# Patient Record
Sex: MALE | Race: BLACK OR AFRICAN AMERICAN | Employment: UNEMPLOYED | URBAN - METROPOLITAN AREA
[De-identification: names, ages, dates, MRNs, and addresses within clinical notes are randomized per-mention and may not be internally consistent; named-entity substitution may affect disease eponyms.]

---

## 2024-05-27 ENCOUNTER — HOSPITAL ENCOUNTER (INPATIENT)
Facility: HOSPITAL | Age: 34
LOS: 3 days | Discharge: HOME | DRG: 439 | End: 2024-05-30
Attending: STUDENT IN AN ORGANIZED HEALTH CARE EDUCATION/TRAINING PROGRAM | Admitting: HOSPITALIST

## 2024-05-27 ENCOUNTER — APPOINTMENT (OUTPATIENT)
Facility: HOSPITAL | Age: 34
DRG: 439 | End: 2024-05-27

## 2024-05-27 DIAGNOSIS — K85.20 ALCOHOL-INDUCED ACUTE PANCREATITIS, UNSPECIFIED COMPLICATION STATUS: Primary | ICD-10-CM

## 2024-05-27 DIAGNOSIS — F10.10 ALCOHOL ABUSE: ICD-10-CM

## 2024-05-27 PROBLEM — K85.90 PANCREATITIS, UNSPECIFIED PANCREATITIS TYPE: Status: ACTIVE | Noted: 2024-05-27

## 2024-05-27 LAB
ALBUMIN SERPL-MCNC: 4.3 G/DL (ref 3.5–5.2)
ALBUMIN/GLOB SERPL: 2 (ref 1.1–2.2)
ALP SERPL-CCNC: 94 U/L (ref 40–129)
ALT SERPL-CCNC: 51 U/L (ref 10–50)
ANION GAP SERPL CALC-SCNC: 15 MMOL/L (ref 5–15)
AST SERPL-CCNC: 64 U/L (ref 10–50)
BASOPHILS # BLD: 0 K/UL (ref 0–0.1)
BASOPHILS NFR BLD: 1 % (ref 0–1)
BILIRUB SERPL-MCNC: 1 MG/DL (ref 0.2–1)
BUN SERPL-MCNC: 6 MG/DL (ref 6–20)
BUN/CREAT SERPL: 8 (ref 12–20)
CALCIUM SERPL-MCNC: 9.4 MG/DL (ref 8.6–10)
CHLORIDE SERPL-SCNC: 100 MMOL/L (ref 98–107)
CO2 SERPL-SCNC: 27 MMOL/L (ref 22–29)
CREAT SERPL-MCNC: 0.71 MG/DL (ref 0.7–1.2)
DIFFERENTIAL METHOD BLD: ABNORMAL
EOSINOPHIL # BLD: 0 K/UL (ref 0–0.4)
EOSINOPHIL NFR BLD: 0 % (ref 0–7)
ERYTHROCYTE [DISTWIDTH] IN BLOOD BY AUTOMATED COUNT: 13.2 % (ref 11.5–14.5)
ETHANOL SERPL-MCNC: 15 MG/DL (ref 0–10)
GLOBULIN SER CALC-MCNC: 2.2 G/DL (ref 2–4)
GLUCOSE SERPL-MCNC: 128 MG/DL (ref 65–100)
HCT VFR BLD AUTO: 45.3 % (ref 36.6–50.3)
HGB BLD-MCNC: 15.2 G/DL (ref 12.1–17)
IMM GRANULOCYTES # BLD AUTO: 0 K/UL (ref 0–0.04)
IMM GRANULOCYTES NFR BLD AUTO: 0 % (ref 0–0.5)
LACTATE BLD-SCNC: 0.56 MMOL/L (ref 0.4–2)
LIPASE SERPL-CCNC: 441 U/L (ref 13–60)
LYMPHOCYTES # BLD: 1.1 K/UL (ref 0.8–3.5)
LYMPHOCYTES NFR BLD: 20 % (ref 12–49)
MAGNESIUM SERPL-MCNC: 1.6 MG/DL (ref 1.6–2.6)
MCH RBC QN AUTO: 28.6 PG (ref 26–34)
MCHC RBC AUTO-ENTMCNC: 33.6 G/DL (ref 30–36.5)
MCV RBC AUTO: 85.3 FL (ref 80–99)
MONOCYTES # BLD: 0.4 K/UL (ref 0–1)
MONOCYTES NFR BLD: 7 % (ref 5–13)
NEUTS SEG # BLD: 3.8 K/UL (ref 1.8–8)
NEUTS SEG NFR BLD: 72 % (ref 32–75)
NRBC # BLD: 0 K/UL (ref 0–0.01)
NRBC BLD-RTO: 0 PER 100 WBC
PLATELET # BLD AUTO: 110 K/UL (ref 150–400)
PMV BLD AUTO: 11.3 FL (ref 8.9–12.9)
POTASSIUM SERPL-SCNC: 3.4 MMOL/L (ref 3.5–5.1)
PROT SERPL-MCNC: 6.5 G/DL (ref 6.4–8.3)
RBC # BLD AUTO: 5.31 M/UL (ref 4.1–5.7)
SODIUM SERPL-SCNC: 142 MMOL/L (ref 136–145)
TROPONIN T SERPL HS-MCNC: <6 NG/L (ref 0–22)
WBC # BLD AUTO: 5.3 K/UL (ref 4.1–11.1)

## 2024-05-27 PROCEDURE — 6370000000 HC RX 637 (ALT 250 FOR IP): Performed by: STUDENT IN AN ORGANIZED HEALTH CARE EDUCATION/TRAINING PROGRAM

## 2024-05-27 PROCEDURE — 74177 CT ABD & PELVIS W/CONTRAST: CPT

## 2024-05-27 PROCEDURE — 85025 COMPLETE CBC W/AUTO DIFF WBC: CPT

## 2024-05-27 PROCEDURE — 83605 ASSAY OF LACTIC ACID: CPT

## 2024-05-27 PROCEDURE — 6360000002 HC RX W HCPCS: Performed by: STUDENT IN AN ORGANIZED HEALTH CARE EDUCATION/TRAINING PROGRAM

## 2024-05-27 PROCEDURE — 83690 ASSAY OF LIPASE: CPT

## 2024-05-27 PROCEDURE — 6360000002 HC RX W HCPCS: Performed by: INTERNAL MEDICINE

## 2024-05-27 PROCEDURE — 96374 THER/PROPH/DIAG INJ IV PUSH: CPT

## 2024-05-27 PROCEDURE — 99285 EMERGENCY DEPT VISIT HI MDM: CPT

## 2024-05-27 PROCEDURE — 94761 N-INVAS EAR/PLS OXIMETRY MLT: CPT

## 2024-05-27 PROCEDURE — 2580000003 HC RX 258: Performed by: STUDENT IN AN ORGANIZED HEALTH CARE EDUCATION/TRAINING PROGRAM

## 2024-05-27 PROCEDURE — 36415 COLL VENOUS BLD VENIPUNCTURE: CPT

## 2024-05-27 PROCEDURE — A4216 STERILE WATER/SALINE, 10 ML: HCPCS | Performed by: INTERNAL MEDICINE

## 2024-05-27 PROCEDURE — 2500000003 HC RX 250 WO HCPCS: Performed by: HOSPITALIST

## 2024-05-27 PROCEDURE — 84484 ASSAY OF TROPONIN QUANT: CPT

## 2024-05-27 PROCEDURE — 93005 ELECTROCARDIOGRAM TRACING: CPT | Performed by: STUDENT IN AN ORGANIZED HEALTH CARE EDUCATION/TRAINING PROGRAM

## 2024-05-27 PROCEDURE — 1100000000 HC RM PRIVATE

## 2024-05-27 PROCEDURE — 82077 ASSAY SPEC XCP UR&BREATH IA: CPT

## 2024-05-27 PROCEDURE — 2580000003 HC RX 258: Performed by: INTERNAL MEDICINE

## 2024-05-27 PROCEDURE — 6360000004 HC RX CONTRAST MEDICATION: Performed by: STUDENT IN AN ORGANIZED HEALTH CARE EDUCATION/TRAINING PROGRAM

## 2024-05-27 PROCEDURE — 96361 HYDRATE IV INFUSION ADD-ON: CPT

## 2024-05-27 PROCEDURE — 96375 TX/PRO/DX INJ NEW DRUG ADDON: CPT

## 2024-05-27 PROCEDURE — 2500000003 HC RX 250 WO HCPCS: Performed by: INTERNAL MEDICINE

## 2024-05-27 PROCEDURE — 6360000002 HC RX W HCPCS: Performed by: HOSPITALIST

## 2024-05-27 PROCEDURE — 2580000003 HC RX 258: Performed by: HOSPITALIST

## 2024-05-27 PROCEDURE — 83735 ASSAY OF MAGNESIUM: CPT

## 2024-05-27 PROCEDURE — 80053 COMPREHEN METABOLIC PANEL: CPT

## 2024-05-27 RX ORDER — HYDROMORPHONE HYDROCHLORIDE 1 MG/ML
1 INJECTION, SOLUTION INTRAMUSCULAR; INTRAVENOUS; SUBCUTANEOUS EVERY 4 HOURS PRN
Status: DISCONTINUED | OUTPATIENT
Start: 2024-05-27 | End: 2024-05-27

## 2024-05-27 RX ORDER — 0.9 % SODIUM CHLORIDE 0.9 %
1000 INTRAVENOUS SOLUTION INTRAVENOUS ONCE
Status: COMPLETED | OUTPATIENT
Start: 2024-05-27 | End: 2024-05-27

## 2024-05-27 RX ORDER — SODIUM CHLORIDE 0.9 % (FLUSH) 0.9 %
5-40 SYRINGE (ML) INJECTION PRN
Status: DISCONTINUED | OUTPATIENT
Start: 2024-05-27 | End: 2024-05-30 | Stop reason: HOSPADM

## 2024-05-27 RX ORDER — ACETAMINOPHEN 650 MG/1
650 SUPPOSITORY RECTAL EVERY 6 HOURS PRN
Status: DISCONTINUED | OUTPATIENT
Start: 2024-05-27 | End: 2024-05-30 | Stop reason: HOSPADM

## 2024-05-27 RX ORDER — LORAZEPAM 0.5 MG/1
0.5 TABLET ORAL EVERY 4 HOURS PRN
Status: DISCONTINUED | OUTPATIENT
Start: 2024-05-27 | End: 2024-05-27

## 2024-05-27 RX ORDER — DIAZEPAM 5 MG/ML
5 INJECTION, SOLUTION INTRAMUSCULAR; INTRAVENOUS
Status: DISCONTINUED | OUTPATIENT
Start: 2024-05-27 | End: 2024-05-30 | Stop reason: HOSPADM

## 2024-05-27 RX ORDER — SODIUM CHLORIDE, SODIUM LACTATE, POTASSIUM CHLORIDE, CALCIUM CHLORIDE 600; 310; 30; 20 MG/100ML; MG/100ML; MG/100ML; MG/100ML
INJECTION, SOLUTION INTRAVENOUS CONTINUOUS
Status: DISCONTINUED | OUTPATIENT
Start: 2024-05-27 | End: 2024-05-30 | Stop reason: HOSPADM

## 2024-05-27 RX ORDER — POTASSIUM CHLORIDE 750 MG/1
40 TABLET, FILM COATED, EXTENDED RELEASE ORAL PRN
Status: DISCONTINUED | OUTPATIENT
Start: 2024-05-27 | End: 2024-05-27

## 2024-05-27 RX ORDER — SODIUM CHLORIDE 9 MG/ML
INJECTION, SOLUTION INTRAVENOUS PRN
Status: DISCONTINUED | OUTPATIENT
Start: 2024-05-27 | End: 2024-05-30 | Stop reason: HOSPADM

## 2024-05-27 RX ORDER — LORAZEPAM 1 MG/1
4 TABLET ORAL
Status: DISCONTINUED | OUTPATIENT
Start: 2024-05-27 | End: 2024-05-27

## 2024-05-27 RX ORDER — LORAZEPAM 2 MG/ML
2 INJECTION INTRAMUSCULAR
Status: DISCONTINUED | OUTPATIENT
Start: 2024-05-27 | End: 2024-05-27

## 2024-05-27 RX ORDER — PROCHLORPERAZINE EDISYLATE 5 MG/ML
10 INJECTION INTRAMUSCULAR; INTRAVENOUS
Status: COMPLETED | OUTPATIENT
Start: 2024-05-27 | End: 2024-05-27

## 2024-05-27 RX ORDER — SODIUM CHLORIDE 0.9 % (FLUSH) 0.9 %
5-40 SYRINGE (ML) INJECTION EVERY 12 HOURS SCHEDULED
Status: DISCONTINUED | OUTPATIENT
Start: 2024-05-27 | End: 2024-05-30 | Stop reason: HOSPADM

## 2024-05-27 RX ORDER — MAGNESIUM SULFATE IN WATER 40 MG/ML
2000 INJECTION, SOLUTION INTRAVENOUS PRN
Status: DISCONTINUED | OUTPATIENT
Start: 2024-05-27 | End: 2024-05-27

## 2024-05-27 RX ORDER — LORAZEPAM 2 MG/ML
1 INJECTION INTRAMUSCULAR
Status: DISCONTINUED | OUTPATIENT
Start: 2024-05-27 | End: 2024-05-27

## 2024-05-27 RX ORDER — DIAZEPAM 5 MG/ML
15 INJECTION, SOLUTION INTRAMUSCULAR; INTRAVENOUS
Status: DISCONTINUED | OUTPATIENT
Start: 2024-05-27 | End: 2024-05-30 | Stop reason: HOSPADM

## 2024-05-27 RX ORDER — ENOXAPARIN SODIUM 100 MG/ML
40 INJECTION SUBCUTANEOUS DAILY
Status: DISCONTINUED | OUTPATIENT
Start: 2024-05-27 | End: 2024-05-30 | Stop reason: HOSPADM

## 2024-05-27 RX ORDER — ONDANSETRON 2 MG/ML
4 INJECTION INTRAMUSCULAR; INTRAVENOUS EVERY 6 HOURS PRN
Status: DISCONTINUED | OUTPATIENT
Start: 2024-05-27 | End: 2024-05-30 | Stop reason: HOSPADM

## 2024-05-27 RX ORDER — DIAZEPAM 5 MG/ML
10 INJECTION, SOLUTION INTRAMUSCULAR; INTRAVENOUS
Status: DISCONTINUED | OUTPATIENT
Start: 2024-05-27 | End: 2024-05-30 | Stop reason: HOSPADM

## 2024-05-27 RX ORDER — POLYETHYLENE GLYCOL 3350 17 G/17G
17 POWDER, FOR SOLUTION ORAL DAILY PRN
Status: DISCONTINUED | OUTPATIENT
Start: 2024-05-27 | End: 2024-05-30 | Stop reason: HOSPADM

## 2024-05-27 RX ORDER — LORAZEPAM 2 MG/ML
1 INJECTION INTRAMUSCULAR EVERY 4 HOURS PRN
Status: DISCONTINUED | OUTPATIENT
Start: 2024-05-27 | End: 2024-05-27

## 2024-05-27 RX ORDER — LORAZEPAM 1 MG/1
3 TABLET ORAL
Status: DISCONTINUED | OUTPATIENT
Start: 2024-05-27 | End: 2024-05-27

## 2024-05-27 RX ORDER — LORAZEPAM 2 MG/ML
3 INJECTION INTRAMUSCULAR
Status: DISCONTINUED | OUTPATIENT
Start: 2024-05-27 | End: 2024-05-27

## 2024-05-27 RX ORDER — POTASSIUM CHLORIDE 7.45 MG/ML
10 INJECTION INTRAVENOUS PRN
Status: DISCONTINUED | OUTPATIENT
Start: 2024-05-27 | End: 2024-05-27

## 2024-05-27 RX ORDER — THIAMINE HYDROCHLORIDE 100 MG/ML
100 INJECTION, SOLUTION INTRAMUSCULAR; INTRAVENOUS DAILY
Status: DISCONTINUED | OUTPATIENT
Start: 2024-05-27 | End: 2024-05-27

## 2024-05-27 RX ORDER — GAUZE BANDAGE 2" X 2"
100 BANDAGE TOPICAL DAILY
Status: DISCONTINUED | OUTPATIENT
Start: 2024-05-27 | End: 2024-05-30 | Stop reason: HOSPADM

## 2024-05-27 RX ORDER — ONDANSETRON 4 MG/1
4 TABLET, ORALLY DISINTEGRATING ORAL EVERY 8 HOURS PRN
Status: DISCONTINUED | OUTPATIENT
Start: 2024-05-27 | End: 2024-05-30 | Stop reason: HOSPADM

## 2024-05-27 RX ORDER — SODIUM CHLORIDE 9 MG/ML
INJECTION, SOLUTION INTRAVENOUS CONTINUOUS
Status: DISCONTINUED | OUTPATIENT
Start: 2024-05-27 | End: 2024-05-27

## 2024-05-27 RX ORDER — MULTIVITAMIN WITH IRON
1 TABLET ORAL DAILY
Status: DISCONTINUED | OUTPATIENT
Start: 2024-05-28 | End: 2024-05-30 | Stop reason: HOSPADM

## 2024-05-27 RX ORDER — HYDROMORPHONE HYDROCHLORIDE 1 MG/ML
1 INJECTION, SOLUTION INTRAMUSCULAR; INTRAVENOUS; SUBCUTANEOUS
Status: DISCONTINUED | OUTPATIENT
Start: 2024-05-27 | End: 2024-05-28

## 2024-05-27 RX ORDER — LORAZEPAM 1 MG/1
1 TABLET ORAL
Status: DISCONTINUED | OUTPATIENT
Start: 2024-05-27 | End: 2024-05-27

## 2024-05-27 RX ORDER — HYDROMORPHONE HYDROCHLORIDE 1 MG/ML
1 INJECTION, SOLUTION INTRAMUSCULAR; INTRAVENOUS; SUBCUTANEOUS
Status: DISCONTINUED | OUTPATIENT
Start: 2024-05-27 | End: 2024-05-27

## 2024-05-27 RX ORDER — LORAZEPAM 1 MG/1
2 TABLET ORAL
Status: DISCONTINUED | OUTPATIENT
Start: 2024-05-27 | End: 2024-05-27

## 2024-05-27 RX ORDER — LORAZEPAM 2 MG/ML
4 INJECTION INTRAMUSCULAR
Status: DISCONTINUED | OUTPATIENT
Start: 2024-05-27 | End: 2024-05-27

## 2024-05-27 RX ORDER — PROCHLORPERAZINE EDISYLATE 5 MG/ML
10 INJECTION INTRAMUSCULAR; INTRAVENOUS EVERY 6 HOURS PRN
Status: DISCONTINUED | OUTPATIENT
Start: 2024-05-27 | End: 2024-05-30 | Stop reason: HOSPADM

## 2024-05-27 RX ORDER — PHENOBARBITAL SODIUM 65 MG/ML
65 INJECTION, SOLUTION INTRAMUSCULAR; INTRAVENOUS EVERY 12 HOURS
Status: DISCONTINUED | OUTPATIENT
Start: 2024-05-27 | End: 2024-05-28

## 2024-05-27 RX ORDER — DIAZEPAM 5 MG/ML
20 INJECTION, SOLUTION INTRAMUSCULAR; INTRAVENOUS
Status: DISCONTINUED | OUTPATIENT
Start: 2024-05-27 | End: 2024-05-30 | Stop reason: HOSPADM

## 2024-05-27 RX ORDER — FOLIC ACID 1 MG/1
1 TABLET ORAL DAILY
Status: DISCONTINUED | OUTPATIENT
Start: 2024-05-28 | End: 2024-05-30 | Stop reason: HOSPADM

## 2024-05-27 RX ORDER — ACETAMINOPHEN 325 MG/1
650 TABLET ORAL EVERY 6 HOURS PRN
Status: DISCONTINUED | OUTPATIENT
Start: 2024-05-27 | End: 2024-05-30 | Stop reason: HOSPADM

## 2024-05-27 RX ADMIN — SODIUM CHLORIDE, PRESERVATIVE FREE 10 ML: 5 INJECTION INTRAVENOUS at 21:30

## 2024-05-27 RX ADMIN — HYDROMORPHONE HYDROCHLORIDE 1 MG: 1 INJECTION, SOLUTION INTRAMUSCULAR; INTRAVENOUS; SUBCUTANEOUS at 17:20

## 2024-05-27 RX ADMIN — HYDROMORPHONE HYDROCHLORIDE 1 MG: 1 INJECTION, SOLUTION INTRAMUSCULAR; INTRAVENOUS; SUBCUTANEOUS at 15:05

## 2024-05-27 RX ADMIN — Medication 100 MG: at 12:59

## 2024-05-27 RX ADMIN — SODIUM CHLORIDE: 9 INJECTION, SOLUTION INTRAVENOUS at 20:22

## 2024-05-27 RX ADMIN — SODIUM CHLORIDE 1000 ML: 9 INJECTION, SOLUTION INTRAVENOUS at 12:58

## 2024-05-27 RX ADMIN — PROCHLORPERAZINE EDISYLATE 10 MG: 5 INJECTION INTRAMUSCULAR; INTRAVENOUS at 12:58

## 2024-05-27 RX ADMIN — IOPAMIDOL 100 ML: 755 INJECTION, SOLUTION INTRAVENOUS at 14:23

## 2024-05-27 RX ADMIN — HYDROMORPHONE HYDROCHLORIDE 1 MG: 1 INJECTION, SOLUTION INTRAMUSCULAR; INTRAVENOUS; SUBCUTANEOUS at 22:16

## 2024-05-27 RX ADMIN — LORAZEPAM 2 MG: 2 INJECTION, SOLUTION INTRAMUSCULAR; INTRAVENOUS at 18:45

## 2024-05-27 RX ADMIN — LORAZEPAM 2 MG: 1 TABLET ORAL at 12:57

## 2024-05-27 RX ADMIN — ONDANSETRON 4 MG: 2 INJECTION INTRAMUSCULAR; INTRAVENOUS at 22:16

## 2024-05-27 RX ADMIN — SODIUM CHLORIDE 1000 ML: 9 INJECTION, SOLUTION INTRAVENOUS at 15:09

## 2024-05-27 RX ADMIN — HYDROMORPHONE HYDROCHLORIDE 1 MG: 1 INJECTION, SOLUTION INTRAMUSCULAR; INTRAVENOUS; SUBCUTANEOUS at 20:17

## 2024-05-27 RX ADMIN — SODIUM CHLORIDE, POTASSIUM CHLORIDE, SODIUM LACTATE AND CALCIUM CHLORIDE: 600; 310; 30; 20 INJECTION, SOLUTION INTRAVENOUS at 23:46

## 2024-05-27 RX ADMIN — ENOXAPARIN SODIUM 40 MG: 100 INJECTION SUBCUTANEOUS at 20:16

## 2024-05-27 RX ADMIN — THIAMINE HYDROCHLORIDE 100 MG: 100 INJECTION, SOLUTION INTRAMUSCULAR; INTRAVENOUS at 22:16

## 2024-05-27 RX ADMIN — FAMOTIDINE 20 MG: 10 INJECTION, SOLUTION INTRAVENOUS at 23:44

## 2024-05-27 RX ADMIN — PHENOBARBITAL SODIUM 65 MG: 65 INJECTION, SOLUTION INTRAMUSCULAR; INTRAVENOUS at 23:43

## 2024-05-27 RX ADMIN — HYDROMORPHONE HYDROCHLORIDE 1 MG: 1 INJECTION, SOLUTION INTRAMUSCULAR; INTRAVENOUS; SUBCUTANEOUS at 12:57

## 2024-05-27 ASSESSMENT — PAIN DESCRIPTION - LOCATION
LOCATION: ABDOMEN
LOCATION: GENERALIZED
LOCATION: ABDOMEN
LOCATION: GENERALIZED
LOCATION: ABDOMEN

## 2024-05-27 ASSESSMENT — ENCOUNTER SYMPTOMS
BACK PAIN: 1
ABDOMINAL PAIN: 1
SHORTNESS OF BREATH: 0
NAUSEA: 1

## 2024-05-27 ASSESSMENT — PAIN SCALES - GENERAL
PAINLEVEL_OUTOF10: 10
PAINLEVEL_OUTOF10: 9
PAINLEVEL_OUTOF10: 9
PAINLEVEL_OUTOF10: 10
PAINLEVEL_OUTOF10: 9
PAINLEVEL_OUTOF10: 6
PAINLEVEL_OUTOF10: 9
PAINLEVEL_OUTOF10: 9

## 2024-05-27 ASSESSMENT — PAIN DESCRIPTION - DESCRIPTORS
DESCRIPTORS: ACHING
DESCRIPTORS: ACHING;THROBBING

## 2024-05-27 ASSESSMENT — PAIN DESCRIPTION - ORIENTATION: ORIENTATION: MID;UPPER

## 2024-05-27 ASSESSMENT — PAIN - FUNCTIONAL ASSESSMENT: PAIN_FUNCTIONAL_ASSESSMENT: PREVENTS OR INTERFERES SOME ACTIVE ACTIVITIES AND ADLS

## 2024-05-27 NOTE — ED PROVIDER NOTES
Full Code  Readmission: No  Isolation Requirements: no  Recommended Level of Care: med/surg  Department: [place] [facility] - [phone]  Consulting Provider: Dr. Simpson    Other:  33-year-old male needs admission for acute pancreatitis likely secondary to alcohol abuse.  Patient was previously sober but fell into remission about a month ago has been drinking about 1/5 of vodka per day, last drink was last evening.  Has had severe abdominal pain, history of pancreatitis this is similar.  Patient was tachycardic on arrival afebrile.  Abdomen is soft but tender throughout.  Lipase is 441, white blood cell count is 5.3, CT shows acute pancreatitis with extensive hepatic steatosis.  Has been given multiple doses of analgesia medication and antiemetics, patient received lorazepam given likely withdrawal and thiamine.  Requires admission further evaluation for the above.    Total critical care time spent exclusive of procedures:  45 minutes.         CONSULTS:  IP CONSULT TO SOCIAL WORK  IP CONSULT TO HOSPITALIST    PROCEDURES:  Unless otherwise noted below, none     Procedures      FINAL IMPRESSION      1. Alcohol-induced acute pancreatitis, unspecified complication status    2. Alcohol abuse          DISPOSITION/PLAN   DISPOSITION Decision To Admit 05/27/2024 02:42:59 PM      PATIENT REFERRED TO:  No follow-up provider specified.    DISCHARGE MEDICATIONS:  New Prescriptions    No medications on file         (Please note that portions of this note were completed with a voice recognition program.  Efforts were made to edit the dictations but occasionally words are mis-transcribed.)    Kp Gordon DO (electronically signed)  Emergency Attending Physician / Physician Assistant / Nurse Practitioner              Kp Gordon DO  05/27/24 2047

## 2024-05-27 NOTE — H&P
of nausea and vomiting.  He denies any blood in vomiting.  As per the patient he has a history of pancreatitis in the past and it feels exactly the same.  He drinks vodka every day.  His last drink was last night.  He does have a history of alcohol withdrawals in the past.  He denies any chest pain or shortness of breath.  When he arrived to the ER his blood pressure was 127/84, pulse 111, respiratory rate 24, temperature 99.2.  Blood work showed a sodium 142, potassium of 3.4, creatinine 0.7, lipase 441, glucose 128, blood alcohol level 15.  WBC 5.3, hemoglobin 15.2, platelet count 110.    CT scan of abdominal pelvis showed acute pancreatitis.  Available records were reviewed at the time of H&P.        No past medical history on file.   No past surgical history on file.  Social History     Tobacco Use    Smoking status: Not on file    Smokeless tobacco: Not on file   Substance Use Topics    Alcohol use: Not on file      No family history on file.     No Known Allergies     Prior to Admission medications    Not on File       REVIEW OF SYSTEMS:  See HPI for details  General:  negative for fever, chills, sweats, weakness, weight loss  Eyes: negative for blurred vision, eye pain, loss of vision, diplopia  Ear Nose and Throat: negative for rhinorrhea, pharyngitis, otalgia, tinnitus, speech or swallowing difficulties  Respiratory:  negative for pleuritic pain, cough, sputum production, wheezing, SOB, HO  Cardiology:  negative for chest pain, palpitations, orthopnea, PND, edema, syncope   Gastrointestinal: Positive for abdominal pain nausea and vomiting  Genitourinary: negative for frequency, urgency, dysuria, hematuria, incontinence  Muskuloskeletal : negative for arthralgia, myalgia  Hematology: negative for easy bruising, bleeding, lymphadenopathy  Dermatological: negative for rash, ulceration, mole change, new lesion  Endocrine: negative for hot flashes or polydipsia  Neurological: negative for headache, dizziness,

## 2024-05-27 NOTE — ED TRIAGE NOTES
PT arrives ambulatory AO4 reporting pancreatitis and alcohol withdraw.  Last drink last night, typically drinks 1 5th of vodka daily.    PT has recurrent hx of both.

## 2024-05-28 LAB
ALBUMIN SERPL-MCNC: 3.9 G/DL (ref 3.5–5)
ALBUMIN/GLOB SERPL: 1.5 (ref 1.1–2.2)
ALP SERPL-CCNC: 82 U/L (ref 45–117)
ALT SERPL-CCNC: 57 U/L (ref 12–78)
ANION GAP SERPL CALC-SCNC: 5 MMOL/L (ref 5–15)
AST SERPL-CCNC: 58 U/L (ref 15–37)
BASOPHILS # BLD: 0 K/UL (ref 0–0.1)
BASOPHILS NFR BLD: 0 % (ref 0–1)
BILIRUB SERPL-MCNC: 1.4 MG/DL (ref 0.2–1)
BUN SERPL-MCNC: 6 MG/DL (ref 6–20)
BUN/CREAT SERPL: 8 (ref 12–20)
CALCIUM SERPL-MCNC: 9.2 MG/DL (ref 8.5–10.1)
CHLORIDE SERPL-SCNC: 103 MMOL/L (ref 97–108)
CO2 SERPL-SCNC: 30 MMOL/L (ref 21–32)
CREAT SERPL-MCNC: 0.72 MG/DL (ref 0.7–1.3)
CRP SERPL-MCNC: <0.29 MG/DL (ref 0–0.3)
DIFFERENTIAL METHOD BLD: ABNORMAL
EOSINOPHIL # BLD: 0 K/UL (ref 0–0.4)
EOSINOPHIL NFR BLD: 0 % (ref 0–7)
ERYTHROCYTE [DISTWIDTH] IN BLOOD BY AUTOMATED COUNT: 13.3 % (ref 11.5–14.5)
EST. AVERAGE GLUCOSE BLD GHB EST-MCNC: 97 MG/DL
GLOBULIN SER CALC-MCNC: 2.6 G/DL (ref 2–4)
GLUCOSE SERPL-MCNC: 142 MG/DL (ref 65–100)
HBA1C MFR BLD: 5 % (ref 4–5.6)
HCT VFR BLD AUTO: 46.7 % (ref 36.6–50.3)
HGB BLD-MCNC: 15.4 G/DL (ref 12.1–17)
IMM GRANULOCYTES # BLD AUTO: 0 K/UL (ref 0–0.04)
IMM GRANULOCYTES NFR BLD AUTO: 0 % (ref 0–0.5)
LACTATE SERPL-SCNC: 1.5 MMOL/L (ref 0.4–2)
LIPASE SERPL-CCNC: 670 U/L (ref 13–75)
LYMPHOCYTES # BLD: 0.5 K/UL (ref 0.8–3.5)
LYMPHOCYTES NFR BLD: 6 % (ref 12–49)
MAGNESIUM SERPL-MCNC: 1.6 MG/DL (ref 1.6–2.4)
MCH RBC QN AUTO: 28.4 PG (ref 26–34)
MCHC RBC AUTO-ENTMCNC: 33 G/DL (ref 30–36.5)
MCV RBC AUTO: 86 FL (ref 80–99)
MONOCYTES # BLD: 0.3 K/UL (ref 0–1)
MONOCYTES NFR BLD: 4 % (ref 5–13)
NEUTS SEG # BLD: 7.5 K/UL (ref 1.8–8)
NEUTS SEG NFR BLD: 90 % (ref 32–75)
NRBC # BLD: 0 K/UL (ref 0–0.01)
NRBC BLD-RTO: 0 PER 100 WBC
PHOSPHATE SERPL-MCNC: 2.1 MG/DL (ref 2.6–4.7)
PLATELET # BLD AUTO: 91 K/UL (ref 150–400)
PMV BLD AUTO: 12.1 FL (ref 8.9–12.9)
POTASSIUM SERPL-SCNC: 3.3 MMOL/L (ref 3.5–5.1)
PROT SERPL-MCNC: 6.5 G/DL (ref 6.4–8.2)
RBC # BLD AUTO: 5.43 M/UL (ref 4.1–5.7)
RBC MORPH BLD: ABNORMAL
SODIUM SERPL-SCNC: 138 MMOL/L (ref 136–145)
WBC # BLD AUTO: 8.3 K/UL (ref 4.1–11.1)

## 2024-05-28 PROCEDURE — 2580000003 HC RX 258: Performed by: HOSPITALIST

## 2024-05-28 PROCEDURE — 2580000003 HC RX 258

## 2024-05-28 PROCEDURE — 2580000003 HC RX 258: Performed by: INTERNAL MEDICINE

## 2024-05-28 PROCEDURE — 85025 COMPLETE CBC W/AUTO DIFF WBC: CPT

## 2024-05-28 PROCEDURE — 36415 COLL VENOUS BLD VENIPUNCTURE: CPT

## 2024-05-28 PROCEDURE — 6360000002 HC RX W HCPCS: Performed by: HOSPITALIST

## 2024-05-28 PROCEDURE — 2500000003 HC RX 250 WO HCPCS: Performed by: INTERNAL MEDICINE

## 2024-05-28 PROCEDURE — 83036 HEMOGLOBIN GLYCOSYLATED A1C: CPT

## 2024-05-28 PROCEDURE — 2580000003 HC RX 258: Performed by: STUDENT IN AN ORGANIZED HEALTH CARE EDUCATION/TRAINING PROGRAM

## 2024-05-28 PROCEDURE — 6360000002 HC RX W HCPCS: Performed by: INTERNAL MEDICINE

## 2024-05-28 PROCEDURE — 6370000000 HC RX 637 (ALT 250 FOR IP): Performed by: STUDENT IN AN ORGANIZED HEALTH CARE EDUCATION/TRAINING PROGRAM

## 2024-05-28 PROCEDURE — 6360000002 HC RX W HCPCS

## 2024-05-28 PROCEDURE — 83690 ASSAY OF LIPASE: CPT

## 2024-05-28 PROCEDURE — 84100 ASSAY OF PHOSPHORUS: CPT

## 2024-05-28 PROCEDURE — 86140 C-REACTIVE PROTEIN: CPT

## 2024-05-28 PROCEDURE — 2060000000 HC ICU INTERMEDIATE R&B

## 2024-05-28 PROCEDURE — 83605 ASSAY OF LACTIC ACID: CPT

## 2024-05-28 PROCEDURE — 83735 ASSAY OF MAGNESIUM: CPT

## 2024-05-28 PROCEDURE — 6370000000 HC RX 637 (ALT 250 FOR IP): Performed by: INTERNAL MEDICINE

## 2024-05-28 PROCEDURE — A4216 STERILE WATER/SALINE, 10 ML: HCPCS | Performed by: INTERNAL MEDICINE

## 2024-05-28 PROCEDURE — 6370000000 HC RX 637 (ALT 250 FOR IP)

## 2024-05-28 PROCEDURE — 80053 COMPREHEN METABOLIC PANEL: CPT

## 2024-05-28 RX ORDER — PHENOBARBITAL SODIUM 65 MG/ML
65 INJECTION, SOLUTION INTRAMUSCULAR; INTRAVENOUS EVERY 8 HOURS SCHEDULED
Status: DISCONTINUED | OUTPATIENT
Start: 2024-05-28 | End: 2024-05-28

## 2024-05-28 RX ORDER — LEVETIRACETAM 500 MG/1
500 TABLET ORAL 2 TIMES DAILY
Status: DISCONTINUED | OUTPATIENT
Start: 2024-05-28 | End: 2024-05-30 | Stop reason: HOSPADM

## 2024-05-28 RX ORDER — HYDROMORPHONE HYDROCHLORIDE 1 MG/ML
1 INJECTION, SOLUTION INTRAMUSCULAR; INTRAVENOUS; SUBCUTANEOUS
Status: DISCONTINUED | OUTPATIENT
Start: 2024-05-28 | End: 2024-05-30 | Stop reason: HOSPADM

## 2024-05-28 RX ORDER — MORPHINE SULFATE 4 MG/ML
2 INJECTION, SOLUTION INTRAMUSCULAR; INTRAVENOUS ONCE
Status: COMPLETED | OUTPATIENT
Start: 2024-05-28 | End: 2024-05-28

## 2024-05-28 RX ORDER — MORPHINE SULFATE 4 MG/ML
4 INJECTION, SOLUTION INTRAMUSCULAR; INTRAVENOUS ONCE
Status: COMPLETED | OUTPATIENT
Start: 2024-05-28 | End: 2024-05-28

## 2024-05-28 RX ORDER — PHENOBARBITAL 32.4 MG/1
32.4 TABLET ORAL
Status: DISCONTINUED | OUTPATIENT
Start: 2024-05-28 | End: 2024-05-29

## 2024-05-28 RX ADMIN — HYDROMORPHONE HYDROCHLORIDE 1 MG: 1 INJECTION, SOLUTION INTRAMUSCULAR; INTRAVENOUS; SUBCUTANEOUS at 15:33

## 2024-05-28 RX ADMIN — THERA TABS 1 TABLET: TAB at 07:59

## 2024-05-28 RX ADMIN — PHENOBARBITAL SODIUM 65 MG: 65 INJECTION, SOLUTION INTRAMUSCULAR; INTRAVENOUS at 08:03

## 2024-05-28 RX ADMIN — SODIUM CHLORIDE, POTASSIUM CHLORIDE, SODIUM LACTATE AND CALCIUM CHLORIDE: 600; 310; 30; 20 INJECTION, SOLUTION INTRAVENOUS at 23:28

## 2024-05-28 RX ADMIN — HYDROMORPHONE HYDROCHLORIDE 1 MG: 1 INJECTION, SOLUTION INTRAMUSCULAR; INTRAVENOUS; SUBCUTANEOUS at 22:12

## 2024-05-28 RX ADMIN — HYDROMORPHONE HYDROCHLORIDE 1 MG: 1 INJECTION, SOLUTION INTRAMUSCULAR; INTRAVENOUS; SUBCUTANEOUS at 19:01

## 2024-05-28 RX ADMIN — DIAZEPAM 10 MG: 5 INJECTION, SOLUTION INTRAMUSCULAR; INTRAVENOUS at 07:58

## 2024-05-28 RX ADMIN — SODIUM CHLORIDE, PRESERVATIVE FREE 10 ML: 5 INJECTION INTRAVENOUS at 21:04

## 2024-05-28 RX ADMIN — PROCHLORPERAZINE EDISYLATE 10 MG: 5 INJECTION INTRAMUSCULAR; INTRAVENOUS at 03:42

## 2024-05-28 RX ADMIN — PROCHLORPERAZINE EDISYLATE 10 MG: 5 INJECTION INTRAMUSCULAR; INTRAVENOUS at 15:31

## 2024-05-28 RX ADMIN — LEVETIRACETAM 500 MG: 500 TABLET, FILM COATED ORAL at 20:39

## 2024-05-28 RX ADMIN — MORPHINE SULFATE 2 MG: 4 INJECTION INTRAVENOUS at 01:39

## 2024-05-28 RX ADMIN — HYDROMORPHONE HYDROCHLORIDE 1 MG: 1 INJECTION, SOLUTION INTRAMUSCULAR; INTRAVENOUS; SUBCUTANEOUS at 12:18

## 2024-05-28 RX ADMIN — ONDANSETRON 4 MG: 2 INJECTION INTRAMUSCULAR; INTRAVENOUS at 07:58

## 2024-05-28 RX ADMIN — HYDROMORPHONE HYDROCHLORIDE 1 MG: 1 INJECTION, SOLUTION INTRAMUSCULAR; INTRAVENOUS; SUBCUTANEOUS at 08:32

## 2024-05-28 RX ADMIN — POTASSIUM BICARBONATE 40 MEQ: 782 TABLET, EFFERVESCENT ORAL at 07:58

## 2024-05-28 RX ADMIN — MORPHINE SULFATE 4 MG: 4 INJECTION INTRAVENOUS at 05:31

## 2024-05-28 RX ADMIN — FAMOTIDINE 20 MG: 10 INJECTION, SOLUTION INTRAVENOUS at 07:58

## 2024-05-28 RX ADMIN — Medication 100 MG: at 07:59

## 2024-05-28 RX ADMIN — POTASSIUM BICARBONATE 40 MEQ: 782 TABLET, EFFERVESCENT ORAL at 20:39

## 2024-05-28 RX ADMIN — FAMOTIDINE 20 MG: 10 INJECTION, SOLUTION INTRAVENOUS at 20:39

## 2024-05-28 RX ADMIN — PHENOBARBITAL 32.4 MG: 32.4 TABLET ORAL at 15:37

## 2024-05-28 RX ADMIN — FOLIC ACID 1 MG: 1 TABLET ORAL at 07:59

## 2024-05-28 RX ADMIN — SODIUM CHLORIDE, POTASSIUM CHLORIDE, SODIUM LACTATE AND CALCIUM CHLORIDE: 600; 310; 30; 20 INJECTION, SOLUTION INTRAVENOUS at 06:59

## 2024-05-28 RX ADMIN — SODIUM CHLORIDE, PRESERVATIVE FREE 10 ML: 5 INJECTION INTRAVENOUS at 20:38

## 2024-05-28 RX ADMIN — DIAZEPAM 10 MG: 5 INJECTION, SOLUTION INTRAMUSCULAR; INTRAVENOUS at 02:48

## 2024-05-28 ASSESSMENT — PAIN DESCRIPTION - LOCATION
LOCATION: ABDOMEN

## 2024-05-28 ASSESSMENT — PAIN DESCRIPTION - DESCRIPTORS
DESCRIPTORS: ACHING
DESCRIPTORS: ACHING
DESCRIPTORS: ACHING;THROBBING
DESCRIPTORS: ACHING
DESCRIPTORS: ACHING;STABBING;SHARP

## 2024-05-28 ASSESSMENT — PAIN SCALES - GENERAL
PAINLEVEL_OUTOF10: 8
PAINLEVEL_OUTOF10: 10
PAINLEVEL_OUTOF10: 0
PAINLEVEL_OUTOF10: 5
PAINLEVEL_OUTOF10: 10
PAINLEVEL_OUTOF10: 8
PAINLEVEL_OUTOF10: 3
PAINLEVEL_OUTOF10: 8
PAINLEVEL_OUTOF10: 10
PAINLEVEL_OUTOF10: 7
PAINLEVEL_OUTOF10: 6
PAINLEVEL_OUTOF10: 8
PAINLEVEL_OUTOF10: 10

## 2024-05-28 ASSESSMENT — PAIN SCALES - WONG BAKER
WONGBAKER_NUMERICALRESPONSE: NO HURT
WONGBAKER_NUMERICALRESPONSE: HURTS A LITTLE BIT

## 2024-05-28 ASSESSMENT — PAIN - FUNCTIONAL ASSESSMENT: PAIN_FUNCTIONAL_ASSESSMENT: ACTIVITIES ARE NOT PREVENTED

## 2024-05-28 ASSESSMENT — PAIN DESCRIPTION - ORIENTATION: ORIENTATION: MID;UPPER

## 2024-05-28 NOTE — BSMART NOTE
overall mood and attitude is irritable and guarded.  Feelings of helplessness and hopelessness are not observed.  Generalized anxiety is not observed.  Panic is not observed. Phobias are not observed.  Obsessive compulsive tendencies are not observed.      Section III - Mental Status Exam  The patient's appearance is unkempt.  The patient's behavior is guarded and shows poor eye contact. The patient is oriented to time, place, person and situation.  The patient's speech is mumbled.  The patient's mood is irritable.  The range of affect shows no evidence of impairment.  The patient's thought content demonstrates no evidence of impairment.  The thought process shows no evidence of impairment.  The patient's perception shows no evidence of impairment. The patient's memory shows no evidence of impairment.  The patient's appetite shows no evidence of impairment.  The patient's sleep shows no evidence of impairment. The patient shows little insight.  The patient's judgement shows no evidence of impairment.      The patient has demonstrated mental capacity to provide informed consent.    Section IV - Substance Abuse  The patient is  using substances.  The patient is using alcohol daily for the last 8 months with last use being PTA. The patient has experienced the following withdrawal symptoms: seizures (reports last was \"years\" ago). BAL: 15    Section V - Medical  Past Medical History:   Diagnosis Date    Alcohol abuse      Section VI - Living Arrangements  The patient .  The patient lives with his cousin, Raimundo. The patient has 6 children, ages 4-14 .  Per chart review, the patient plans to discharge home with girlfriend when medically stable. The patient's source of income comes from unknown.    The patient's greatest support comes from no one identified. The patient has not been in an event described as horrible or outside the realm of ordinary life experience either currently or in the past. The patient has

## 2024-05-28 NOTE — CONSULTS
PSYCHIATRY CONSULT NOTE    REASON FOR CONSULT: Alcohol abuse      HISTORY OF PRESENTING COMPLAINT:  Nathan Trejo is a 33 y.o. Black /  male who is currently admitted to the medical floor at [facility]. Mr. Trejo reported to the ED with abdominal pain and was found to have acute alcohol pancreatitis. Patient reported drinking a 5th of liquor daily since November. He denies any acute stressors in his life or changes. He reports having a history of withdrawal seizures. Mr. Trejo denies SI, HI, AVH, or paranoid delusions. He is dismissive and does not fully cooperative with the interview. He is sleeping in bed and is difficult to awaken. He is guarded.        PAST PSYCHIATRIC HISTORY and SUBSTANCE ABUSE HISTORY:  Denies current outpatient services, previous diagnoses, or medications. Denies previous suicide attempts or self harming behaviors. Denies trauma or abuse. Reports drinking a 5th of alcohol daily since November. Reports history of seizure withdrawals, denies Dts. Reports daily marijuana use and occasional cocaine use.       PAST MEDICAL HISTORY:    Please see H&P for details.     Past Medical History:   Diagnosis Date    Alcohol abuse      Prior to Admission medications    Not on File     [unfilled]  Lab Results   Component Value Date    WBC 8.3 05/28/2024    HGB 15.4 05/28/2024    HCT 46.7 05/28/2024    MCV 86.0 05/28/2024    PLT 91 (L) 05/28/2024     Lab Results   Component Value Date     05/28/2024    K 3.3 (L) 05/28/2024     05/28/2024    CO2 30 05/28/2024    BUN 6 05/28/2024    CREATININE 0.72 05/28/2024    GLUCOSE 142 (H) 05/28/2024    CALCIUM 9.2 05/28/2024    BILITOT 1.4 (H) 05/28/2024    ALKPHOS 82 05/28/2024    AST 58 (H) 05/28/2024    ALT 57 05/28/2024    LABGLOM >90 05/28/2024    GLOB 2.6 05/28/2024         No results found for: \"VALAC\", \"VALP\", \"CARB2\"  No results found for: \"LITHM\"  RADIOLOGY REPORTS:(reviewed/updated

## 2024-05-28 NOTE — CARE COORDINATION
Initial Case Management Assessment    Discharge Plan:  Anticipate DC to his girlfriend's home when medically stable for DC.   Resources provided to patient.      Transportation at DC:  Malka Juarez (girlfriend) [phone]    CM met with patient at bedside and confirmed information. Patient is sleeping comfortably and would not open his eyes while discussing discharge plan.  Patient confirmed address on file is [place], but stated that he will be staying in the [place] area for a while.  Patient confirmed his emergency contact to be his girlfriend, Malka Juarez and confirmed that he is discharging to her home (patient was unable to provide his girlfriend's address at this time).      Pt has no hx of HH, home O2, or equipment. Pt is independent with ADLs and ambulation. Denies problems with either.     Insurance:  Patient confirmed that he does not have [place] Medicaid.  He confirmed that he is uninsured.  Referral sent to [facility].      PCP:  Patient confirmed that he currently does not have a PCP.  CM requested assistance from CM Coordinator.      Will continue to follow with DC needs.    ______________________________________  JHON Christiansen, RN-CM  [facility]- Care Management  Available via [employer]  5/28/2024  11:17 AM

## 2024-05-29 LAB
ALBUMIN SERPL-MCNC: 3.1 G/DL (ref 3.5–5)
ALBUMIN/GLOB SERPL: 1.6 (ref 1.1–2.2)
ALP SERPL-CCNC: 64 U/L (ref 45–117)
ALT SERPL-CCNC: 44 U/L (ref 12–78)
ANION GAP SERPL CALC-SCNC: 2 MMOL/L (ref 5–15)
AST SERPL-CCNC: 40 U/L (ref 15–37)
BILIRUB SERPL-MCNC: 1 MG/DL (ref 0.2–1)
BUN SERPL-MCNC: 7 MG/DL (ref 6–20)
BUN/CREAT SERPL: 13 (ref 12–20)
CALCIUM SERPL-MCNC: 8.7 MG/DL (ref 8.5–10.1)
CHLORIDE SERPL-SCNC: 102 MMOL/L (ref 97–108)
CO2 SERPL-SCNC: 33 MMOL/L (ref 21–32)
CREAT SERPL-MCNC: 0.56 MG/DL (ref 0.7–1.3)
EKG ATRIAL RATE: 83 BPM
EKG DIAGNOSIS: NORMAL
EKG P AXIS: 22 DEGREES
EKG P-R INTERVAL: 112 MS
EKG Q-T INTERVAL: 458 MS
EKG QRS DURATION: 78 MS
EKG QTC CALCULATION (BAZETT): 522 MS
EKG R AXIS: 82 DEGREES
EKG T AXIS: 71 DEGREES
EKG VENTRICULAR RATE: 78 BPM
ERYTHROCYTE [DISTWIDTH] IN BLOOD BY AUTOMATED COUNT: 13.1 % (ref 11.5–14.5)
GLOBULIN SER CALC-MCNC: 2 G/DL (ref 2–4)
GLUCOSE SERPL-MCNC: 107 MG/DL (ref 65–100)
HCT VFR BLD AUTO: 42 % (ref 36.6–50.3)
HGB BLD-MCNC: 13.6 G/DL (ref 12.1–17)
LIPASE SERPL-CCNC: 166 U/L (ref 13–75)
MAGNESIUM SERPL-MCNC: 1.5 MG/DL (ref 1.6–2.4)
MCH RBC QN AUTO: 28.7 PG (ref 26–34)
MCHC RBC AUTO-ENTMCNC: 32.4 G/DL (ref 30–36.5)
MCV RBC AUTO: 88.6 FL (ref 80–99)
NRBC # BLD: 0 K/UL (ref 0–0.01)
NRBC BLD-RTO: 0 PER 100 WBC
PHOSPHATE SERPL-MCNC: 1.3 MG/DL (ref 2.6–4.7)
PLATELET # BLD AUTO: 81 K/UL (ref 150–400)
PMV BLD AUTO: 11.7 FL (ref 8.9–12.9)
POTASSIUM SERPL-SCNC: 3.7 MMOL/L (ref 3.5–5.1)
PROT SERPL-MCNC: 5.1 G/DL (ref 6.4–8.2)
RBC # BLD AUTO: 4.74 M/UL (ref 4.1–5.7)
SODIUM SERPL-SCNC: 137 MMOL/L (ref 136–145)
TRIGL SERPL-MCNC: 40 MG/DL
WBC # BLD AUTO: 7.6 K/UL (ref 4.1–11.1)

## 2024-05-29 PROCEDURE — 2500000003 HC RX 250 WO HCPCS: Performed by: INTERNAL MEDICINE

## 2024-05-29 PROCEDURE — 6370000000 HC RX 637 (ALT 250 FOR IP): Performed by: STUDENT IN AN ORGANIZED HEALTH CARE EDUCATION/TRAINING PROGRAM

## 2024-05-29 PROCEDURE — 36415 COLL VENOUS BLD VENIPUNCTURE: CPT

## 2024-05-29 PROCEDURE — 84100 ASSAY OF PHOSPHORUS: CPT

## 2024-05-29 PROCEDURE — 6370000000 HC RX 637 (ALT 250 FOR IP): Performed by: INTERNAL MEDICINE

## 2024-05-29 PROCEDURE — 2580000003 HC RX 258: Performed by: INTERNAL MEDICINE

## 2024-05-29 PROCEDURE — 6360000002 HC RX W HCPCS: Performed by: INTERNAL MEDICINE

## 2024-05-29 PROCEDURE — 84478 ASSAY OF TRIGLYCERIDES: CPT

## 2024-05-29 PROCEDURE — 1100000000 HC RM PRIVATE

## 2024-05-29 PROCEDURE — A4216 STERILE WATER/SALINE, 10 ML: HCPCS | Performed by: INTERNAL MEDICINE

## 2024-05-29 PROCEDURE — 83735 ASSAY OF MAGNESIUM: CPT

## 2024-05-29 PROCEDURE — 2580000003 HC RX 258: Performed by: STUDENT IN AN ORGANIZED HEALTH CARE EDUCATION/TRAINING PROGRAM

## 2024-05-29 PROCEDURE — 83690 ASSAY OF LIPASE: CPT

## 2024-05-29 PROCEDURE — 6370000000 HC RX 637 (ALT 250 FOR IP)

## 2024-05-29 PROCEDURE — 80053 COMPREHEN METABOLIC PANEL: CPT

## 2024-05-29 PROCEDURE — 2580000003 HC RX 258: Performed by: HOSPITALIST

## 2024-05-29 PROCEDURE — 85027 COMPLETE CBC AUTOMATED: CPT

## 2024-05-29 PROCEDURE — 93010 ELECTROCARDIOGRAM REPORT: CPT | Performed by: SPECIALIST

## 2024-05-29 RX ORDER — MAGNESIUM SULFATE IN WATER 40 MG/ML
2000 INJECTION, SOLUTION INTRAVENOUS ONCE
Status: COMPLETED | OUTPATIENT
Start: 2024-05-29 | End: 2024-05-29

## 2024-05-29 RX ORDER — PHENOBARBITAL 32.4 MG/1
64.8 TABLET ORAL 4 TIMES DAILY
Status: DISCONTINUED | OUTPATIENT
Start: 2024-05-29 | End: 2024-05-29

## 2024-05-29 RX ORDER — PHENOBARBITAL 32.4 MG/1
32.4 TABLET ORAL EVERY 6 HOURS PRN
Status: DISCONTINUED | OUTPATIENT
Start: 2024-05-30 | End: 2024-05-30 | Stop reason: HOSPADM

## 2024-05-29 RX ORDER — PHENOBARBITAL 32.4 MG/1
32.4 TABLET ORAL 2 TIMES DAILY
Status: DISCONTINUED | OUTPATIENT
Start: 2024-05-31 | End: 2024-05-30 | Stop reason: HOSPADM

## 2024-05-29 RX ORDER — PHENOBARBITAL 32.4 MG/1
16.2 TABLET ORAL 2 TIMES DAILY
Status: DISCONTINUED | OUTPATIENT
Start: 2024-06-01 | End: 2024-05-30 | Stop reason: HOSPADM

## 2024-05-29 RX ORDER — PHENOBARBITAL 32.4 MG/1
32.4 TABLET ORAL EVERY 8 HOURS
Status: DISCONTINUED | OUTPATIENT
Start: 2024-05-30 | End: 2024-05-30 | Stop reason: HOSPADM

## 2024-05-29 RX ORDER — PHENOBARBITAL 32.4 MG/1
32.4 TABLET ORAL 4 TIMES DAILY
Status: DISCONTINUED | OUTPATIENT
Start: 2024-05-30 | End: 2024-05-29

## 2024-05-29 RX ORDER — PHENOBARBITAL 32.4 MG/1
32.4 TABLET ORAL EVERY 8 HOURS
Status: DISCONTINUED | OUTPATIENT
Start: 2024-05-29 | End: 2024-05-30 | Stop reason: HOSPADM

## 2024-05-29 RX ORDER — PHENOBARBITAL 32.4 MG/1
16.2 TABLET ORAL EVERY 6 HOURS PRN
Status: DISCONTINUED | OUTPATIENT
Start: 2024-06-01 | End: 2024-05-30 | Stop reason: HOSPADM

## 2024-05-29 RX ORDER — PHENOBARBITAL 32.4 MG/1
32.4 TABLET ORAL 2 TIMES DAILY
Status: DISCONTINUED | OUTPATIENT
Start: 2024-05-31 | End: 2024-05-29

## 2024-05-29 RX ORDER — PHENOBARBITAL 32.4 MG/1
16.2 TABLET ORAL 2 TIMES DAILY
Status: DISCONTINUED | OUTPATIENT
Start: 2024-06-01 | End: 2024-05-29

## 2024-05-29 RX ORDER — PHENOBARBITAL 32.4 MG/1
64.8 TABLET ORAL EVERY 6 HOURS PRN
Status: DISCONTINUED | OUTPATIENT
Start: 2024-05-29 | End: 2024-05-30 | Stop reason: HOSPADM

## 2024-05-29 RX ADMIN — SODIUM CHLORIDE, PRESERVATIVE FREE 10 ML: 5 INJECTION INTRAVENOUS at 10:01

## 2024-05-29 RX ADMIN — HYDROMORPHONE HYDROCHLORIDE 1 MG: 1 INJECTION, SOLUTION INTRAMUSCULAR; INTRAVENOUS; SUBCUTANEOUS at 07:34

## 2024-05-29 RX ADMIN — SODIUM CHLORIDE, PRESERVATIVE FREE 10 ML: 5 INJECTION INTRAVENOUS at 07:36

## 2024-05-29 RX ADMIN — HYDROMORPHONE HYDROCHLORIDE 1 MG: 1 INJECTION, SOLUTION INTRAMUSCULAR; INTRAVENOUS; SUBCUTANEOUS at 21:08

## 2024-05-29 RX ADMIN — MAGNESIUM SULFATE HEPTAHYDRATE 2000 MG: 40 INJECTION, SOLUTION INTRAVENOUS at 13:34

## 2024-05-29 RX ADMIN — HYDROMORPHONE HYDROCHLORIDE 1 MG: 1 INJECTION, SOLUTION INTRAMUSCULAR; INTRAVENOUS; SUBCUTANEOUS at 01:17

## 2024-05-29 RX ADMIN — SODIUM CHLORIDE, PRESERVATIVE FREE 10 ML: 5 INJECTION INTRAVENOUS at 21:11

## 2024-05-29 RX ADMIN — SODIUM CHLORIDE, POTASSIUM CHLORIDE, SODIUM LACTATE AND CALCIUM CHLORIDE: 600; 310; 30; 20 INJECTION, SOLUTION INTRAVENOUS at 06:24

## 2024-05-29 RX ADMIN — FAMOTIDINE 20 MG: 10 INJECTION, SOLUTION INTRAVENOUS at 21:08

## 2024-05-29 RX ADMIN — HYDROMORPHONE HYDROCHLORIDE 1 MG: 1 INJECTION, SOLUTION INTRAMUSCULAR; INTRAVENOUS; SUBCUTANEOUS at 11:02

## 2024-05-29 RX ADMIN — PHENOBARBITAL 32.4 MG: 32.4 TABLET ORAL at 07:35

## 2024-05-29 RX ADMIN — THERA TABS 1 TABLET: TAB at 07:35

## 2024-05-29 RX ADMIN — PHENOBARBITAL 32.4 MG: 32.4 TABLET ORAL at 21:07

## 2024-05-29 RX ADMIN — LEVETIRACETAM 500 MG: 500 TABLET, FILM COATED ORAL at 07:35

## 2024-05-29 RX ADMIN — HYDROMORPHONE HYDROCHLORIDE 1 MG: 1 INJECTION, SOLUTION INTRAMUSCULAR; INTRAVENOUS; SUBCUTANEOUS at 04:29

## 2024-05-29 RX ADMIN — FOLIC ACID 1 MG: 1 TABLET ORAL at 07:35

## 2024-05-29 RX ADMIN — PHENOBARBITAL 32.4 MG: 32.4 TABLET ORAL at 00:29

## 2024-05-29 RX ADMIN — FAMOTIDINE 20 MG: 10 INJECTION, SOLUTION INTRAVENOUS at 07:35

## 2024-05-29 RX ADMIN — LEVETIRACETAM 500 MG: 500 TABLET, FILM COATED ORAL at 21:07

## 2024-05-29 RX ADMIN — HYDROMORPHONE HYDROCHLORIDE 1 MG: 1 INJECTION, SOLUTION INTRAMUSCULAR; INTRAVENOUS; SUBCUTANEOUS at 23:52

## 2024-05-29 RX ADMIN — PHENOBARBITAL 32.4 MG: 32.4 TABLET ORAL at 13:28

## 2024-05-29 RX ADMIN — Medication 100 MG: at 07:35

## 2024-05-29 RX ADMIN — SODIUM CHLORIDE, POTASSIUM CHLORIDE, SODIUM LACTATE AND CALCIUM CHLORIDE: 600; 310; 30; 20 INJECTION, SOLUTION INTRAVENOUS at 23:51

## 2024-05-29 RX ADMIN — HYDROMORPHONE HYDROCHLORIDE 1 MG: 1 INJECTION, SOLUTION INTRAMUSCULAR; INTRAVENOUS; SUBCUTANEOUS at 18:09

## 2024-05-29 RX ADMIN — POTASSIUM PHOSPHATE, MONOBASIC POTASSIUM PHOSPHATE, DIBASIC 30 MMOL: 224; 236 INJECTION, SOLUTION, CONCENTRATE INTRAVENOUS at 17:17

## 2024-05-29 RX ADMIN — HYDROMORPHONE HYDROCHLORIDE 1 MG: 1 INJECTION, SOLUTION INTRAMUSCULAR; INTRAVENOUS; SUBCUTANEOUS at 14:41

## 2024-05-29 ASSESSMENT — PAIN DESCRIPTION - DESCRIPTORS
DESCRIPTORS: THROBBING
DESCRIPTORS: ACHING
DESCRIPTORS: THROBBING

## 2024-05-29 ASSESSMENT — PAIN DESCRIPTION - LOCATION
LOCATION: ABDOMEN

## 2024-05-29 ASSESSMENT — PAIN SCALES - GENERAL
PAINLEVEL_OUTOF10: 7
PAINLEVEL_OUTOF10: 0
PAINLEVEL_OUTOF10: 8
PAINLEVEL_OUTOF10: 8
PAINLEVEL_OUTOF10: 0
PAINLEVEL_OUTOF10: 9
PAINLEVEL_OUTOF10: 8
PAINLEVEL_OUTOF10: 7
PAINLEVEL_OUTOF10: 8
PAINLEVEL_OUTOF10: 8

## 2024-05-29 ASSESSMENT — PAIN DESCRIPTION - ORIENTATION
ORIENTATION: LEFT
ORIENTATION: MID;UPPER
ORIENTATION: MID;UPPER

## 2024-05-29 ASSESSMENT — PAIN DESCRIPTION - ONSET: ONSET: OTHER (COMMENT)

## 2024-05-29 ASSESSMENT — PAIN SCALES - WONG BAKER
WONGBAKER_NUMERICALRESPONSE: NO HURT

## 2024-05-29 ASSESSMENT — PAIN DESCRIPTION - PAIN TYPE: TYPE: ACUTE PAIN

## 2024-05-29 NOTE — CARE COORDINATION
Care Management Progress Note:      05/29/24 0838   Discharge Planning   Patient expects to be discharged to: House   Services At/After Discharge   Transition of Care Consult (CM Consult) Other  (SA resources provided)   Mode of Transport at Discharge Other (see comment)  (GF)   Confirm Follow Up Transport Self     Patient new to this CM. Per review, SA resources provided along with FA screening with [facility]. Anticipate home with GF to provide transportation once medically stable.  ______________________  Karla FERREIRA, RN  Care Management  5/29/2024  8:41 AM

## 2024-05-30 VITALS
TEMPERATURE: 98.1 F | RESPIRATION RATE: 16 BRPM | HEART RATE: 69 BPM | BODY MASS INDEX: 21.2 KG/M2 | HEIGHT: 73 IN | DIASTOLIC BLOOD PRESSURE: 98 MMHG | WEIGHT: 160 LBS | SYSTOLIC BLOOD PRESSURE: 129 MMHG | OXYGEN SATURATION: 100 %

## 2024-05-30 LAB
ALBUMIN SERPL-MCNC: 2.6 G/DL (ref 3.5–5)
ALBUMIN/GLOB SERPL: 1.1 (ref 1.1–2.2)
ALP SERPL-CCNC: 58 U/L (ref 45–117)
ALT SERPL-CCNC: 50 U/L (ref 12–78)
ANION GAP SERPL CALC-SCNC: 3 MMOL/L (ref 5–15)
AST SERPL-CCNC: 53 U/L (ref 15–37)
BASOPHILS # BLD: 0 K/UL (ref 0–0.1)
BASOPHILS NFR BLD: 0 % (ref 0–1)
BILIRUB SERPL-MCNC: 0.8 MG/DL (ref 0.2–1)
BUN SERPL-MCNC: 5 MG/DL (ref 6–20)
BUN/CREAT SERPL: 9 (ref 12–20)
CALCIUM SERPL-MCNC: 7.6 MG/DL (ref 8.5–10.1)
CHLORIDE SERPL-SCNC: 104 MMOL/L (ref 97–108)
CO2 SERPL-SCNC: 29 MMOL/L (ref 21–32)
CREAT SERPL-MCNC: 0.58 MG/DL (ref 0.7–1.3)
DIFFERENTIAL METHOD BLD: ABNORMAL
EOSINOPHIL # BLD: 0.1 K/UL (ref 0–0.4)
EOSINOPHIL NFR BLD: 3 % (ref 0–7)
ERYTHROCYTE [DISTWIDTH] IN BLOOD BY AUTOMATED COUNT: 13.1 % (ref 11.5–14.5)
GLOBULIN SER CALC-MCNC: 2.4 G/DL (ref 2–4)
GLUCOSE SERPL-MCNC: 96 MG/DL (ref 65–100)
HCT VFR BLD AUTO: 36.9 % (ref 36.6–50.3)
HGB BLD-MCNC: 12.2 G/DL (ref 12.1–17)
IMM GRANULOCYTES # BLD AUTO: 0 K/UL (ref 0–0.04)
IMM GRANULOCYTES NFR BLD AUTO: 0 % (ref 0–0.5)
LYMPHOCYTES # BLD: 1 K/UL (ref 0.8–3.5)
LYMPHOCYTES NFR BLD: 25 % (ref 12–49)
MAGNESIUM SERPL-MCNC: 1.5 MG/DL (ref 1.6–2.4)
MCH RBC QN AUTO: 28.5 PG (ref 26–34)
MCHC RBC AUTO-ENTMCNC: 33.1 G/DL (ref 30–36.5)
MCV RBC AUTO: 86.2 FL (ref 80–99)
MONOCYTES # BLD: 0.3 K/UL (ref 0–1)
MONOCYTES NFR BLD: 7 % (ref 5–13)
NEUTS SEG # BLD: 2.5 K/UL (ref 1.8–8)
NEUTS SEG NFR BLD: 65 % (ref 32–75)
NRBC # BLD: 0 K/UL (ref 0–0.01)
NRBC BLD-RTO: 0 PER 100 WBC
PHOSPHATE SERPL-MCNC: 2.5 MG/DL (ref 2.6–4.7)
PLATELET # BLD AUTO: 70 K/UL (ref 150–400)
PMV BLD AUTO: 12 FL (ref 8.9–12.9)
POTASSIUM SERPL-SCNC: 3 MMOL/L (ref 3.5–5.1)
PROT SERPL-MCNC: 5 G/DL (ref 6.4–8.2)
RBC # BLD AUTO: 4.28 M/UL (ref 4.1–5.7)
RBC MORPH BLD: ABNORMAL
SODIUM SERPL-SCNC: 136 MMOL/L (ref 136–145)
WBC # BLD AUTO: 3.9 K/UL (ref 4.1–11.1)

## 2024-05-30 PROCEDURE — 85025 COMPLETE CBC W/AUTO DIFF WBC: CPT

## 2024-05-30 PROCEDURE — 83735 ASSAY OF MAGNESIUM: CPT

## 2024-05-30 PROCEDURE — 6370000000 HC RX 637 (ALT 250 FOR IP): Performed by: STUDENT IN AN ORGANIZED HEALTH CARE EDUCATION/TRAINING PROGRAM

## 2024-05-30 PROCEDURE — 6370000000 HC RX 637 (ALT 250 FOR IP): Performed by: INTERNAL MEDICINE

## 2024-05-30 PROCEDURE — 80053 COMPREHEN METABOLIC PANEL: CPT

## 2024-05-30 PROCEDURE — 84100 ASSAY OF PHOSPHORUS: CPT

## 2024-05-30 PROCEDURE — 2500000003 HC RX 250 WO HCPCS: Performed by: INTERNAL MEDICINE

## 2024-05-30 PROCEDURE — 36415 COLL VENOUS BLD VENIPUNCTURE: CPT

## 2024-05-30 PROCEDURE — 6370000000 HC RX 637 (ALT 250 FOR IP)

## 2024-05-30 PROCEDURE — 2580000003 HC RX 258: Performed by: INTERNAL MEDICINE

## 2024-05-30 PROCEDURE — 2580000003 HC RX 258: Performed by: HOSPITALIST

## 2024-05-30 PROCEDURE — A4216 STERILE WATER/SALINE, 10 ML: HCPCS | Performed by: INTERNAL MEDICINE

## 2024-05-30 RX ORDER — POTASSIUM CHLORIDE 750 MG/1
40 TABLET, FILM COATED, EXTENDED RELEASE ORAL ONCE
Status: COMPLETED | OUTPATIENT
Start: 2024-05-30 | End: 2024-05-30

## 2024-05-30 RX ADMIN — THERA TABS 1 TABLET: TAB at 08:45

## 2024-05-30 RX ADMIN — FOLIC ACID 1 MG: 1 TABLET ORAL at 08:45

## 2024-05-30 RX ADMIN — HYDROMORPHONE HYDROCHLORIDE 1 MG: 1 INJECTION, SOLUTION INTRAMUSCULAR; INTRAVENOUS; SUBCUTANEOUS at 03:11

## 2024-05-30 RX ADMIN — POTASSIUM CHLORIDE 40 MEQ: 750 TABLET, FILM COATED, EXTENDED RELEASE ORAL at 09:46

## 2024-05-30 RX ADMIN — LEVETIRACETAM 500 MG: 500 TABLET, FILM COATED ORAL at 08:45

## 2024-05-30 RX ADMIN — PHENOBARBITAL 32.4 MG: 32.4 TABLET ORAL at 05:24

## 2024-05-30 RX ADMIN — HYDROMORPHONE HYDROCHLORIDE 1 MG: 1 INJECTION, SOLUTION INTRAMUSCULAR; INTRAVENOUS; SUBCUTANEOUS at 05:45

## 2024-05-30 RX ADMIN — FAMOTIDINE 20 MG: 10 INJECTION, SOLUTION INTRAVENOUS at 08:45

## 2024-05-30 RX ADMIN — Medication 100 MG: at 08:45

## 2024-05-30 RX ADMIN — HYDROMORPHONE HYDROCHLORIDE 1 MG: 1 INJECTION, SOLUTION INTRAMUSCULAR; INTRAVENOUS; SUBCUTANEOUS at 08:45

## 2024-05-30 RX ADMIN — SODIUM CHLORIDE, PRESERVATIVE FREE 10 ML: 5 INJECTION INTRAVENOUS at 08:46

## 2024-05-30 ASSESSMENT — PAIN DESCRIPTION - DESCRIPTORS
DESCRIPTORS: ACHING

## 2024-05-30 ASSESSMENT — PAIN DESCRIPTION - LOCATION
LOCATION: ABDOMEN

## 2024-05-30 ASSESSMENT — PAIN SCALES - GENERAL
PAINLEVEL_OUTOF10: 6
PAINLEVEL_OUTOF10: 9
PAINLEVEL_OUTOF10: 8
PAINLEVEL_OUTOF10: 8
PAINLEVEL_OUTOF10: 9

## 2024-05-30 ASSESSMENT — PAIN DESCRIPTION - ORIENTATION
ORIENTATION: MID

## 2024-05-30 ASSESSMENT — PAIN DESCRIPTION - PAIN TYPE
TYPE: ACUTE PAIN
TYPE: ACUTE PAIN

## 2024-05-30 NOTE — PLAN OF CARE
Problem: Discharge Planning  Goal: Discharge to home or other facility with appropriate resources  Outcome: HH/HSPC Resolved Met  Flowsheets (Taken 5/29/2024 2210 by Erica Crawford, RN)  Discharge to home or other facility with appropriate resources: Identify barriers to discharge with patient and caregiver     Problem: Safety - Adult  Goal: Free from fall injury  Outcome: HH/HSPC Resolved Met     Problem: Pain  Goal: Verbalizes/displays adequate comfort level or baseline comfort level  Outcome: HH/HSPC Resolved Met

## 2024-05-30 NOTE — DISCHARGE SUMMARY
Patient admitted for pancreatitis and alcohol withdrawal.  Patient being treated for both and electrolyte deficiencies.  Patient decided to leave [facility]; please see [facility] note prior.

## 2024-05-30 NOTE — PROGRESS NOTES
[facility]  [address] [place] [address] [phone]        Hospitalist Progress Note      NAME: Nathan Trejo   :  1990  MRM:  [id number]    Date/Time of service: 2024        Subjective:     Chief Complaint:  Patient was personally seen and examined by me during this time period.  Chart reviewed.  Reports epigastric pain, no n/v        Objective:       Vitals:       Last 24hrs VS reviewed since prior progress note. Most recent are:    Vitals:    24 1110   BP: (!) 136/93   Pulse: 78   Resp: 16   Temp: 98.6 °F (37 °C)   SpO2: 96%     SpO2 Readings from Last 6 Encounters:   24 96%          Intake/Output Summary (Last 24 hours) at 2024 1236  Last data filed at 2024 0622  Gross per 24 hour   Intake --   Output 1275 ml   Net -1275 ml        Exam:     Physical Exam:    Gen:  Well-developed, well-nourished, in no acute distress  HEENT:  Pink conjunctivae, PERRL, hearing intact to voice  Resp:  No accessory muscle use, clear breath sounds without wheezes rales or rhonchi  Card:  No murmurs, normal S1, S2 without thrills, bruits or peripheral edema  Abd:  Soft, epigastric pain, non-distended, normoactive bowel sounds are present  Musc:  No cyanosis or clubbing  Skin:  No rashes   Neuro:  Cranial nerves 3-12 are grossly intact, follows commands appropriately  Psych:  poor insight, oriented to person, place and time, alert    Medications Reviewed: (see below)    Lab Data Reviewed: (see below)    ______________________________________________________________________    Medications:     Current Facility-Administered Medications   Medication Dose Route Frequency    PHENobarbital tablet 64.8 mg  64.8 mg Oral Q6H PRN    Followed by    [START ON 2024] PHENobarbital tablet 32.4 mg  32.4 mg Oral Q6H PRN    Followed by    [START ON 2024] PHENobarbital tablet 16.2 mg  16.2 mg Oral Q6H PRN    PHENobarbital tablet 32.4 mg  32.4 mg Oral q8h    
[facility]  [address] [place] [address] [phone]        Hospitalist Progress Note      NAME: Nathan Trejo   :  1990  MRM:  [id number]    Date/Time of service: 2024        Subjective:     Chief Complaint:  Patient was personally seen and examined by me during this time period.  Chart reviewed.  Improved  epigastric pain, no n/v        Objective:       Vitals:       Last 24hrs VS reviewed since prior progress note. Most recent are:    Vitals:    24 0718   BP: (!) 129/98   Pulse: 69   Resp: 16   Temp: 98.1 °F (36.7 °C)   SpO2: 100%     SpO2 Readings from Last 6 Encounters:   24 100%          Intake/Output Summary (Last 24 hours) at 2024 1058  Last data filed at 2024 2100  Gross per 24 hour   Intake --   Output 1950 ml   Net -1950 ml          Exam:     Physical Exam:    Gen:  Well-developed, well-nourished, in no acute distress  HEENT:  Pink conjunctivae, PERRL, hearing intact to voice  Resp:  No accessory muscle use, clear breath sounds without wheezes rales or rhonchi  Card:  No murmurs, normal S1, S2 without thrills, bruits or peripheral edema  Abd:  Soft, epigastric pain, non-distended, normoactive bowel sounds are present  Musc:  No cyanosis or clubbing  Skin:  No rashes   Neuro:  Cranial nerves 3-12 are grossly intact, follows commands appropriately  Psych:  poor insight, oriented to person, place and time, alert    Medications Reviewed: (see below)    Lab Data Reviewed: (see below)    ______________________________________________________________________    Medications:     Current Facility-Administered Medications   Medication Dose Route Frequency    PHENobarbital tablet 64.8 mg  64.8 mg Oral Q6H PRN    Followed by    PHENobarbital tablet 32.4 mg  32.4 mg Oral Q6H PRN    Followed by    [START ON 2024] PHENobarbital tablet 16.2 mg  16.2 mg Oral Q6H PRN    PHENobarbital tablet 32.4 mg  32.4 mg Oral q8h    Followed by    
New patient PCP appointment set for Wednesday August 28th 2024 at 8:30 AM at the [facility] location. Office asks that patient arrives 15 to 20 mins early for new patient check in, brings ID and any insurance cards.    -Renetta Garcia  Case Management Specialist   
Patient decided that he wants to leave ama. Dr. Soriano made aware and came to bedside and explain treatment to patient. Patient continues to want to leave. IV's removed without any issues.   
Rounded this morning discussed with patient plan was for discharge today since his symptoms had improved and he was tolerating l liquids.  Discussed with patient that we will need to make sure that mag and Phos levels are checked and repleted if needed; discussed potassium was low and we were ready working on repeating potassium.  Discussed with patient the process would take 1 to 2 hours likely.  Shortly after leaving room notified by nursing that patient undergo AMA did not want a wait any longer.  Went to bedside and spoke with patient explained to him that he could experience arrhythmias, worsening condition or even death  And that I cannot provide prescriptions on discharge if he left AMA.  Patient stated he wanted to go to work and wanted to leave AMA understands the risks.  
Ultrasound IV by Beatrice York RN :  Procedure Note    Ultrasound IV education provided to patient. Opportunities for questions given.     Ultrasound used for PIV placement:  20 gauge 8 cm PowerGlide Pro 8cm  left basilic} location.  1 X Attempt(s).    Vigorous blood return present and saline flushes with ease.     Procedure tolerated well. Primary RN aware of IV placement and added to LDA.      BEATRICE YORK RN  
injection 5-40 mL  5-40 mL IntraVENous PRN    0.9 % sodium chloride infusion   IntraVENous PRN    thiamine mononitrate tablet 100 mg  100 mg Oral Daily    sodium chloride flush 0.9 % injection 5-40 mL  5-40 mL IntraVENous 2 times per day    sodium chloride flush 0.9 % injection 5-40 mL  5-40 mL IntraVENous PRN    0.9 % sodium chloride infusion   IntraVENous PRN    ondansetron (ZOFRAN-ODT) disintegrating tablet 4 mg  4 mg Oral Q8H PRN    Or    ondansetron (ZOFRAN) injection 4 mg  4 mg IntraVENous Q6H PRN    polyethylene glycol (GLYCOLAX) packet 17 g  17 g Oral Daily PRN    acetaminophen (TYLENOL) tablet 650 mg  650 mg Oral Q6H PRN    Or    acetaminophen (TYLENOL) suppository 650 mg  650 mg Rectal Q6H PRN    [Held by provider] enoxaparin (LOVENOX) injection 40 mg  40 mg SubCUTAneous Daily    diazePAM (VALIUM) injection 5 mg  5 mg IntraVENous Q1H PRN    Or    diazePAM (VALIUM) injection 10 mg  10 mg IntraVENous Q1H PRN    Or    diazePAM (VALIUM) injection 15 mg  15 mg IntraVENous Q1H PRN    Or    diazePAM (VALIUM) injection 20 mg  20 mg IntraVENous Q1H PRN    folic acid (FOLVITE) tablet 1 mg  1 mg Oral Daily    multivitamin 1 tablet  1 tablet Oral Daily    lactated ringers IV soln infusion   IntraVENous Continuous    prochlorperazine (COMPAZINE) injection 10 mg  10 mg IntraVENous Q6H PRN    famotidine (PEPCID) 20 mg in sodium chloride (PF) 0.9 % 10 mL injection  20 mg IntraVENous BID     No current outpatient medications on file.          Lab Review:     Recent Labs     05/27/24  1232 05/28/24  0346   WBC 5.3 8.3   HGB 15.2 15.4   HCT 45.3 46.7   * 91*     Recent Labs     05/27/24  1232 05/28/24  0346    138   K 3.4* 3.3*    103   CO2 27 30   BUN 6 6   MG 1.6 1.6   PHOS  --  2.1*   ALT 51* 57     No results found for: \"GLUCPOC\"       Assessment / Plan:     34 yo hx of etoh abuse, pancreatitis, presented w/ abd pain, acute etoh pancreatitis    1) Acute etoh pancreatitis/abd pain: slowly improving.

## 2024-07-06 ENCOUNTER — HOSPITAL ENCOUNTER (INPATIENT)
Facility: HOSPITAL | Age: 34
LOS: 5 days | Discharge: HOME | End: 2024-07-12
Attending: EMERGENCY MEDICINE | Admitting: STUDENT IN AN ORGANIZED HEALTH CARE EDUCATION/TRAINING PROGRAM
Payer: COMMERCIAL

## 2024-07-06 ENCOUNTER — APPOINTMENT (OUTPATIENT)
Facility: HOSPITAL | Age: 34
End: 2024-07-06
Payer: COMMERCIAL

## 2024-07-06 DIAGNOSIS — R10.33 PERIUMBILICAL ABDOMINAL PAIN: ICD-10-CM

## 2024-07-06 DIAGNOSIS — K85.90 ACUTE PANCREATITIS WITHOUT INFECTION OR NECROSIS, UNSPECIFIED PANCREATITIS TYPE: Primary | ICD-10-CM

## 2024-07-06 DIAGNOSIS — K85.90 ACUTE PANCREATITIS, UNSPECIFIED COMPLICATION STATUS, UNSPECIFIED PANCREATITIS TYPE: ICD-10-CM

## 2024-07-06 DIAGNOSIS — R11.2 NAUSEA AND VOMITING, UNSPECIFIED VOMITING TYPE: ICD-10-CM

## 2024-07-06 DIAGNOSIS — I42.1 HYPERTROPHIC OBSTRUCTIVE CARDIOMYOPATHY (HCC): ICD-10-CM

## 2024-07-06 LAB
ALBUMIN SERPL-MCNC: 4.4 G/DL (ref 3.5–5)
ALBUMIN/GLOB SERPL: 1.3 (ref 1.1–2.2)
ALP SERPL-CCNC: 95 U/L (ref 45–117)
ALT SERPL-CCNC: 69 U/L (ref 12–78)
ANION GAP SERPL CALC-SCNC: 10 MMOL/L (ref 5–15)
AST SERPL W P-5'-P-CCNC: 53 U/L (ref 15–37)
BASOPHILS # BLD: 0.1 K/UL (ref 0–0.1)
BASOPHILS NFR BLD: 1 % (ref 0–1)
BILIRUB SERPL-MCNC: 1.5 MG/DL (ref 0.2–1)
BUN SERPL-MCNC: 8 MG/DL (ref 6–20)
BUN/CREAT SERPL: 10 (ref 12–20)
CA-I BLD-MCNC: 10 MG/DL (ref 8.5–10.1)
CHLORIDE SERPL-SCNC: 108 MMOL/L (ref 97–108)
CO2 SERPL-SCNC: 20 MMOL/L (ref 21–32)
CREAT SERPL-MCNC: 0.78 MG/DL (ref 0.7–1.3)
DIFFERENTIAL METHOD BLD: ABNORMAL
EOSINOPHIL # BLD: 0 K/UL (ref 0–0.4)
EOSINOPHIL NFR BLD: 0 % (ref 0–7)
ERYTHROCYTE [DISTWIDTH] IN BLOOD BY AUTOMATED COUNT: 14.2 % (ref 11.5–14.5)
ETHANOL SERPL-MCNC: <10 MG/DL (ref 0–0.08)
GLOBULIN SER CALC-MCNC: 3.3 G/DL (ref 2–4)
GLUCOSE SERPL-MCNC: 120 MG/DL (ref 65–100)
HCT VFR BLD AUTO: 44.5 % (ref 36.6–50.3)
HGB BLD-MCNC: 14.8 G/DL (ref 12.1–17)
IMM GRANULOCYTES # BLD AUTO: 0 K/UL (ref 0–0.04)
IMM GRANULOCYTES NFR BLD AUTO: 0 % (ref 0–0.5)
LIPASE SERPL-CCNC: 84 U/L (ref 13–75)
LYMPHOCYTES # BLD: 1.4 K/UL (ref 0.8–3.5)
LYMPHOCYTES NFR BLD: 17 % (ref 12–49)
MAGNESIUM SERPL-MCNC: 1.6 MG/DL (ref 1.6–2.4)
MCH RBC QN AUTO: 28.8 PG (ref 26–34)
MCHC RBC AUTO-ENTMCNC: 33.3 G/DL (ref 30–36.5)
MCV RBC AUTO: 86.6 FL (ref 80–99)
MONOCYTES # BLD: 0.3 K/UL (ref 0–1)
MONOCYTES NFR BLD: 4 % (ref 5–13)
NEUTS SEG # BLD: 6 K/UL (ref 1.8–8)
NEUTS SEG NFR BLD: 78 % (ref 32–75)
NRBC # BLD: 0 K/UL (ref 0–0.01)
NRBC BLD-RTO: 0 PER 100 WBC
PLATELET # BLD AUTO: 162 K/UL (ref 150–400)
PMV BLD AUTO: 11.4 FL (ref 8.9–12.9)
POTASSIUM SERPL-SCNC: 4.4 MMOL/L (ref 3.5–5.1)
PROT SERPL-MCNC: 7.7 G/DL (ref 6.4–8.2)
RBC # BLD AUTO: 5.14 M/UL (ref 4.1–5.7)
SODIUM SERPL-SCNC: 138 MMOL/L (ref 136–145)
WBC # BLD AUTO: 7.8 K/UL (ref 4.1–11.1)

## 2024-07-06 PROCEDURE — 83690 ASSAY OF LIPASE: CPT

## 2024-07-06 PROCEDURE — 96375 TX/PRO/DX INJ NEW DRUG ADDON: CPT

## 2024-07-06 PROCEDURE — 99285 EMERGENCY DEPT VISIT HI MDM: CPT

## 2024-07-06 PROCEDURE — 83735 ASSAY OF MAGNESIUM: CPT

## 2024-07-06 PROCEDURE — 82077 ASSAY SPEC XCP UR&BREATH IA: CPT

## 2024-07-06 PROCEDURE — 6360000002 HC RX W HCPCS: Performed by: EMERGENCY MEDICINE

## 2024-07-06 PROCEDURE — 80053 COMPREHEN METABOLIC PANEL: CPT

## 2024-07-06 PROCEDURE — 6360000004 HC RX CONTRAST MEDICATION: Performed by: EMERGENCY MEDICINE

## 2024-07-06 PROCEDURE — 2580000003 HC RX 258: Performed by: EMERGENCY MEDICINE

## 2024-07-06 PROCEDURE — 93005 ELECTROCARDIOGRAM TRACING: CPT | Performed by: EMERGENCY MEDICINE

## 2024-07-06 PROCEDURE — 96361 HYDRATE IV INFUSION ADD-ON: CPT

## 2024-07-06 PROCEDURE — 96374 THER/PROPH/DIAG INJ IV PUSH: CPT

## 2024-07-06 PROCEDURE — 74177 CT ABD & PELVIS W/CONTRAST: CPT

## 2024-07-06 PROCEDURE — 94761 N-INVAS EAR/PLS OXIMETRY MLT: CPT

## 2024-07-06 PROCEDURE — 2500000003 HC RX 250 WO HCPCS: Performed by: EMERGENCY MEDICINE

## 2024-07-06 PROCEDURE — 85025 COMPLETE CBC W/AUTO DIFF WBC: CPT

## 2024-07-06 PROCEDURE — 36415 COLL VENOUS BLD VENIPUNCTURE: CPT

## 2024-07-06 PROCEDURE — 96376 TX/PRO/DX INJ SAME DRUG ADON: CPT

## 2024-07-06 RX ORDER — 0.9 % SODIUM CHLORIDE 0.9 %
1000 INTRAVENOUS SOLUTION INTRAVENOUS
Status: COMPLETED | OUTPATIENT
Start: 2024-07-06 | End: 2024-07-06

## 2024-07-06 RX ORDER — ONDANSETRON 2 MG/ML
4 INJECTION INTRAMUSCULAR; INTRAVENOUS ONCE
Status: COMPLETED | OUTPATIENT
Start: 2024-07-06 | End: 2024-07-06

## 2024-07-06 RX ORDER — LORAZEPAM 2 MG/ML
1 INJECTION INTRAMUSCULAR ONCE
Status: COMPLETED | OUTPATIENT
Start: 2024-07-06 | End: 2024-07-06

## 2024-07-06 RX ORDER — MORPHINE SULFATE 4 MG/ML
4 INJECTION, SOLUTION INTRAMUSCULAR; INTRAVENOUS
Status: COMPLETED | OUTPATIENT
Start: 2024-07-06 | End: 2024-07-06

## 2024-07-06 RX ORDER — PROCHLORPERAZINE EDISYLATE 5 MG/ML
10 INJECTION INTRAMUSCULAR; INTRAVENOUS ONCE
Status: COMPLETED | OUTPATIENT
Start: 2024-07-06 | End: 2024-07-06

## 2024-07-06 RX ADMIN — HYDROMORPHONE HYDROCHLORIDE 1 MG: 1 INJECTION, SOLUTION INTRAMUSCULAR; INTRAVENOUS; SUBCUTANEOUS at 18:15

## 2024-07-06 RX ADMIN — HYDROMORPHONE HYDROCHLORIDE 1 MG: 1 INJECTION, SOLUTION INTRAMUSCULAR; INTRAVENOUS; SUBCUTANEOUS at 21:48

## 2024-07-06 RX ADMIN — MORPHINE SULFATE 4 MG: 4 INJECTION, SOLUTION INTRAMUSCULAR; INTRAVENOUS at 16:43

## 2024-07-06 RX ADMIN — SODIUM CHLORIDE 1000 ML: 9 INJECTION, SOLUTION INTRAVENOUS at 16:43

## 2024-07-06 RX ADMIN — ONDANSETRON 4 MG: 2 INJECTION INTRAMUSCULAR; INTRAVENOUS at 16:43

## 2024-07-06 RX ADMIN — LORAZEPAM 1 MG: 2 INJECTION, SOLUTION INTRAMUSCULAR; INTRAVENOUS at 19:08

## 2024-07-06 RX ADMIN — IOPAMIDOL 100 ML: 755 INJECTION, SOLUTION INTRAVENOUS at 19:04

## 2024-07-06 RX ADMIN — PROCHLORPERAZINE EDISYLATE 10 MG: 5 INJECTION INTRAMUSCULAR; INTRAVENOUS at 19:07

## 2024-07-06 ASSESSMENT — PAIN - FUNCTIONAL ASSESSMENT: PAIN_FUNCTIONAL_ASSESSMENT: 0-10

## 2024-07-06 ASSESSMENT — PAIN DESCRIPTION - LOCATION: LOCATION: ABDOMEN

## 2024-07-06 ASSESSMENT — PAIN SCALES - GENERAL
PAINLEVEL_OUTOF10: 10
PAINLEVEL_OUTOF10: 10
PAINLEVEL_OUTOF10: 9

## 2024-07-06 ASSESSMENT — LIFESTYLE VARIABLES
HOW MANY STANDARD DRINKS CONTAINING ALCOHOL DO YOU HAVE ON A TYPICAL DAY: 10 OR MORE
HOW OFTEN DO YOU HAVE A DRINK CONTAINING ALCOHOL: 4 OR MORE TIMES A WEEK

## 2024-07-06 ASSESSMENT — PAIN DESCRIPTION - DESCRIPTORS: DESCRIPTORS: ACHING

## 2024-07-06 NOTE — ED PROVIDER NOTES
[facility] EMERGENCY DEPT  EMERGENCY DEPARTMENT HISTORY AND PHYSICAL EXAM      Date: 7/6/2024  Patient Name: Nathan Trejo  MRN: [id number]  Birthdate 1990  Date of evaluation: 7/6/2024  Provider: Kun Briceno MD   Note Started: 5:53 PM EDT 7/6/24    HISTORY OF PRESENT ILLNESS     Chief Complaint   Patient presents with    Abdominal Pain    Nausea    Emesis       History Provided By: Patient    HPI: Nathan Trejo is a 33 y.o. male presents for evaluation of periumbilical abdominal pain with nausea and vomiting.  Patient reports history of the same, reports history of pancreatitis.  Patient denies fevers or chills, denies bowel complaint.    PAST MEDICAL HISTORY   Past Medical History:  Past Medical History:   Diagnosis Date    Alcohol abuse        Past Surgical History:  No past surgical history on file.    Family History:  No family history on file.    Social History:       Allergies:  No Known Allergies    PCP: No primary care provider on file.    Current Meds:   No current facility-administered medications for this encounter.     No current outpatient medications on file.       Social Determinants of Health:   Social Determinants of Health     Tobacco Use: Not on file   Alcohol Use: Heavy Drinker (7/6/2024)    AUDIT-C     Frequency of Alcohol Consumption: 4 or more times a week     Average Number of Drinks: 10 or more     Frequency of Binge Drinking: Daily or almost daily   Financial Resource Strain: Not on file   Food Insecurity: Not on file   Transportation Needs: Not on file   Physical Activity: Not on file   Stress: Not on file   Social Connections: Not on file   Intimate Partner Violence: Not on file   Depression: Not on file   Housing Stability: Not on file   Interpersonal Safety: Not on file   Utilities: Not on file       PHYSICAL EXAM   Physical Exam  Physical Exam  Constitutional:       General: No acute distress.     Appearance: Normal appearance.  Not toxic-appearing.   HENT:      Head:

## 2024-07-06 NOTE — ED NOTES
Pt states that for the last month he has been drinking a gallon of vodka, last drink was yesterday at 1500.

## 2024-07-07 PROBLEM — K85.90 ACUTE PANCREATITIS WITHOUT INFECTION OR NECROSIS, UNSPECIFIED PANCREATITIS TYPE: Status: ACTIVE | Noted: 2024-07-07

## 2024-07-07 LAB
EKG ATRIAL RATE: 46 BPM
EKG ATRIAL RATE: 63 BPM
EKG DIAGNOSIS: NORMAL
EKG DIAGNOSIS: NORMAL
EKG P AXIS: 21 DEGREES
EKG P AXIS: 21 DEGREES
EKG P-R INTERVAL: 100 MS
EKG P-R INTERVAL: 124 MS
EKG Q-T INTERVAL: 404 MS
EKG Q-T INTERVAL: 448 MS
EKG QRS DURATION: 82 MS
EKG QRS DURATION: 92 MS
EKG QTC CALCULATION (BAZETT): 392 MS
EKG QTC CALCULATION (BAZETT): 413 MS
EKG R AXIS: 76 DEGREES
EKG R AXIS: 83 DEGREES
EKG T AXIS: 66 DEGREES
EKG T AXIS: 71 DEGREES
EKG VENTRICULAR RATE: 46 BPM
EKG VENTRICULAR RATE: 63 BPM

## 2024-07-07 PROCEDURE — 6360000002 HC RX W HCPCS: Performed by: STUDENT IN AN ORGANIZED HEALTH CARE EDUCATION/TRAINING PROGRAM

## 2024-07-07 PROCEDURE — 94761 N-INVAS EAR/PLS OXIMETRY MLT: CPT

## 2024-07-07 PROCEDURE — 93005 ELECTROCARDIOGRAM TRACING: CPT | Performed by: PHYSICIAN ASSISTANT

## 2024-07-07 PROCEDURE — 2500000003 HC RX 250 WO HCPCS: Performed by: STUDENT IN AN ORGANIZED HEALTH CARE EDUCATION/TRAINING PROGRAM

## 2024-07-07 PROCEDURE — 2580000003 HC RX 258: Performed by: STUDENT IN AN ORGANIZED HEALTH CARE EDUCATION/TRAINING PROGRAM

## 2024-07-07 PROCEDURE — 1100000000 HC RM PRIVATE

## 2024-07-07 PROCEDURE — 6370000000 HC RX 637 (ALT 250 FOR IP): Performed by: STUDENT IN AN ORGANIZED HEALTH CARE EDUCATION/TRAINING PROGRAM

## 2024-07-07 RX ORDER — DIAZEPAM 5 MG/1
5 TABLET ORAL 3 TIMES DAILY
Status: COMPLETED | OUTPATIENT
Start: 2024-07-07 | End: 2024-07-09

## 2024-07-07 RX ORDER — FOLIC ACID 1 MG/1
1 TABLET ORAL DAILY
Status: DISCONTINUED | OUTPATIENT
Start: 2024-07-07 | End: 2024-07-12 | Stop reason: HOSPADM

## 2024-07-07 RX ORDER — PANTOPRAZOLE SODIUM 40 MG/1
40 TABLET, DELAYED RELEASE ORAL
Status: DISCONTINUED | OUTPATIENT
Start: 2024-07-08 | End: 2024-07-12 | Stop reason: HOSPADM

## 2024-07-07 RX ORDER — DIAZEPAM 5 MG/1
10 TABLET ORAL
Status: DISCONTINUED | OUTPATIENT
Start: 2024-07-07 | End: 2024-07-07 | Stop reason: SDUPTHER

## 2024-07-07 RX ORDER — DIAZEPAM 5 MG/ML
15 INJECTION, SOLUTION INTRAMUSCULAR; INTRAVENOUS
Status: DISCONTINUED | OUTPATIENT
Start: 2024-07-07 | End: 2024-07-07 | Stop reason: SDUPTHER

## 2024-07-07 RX ORDER — DIAZEPAM 5 MG/ML
15 INJECTION, SOLUTION INTRAMUSCULAR; INTRAVENOUS
Status: DISCONTINUED | OUTPATIENT
Start: 2024-07-07 | End: 2024-07-10

## 2024-07-07 RX ORDER — SODIUM CHLORIDE 9 MG/ML
INJECTION, SOLUTION INTRAVENOUS PRN
Status: DISCONTINUED | OUTPATIENT
Start: 2024-07-07 | End: 2024-07-12 | Stop reason: HOSPADM

## 2024-07-07 RX ORDER — SODIUM CHLORIDE 0.9 % (FLUSH) 0.9 %
5-40 SYRINGE (ML) INJECTION EVERY 12 HOURS SCHEDULED
Status: DISCONTINUED | OUTPATIENT
Start: 2024-07-07 | End: 2024-07-07 | Stop reason: SDUPTHER

## 2024-07-07 RX ORDER — HYDROMORPHONE HYDROCHLORIDE 1 MG/ML
1 INJECTION, SOLUTION INTRAMUSCULAR; INTRAVENOUS; SUBCUTANEOUS EVERY 4 HOURS PRN
Status: DISPENSED | OUTPATIENT
Start: 2024-07-07 | End: 2024-07-08

## 2024-07-07 RX ORDER — DIAZEPAM 5 MG/ML
10 INJECTION, SOLUTION INTRAMUSCULAR; INTRAVENOUS
Status: DISCONTINUED | OUTPATIENT
Start: 2024-07-07 | End: 2024-07-10

## 2024-07-07 RX ORDER — DIAZEPAM 5 MG/1
15 TABLET ORAL
Status: DISCONTINUED | OUTPATIENT
Start: 2024-07-07 | End: 2024-07-07 | Stop reason: SDUPTHER

## 2024-07-07 RX ORDER — DIAZEPAM 5 MG/ML
5 INJECTION, SOLUTION INTRAMUSCULAR; INTRAVENOUS
Status: DISCONTINUED | OUTPATIENT
Start: 2024-07-07 | End: 2024-07-07 | Stop reason: SDUPTHER

## 2024-07-07 RX ORDER — DIAZEPAM 5 MG/1
20 TABLET ORAL
Status: DISCONTINUED | OUTPATIENT
Start: 2024-07-07 | End: 2024-07-10

## 2024-07-07 RX ORDER — ONDANSETRON 4 MG/1
4 TABLET, ORALLY DISINTEGRATING ORAL EVERY 8 HOURS PRN
Status: DISCONTINUED | OUTPATIENT
Start: 2024-07-07 | End: 2024-07-12 | Stop reason: HOSPADM

## 2024-07-07 RX ORDER — DIAZEPAM 5 MG/1
5 TABLET ORAL
Status: DISCONTINUED | OUTPATIENT
Start: 2024-07-07 | End: 2024-07-10

## 2024-07-07 RX ORDER — DIAZEPAM 5 MG/ML
20 INJECTION, SOLUTION INTRAMUSCULAR; INTRAVENOUS
Status: DISCONTINUED | OUTPATIENT
Start: 2024-07-07 | End: 2024-07-10

## 2024-07-07 RX ORDER — SODIUM CHLORIDE 0.9 % (FLUSH) 0.9 %
5-40 SYRINGE (ML) INJECTION EVERY 12 HOURS SCHEDULED
Status: DISCONTINUED | OUTPATIENT
Start: 2024-07-07 | End: 2024-07-12 | Stop reason: HOSPADM

## 2024-07-07 RX ORDER — SODIUM CHLORIDE 0.9 % (FLUSH) 0.9 %
5-40 SYRINGE (ML) INJECTION PRN
Status: DISCONTINUED | OUTPATIENT
Start: 2024-07-07 | End: 2024-07-12 | Stop reason: HOSPADM

## 2024-07-07 RX ORDER — SODIUM CHLORIDE, SODIUM LACTATE, POTASSIUM CHLORIDE, CALCIUM CHLORIDE 600; 310; 30; 20 MG/100ML; MG/100ML; MG/100ML; MG/100ML
INJECTION, SOLUTION INTRAVENOUS CONTINUOUS
Status: DISCONTINUED | OUTPATIENT
Start: 2024-07-07 | End: 2024-07-08

## 2024-07-07 RX ORDER — DIAZEPAM 5 MG/1
20 TABLET ORAL
Status: DISCONTINUED | OUTPATIENT
Start: 2024-07-07 | End: 2024-07-07 | Stop reason: SDUPTHER

## 2024-07-07 RX ORDER — DIAZEPAM 5 MG/1
5 TABLET ORAL
Status: DISCONTINUED | OUTPATIENT
Start: 2024-07-07 | End: 2024-07-07 | Stop reason: SDUPTHER

## 2024-07-07 RX ORDER — DIAZEPAM 5 MG/1
10 TABLET ORAL
Status: DISCONTINUED | OUTPATIENT
Start: 2024-07-07 | End: 2024-07-10

## 2024-07-07 RX ORDER — MULTIVITAMIN WITH IRON
1 TABLET ORAL DAILY
Status: DISCONTINUED | OUTPATIENT
Start: 2024-07-07 | End: 2024-07-12 | Stop reason: HOSPADM

## 2024-07-07 RX ORDER — POLYETHYLENE GLYCOL 3350 17 G/17G
17 POWDER, FOR SOLUTION ORAL DAILY PRN
Status: DISCONTINUED | OUTPATIENT
Start: 2024-07-07 | End: 2024-07-12 | Stop reason: HOSPADM

## 2024-07-07 RX ORDER — POTASSIUM CHLORIDE 20 MEQ/1
40 TABLET, EXTENDED RELEASE ORAL PRN
Status: DISCONTINUED | OUTPATIENT
Start: 2024-07-07 | End: 2024-07-12 | Stop reason: HOSPADM

## 2024-07-07 RX ORDER — ACETAMINOPHEN 325 MG/1
650 TABLET ORAL EVERY 6 HOURS PRN
Status: DISCONTINUED | OUTPATIENT
Start: 2024-07-07 | End: 2024-07-12 | Stop reason: HOSPADM

## 2024-07-07 RX ORDER — DIAZEPAM 5 MG/ML
5 INJECTION, SOLUTION INTRAMUSCULAR; INTRAVENOUS
Status: DISCONTINUED | OUTPATIENT
Start: 2024-07-07 | End: 2024-07-10

## 2024-07-07 RX ORDER — DIAZEPAM 5 MG/ML
10 INJECTION, SOLUTION INTRAMUSCULAR; INTRAVENOUS
Status: DISCONTINUED | OUTPATIENT
Start: 2024-07-07 | End: 2024-07-07 | Stop reason: SDUPTHER

## 2024-07-07 RX ORDER — ACETAMINOPHEN 650 MG/1
650 SUPPOSITORY RECTAL EVERY 6 HOURS PRN
Status: DISCONTINUED | OUTPATIENT
Start: 2024-07-07 | End: 2024-07-12 | Stop reason: HOSPADM

## 2024-07-07 RX ORDER — ENOXAPARIN SODIUM 100 MG/ML
40 INJECTION SUBCUTANEOUS DAILY
Status: DISCONTINUED | OUTPATIENT
Start: 2024-07-07 | End: 2024-07-12 | Stop reason: HOSPADM

## 2024-07-07 RX ORDER — DIAZEPAM 5 MG/1
15 TABLET ORAL
Status: DISCONTINUED | OUTPATIENT
Start: 2024-07-07 | End: 2024-07-10

## 2024-07-07 RX ORDER — MAGNESIUM SULFATE IN WATER 40 MG/ML
2000 INJECTION, SOLUTION INTRAVENOUS PRN
Status: DISCONTINUED | OUTPATIENT
Start: 2024-07-07 | End: 2024-07-12 | Stop reason: HOSPADM

## 2024-07-07 RX ORDER — MIDAZOLAM HYDROCHLORIDE 2 MG/2ML
2 INJECTION, SOLUTION INTRAMUSCULAR; INTRAVENOUS ONCE
Status: COMPLETED | OUTPATIENT
Start: 2024-07-07 | End: 2024-07-07

## 2024-07-07 RX ORDER — DIAZEPAM 5 MG/ML
20 INJECTION, SOLUTION INTRAMUSCULAR; INTRAVENOUS
Status: DISCONTINUED | OUTPATIENT
Start: 2024-07-07 | End: 2024-07-07 | Stop reason: SDUPTHER

## 2024-07-07 RX ORDER — SODIUM CHLORIDE 0.9 % (FLUSH) 0.9 %
5-40 SYRINGE (ML) INJECTION PRN
Status: DISCONTINUED | OUTPATIENT
Start: 2024-07-07 | End: 2024-07-07 | Stop reason: SDUPTHER

## 2024-07-07 RX ORDER — POTASSIUM CHLORIDE 7.45 MG/ML
10 INJECTION INTRAVENOUS PRN
Status: DISCONTINUED | OUTPATIENT
Start: 2024-07-07 | End: 2024-07-12 | Stop reason: HOSPADM

## 2024-07-07 RX ORDER — ONDANSETRON 2 MG/ML
4 INJECTION INTRAMUSCULAR; INTRAVENOUS EVERY 6 HOURS PRN
Status: DISCONTINUED | OUTPATIENT
Start: 2024-07-07 | End: 2024-07-12 | Stop reason: HOSPADM

## 2024-07-07 RX ORDER — THIAMINE HYDROCHLORIDE 100 MG/ML
100 INJECTION, SOLUTION INTRAMUSCULAR; INTRAVENOUS DAILY
Status: DISCONTINUED | OUTPATIENT
Start: 2024-07-07 | End: 2024-07-10

## 2024-07-07 RX ADMIN — HYDROMORPHONE HYDROCHLORIDE 1 MG: 1 INJECTION, SOLUTION INTRAMUSCULAR; INTRAVENOUS; SUBCUTANEOUS at 21:49

## 2024-07-07 RX ADMIN — SODIUM CHLORIDE, POTASSIUM CHLORIDE, SODIUM LACTATE AND CALCIUM CHLORIDE: 600; 310; 30; 20 INJECTION, SOLUTION INTRAVENOUS at 01:01

## 2024-07-07 RX ADMIN — DIAZEPAM 10 MG: 5 INJECTION INTRAMUSCULAR; INTRAVENOUS at 04:12

## 2024-07-07 RX ADMIN — ONDANSETRON 4 MG: 2 INJECTION INTRAMUSCULAR; INTRAVENOUS at 14:13

## 2024-07-07 RX ADMIN — MIDAZOLAM HYDROCHLORIDE 2 MG: 1 INJECTION, SOLUTION INTRAMUSCULAR; INTRAVENOUS at 01:02

## 2024-07-07 RX ADMIN — DIAZEPAM 5 MG: 5 INJECTION INTRAMUSCULAR; INTRAVENOUS at 06:26

## 2024-07-07 RX ADMIN — DIAZEPAM 5 MG: 5 TABLET ORAL at 09:01

## 2024-07-07 RX ADMIN — THIAMINE HYDROCHLORIDE 100 MG: 100 INJECTION, SOLUTION INTRAMUSCULAR; INTRAVENOUS at 08:59

## 2024-07-07 RX ADMIN — ENOXAPARIN SODIUM 40 MG: 100 INJECTION SUBCUTANEOUS at 08:59

## 2024-07-07 RX ADMIN — ONDANSETRON 4 MG: 2 INJECTION INTRAMUSCULAR; INTRAVENOUS at 02:15

## 2024-07-07 RX ADMIN — HYDROMORPHONE HYDROCHLORIDE 1 MG: 1 INJECTION, SOLUTION INTRAMUSCULAR; INTRAVENOUS; SUBCUTANEOUS at 11:12

## 2024-07-07 RX ADMIN — DIAZEPAM 5 MG: 5 TABLET ORAL at 20:40

## 2024-07-07 RX ADMIN — THERA TABS 1 TABLET: TAB at 08:59

## 2024-07-07 RX ADMIN — FOLIC ACID 1 MG: 1 TABLET ORAL at 08:59

## 2024-07-07 RX ADMIN — ACETAMINOPHEN 650 MG: 325 TABLET ORAL at 09:03

## 2024-07-07 RX ADMIN — HYDROMORPHONE HYDROCHLORIDE 1 MG: 1 INJECTION, SOLUTION INTRAMUSCULAR; INTRAVENOUS; SUBCUTANEOUS at 02:29

## 2024-07-07 RX ADMIN — HYDROMORPHONE HYDROCHLORIDE 1 MG: 1 INJECTION, SOLUTION INTRAMUSCULAR; INTRAVENOUS; SUBCUTANEOUS at 06:29

## 2024-07-07 RX ADMIN — SODIUM CHLORIDE, PRESERVATIVE FREE 10 ML: 5 INJECTION INTRAVENOUS at 20:43

## 2024-07-07 RX ADMIN — SODIUM CHLORIDE, PRESERVATIVE FREE 10 ML: 5 INJECTION INTRAVENOUS at 08:59

## 2024-07-07 RX ADMIN — DIAZEPAM 10 MG: 5 INJECTION INTRAMUSCULAR; INTRAVENOUS at 02:15

## 2024-07-07 RX ADMIN — DIAZEPAM 5 MG: 5 TABLET ORAL at 14:12

## 2024-07-07 RX ADMIN — HYDROMORPHONE HYDROCHLORIDE 1 MG: 1 INJECTION, SOLUTION INTRAMUSCULAR; INTRAVENOUS; SUBCUTANEOUS at 16:54

## 2024-07-07 ASSESSMENT — PAIN SCALES - GENERAL
PAINLEVEL_OUTOF10: 6
PAINLEVEL_OUTOF10: 8
PAINLEVEL_OUTOF10: 7
PAINLEVEL_OUTOF10: 8
PAINLEVEL_OUTOF10: 8
PAINLEVEL_OUTOF10: 0
PAINLEVEL_OUTOF10: 9
PAINLEVEL_OUTOF10: 9
PAINLEVEL_OUTOF10: 8
PAINLEVEL_OUTOF10: 5
PAINLEVEL_OUTOF10: 7
PAINLEVEL_OUTOF10: 8
PAINLEVEL_OUTOF10: 0
PAINLEVEL_OUTOF10: 5
PAINLEVEL_OUTOF10: 8

## 2024-07-07 ASSESSMENT — PAIN DESCRIPTION - DESCRIPTORS
DESCRIPTORS: CRAMPING
DESCRIPTORS: ACHING;CRAMPING

## 2024-07-07 ASSESSMENT — PAIN DESCRIPTION - LOCATION
LOCATION: ABDOMEN

## 2024-07-07 ASSESSMENT — PAIN DESCRIPTION - ORIENTATION
ORIENTATION: RIGHT;LEFT
ORIENTATION: RIGHT;LEFT

## 2024-07-07 ASSESSMENT — PAIN SCALES - WONG BAKER
WONGBAKER_NUMERICALRESPONSE: HURTS LITTLE MORE
WONGBAKER_NUMERICALRESPONSE: NO HURT
WONGBAKER_NUMERICALRESPONSE: NO HURT

## 2024-07-07 NOTE — H&P
Hospital Medicine History & Physical      Date of Admission: 7/6/2024    Date of Service:  7/6/2024    [x]Admitted to Inpatient with expected LOS greater than two midnights due to medical therapy.  []Placed in Observation status.    Chief Admission Complaint:  abd pain     Presenting Admission History: This is a 34-year-old male with past medical history of alcohol abuse, recurrent pancreatitis who presented to the emergency department with abdominal pain.  Patient was admitted to [facility] for pancreatitis in May and was discharged.  Relapsed and was also admitted at [facility] early June.  After being treated for another episode of pancreatitis patient was discharge home.  While in the hospital patient remained on CIWA protocol.  Patient states he has been sober up until November 2023 relapsed after he lost his grandma and grandpa.  States he has been drinking a gallon of vodka daily.  He was unable to afford alcohol cessation medication which patient was discharged with last month.  Last drink was yesterday.  At the time of evaluation patient complaining of abdominal pain which has been not resolving with pain and      The emergency department patient was hemodynamically stable afebrile.  Labs normal electrolytes as well as normal LFTs no leukocytosis. CT abdomen pelvis consistent with acute pancreatitis.     Assessment/Plan    #Acute pancreatitis  -Recurrent pancreatitis in the setting of alcohol use disorder  Recent hospital admission at [facility]  Discharge on 6/3  -CT abdomen and pelvis as above  -IV fluids  -N.p.o. for now  -Start early p.o. nutrition when abdominal pain subsides      #Alcohol use disorder  -Patient with discharge from [facility] with Campral for alcohol cessation on 6/3.  -Discussed complete cessation of alcohol to avoid recurrent pancreatitis.  -[facility] protocol  -Multivitamins      Physical Exam Performed:      /80   Pulse 59   Temp 98.4 °F (36.9 °C)   Resp 16   Ht 1.854 m (6' 1\")   Wt 74.8 kg  aortic aneurysm. REPRODUCTIVE ORGANS: Prostate not enlarged. URINARY BLADDER: No mass or calculus. BONES: No destructive bone lesion. ABDOMINAL WALL: No mass or hernia. ADDITIONAL COMMENTS: N/A     Acute uncomplicated pancreatitis. Trace ascites. Electronically signed by MIRTA GRAVES      PCP: No primary care provider on file.    Past Medical History:        Diagnosis Date    Alcohol abuse        Past Surgical History:    No past surgical history on file.    Medications Prior to Admission:   Prior to Admission medications    Not on File       Labs: Personally reviewed and interpreted for clinical significance.   Recent Labs     07/06/24  1557   WBC 7.8   HGB 14.8   HCT 44.5        Recent Labs     07/06/24  1557      K 4.4      CO2 20*   BUN 8   CREATININE 0.78   CALCIUM 10.0   MG 1.6     No results for input(s): \"PROBNP\", \"TROPHS\" in the last 72 hours.  No results for input(s): \"LABA1C\" in the last 72 hours.  Recent Labs     07/06/24  1557   AST 53*   ALT 69   BILITOT 1.5*   ALKPHOS 95     No results for input(s): \"INR\", \"LACTA\", \"TSH\" in the last 72 hours.     Geoff Adhikari MD

## 2024-07-07 NOTE — PROGRESS NOTES
4 Eyes Skin Assessment     NAME:  Nathan Trejo  YOB: 1990  MEDICAL RECORD NUMBER:  [id number]    The patient is being assessed for  Admission    I agree that at least one RN has performed a thorough Head to Toe Skin Assessment on the patient. ALL assessment sites listed below have been assessed.      Areas assessed by both nurses:    Head, Face, Ears, Shoulders, Back, Chest, Arms, Elbows, Hands, Sacrum. Buttock, Coccyx, Ischium, and Legs. Feet and Heels        Does the Patient have a Wound? No noted wound(s)       Rg Prevention initiated by RN: Yes  Wound Care Orders initiated by RN: No    Pressure Injury (Stage 3,4, Unstageable, DTI, NWPT, and Complex wounds) if present, place Wound referral order by RN under : No    New Ostomies, if present place, Ostomy referral order under : No     Nurse 1 eSignature: Electronically signed by JEFERSON CALDWELL RN on 7/7/24 at 3:19 AM EDT    **SHARE this note so that the co-signing nurse can place an eSignature**    Nurse 2 eSignature: Electronically signed by Genesis Schultz RN on 7/7/24 at 3:57 AM EDT

## 2024-07-07 NOTE — PROGRESS NOTES
Hospitalist Progress Note    NAME:   Nathan Trejo   : 1990   MRN: [id number]     Date/Time: 2024 9:07 AM  Patient PCP: No primary care provider on file.    Estimated discharge date:48  Barriers: Resolution of pancreatitis, Tolerating diet    Hospital course:    33-year-old male with a history of alcohol abuse and recurrent pancreatitis.  Has been admitted to [facility] in May and [facility] in  for recurrent pancreatitis secondary to alcohol abuse.  CT of the abdomen pelvis consistent with acute pancreatitis.  No obvious obstructive causes.  Triglyceride levels pending.  Bilirubin is mildly elevated 1.5.  If there is an increase will need further evaluation for obstruction with MRCP.  Continuing pain medication and aggressive IV hydration.  Patient with asymptomatic bradycardia and EKG pending.      Assessment / Plan:    Acute pancreatitis secondary to alcohol abuse  CT of the abdomen pelvis with acute pancreatitis  Triglyceride levels pending  Continue pain control  Continue IV fluids  N.p.o.  Lipase mildly elevated  Trend lipase  Protonix for probable acute alcoholic gastritis    Alcohol abuse  Continue thiamine  Continue CIWA protocol  Continue folic acid    Asymptomatic bradycardia  EKG for further evaluation    Medical Decision Making     [x] High (any 2)     A. Problems (any 1)  [x] Acute/Chronic Illness/injury posing threat to life or bodily function:    [] Severe exacerbation of chronic illness:    ---------------------------------------------------------------------  B. Risk of Treatment (any 1)   [x] Drugs/treatments that require intensive monitoring for toxicity include:    [] IV ABX requiring serial renal monitoring for nephrotoxicity:     [] IV Narcotic analgesia for adverse drug reaction  [] Aggressive IV diuresis requiring serial monitoring for renal impairment and electrolyte derangements  [x] Critical electrolyte abnormalities requiring IV replacement and close serial

## 2024-07-07 NOTE — CONSULTS
CARDIOLOGY CONSULT NOTE      Patient Name: Nathan Trejo  Age: 33 y.o.  Gender:male  :1990  MRN: [id number]    33-year-old male with a history of alcohol abuse and recurrent pancreatitis. Has been admitted to [facility] in May and [facility] in  for recurrent pancreatitis secondary to alcohol abuse. CT of the abdomen pelvis consistent with acute pancreatitis.   He only complains of abdominal pain and feeling weak.  He never passed out  No CP or SOB.   Cardiology was consulted today for bradycardia and ST elevation with no symptoms HR went down to 32 bpm.    EKG reviewed sinus bradycardia rate 46(no significant ST elevation_  EKG sinus rate 63 bpm. No ST elevations    HX of alcohol use.  Smoking 1 pack a day    No family hx of CAD    Telemetry reviewed, sinus. No pause    Pertinent review of systems items noted above, all other systems are negative. Current medications reviewed.    Physical Examination    No Known Allergies  Vitals:    24 1142   BP:    Pulse:    Resp: 18   Temp:    SpO2:    /81 HR 41  Vital signs are stable  No apparent distress.  Vital signs are stable  No apparent distress.  Heart has a regular.  no murmur  Lungs are clear  Abdomen is soft, nontender, normal bowel sounds.  Extremities have no edema  Skin is dry and warm.  Normal affect     Labs reviewed: 24 CR 0.78      A and P  Bradycardia  No symptoms DDX from his pain medication and benzodiazepene treatment, also uncontrolled pain  EKG ruled out STEMI.    Plan  -will do echocardiogram  -will monitor  -avoid AV jason blocking agent      Hx of pancreatitis, alcoholism per PCP    Please do not hesitate to call if additional questions arise.    Bryanna Rodriguez MD  2024

## 2024-07-07 NOTE — ED NOTES
Nurse to Nurse report given to Celia RICH on 4E via telephone, once tele box available pt will go up to inpatient room.

## 2024-07-07 NOTE — CARE COORDINATION
07/07/24 1535   Service Assessment   Patient Orientation Alert and Oriented   Cognition Alert   History Provided By Patient   Primary Caregiver Self   Support Systems Friends/Neighbors   PCP Verified by CM Yes  (Currently seeking a doctor)   Last Visit to PCP Within last year   Prior Functional Level Independent in ADLs/IADLs   Current Functional Level Independent in ADLs/IADLs   Can patient return to prior living arrangement Yes   Ability to make needs known: Good   Family able to assist with home care needs: Yes   Would you like for me to discuss the discharge plan with any other family members/significant others, and if so, who? No   Financial Resources Medicaid   Community Resources None   CM/SW Referral No PCP   Social/Functional History   Lives With Alone   Type of Home House   Home Equipment None   Receives Help From Friend(s)   ADL Assistance Independent   Homemaking Assistance Independent   Ambulation Assistance Independent   Transfer Assistance Independent   Active  No   Discharge Planning   Type of Residence Apartment   Living Arrangements Friends   Current Services Prior To Admission None   Potential Assistance Needed (S)  Other (Comment)  (Patient states that there is help he will seek for ETOH in [place])   DME Ordered? No   Potential Assistance Purchasing Medications No   Type of Home Care Services None   Patient expects to be discharged to: Apartment     Patient lives in [place].  Patient was here on a job.  Patient will return back to [place], [place] once medically stable.    Advance Care Planning     General Advance Care Planning (ACP) Conversation    Date of Conversation: 7/7/2024  Conducted with: Patient with Decision Making Capacity  Other persons present: None    Healthcare Decision Maker: No healthcare decision makers have been documented.  Click here to complete HealthCare Decision Makers including selection of the Healthcare Decision Maker Relationship (ie \"Primary\")

## 2024-07-08 ENCOUNTER — APPOINTMENT (OUTPATIENT)
Facility: HOSPITAL | Age: 34
End: 2024-07-08
Payer: COMMERCIAL

## 2024-07-08 LAB
ALBUMIN SERPL-MCNC: 3.5 G/DL (ref 3.5–5)
ALBUMIN/GLOB SERPL: 1.4 (ref 1.1–2.2)
ALP SERPL-CCNC: 73 U/L (ref 45–117)
ALT SERPL-CCNC: 40 U/L (ref 12–78)
ANION GAP SERPL CALC-SCNC: 8 MMOL/L (ref 5–15)
AST SERPL W P-5'-P-CCNC: 18 U/L (ref 15–37)
BASOPHILS # BLD: 0.1 K/UL (ref 0–0.1)
BASOPHILS NFR BLD: 1 % (ref 0–1)
BILIRUB SERPL-MCNC: 1 MG/DL (ref 0.2–1)
BUN SERPL-MCNC: 8 MG/DL (ref 6–20)
BUN/CREAT SERPL: 14 (ref 12–20)
CA-I BLD-MCNC: 8.8 MG/DL (ref 8.5–10.1)
CHLORIDE SERPL-SCNC: 103 MMOL/L (ref 97–108)
CHOLEST SERPL-MCNC: 118 MG/DL
CO2 SERPL-SCNC: 26 MMOL/L (ref 21–32)
CREAT SERPL-MCNC: 0.57 MG/DL (ref 0.7–1.3)
DIFFERENTIAL METHOD BLD: ABNORMAL
EOSINOPHIL # BLD: 0.2 K/UL (ref 0–0.4)
EOSINOPHIL NFR BLD: 3 % (ref 0–7)
ERYTHROCYTE [DISTWIDTH] IN BLOOD BY AUTOMATED COUNT: 13.9 % (ref 11.5–14.5)
GLOBULIN SER CALC-MCNC: 2.5 G/DL (ref 2–4)
GLUCOSE SERPL-MCNC: 70 MG/DL (ref 65–100)
HCT VFR BLD AUTO: 41.8 % (ref 36.6–50.3)
HDLC SERPL-MCNC: 54 MG/DL
HDLC SERPL: 2.2 (ref 0–5)
HGB BLD-MCNC: 13.7 G/DL (ref 12.1–17)
IMM GRANULOCYTES # BLD AUTO: 0 K/UL (ref 0–0.04)
IMM GRANULOCYTES NFR BLD AUTO: 0 % (ref 0–0.5)
LDLC SERPL CALC-MCNC: 44.6 MG/DL (ref 0–100)
LIPASE SERPL-CCNC: 203 U/L (ref 13–75)
LIPID PANEL: NORMAL
LYMPHOCYTES # BLD: 1.6 K/UL (ref 0.8–3.5)
LYMPHOCYTES NFR BLD: 29 % (ref 12–49)
MCH RBC QN AUTO: 29.1 PG (ref 26–34)
MCHC RBC AUTO-ENTMCNC: 32.8 G/DL (ref 30–36.5)
MCV RBC AUTO: 88.7 FL (ref 80–99)
MONOCYTES # BLD: 0.4 K/UL (ref 0–1)
MONOCYTES NFR BLD: 7 % (ref 5–13)
NEUTS SEG # BLD: 3.2 K/UL (ref 1.8–8)
NEUTS SEG NFR BLD: 60 % (ref 32–75)
NRBC # BLD: 0 K/UL (ref 0–0.01)
NRBC BLD-RTO: 0 PER 100 WBC
PLATELET # BLD AUTO: 99 K/UL (ref 150–400)
PMV BLD AUTO: 12.9 FL (ref 8.9–12.9)
POTASSIUM SERPL-SCNC: 3.2 MMOL/L (ref 3.5–5.1)
PROT SERPL-MCNC: 6 G/DL (ref 6.4–8.2)
RBC # BLD AUTO: 4.71 M/UL (ref 4.1–5.7)
RBC MORPH BLD: ABNORMAL
SODIUM SERPL-SCNC: 137 MMOL/L (ref 136–145)
TRIGL SERPL-MCNC: 97 MG/DL
VLDLC SERPL CALC-MCNC: 19.4 MG/DL
WBC # BLD AUTO: 5.5 K/UL (ref 4.1–11.1)

## 2024-07-08 PROCEDURE — 83690 ASSAY OF LIPASE: CPT

## 2024-07-08 PROCEDURE — 6370000000 HC RX 637 (ALT 250 FOR IP): Performed by: PHYSICIAN ASSISTANT

## 2024-07-08 PROCEDURE — 2500000003 HC RX 250 WO HCPCS: Performed by: HOSPITALIST

## 2024-07-08 PROCEDURE — 80053 COMPREHEN METABOLIC PANEL: CPT

## 2024-07-08 PROCEDURE — 6370000000 HC RX 637 (ALT 250 FOR IP): Performed by: STUDENT IN AN ORGANIZED HEALTH CARE EDUCATION/TRAINING PROGRAM

## 2024-07-08 PROCEDURE — 6370000000 HC RX 637 (ALT 250 FOR IP): Performed by: HOSPITALIST

## 2024-07-08 PROCEDURE — 6360000002 HC RX W HCPCS: Performed by: STUDENT IN AN ORGANIZED HEALTH CARE EDUCATION/TRAINING PROGRAM

## 2024-07-08 PROCEDURE — 36415 COLL VENOUS BLD VENIPUNCTURE: CPT

## 2024-07-08 PROCEDURE — 85025 COMPLETE CBC W/AUTO DIFF WBC: CPT

## 2024-07-08 PROCEDURE — 6360000002 HC RX W HCPCS: Performed by: HOSPITALIST

## 2024-07-08 PROCEDURE — 80061 LIPID PANEL: CPT

## 2024-07-08 PROCEDURE — 1100000000 HC RM PRIVATE

## 2024-07-08 PROCEDURE — 93306 TTE W/DOPPLER COMPLETE: CPT

## 2024-07-08 PROCEDURE — 2580000003 HC RX 258: Performed by: STUDENT IN AN ORGANIZED HEALTH CARE EDUCATION/TRAINING PROGRAM

## 2024-07-08 RX ORDER — HYDROMORPHONE HYDROCHLORIDE 1 MG/ML
1 INJECTION, SOLUTION INTRAMUSCULAR; INTRAVENOUS; SUBCUTANEOUS EVERY 4 HOURS PRN
Status: DISCONTINUED | OUTPATIENT
Start: 2024-07-08 | End: 2024-07-09

## 2024-07-08 RX ORDER — UREA 10 %
5 LOTION (ML) TOPICAL NIGHTLY PRN
Status: DISCONTINUED | OUTPATIENT
Start: 2024-07-08 | End: 2024-07-12 | Stop reason: HOSPADM

## 2024-07-08 RX ORDER — SODIUM CHLORIDE AND POTASSIUM CHLORIDE 150; 900 MG/100ML; MG/100ML
INJECTION, SOLUTION INTRAVENOUS CONTINUOUS
Status: DISCONTINUED | OUTPATIENT
Start: 2024-07-08 | End: 2024-07-09

## 2024-07-08 RX ORDER — POTASSIUM CHLORIDE 20 MEQ/1
20 TABLET, EXTENDED RELEASE ORAL ONCE
Status: COMPLETED | OUTPATIENT
Start: 2024-07-08 | End: 2024-07-08

## 2024-07-08 RX ORDER — MAGNESIUM SULFATE HEPTAHYDRATE 40 MG/ML
2000 INJECTION, SOLUTION INTRAVENOUS ONCE
Status: COMPLETED | OUTPATIENT
Start: 2024-07-08 | End: 2024-07-08

## 2024-07-08 RX ADMIN — SODIUM CHLORIDE, PRESERVATIVE FREE 10 ML: 5 INJECTION INTRAVENOUS at 08:32

## 2024-07-08 RX ADMIN — MAGNESIUM SULFATE HEPTAHYDRATE 2000 MG: 40 INJECTION, SOLUTION INTRAVENOUS at 12:06

## 2024-07-08 RX ADMIN — HYDROMORPHONE HYDROCHLORIDE 1 MG: 1 INJECTION, SOLUTION INTRAMUSCULAR; INTRAVENOUS; SUBCUTANEOUS at 08:31

## 2024-07-08 RX ADMIN — HYDROMORPHONE HYDROCHLORIDE 1 MG: 1 INJECTION, SOLUTION INTRAMUSCULAR; INTRAVENOUS; SUBCUTANEOUS at 02:48

## 2024-07-08 RX ADMIN — PANTOPRAZOLE SODIUM 40 MG: 40 TABLET, DELAYED RELEASE ORAL at 07:23

## 2024-07-08 RX ADMIN — THIAMINE HYDROCHLORIDE 100 MG: 100 INJECTION, SOLUTION INTRAMUSCULAR; INTRAVENOUS at 08:31

## 2024-07-08 RX ADMIN — Medication 5 MG: at 00:52

## 2024-07-08 RX ADMIN — POTASSIUM CHLORIDE AND SODIUM CHLORIDE: 900; 150 INJECTION, SOLUTION INTRAVENOUS at 12:08

## 2024-07-08 RX ADMIN — FOLIC ACID 1 MG: 1 TABLET ORAL at 08:32

## 2024-07-08 RX ADMIN — POTASSIUM CHLORIDE 20 MEQ: 1500 TABLET, EXTENDED RELEASE ORAL at 12:22

## 2024-07-08 RX ADMIN — Medication 5 MG: at 21:30

## 2024-07-08 RX ADMIN — DIAZEPAM 5 MG: 5 TABLET ORAL at 14:23

## 2024-07-08 RX ADMIN — SODIUM CHLORIDE, PRESERVATIVE FREE 10 ML: 5 INJECTION INTRAVENOUS at 22:00

## 2024-07-08 RX ADMIN — HYDROMORPHONE HYDROCHLORIDE 1 MG: 1 INJECTION, SOLUTION INTRAMUSCULAR; INTRAVENOUS; SUBCUTANEOUS at 17:00

## 2024-07-08 RX ADMIN — SODIUM CHLORIDE, POTASSIUM CHLORIDE, SODIUM LACTATE AND CALCIUM CHLORIDE: 600; 310; 30; 20 INJECTION, SOLUTION INTRAVENOUS at 03:49

## 2024-07-08 RX ADMIN — HYDROMORPHONE HYDROCHLORIDE 1 MG: 1 INJECTION, SOLUTION INTRAMUSCULAR; INTRAVENOUS; SUBCUTANEOUS at 12:22

## 2024-07-08 RX ADMIN — THERA TABS 1 TABLET: TAB at 08:32

## 2024-07-08 RX ADMIN — DIAZEPAM 10 MG: 5 INJECTION INTRAMUSCULAR; INTRAVENOUS at 12:03

## 2024-07-08 RX ADMIN — DIAZEPAM 5 MG: 5 TABLET ORAL at 08:32

## 2024-07-08 RX ADMIN — DIAZEPAM 5 MG: 5 TABLET ORAL at 21:35

## 2024-07-08 RX ADMIN — ONDANSETRON 4 MG: 2 INJECTION INTRAMUSCULAR; INTRAVENOUS at 21:30

## 2024-07-08 RX ADMIN — HYDROMORPHONE HYDROCHLORIDE 1 MG: 1 INJECTION, SOLUTION INTRAMUSCULAR; INTRAVENOUS; SUBCUTANEOUS at 21:35

## 2024-07-08 RX ADMIN — ENOXAPARIN SODIUM 40 MG: 100 INJECTION SUBCUTANEOUS at 08:31

## 2024-07-08 RX ADMIN — POTASSIUM CHLORIDE AND SODIUM CHLORIDE: 900; 150 INJECTION, SOLUTION INTRAVENOUS at 22:00

## 2024-07-08 RX ADMIN — DIAZEPAM 10 MG: 5 TABLET ORAL at 00:00

## 2024-07-08 ASSESSMENT — PAIN SCALES - GENERAL
PAINLEVEL_OUTOF10: 8
PAINLEVEL_OUTOF10: 7
PAINLEVEL_OUTOF10: 8
PAINLEVEL_OUTOF10: 7
PAINLEVEL_OUTOF10: 9

## 2024-07-08 ASSESSMENT — PAIN DESCRIPTION - DESCRIPTORS
DESCRIPTORS: ACHING;CRAMPING

## 2024-07-08 ASSESSMENT — PAIN DESCRIPTION - LOCATION
LOCATION: ABDOMEN;BACK;HEAD
LOCATION: ABDOMEN
LOCATION: ABDOMEN;BACK
LOCATION: ABDOMEN;BACK
LOCATION: ABDOMEN

## 2024-07-08 ASSESSMENT — PAIN SCALES - WONG BAKER
WONGBAKER_NUMERICALRESPONSE: HURTS A LITTLE BIT
WONGBAKER_NUMERICALRESPONSE: HURTS A LITTLE BIT

## 2024-07-08 NOTE — PLAN OF CARE
Problem: Discharge Planning  Goal: Discharge to home or other facility with appropriate resources  7/7/2024 2122 by Genesis Schultz RN  Outcome: Progressing  7/7/2024 1453 by Laura Rosenthal RN  Outcome: Progressing  Flowsheets (Taken 7/7/2024 0969)  Discharge to home or other facility with appropriate resources: Identify barriers to discharge with patient and caregiver     Problem: Pain  Goal: Verbalizes/displays adequate comfort level or baseline comfort level  7/7/2024 2122 by Genesis Schultz RN  Outcome: Progressing  7/7/2024 1453 by Laura Rosenthal RN  Outcome: Progressing     Problem: Cardiovascular - Adult  Goal: Maintains optimal cardiac output and hemodynamic stability  Outcome: Progressing  Goal: Absence of cardiac dysrhythmias or at baseline  Outcome: Progressing     Problem: Gastrointestinal - Adult  Goal: Minimal or absence of nausea and vomiting  Outcome: Progressing

## 2024-07-08 NOTE — PROGRESS NOTES
Daily    thiamine  100 mg IntraVENous Daily    multivitamin  1 tablet Oral Daily    folic acid  1 mg Oral Daily    diazePAM  5 mg Oral TID    pantoprazole  40 mg Oral QAM AC     Continuous Infusions:   0.9% NaCl with KCl 20 mEq      sodium chloride      sodium chloride       PRN Meds:.melatonin, HYDROmorphone, sodium chloride flush, sodium chloride, potassium chloride **OR** potassium alternative oral replacement **OR** potassium chloride, magnesium sulfate, ondansetron **OR** ondansetron, polyethylene glycol, acetaminophen **OR** acetaminophen, sodium chloride, diazePAM **OR** diazePAM **OR** diazePAM **OR** diazePAM **OR** diazePAM **OR** diazePAM **OR** diazePAM **OR** diazePAM      CT ABDOMEN PELVIS W IV CONTRAST Additional Contrast? None--Result Date: 7/6/2024    Acute uncomplicated pancreatitis. Trace ascites. Electronically signed by MIRTA GRAVES        Assessment/Plan:     Acute pancreatitis from alcohol abuse-  CT as above  TG levels 40  Pain control  Trend lipase  Start clear liquid diet today    Alcohol use disorder-thiamine, multivitamin, CIWA protocol, advised to quit    Mild thrombocytopenia-related to alcohol use monitor periodically    Bradycardia asymptomatic-follow echo, cardiology on case    Hypokalemia-replete potassium             Discussion/MDM:-  Patient with multiple medical comorbidities, each with high likelihood for morbidity and mortality if left untreated.   I have reviewed patient's presenting subjective and objective findings, as well as all laboratory studies from today/yesterday including cbc/bmp or any other labs, imaging studies from today/yesterday if available, consultant notes from today/yesterday and vital signs to date as well as treatment rendered and patient's response to those treatments. Pt need IV fluids with additives , IV antibiotics if needed and Drug therapy requiring intensive monitoring for toxicity with labs and levels. Personally reviewed in detail in conjunction  w/ labs as documented for evidence of drug toxicity. In addition, prior medical, surgical and relevant social and family histories were reviewed. I have discussed management plan with patient/family/consultant and with nursing staff as much as feasible.          AD FC  DVT Prx -subcu Lovenox    [id number] H    Barriers to discharge-clinical improvement        This is dictation was done by dragon, computer voice recognition software.  Quite often unanticipated grammatical, syntax, homophones and other interpretive errors or inadvertently transcribed by the computer software.  Please excuse errors that have escaped final proofreading. Please contact me for any questions or details.  Thank you.     Signed By: Gael Garcia MD     July 8, 2024

## 2024-07-08 NOTE — PROGRESS NOTES
CARDIOLOGY PROGRESS NOTE      Patient Name: Nathan Trejo  Age: 33 y.o.  Gender:male  :1990  MRN: [id number]    Patient seen and examined. This is a patient with a history of alcohol abuse and recurrent pancreatitis who presented with abdominal pain and weakness now being followed for bradycardia. Resting in bed today. Continues to endorse abdominal pain. Denies chest pain. Breathing stable. He reports having a slow heart rate for many years, he works as a  and is very active. No other complaints reported.    Telemetry reviewed.    Pertinent review of systems items noted above, all other systems are negative. Current medications reviewed.    Physical Examination    No Known Allergies  Vitals:    24 0831   BP:    Pulse:    Resp: 18   Temp:    SpO2:      Vital signs are stable  No apparent distress.  Heart has a bradycardic but regular rhythm.  No murmur  Lungs are clear  Abdomen is soft, nontender, normal bowel sounds.  Extremities have no edema  Skin is dry and warm.  Normal affect    Labs reviewed:  Recent Results (from the past 12 hour(s))   Comprehensive Metabolic Panel w/ Reflex to MG    Collection Time: 24  6:16 AM   Result Value Ref Range    Sodium 137 136 - 145 mmol/L    Potassium 3.2 (L) 3.5 - 5.1 mmol/L    Chloride 103 97 - 108 mmol/L    CO2 26 21 - 32 mmol/L    Anion Gap 8 5 - 15 mmol/L    Glucose 70 65 - 100 mg/dL    BUN 8 6 - 20 mg/dL    Creatinine 0.57 (L) 0.70 - 1.30 mg/dL    BUN/Creatinine Ratio 14 12 - 20      Est, Glom Filt Rate >90 >60 ml/min/1.73m2    Calcium 8.8 8.5 - 10.1 mg/dL    Total Bilirubin 1.0 0.2 - 1.0 mg/dL    AST 18 15 - 37 U/L    ALT 40 12 - 78 U/L    Alk Phosphatase 73 45 - 117 U/L    Total Protein 6.0 (L) 6.4 - 8.2 g/dL    Albumin 3.5 3.5 - 5.0 g/dL    Globulin 2.5 2.0 - 4.0 g/dL    Albumin/Globulin Ratio 1.4 1.1 - 2.2     CBC with Auto Differential    Collection Time: 24  6:16 AM   Result Value Ref Range    WBC 5.5 4.1 - 11.1 K/uL    RBC

## 2024-07-08 NOTE — CARE COORDINATION
Pt remains admitted, declines [facility] resources. Indicates he may need assistance w/ transport. CM informed pt that his [place] Medicaid will not pay for transport and that he would need to contact fam/friend or pay privately for transportation.

## 2024-07-09 LAB
ALBUMIN SERPL-MCNC: 3.1 G/DL (ref 3.5–5)
ANION GAP SERPL CALC-SCNC: 4 MMOL/L (ref 5–15)
BASOPHILS # BLD: 0 K/UL (ref 0–0.1)
BASOPHILS NFR BLD: 1 % (ref 0–1)
BUN SERPL-MCNC: 3 MG/DL (ref 6–20)
BUN/CREAT SERPL: 6 (ref 12–20)
CA-I BLD-MCNC: 7.9 MG/DL (ref 8.5–10.1)
CHLORIDE SERPL-SCNC: 101 MMOL/L (ref 97–108)
CO2 SERPL-SCNC: 24 MMOL/L (ref 21–32)
CREAT SERPL-MCNC: 0.5 MG/DL (ref 0.7–1.3)
DIFFERENTIAL METHOD BLD: ABNORMAL
ECHO AO ASC DIAM: 3 CM
ECHO AO ASCENDING AORTA INDEX: 1.55 CM/M2
ECHO AO ROOT DIAM: 3 CM
ECHO AO ROOT INDEX: 1.55 CM/M2
ECHO AV AREA PEAK VELOCITY: 3 CM2
ECHO AV AREA/BSA PEAK VELOCITY: 1.6 CM2/M2
ECHO AV PEAK GRADIENT: 6 MMHG
ECHO AV PEAK VELOCITY: 1.2 M/S
ECHO AV VELOCITY RATIO: 0.92
ECHO BSA: 1.96 M2
ECHO EST RA PRESSURE: 3 MMHG
ECHO IVC EXP: 2 CM
ECHO LA AREA 4C: 18.7 CM2
ECHO LA DIAMETER INDEX: 1.81 CM/M2
ECHO LA DIAMETER: 3.5 CM
ECHO LA MAJOR AXIS: 5.3 CM
ECHO LA TO AORTIC ROOT RATIO: 1.17
ECHO LA VOL MOD A4C: 49 ML (ref 18–58)
ECHO LA VOLUME INDEX MOD A4C: 25 ML/M2 (ref 16–34)
ECHO LV E' LATERAL VELOCITY: 19 CM/S
ECHO LV E' SEPTAL VELOCITY: 15 CM/S
ECHO LV FRACTIONAL SHORTENING: 40 % (ref 28–44)
ECHO LV INTERNAL DIMENSION DIASTOLE INDEX: 2.44 CM/M2
ECHO LV INTERNAL DIMENSION DIASTOLIC: 4.7 CM (ref 4.2–5.9)
ECHO LV INTERNAL DIMENSION SYSTOLIC INDEX: 1.45 CM/M2
ECHO LV INTERNAL DIMENSION SYSTOLIC: 2.8 CM
ECHO LV IVSD: 1.4 CM (ref 0.6–1)
ECHO LV MASS 2D: 199.6 G (ref 88–224)
ECHO LV MASS INDEX 2D: 103.4 G/M2 (ref 49–115)
ECHO LV POSTERIOR WALL DIASTOLIC: 0.9 CM (ref 0.6–1)
ECHO LV RELATIVE WALL THICKNESS RATIO: 0.38
ECHO LVOT AREA: 3.1 CM2
ECHO LVOT DIAM: 2 CM
ECHO LVOT PEAK GRADIENT: 5 MMHG
ECHO LVOT PEAK VELOCITY: 1.1 M/S
ECHO MV A VELOCITY: 0.38 M/S
ECHO MV E DECELERATION TIME (DT): 293 MS
ECHO MV E VELOCITY: 0.71 M/S
ECHO MV E/A RATIO: 1.87
ECHO MV E/E' LATERAL: 3.74
ECHO MV E/E' RATIO (AVERAGED): 4.24
ECHO MV E/E' SEPTAL: 4.73
ECHO PV ACCELERATION TIME (AT): 173 MS
ECHO PV MAX VELOCITY: 1 M/S
ECHO PV PEAK GRADIENT: 4 MMHG
ECHO RA AREA 4C: 17.4 CM2
ECHO RA END SYSTOLIC VOLUME APICAL 4 CHAMBER INDEX BSA: 27 ML/M2
ECHO RA VOLUME: 52 ML
ECHO RV BASAL DIMENSION: 3.8 CM
ECHO RV FREE WALL PEAK S': 15 CM/S
ECHO RV TAPSE: 4.3 CM (ref 1.7–?)
EOSINOPHIL # BLD: 0.1 K/UL (ref 0–0.4)
EOSINOPHIL NFR BLD: 3 % (ref 0–7)
ERYTHROCYTE [DISTWIDTH] IN BLOOD BY AUTOMATED COUNT: 13.9 % (ref 11.5–14.5)
GLUCOSE SERPL-MCNC: 70 MG/DL (ref 65–100)
HCT VFR BLD AUTO: 38.2 % (ref 36.6–50.3)
HGB BLD-MCNC: 12.6 G/DL (ref 12.1–17)
IMM GRANULOCYTES # BLD AUTO: 0 K/UL (ref 0–0.04)
IMM GRANULOCYTES NFR BLD AUTO: 0 % (ref 0–0.5)
LYMPHOCYTES # BLD: 1.3 K/UL (ref 0.8–3.5)
LYMPHOCYTES NFR BLD: 27 % (ref 12–49)
MAGNESIUM SERPL-MCNC: 1.6 MG/DL (ref 1.6–2.4)
MCH RBC QN AUTO: 29 PG (ref 26–34)
MCHC RBC AUTO-ENTMCNC: 33 G/DL (ref 30–36.5)
MCV RBC AUTO: 88 FL (ref 80–99)
MONOCYTES # BLD: 0.4 K/UL (ref 0–1)
MONOCYTES NFR BLD: 8 % (ref 5–13)
NEUTS SEG # BLD: 3.1 K/UL (ref 1.8–8)
NEUTS SEG NFR BLD: 61 % (ref 32–75)
NRBC # BLD: 0 K/UL (ref 0–0.01)
NRBC BLD-RTO: 0 PER 100 WBC
PHOSPHATE SERPL-MCNC: 2.5 MG/DL (ref 2.6–4.7)
PLATELET # BLD AUTO: 80 K/UL (ref 150–400)
PMV BLD AUTO: 12.7 FL (ref 8.9–12.9)
POTASSIUM SERPL-SCNC: 5.7 MMOL/L (ref 3.5–5.1)
RBC # BLD AUTO: 4.34 M/UL (ref 4.1–5.7)
SODIUM SERPL-SCNC: 129 MMOL/L (ref 136–145)
WBC # BLD AUTO: 5 K/UL (ref 4.1–11.1)

## 2024-07-09 PROCEDURE — 6360000002 HC RX W HCPCS: Performed by: HOSPITALIST

## 2024-07-09 PROCEDURE — 6370000000 HC RX 637 (ALT 250 FOR IP): Performed by: STUDENT IN AN ORGANIZED HEALTH CARE EDUCATION/TRAINING PROGRAM

## 2024-07-09 PROCEDURE — 6370000000 HC RX 637 (ALT 250 FOR IP): Performed by: HOSPITALIST

## 2024-07-09 PROCEDURE — 2500000003 HC RX 250 WO HCPCS: Performed by: HOSPITALIST

## 2024-07-09 PROCEDURE — 1100000000 HC RM PRIVATE

## 2024-07-09 PROCEDURE — 6360000002 HC RX W HCPCS: Performed by: STUDENT IN AN ORGANIZED HEALTH CARE EDUCATION/TRAINING PROGRAM

## 2024-07-09 PROCEDURE — 2580000003 HC RX 258: Performed by: STUDENT IN AN ORGANIZED HEALTH CARE EDUCATION/TRAINING PROGRAM

## 2024-07-09 PROCEDURE — 80069 RENAL FUNCTION PANEL: CPT

## 2024-07-09 PROCEDURE — 2580000003 HC RX 258: Performed by: HOSPITALIST

## 2024-07-09 PROCEDURE — 83735 ASSAY OF MAGNESIUM: CPT

## 2024-07-09 PROCEDURE — 6370000000 HC RX 637 (ALT 250 FOR IP): Performed by: PHYSICIAN ASSISTANT

## 2024-07-09 PROCEDURE — 36415 COLL VENOUS BLD VENIPUNCTURE: CPT

## 2024-07-09 PROCEDURE — 85025 COMPLETE CBC W/AUTO DIFF WBC: CPT

## 2024-07-09 RX ORDER — TRAMADOL HYDROCHLORIDE 50 MG/1
50 TABLET ORAL EVERY 6 HOURS PRN
Status: DISCONTINUED | OUTPATIENT
Start: 2024-07-09 | End: 2024-07-12 | Stop reason: HOSPADM

## 2024-07-09 RX ORDER — HYDROMORPHONE HYDROCHLORIDE 1 MG/ML
1 INJECTION, SOLUTION INTRAMUSCULAR; INTRAVENOUS; SUBCUTANEOUS EVERY 4 HOURS PRN
Status: DISCONTINUED | OUTPATIENT
Start: 2024-07-09 | End: 2024-07-10

## 2024-07-09 RX ORDER — HYDROMORPHONE HYDROCHLORIDE 1 MG/ML
1 INJECTION, SOLUTION INTRAMUSCULAR; INTRAVENOUS; SUBCUTANEOUS EVERY 4 HOURS PRN
Status: DISCONTINUED | OUTPATIENT
Start: 2024-07-09 | End: 2024-07-09

## 2024-07-09 RX ORDER — SODIUM CHLORIDE 9 MG/ML
INJECTION, SOLUTION INTRAVENOUS CONTINUOUS
Status: DISCONTINUED | OUTPATIENT
Start: 2024-07-09 | End: 2024-07-12 | Stop reason: HOSPADM

## 2024-07-09 RX ORDER — METOCLOPRAMIDE HYDROCHLORIDE 5 MG/ML
10 INJECTION INTRAMUSCULAR; INTRAVENOUS EVERY 6 HOURS
Status: DISCONTINUED | OUTPATIENT
Start: 2024-07-09 | End: 2024-07-09

## 2024-07-09 RX ORDER — METOCLOPRAMIDE HYDROCHLORIDE 5 MG/ML
10 INJECTION INTRAMUSCULAR; INTRAVENOUS EVERY 6 HOURS PRN
Status: DISCONTINUED | OUTPATIENT
Start: 2024-07-09 | End: 2024-07-12 | Stop reason: HOSPADM

## 2024-07-09 RX ORDER — ATROPINE SULFATE 0.4 MG/ML
0.4 INJECTION, SOLUTION INTRAVENOUS DAILY PRN
Status: DISCONTINUED | OUTPATIENT
Start: 2024-07-09 | End: 2024-07-12 | Stop reason: HOSPADM

## 2024-07-09 RX ADMIN — ONDANSETRON 4 MG: 2 INJECTION INTRAMUSCULAR; INTRAVENOUS at 06:00

## 2024-07-09 RX ADMIN — HYDROMORPHONE HYDROCHLORIDE 1 MG: 1 INJECTION, SOLUTION INTRAMUSCULAR; INTRAVENOUS; SUBCUTANEOUS at 01:44

## 2024-07-09 RX ADMIN — HYDROMORPHONE HYDROCHLORIDE 1 MG: 1 INJECTION, SOLUTION INTRAMUSCULAR; INTRAVENOUS; SUBCUTANEOUS at 10:30

## 2024-07-09 RX ADMIN — THIAMINE HYDROCHLORIDE 100 MG: 100 INJECTION, SOLUTION INTRAMUSCULAR; INTRAVENOUS at 08:33

## 2024-07-09 RX ADMIN — HYDROMORPHONE HYDROCHLORIDE 1 MG: 1 INJECTION, SOLUTION INTRAMUSCULAR; INTRAVENOUS; SUBCUTANEOUS at 06:00

## 2024-07-09 RX ADMIN — TRAMADOL HYDROCHLORIDE 50 MG: 50 TABLET, COATED ORAL at 17:05

## 2024-07-09 RX ADMIN — HYDROMORPHONE HYDROCHLORIDE 1 MG: 1 INJECTION, SOLUTION INTRAMUSCULAR; INTRAVENOUS; SUBCUTANEOUS at 21:18

## 2024-07-09 RX ADMIN — DIAZEPAM 5 MG: 5 TABLET ORAL at 13:27

## 2024-07-09 RX ADMIN — FOLIC ACID 1 MG: 1 TABLET ORAL at 08:35

## 2024-07-09 RX ADMIN — TRAMADOL HYDROCHLORIDE 50 MG: 50 TABLET, COATED ORAL at 10:53

## 2024-07-09 RX ADMIN — ENOXAPARIN SODIUM 40 MG: 100 INJECTION SUBCUTANEOUS at 08:34

## 2024-07-09 RX ADMIN — PANTOPRAZOLE SODIUM 40 MG: 40 TABLET, DELAYED RELEASE ORAL at 08:34

## 2024-07-09 RX ADMIN — SODIUM CHLORIDE: 9 INJECTION, SOLUTION INTRAVENOUS at 09:10

## 2024-07-09 RX ADMIN — METOCLOPRAMIDE 10 MG: 5 INJECTION, SOLUTION INTRAMUSCULAR; INTRAVENOUS at 15:23

## 2024-07-09 RX ADMIN — Medication 5 MG: at 23:14

## 2024-07-09 RX ADMIN — DIAZEPAM 15 MG: 5 INJECTION INTRAMUSCULAR; INTRAVENOUS at 02:45

## 2024-07-09 RX ADMIN — SODIUM CHLORIDE, PRESERVATIVE FREE 10 ML: 5 INJECTION INTRAVENOUS at 08:31

## 2024-07-09 RX ADMIN — HYDROMORPHONE HYDROCHLORIDE 1 MG: 1 INJECTION, SOLUTION INTRAMUSCULAR; INTRAVENOUS; SUBCUTANEOUS at 15:19

## 2024-07-09 RX ADMIN — DIAZEPAM 5 MG: 5 TABLET ORAL at 08:35

## 2024-07-09 RX ADMIN — DIAZEPAM 5 MG: 5 INJECTION INTRAMUSCULAR; INTRAVENOUS at 18:16

## 2024-07-09 RX ADMIN — METOCLOPRAMIDE 10 MG: 5 INJECTION, SOLUTION INTRAMUSCULAR; INTRAVENOUS at 08:30

## 2024-07-09 RX ADMIN — DIAZEPAM 5 MG: 5 TABLET ORAL at 21:18

## 2024-07-09 RX ADMIN — THERA TABS 1 TABLET: TAB at 08:35

## 2024-07-09 RX ADMIN — ONDANSETRON 4 MG: 2 INJECTION INTRAMUSCULAR; INTRAVENOUS at 12:15

## 2024-07-09 RX ADMIN — SODIUM CHLORIDE, PRESERVATIVE FREE 10 ML: 5 INJECTION INTRAVENOUS at 22:08

## 2024-07-09 RX ADMIN — TRAMADOL HYDROCHLORIDE 50 MG: 50 TABLET, COATED ORAL at 23:14

## 2024-07-09 RX ADMIN — DIAZEPAM 10 MG: 5 INJECTION INTRAMUSCULAR; INTRAVENOUS at 12:16

## 2024-07-09 RX ADMIN — DIAZEPAM 10 MG: 5 INJECTION INTRAMUSCULAR; INTRAVENOUS at 13:27

## 2024-07-09 ASSESSMENT — PAIN SCALES - GENERAL
PAINLEVEL_OUTOF10: 0
PAINLEVEL_OUTOF10: 5
PAINLEVEL_OUTOF10: 4
PAINLEVEL_OUTOF10: 8
PAINLEVEL_OUTOF10: 8
PAINLEVEL_OUTOF10: 4
PAINLEVEL_OUTOF10: 4
PAINLEVEL_OUTOF10: 8
PAINLEVEL_OUTOF10: 7
PAINLEVEL_OUTOF10: 3
PAINLEVEL_OUTOF10: 8
PAINLEVEL_OUTOF10: 6
PAINLEVEL_OUTOF10: 8
PAINLEVEL_OUTOF10: 6
PAINLEVEL_OUTOF10: 5
PAINLEVEL_OUTOF10: 7
PAINLEVEL_OUTOF10: 7
PAINLEVEL_OUTOF10: 8
PAINLEVEL_OUTOF10: 3

## 2024-07-09 ASSESSMENT — PAIN DESCRIPTION - DESCRIPTORS
DESCRIPTORS: ACHING
DESCRIPTORS: ACHING;DISCOMFORT
DESCRIPTORS: ACHING
DESCRIPTORS: ACHING;DISCOMFORT

## 2024-07-09 ASSESSMENT — PAIN DESCRIPTION - LOCATION
LOCATION: ABDOMEN
LOCATION: BACK;ABDOMEN
LOCATION: ABDOMEN
LOCATION: ABDOMEN
LOCATION: ABDOMEN;BACK

## 2024-07-09 ASSESSMENT — PAIN DESCRIPTION - PAIN TYPE
TYPE: ACUTE PAIN
TYPE: ACUTE PAIN

## 2024-07-09 ASSESSMENT — PAIN DESCRIPTION - DIRECTION
RADIATING_TOWARDS: R BACK
RADIATING_TOWARDS: R BACK

## 2024-07-09 ASSESSMENT — PAIN DESCRIPTION - ORIENTATION
ORIENTATION: RIGHT
ORIENTATION: ANTERIOR
ORIENTATION: RIGHT
ORIENTATION: ANTERIOR

## 2024-07-09 ASSESSMENT — PAIN - FUNCTIONAL ASSESSMENT
PAIN_FUNCTIONAL_ASSESSMENT: PREVENTS OR INTERFERES SOME ACTIVE ACTIVITIES AND ADLS
PAIN_FUNCTIONAL_ASSESSMENT: ACTIVITIES ARE NOT PREVENTED
PAIN_FUNCTIONAL_ASSESSMENT: PREVENTS OR INTERFERES SOME ACTIVE ACTIVITIES AND ADLS

## 2024-07-09 ASSESSMENT — PAIN DESCRIPTION - FREQUENCY
FREQUENCY: CONTINUOUS
FREQUENCY: CONTINUOUS

## 2024-07-09 ASSESSMENT — PAIN DESCRIPTION - ONSET
ONSET: ON-GOING
ONSET: ON-GOING

## 2024-07-09 ASSESSMENT — PAIN SCALES - WONG BAKER
WONGBAKER_NUMERICALRESPONSE: NO HURT

## 2024-07-09 NOTE — PLAN OF CARE
Problem: Discharge Planning  Goal: Discharge to home or other facility with appropriate resources  7/9/2024 1318 by Hortensia Aceves RN  Outcome: Progressing  Flowsheets (Taken 7/9/2024 0800)  Discharge to home or other facility with appropriate resources:   Identify barriers to discharge with patient and caregiver   Arrange for needed discharge resources and transportation as appropriate   Identify discharge learning needs (meds, wound care, etc)   Refer to discharge planning if patient needs post-hospital services based on physician order or complex needs related to functional status, cognitive ability or social support system  7/9/2024 0337 by Shakir Liu, RN  Outcome: Progressing     Problem: Pain  Goal: Verbalizes/displays adequate comfort level or baseline comfort level  7/9/2024 1318 by Hortensia Aceves RN  Outcome: Progressing  Flowsheets (Taken 7/9/2024 0815)  Verbalizes/displays adequate comfort level or baseline comfort level:   Encourage patient to monitor pain and request assistance   Assess pain using appropriate pain scale   Administer analgesics based on type and severity of pain and evaluate response   Implement non-pharmacological measures as appropriate and evaluate response   Consider cultural and social influences on pain and pain management   Notify Licensed Independent Practitioner if interventions unsuccessful or patient reports new pain  7/9/2024 0337 by Shakir Liu, RN  Outcome: Progressing     Problem: Cardiovascular - Adult  Goal: Maintains optimal cardiac output and hemodynamic stability  7/9/2024 1318 by Hortensia Aceves RN  Outcome: Progressing  Flowsheets (Taken 7/9/2024 0800)  Maintains optimal cardiac output and hemodynamic stability:   Monitor blood pressure and heart rate   Monitor urine output and notify Licensed Independent Practitioner for values outside of normal range   Assess for signs of decreased cardiac output   Administer fluid and/or volume expanders as

## 2024-07-09 NOTE — PROGRESS NOTES
Hospitalist Progress Note    NAME:   Nathan Trejo   : 1990   MRN: [id number]     Subjective:   Daily Progress Note:  2024    Chief complaint:  Chief Complaint   Patient presents with    Abdominal Pain    Nausea    Emesis         Hospital course to date/HPI from H&P:  33-year-old male with a history of alcohol abuse and recurrent pancreatitis. Has been admitted to [facility] in May and [facility] in  for recurrent pancreatitis secondary to alcohol abuse. CT of the abdomen pelvis consistent with acute pancreatitis. No obvious obstructive causes. Triglyceride levels pending. Bilirubin is mildly elevated 1.5. If there is an increase will need further evaluation for obstruction with MRCP. Continuing pain medication and aggressive IV hydration. Patient with asymptomatic bradycardia and seen by cardiology felt bradycardia from pain, benzodiazepines.  Cards recommended echo and close monitoring.       24-patient seen and examined for the first time, chart was reviewed.  Patient continues to have intermittent abdominal pain.  No other acute issues reported to me by staff at this time.  Heart rate improved to 66.  Appreciate cardiology input.  Echo is pending.      24-patient seen and examined, had some nausea yesterday.  Tolerated liquid diet yesterday.  Echo completed, results pending.  Continues to have bradycardia episodes during that time.  Patient denies abdominal pain at this time.  Potassium 5.7 however repeat potassium pending at this time.        Objective:     BP (!) 143/102   Pulse (!) 49   Temp 97.7 °F (36.5 °C)   Resp 15   Ht 1.854 m (6' 1\")   Wt 70 kg (154 lb 5.2 oz)   SpO2 100%   BMI 20.36 kg/m²         Temp (24hrs), Av.8 °F (36.6 °C), Min:97.5 °F (36.4 °C), Max:98.4 °F (36.9 °C)        PHYSICAL EXAM:  Gen thin built and nourished  Neck Supple  CVS RRR  Resp Symmetric expansion  Abdomen soft,  ND  Ext moves all  Neuro Alert normal speech  Psych Normal Affect  Skin no  mg/dL    BUN/Creatinine Ratio 6 (L) 12 - 20      Est, Glom Filt Rate >90 >60 ml/min/1.73m2    Calcium 7.9 (L) 8.5 - 10.1 mg/dL    Phosphorus 2.5 (L) 2.6 - 4.7 mg/dL    Albumin 3.1 (L) 3.5 - 5.0 g/dL   Magnesium    Collection Time: 07/09/24  6:03 AM   Result Value Ref Range    Magnesium 1.6 1.6 - 2.4 mg/dL   CBC with Auto Differential    Collection Time: 07/09/24  6:03 AM   Result Value Ref Range    WBC 5.0 4.1 - 11.1 K/uL    RBC 4.34 4.10 - 5.70 M/uL    Hemoglobin 12.6 12.1 - 17.0 g/dL    Hematocrit 38.2 36.6 - 50.3 %    MCV 88.0 80.0 - 99.0 FL    MCH 29.0 26.0 - 34.0 PG    MCHC 33.0 30.0 - 36.5 g/dL    RDW 13.9 11.5 - 14.5 %    Platelets 80 (L) 150 - 400 K/uL    MPV 12.7 8.9 - 12.9 FL    Nucleated RBCs 0.0 0.0  WBC    nRBC 0.00 0.00 - 0.01 K/uL    Neutrophils % 61 32 - 75 %    Lymphocytes % 27 12 - 49 %    Monocytes % 8 5 - 13 %    Eosinophils % 3 0 - 7 %    Basophils % 1 0 - 1 %    Immature Granulocytes % 0 0 - 0.5 %    Neutrophils Absolute 3.1 1.8 - 8.0 K/UL    Lymphocytes Absolute 1.3 0.8 - 3.5 K/UL    Monocytes Absolute 0.4 0.0 - 1.0 K/UL    Eosinophils Absolute 0.1 0.0 - 0.4 K/UL    Basophils Absolute 0.0 0.0 - 0.1 K/UL    Immature Granulocytes Absolute 0.0 0.00 - 0.04 K/UL    Differential Type AUTOMATED       No results found for this or any previous visit.     Results       ** No results found for the last 336 hours. **             Scheduled Meds:   sodium chloride flush  5-40 mL IntraVENous 2 times per day    enoxaparin  40 mg SubCUTAneous Daily    thiamine  100 mg IntraVENous Daily    multivitamin  1 tablet Oral Daily    folic acid  1 mg Oral Daily    diazePAM  5 mg Oral TID    pantoprazole  40 mg Oral QAM AC     Continuous Infusions:   sodium chloride 125 mL/hr at 07/09/24 0910    sodium chloride      sodium chloride       PRN Meds:.metoclopramide, traMADol, HYDROmorphone, atropine, melatonin, sodium chloride flush, sodium chloride, potassium chloride **OR** potassium alternative oral replacement

## 2024-07-09 NOTE — PROGRESS NOTES
0730: Bedside and Verbal shift change report given to Dorene Aceves RN (oncoming nurse) by Shakir Liu RN (offgoing nurse). Report included the following information Nurse Handoff Report, Index, Intake/Output, MAR, Recent Results, Cardiac Rhythm SR-SB, Alarm Parameters, Quality Measures, and Neuro Assessment.     0800: Assessment completed. Pt repositioned in bed.     1000: Pt repositioned in bed.     1131: IV infiltrated. Primary RN attempted twice to place new IV and unsuccessful. Spoke to Eloisa charge RN and Henri. Spoke to ED and ICU Rns to try to get an ultrasound guided IV in.     1200: Reassessment completed. Pt repositioned in bed.     1400: Pt repositioned in bed.     1600: Reassessment completed. Pt repositioned in bed.     1800: Pt repositioned in bed.     1920: Bedside and Verbal shift change report given to Arcelia Tripp, HILARIO (oncoming nurse) by Dorene Aceves RN (offgoing nurse). Report included the following information Nurse Handoff Report, Index, Adult Overview, Intake/Output, MAR, Recent Results, Cardiac Rhythm SB, Alarm Parameters, Quality Measures, and Neuro Assessment.

## 2024-07-09 NOTE — PLAN OF CARE
Problem: Discharge Planning  Goal: Discharge to home or other facility with appropriate resources  Outcome: Progressing     Problem: Pain  Goal: Verbalizes/displays adequate comfort level or baseline comfort level  Outcome: Progressing     Problem: Cardiovascular - Adult  Goal: Maintains optimal cardiac output and hemodynamic stability  Outcome: Progressing  Goal: Absence of cardiac dysrhythmias or at baseline  Outcome: Progressing     Problem: Gastrointestinal - Adult  Goal: Minimal or absence of nausea and vomiting  Outcome: Progressing

## 2024-07-10 LAB
ALBUMIN SERPL-MCNC: 3.3 G/DL (ref 3.5–5)
ANION GAP SERPL CALC-SCNC: 1 MMOL/L (ref 5–15)
BUN SERPL-MCNC: 7 MG/DL (ref 6–20)
BUN/CREAT SERPL: 8 (ref 12–20)
CA-I BLD-MCNC: 8.9 MG/DL (ref 8.5–10.1)
CHLORIDE SERPL-SCNC: 104 MMOL/L (ref 97–108)
CO2 SERPL-SCNC: 32 MMOL/L (ref 21–32)
CREAT SERPL-MCNC: 0.91 MG/DL (ref 0.7–1.3)
GLUCOSE SERPL-MCNC: 167 MG/DL (ref 65–100)
MAGNESIUM SERPL-MCNC: 2 MG/DL (ref 1.6–2.4)
PHOSPHATE SERPL-MCNC: 4.9 MG/DL (ref 2.6–4.7)
PLATELET # BLD AUTO: 108 K/UL (ref 150–400)
POTASSIUM SERPL-SCNC: 4 MMOL/L (ref 3.5–5.1)
SODIUM SERPL-SCNC: 137 MMOL/L (ref 136–145)

## 2024-07-10 PROCEDURE — 6370000000 HC RX 637 (ALT 250 FOR IP): Performed by: STUDENT IN AN ORGANIZED HEALTH CARE EDUCATION/TRAINING PROGRAM

## 2024-07-10 PROCEDURE — 2580000003 HC RX 258: Performed by: HOSPITALIST

## 2024-07-10 PROCEDURE — 80069 RENAL FUNCTION PANEL: CPT

## 2024-07-10 PROCEDURE — 6370000000 HC RX 637 (ALT 250 FOR IP): Performed by: HOSPITALIST

## 2024-07-10 PROCEDURE — 2500000003 HC RX 250 WO HCPCS: Performed by: HOSPITALIST

## 2024-07-10 PROCEDURE — 36415 COLL VENOUS BLD VENIPUNCTURE: CPT

## 2024-07-10 PROCEDURE — 83735 ASSAY OF MAGNESIUM: CPT

## 2024-07-10 PROCEDURE — 85049 AUTOMATED PLATELET COUNT: CPT

## 2024-07-10 PROCEDURE — 1100000000 HC RM PRIVATE

## 2024-07-10 PROCEDURE — 6370000000 HC RX 637 (ALT 250 FOR IP): Performed by: PHYSICIAN ASSISTANT

## 2024-07-10 PROCEDURE — 6360000002 HC RX W HCPCS: Performed by: STUDENT IN AN ORGANIZED HEALTH CARE EDUCATION/TRAINING PROGRAM

## 2024-07-10 PROCEDURE — 2580000003 HC RX 258: Performed by: STUDENT IN AN ORGANIZED HEALTH CARE EDUCATION/TRAINING PROGRAM

## 2024-07-10 RX ORDER — GAUZE BANDAGE 2" X 2"
100 BANDAGE TOPICAL DAILY
Status: DISCONTINUED | OUTPATIENT
Start: 2024-07-10 | End: 2024-07-12 | Stop reason: HOSPADM

## 2024-07-10 RX ORDER — OXYCODONE HYDROCHLORIDE 5 MG/1
5 TABLET ORAL EVERY 6 HOURS PRN
Status: DISCONTINUED | OUTPATIENT
Start: 2024-07-10 | End: 2024-07-11

## 2024-07-10 RX ADMIN — HYDROMORPHONE HYDROCHLORIDE 1 MG: 1 INJECTION, SOLUTION INTRAMUSCULAR; INTRAVENOUS; SUBCUTANEOUS at 06:39

## 2024-07-10 RX ADMIN — FOLIC ACID 1 MG: 1 TABLET ORAL at 09:13

## 2024-07-10 RX ADMIN — PANTOPRAZOLE SODIUM 40 MG: 40 TABLET, DELAYED RELEASE ORAL at 06:05

## 2024-07-10 RX ADMIN — DIAZEPAM 5 MG: 5 TABLET ORAL at 06:05

## 2024-07-10 RX ADMIN — SODIUM CHLORIDE: 9 INJECTION, SOLUTION INTRAVENOUS at 21:05

## 2024-07-10 RX ADMIN — HYDROMORPHONE HYDROCHLORIDE 1 MG: 1 INJECTION, SOLUTION INTRAMUSCULAR; INTRAVENOUS; SUBCUTANEOUS at 11:27

## 2024-07-10 RX ADMIN — TRAMADOL HYDROCHLORIDE 50 MG: 50 TABLET, COATED ORAL at 04:53

## 2024-07-10 RX ADMIN — Medication 5 MG: at 21:04

## 2024-07-10 RX ADMIN — ENOXAPARIN SODIUM 40 MG: 100 INJECTION SUBCUTANEOUS at 09:13

## 2024-07-10 RX ADMIN — DIAZEPAM 5 MG: 5 TABLET ORAL at 02:44

## 2024-07-10 RX ADMIN — OXYCODONE HYDROCHLORIDE 5 MG: 5 TABLET ORAL at 17:33

## 2024-07-10 RX ADMIN — SODIUM CHLORIDE, PRESERVATIVE FREE 10 ML: 5 INJECTION INTRAVENOUS at 21:04

## 2024-07-10 RX ADMIN — THERA TABS 1 TABLET: TAB at 09:13

## 2024-07-10 RX ADMIN — OXYCODONE HYDROCHLORIDE 5 MG: 5 TABLET ORAL at 23:18

## 2024-07-10 RX ADMIN — OXYCODONE HYDROCHLORIDE 5 MG: 5 TABLET ORAL at 13:57

## 2024-07-10 RX ADMIN — HYDROMORPHONE HYDROCHLORIDE 1 MG: 1 INJECTION, SOLUTION INTRAMUSCULAR; INTRAVENOUS; SUBCUTANEOUS at 02:44

## 2024-07-10 RX ADMIN — THIAMINE HCL TAB 100 MG 100 MG: 100 TAB at 09:13

## 2024-07-10 RX ADMIN — TRAMADOL HYDROCHLORIDE 50 MG: 50 TABLET, COATED ORAL at 21:04

## 2024-07-10 RX ADMIN — SODIUM CHLORIDE: 9 INJECTION, SOLUTION INTRAVENOUS at 04:52

## 2024-07-10 RX ADMIN — TRAMADOL HYDROCHLORIDE 50 MG: 50 TABLET, COATED ORAL at 09:12

## 2024-07-10 RX ADMIN — ACETAMINOPHEN 650 MG: 325 TABLET ORAL at 23:18

## 2024-07-10 RX ADMIN — SODIUM CHLORIDE, PRESERVATIVE FREE 10 ML: 5 INJECTION INTRAVENOUS at 09:15

## 2024-07-10 ASSESSMENT — PAIN DESCRIPTION - LOCATION
LOCATION: ABDOMEN
LOCATION: ABDOMEN
LOCATION: ABDOMEN;BACK
LOCATION: ABDOMEN
LOCATION: ABDOMEN
LOCATION: ABDOMEN;FLANK
LOCATION: ABDOMEN

## 2024-07-10 ASSESSMENT — PAIN DESCRIPTION - ORIENTATION
ORIENTATION: ANTERIOR
ORIENTATION: ANTERIOR;RIGHT
ORIENTATION: RIGHT
ORIENTATION: ANTERIOR
ORIENTATION: RIGHT;LOWER
ORIENTATION: RIGHT
ORIENTATION: ANTERIOR
ORIENTATION: ANTERIOR
ORIENTATION: RIGHT;LOWER

## 2024-07-10 ASSESSMENT — PAIN DESCRIPTION - PAIN TYPE
TYPE: ACUTE PAIN

## 2024-07-10 ASSESSMENT — PAIN SCALES - GENERAL
PAINLEVEL_OUTOF10: 5
PAINLEVEL_OUTOF10: 10
PAINLEVEL_OUTOF10: 10
PAINLEVEL_OUTOF10: 5
PAINLEVEL_OUTOF10: 8
PAINLEVEL_OUTOF10: 0
PAINLEVEL_OUTOF10: 8
PAINLEVEL_OUTOF10: 7
PAINLEVEL_OUTOF10: 5
PAINLEVEL_OUTOF10: 8
PAINLEVEL_OUTOF10: 7

## 2024-07-10 ASSESSMENT — PAIN DESCRIPTION - DESCRIPTORS
DESCRIPTORS: ACHING
DESCRIPTORS: THROBBING
DESCRIPTORS: ACHING;DISCOMFORT
DESCRIPTORS: ACHING
DESCRIPTORS: THROBBING

## 2024-07-10 ASSESSMENT — PAIN DESCRIPTION - DIRECTION: RADIATING_TOWARDS: BACK

## 2024-07-10 ASSESSMENT — PAIN DESCRIPTION - FREQUENCY
FREQUENCY: CONTINUOUS

## 2024-07-10 ASSESSMENT — PAIN DESCRIPTION - ONSET
ONSET: PROGRESSIVE
ONSET: ON-GOING
ONSET: ON-GOING

## 2024-07-10 ASSESSMENT — PAIN SCALES - WONG BAKER
WONGBAKER_NUMERICALRESPONSE: HURTS EVEN MORE
WONGBAKER_NUMERICALRESPONSE: HURTS EVEN MORE

## 2024-07-10 NOTE — PROGRESS NOTES
4 Eyes Skin Assessment     NAME:  Nathan Trejo  YOB: 1990  MEDICAL RECORD NUMBER:  [id number]    The patient is being assessed for  Skin assessment    I agree that at least one RN has performed a thorough Head to Toe Skin Assessment on the patient. ALL assessment sites listed below have been assessed.      Areas assessed by both nurses:    Head, Face, Ears, Shoulders, Back, Chest, Arms, Elbows, Hands, Sacrum. Buttock, Coccyx, Ischium, Legs. Feet and Heels, and Under Medical Devices         Does the Patient have a Wound? No noted wound(s)       Rg Prevention initiated by RN: Yes  Wound Care Orders initiated by RN: No    Pressure Injury (Stage 3,4, Unstageable, DTI, NWPT, and Complex wounds) if present, place Wound referral order by RN under : No    New Ostomies, if present place, Ostomy referral order under : No     Nurse 1 eSignature: Electronically signed by HUSSAIN ALEJANDRE RN on 7/10/24 at 12:49 AM EDT    **SHARE this note so that the co-signing nurse can place an eSignature**    Nurse 2 eSignature: {Esignature:[id number]}

## 2024-07-10 NOTE — PROGRESS NOTES
CARDIOLOGY PROGRESS NOTE      Patient Name: Nathan Trejo  Age: 33 y.o.  Gender:male  :1990  MRN: [id number]    Patient seen and examined. This is a patient with a history of alcohol abuse and recurrent pancreatitis who presented with abdominal pain and weakness now being followed for bradycardia. Sleeping in bed today, awakens to voice. Continues to endorse abdominal pain. Denies chest pain. Breathing stable. He reports having a slow heart rate for many years, he works as a  and is very active. Also taking Valium, and pain medications around the clock. No other complaints reported.    Telemetry reviewed.    Pertinent review of systems items noted above, all other systems are negative. Current medications reviewed.    Physical Examination    No Known Allergies  Vitals:    07/10/24 0830   BP: 111/67   Pulse: 58   Resp: 18   Temp: 97.5 °F (36.4 °C)   SpO2:      Vital signs are stable  No apparent distress.  Heart has a bradycardic but regular rhythm.  No murmur  Lungs are clear  Abdomen is soft, nontender, normal bowel sounds.  Extremities have no edema  Skin is dry and warm.  Normal affect    Labs reviewed:  Recent Results (from the past 12 hour(s))   Renal Function Panel    Collection Time: 07/10/24  5:51 AM   Result Value Ref Range    Sodium 137 136 - 145 mmol/L    Potassium 4.0 3.5 - 5.1 mmol/L    Chloride 104 97 - 108 mmol/L    CO2 32 21 - 32 mmol/L    Anion Gap 1 (L) 5 - 15 mmol/L    Glucose 167 (H) 65 - 100 mg/dL    BUN 7 6 - 20 mg/dL    Creatinine 0.91 0.70 - 1.30 mg/dL    BUN/Creatinine Ratio 8 (L) 12 - 20      Est, Glom Filt Rate >90 >60 ml/min/1.73m2    Calcium 8.9 8.5 - 10.1 mg/dL    Phosphorus 4.9 (H) 2.6 - 4.7 mg/dL    Albumin 3.3 (L) 3.5 - 5.0 g/dL   Magnesium    Collection Time: 07/10/24  5:51 AM   Result Value Ref Range    Magnesium 2.0 1.6 - 2.4 mg/dL   Platelet Count    Collection Time: 07/10/24  5:51 AM   Result Value Ref Range    Platelets 108 (L) 150 - 400 K/uL

## 2024-07-10 NOTE — PLAN OF CARE
Problem: Pain  Goal: Verbalizes/displays adequate comfort level or baseline comfort level  Outcome: Progressing  Flowsheets  Taken 7/10/2024 0314  Verbalizes/displays adequate comfort level or baseline comfort level:   Encourage patient to monitor pain and request assistance   Assess pain using appropriate pain scale    Problem: Cardiovascular - Adult  Goal: Maintains optimal cardiac output and hemodynamic stability  Outcome: Progressing  Flowsheets (Taken 7/9/2024 2006)  Maintains optimal cardiac output and hemodynamic stability:   Monitor blood pressure and heart rate   Monitor urine output and notify Licensed Independent Practitioner for values outside of normal range     Problem: Safety - Adult  Goal: Free from fall injury  Outcome: Progressing

## 2024-07-10 NOTE — PROGRESS NOTES
Hospitalist Progress Note    NAME:   Nathan Trejo   : 1990   MRN: [id number]     Subjective:   Daily Progress Note:  7/10/2024    Chief complaint:  Chief Complaint   Patient presents with    Abdominal Pain    Nausea    Emesis         Hospital course to date/HPI from H&P:  33-year-old male with a history of alcohol abuse and recurrent pancreatitis. Has been admitted to [facility] in May and [facility] in  for recurrent pancreatitis secondary to alcohol abuse. CT of the abdomen pelvis consistent with acute pancreatitis. No obvious obstructive causes. Triglyceride levels pending. Bilirubin is mildly elevated 1.5. If there is an increase will need further evaluation for obstruction with MRCP. Continuing pain medication and aggressive IV hydration. Patient with asymptomatic bradycardia and seen by cardiology felt bradycardia from pain, benzodiazepines.  Cards recommended echo and close monitoring.       24-patient seen and examined for the first time, chart was reviewed.  Patient continues to have intermittent abdominal pain.  No other acute issues reported to me by staff at this time.  Heart rate improved to 66.  Appreciate cardiology input.  Echo is pending.      24-patient seen and examined, had some nausea yesterday.  Tolerated liquid diet yesterday.  Echo completed, results pending.  Continues to have bradycardia episodes during that time.  Patient denies abdominal pain at this time.  Potassium 5.7 however repeat potassium pending at this time.      7/10/24-patient seen and examined had some more nausea today.  He does not feel well to go home today.  Patient still wants to try soft diet today and see how he does.  I have DC'd his IV Dilaudid and Valium as patient was sedated and causing bradycardia.  We will try to manage pain orally at this time.      Objective:     /82   Pulse 59   Temp 97.9 °F (36.6 °C) (Oral)   Resp 14   Ht 1.854 m (6' 1\")   Wt 70 kg (154 lb 5.2 oz)   SpO2  questions or details.  Thank you.     Signed By: Gael Garcia MD     July 10, 2024

## 2024-07-11 PROCEDURE — 6360000002 HC RX W HCPCS: Performed by: STUDENT IN AN ORGANIZED HEALTH CARE EDUCATION/TRAINING PROGRAM

## 2024-07-11 PROCEDURE — 6370000000 HC RX 637 (ALT 250 FOR IP): Performed by: HOSPITALIST

## 2024-07-11 PROCEDURE — 6370000000 HC RX 637 (ALT 250 FOR IP): Performed by: STUDENT IN AN ORGANIZED HEALTH CARE EDUCATION/TRAINING PROGRAM

## 2024-07-11 PROCEDURE — 2580000003 HC RX 258: Performed by: STUDENT IN AN ORGANIZED HEALTH CARE EDUCATION/TRAINING PROGRAM

## 2024-07-11 PROCEDURE — 1100000000 HC RM PRIVATE

## 2024-07-11 PROCEDURE — 6370000000 HC RX 637 (ALT 250 FOR IP): Performed by: PHYSICIAN ASSISTANT

## 2024-07-11 RX ORDER — THIAMINE MONONITRATE (VIT B1) 100 MG
100 TABLET ORAL DAILY
Qty: 30 TABLET | Refills: 0 | Status: SHIPPED | OUTPATIENT
Start: 2024-07-12

## 2024-07-11 RX ORDER — PANTOPRAZOLE SODIUM 40 MG/1
40 TABLET, DELAYED RELEASE ORAL
Qty: 30 TABLET | Refills: 3 | Status: SHIPPED | OUTPATIENT
Start: 2024-07-12

## 2024-07-11 RX ORDER — FOLIC ACID 1 MG/1
1 TABLET ORAL DAILY
Qty: 30 TABLET | Refills: 3 | Status: SHIPPED | OUTPATIENT
Start: 2024-07-12

## 2024-07-11 RX ORDER — TRAMADOL HYDROCHLORIDE 50 MG/1
50 TABLET ORAL EVERY 6 HOURS PRN
Qty: 10 TABLET | Refills: 0 | Status: SHIPPED | OUTPATIENT
Start: 2024-07-11 | End: 2024-07-14

## 2024-07-11 RX ADMIN — TRAMADOL HYDROCHLORIDE 50 MG: 50 TABLET, COATED ORAL at 14:01

## 2024-07-11 RX ADMIN — FOLIC ACID 1 MG: 1 TABLET ORAL at 09:18

## 2024-07-11 RX ADMIN — SODIUM CHLORIDE, PRESERVATIVE FREE 10 ML: 5 INJECTION INTRAVENOUS at 20:17

## 2024-07-11 RX ADMIN — PANTOPRAZOLE SODIUM 40 MG: 40 TABLET, DELAYED RELEASE ORAL at 05:20

## 2024-07-11 RX ADMIN — OXYCODONE HYDROCHLORIDE 5 MG: 5 TABLET ORAL at 05:20

## 2024-07-11 RX ADMIN — THIAMINE HCL TAB 100 MG 100 MG: 100 TAB at 09:18

## 2024-07-11 RX ADMIN — ENOXAPARIN SODIUM 40 MG: 100 INJECTION SUBCUTANEOUS at 09:19

## 2024-07-11 RX ADMIN — THERA TABS 1 TABLET: TAB at 09:18

## 2024-07-11 RX ADMIN — TRAMADOL HYDROCHLORIDE 50 MG: 50 TABLET, COATED ORAL at 04:24

## 2024-07-11 RX ADMIN — TRAMADOL HYDROCHLORIDE 50 MG: 50 TABLET, COATED ORAL at 09:18

## 2024-07-11 RX ADMIN — TRAMADOL HYDROCHLORIDE 50 MG: 50 TABLET, COATED ORAL at 20:17

## 2024-07-11 RX ADMIN — SODIUM CHLORIDE, PRESERVATIVE FREE 10 ML: 5 INJECTION INTRAVENOUS at 09:20

## 2024-07-11 ASSESSMENT — PAIN DESCRIPTION - ORIENTATION
ORIENTATION: ANTERIOR
ORIENTATION: RIGHT;LOWER
ORIENTATION: ANTERIOR
ORIENTATION: RIGHT;LOWER

## 2024-07-11 ASSESSMENT — PAIN DESCRIPTION - DESCRIPTORS
DESCRIPTORS: ACHING
DESCRIPTORS: ACHING
DESCRIPTORS: THROBBING
DESCRIPTORS: THROBBING
DESCRIPTORS: ACHING

## 2024-07-11 ASSESSMENT — PAIN SCALES - WONG BAKER
WONGBAKER_NUMERICALRESPONSE: HURTS A LITTLE BIT
WONGBAKER_NUMERICALRESPONSE: HURTS LITTLE MORE
WONGBAKER_NUMERICALRESPONSE: HURTS A LITTLE BIT

## 2024-07-11 ASSESSMENT — PAIN - FUNCTIONAL ASSESSMENT
PAIN_FUNCTIONAL_ASSESSMENT: PREVENTS OR INTERFERES SOME ACTIVE ACTIVITIES AND ADLS
PAIN_FUNCTIONAL_ASSESSMENT: PREVENTS OR INTERFERES SOME ACTIVE ACTIVITIES AND ADLS

## 2024-07-11 ASSESSMENT — PAIN SCALES - GENERAL
PAINLEVEL_OUTOF10: 5
PAINLEVEL_OUTOF10: 7
PAINLEVEL_OUTOF10: 6
PAINLEVEL_OUTOF10: 4
PAINLEVEL_OUTOF10: 0
PAINLEVEL_OUTOF10: 8
PAINLEVEL_OUTOF10: 8
PAINLEVEL_OUTOF10: 5
PAINLEVEL_OUTOF10: 9
PAINLEVEL_OUTOF10: 0
PAINLEVEL_OUTOF10: 7

## 2024-07-11 ASSESSMENT — PAIN DESCRIPTION - LOCATION
LOCATION: ABDOMEN

## 2024-07-11 NOTE — DISCHARGE INSTRUCTIONS
Diet-low-fat soft easily digestible  Activity slowly increase to levels before  Stop alcohol abuse  Schedule appointment with PCP follow-up care  Follow-up with GI for further workup of pancreatitis  Return to emergency call ambulance immediately symptoms recur or get worse

## 2024-07-11 NOTE — CARE COORDINATION
Transition of Care Plan:    RUR: 10%  Prior Level of Functioning: INDEP  Disposition: HOME/SELF  If SNF or IPR: Date FOC offered: NA  Date FOC received: NA  Accepting facility: HOME  Date authorization started with reference number: NA  Date authorization received and expires: NA  Follow up appointments: PCP  DME needed: NONE  Transportation at discharge: FAMILY  IM/IMM Medicare/ letter given: NA  Is patient a  and connected with [facility]? NO   If yes, was [place] transfer form completed and VA notified?   Caregiver Contact: MIKA AGUILAR  Discharge Caregiver contacted prior to discharge? NA  Care Conference needed? NO  Barriers to discharge: NONE    Pt to dc today w/ no needs from CM. CM received consult for PCP establishment. Pt currently has [place] Medicaid with a tentative plan to move to [place], CM unable to assist with PCP establishment as pt is unclear as to when and where he will be living.

## 2024-07-11 NOTE — CARE COORDINATION
CM spoke with pt this morning, pt had concerns regarding dc today in regards to pain and indicating that he feels he was not able to speak with his provider and have his concerns heard and addressed. CM notified provider who indicated that he would come to pt bedside to discuss.

## 2024-07-11 NOTE — PLAN OF CARE
Problem: Discharge Planning  Goal: Discharge to home or other facility with appropriate resources  Outcome: Progressing     Problem: Pain  Goal: Verbalizes/displays adequate comfort level or baseline comfort level  Outcome: Progressing     Problem: Cardiovascular - Adult  Goal: Maintains optimal cardiac output and hemodynamic stability  Outcome: Progressing  Goal: Absence of cardiac dysrhythmias or at baseline  Outcome: Progressing     Problem: Gastrointestinal - Adult  Goal: Minimal or absence of nausea and vomiting  Outcome: Progressing     Problem: Safety - Adult  Goal: Free from fall injury  Outcome: Progressing

## 2024-07-12 VITALS
HEART RATE: 53 BPM | OXYGEN SATURATION: 98 % | RESPIRATION RATE: 16 BRPM | HEIGHT: 73 IN | TEMPERATURE: 97.5 F | SYSTOLIC BLOOD PRESSURE: 113 MMHG | DIASTOLIC BLOOD PRESSURE: 76 MMHG | BODY MASS INDEX: 21.48 KG/M2 | WEIGHT: 162.04 LBS

## 2024-07-12 PROCEDURE — 6370000000 HC RX 637 (ALT 250 FOR IP): Performed by: STUDENT IN AN ORGANIZED HEALTH CARE EDUCATION/TRAINING PROGRAM

## 2024-07-12 PROCEDURE — 6370000000 HC RX 637 (ALT 250 FOR IP): Performed by: PHYSICIAN ASSISTANT

## 2024-07-12 PROCEDURE — 6360000002 HC RX W HCPCS: Performed by: STUDENT IN AN ORGANIZED HEALTH CARE EDUCATION/TRAINING PROGRAM

## 2024-07-12 PROCEDURE — 2580000003 HC RX 258: Performed by: HOSPITALIST

## 2024-07-12 PROCEDURE — 6370000000 HC RX 637 (ALT 250 FOR IP): Performed by: HOSPITALIST

## 2024-07-12 PROCEDURE — 2580000003 HC RX 258: Performed by: STUDENT IN AN ORGANIZED HEALTH CARE EDUCATION/TRAINING PROGRAM

## 2024-07-12 RX ADMIN — SODIUM CHLORIDE, PRESERVATIVE FREE 10 ML: 5 INJECTION INTRAVENOUS at 09:19

## 2024-07-12 RX ADMIN — PANTOPRAZOLE SODIUM 40 MG: 40 TABLET, DELAYED RELEASE ORAL at 06:10

## 2024-07-12 RX ADMIN — FOLIC ACID 1 MG: 1 TABLET ORAL at 09:18

## 2024-07-12 RX ADMIN — SODIUM CHLORIDE: 9 INJECTION, SOLUTION INTRAVENOUS at 06:37

## 2024-07-12 RX ADMIN — THERA TABS 1 TABLET: TAB at 09:18

## 2024-07-12 RX ADMIN — THIAMINE HCL TAB 100 MG 100 MG: 100 TAB at 09:18

## 2024-07-12 RX ADMIN — ENOXAPARIN SODIUM 40 MG: 100 INJECTION SUBCUTANEOUS at 09:18

## 2024-07-12 ASSESSMENT — PAIN SCALES - GENERAL: PAINLEVEL_OUTOF10: 8

## 2024-07-12 NOTE — DISCHARGE SUMMARY
deleted     
MD

## 2024-07-12 NOTE — PROGRESS NOTES
Patient discharged per providers' order. Paperwork discussed with patient and all questions answered. Patient is Ambulatory, he walked off the unit to his cab, which is his ride home (per patient).

## 2024-07-12 NOTE — PROGRESS NOTES
Hospitalist Progress Note    NAME:   Nathan Trejo   : 1990   MRN: [id number]     Subjective:   Daily Progress Note:  2024    Chief complaint:  Chief Complaint   Patient presents with    Abdominal Pain    Nausea    Emesis         Hospital course to date/HPI from H&P:  33-year-old male with a history of alcohol abuse and recurrent pancreatitis. Has been admitted to [facility] in May and [facility] in  for recurrent pancreatitis secondary to alcohol abuse. CT of the abdomen pelvis consistent with acute pancreatitis. No obvious obstructive causes. Triglyceride levels pending. Bilirubin is mildly elevated 1.5. If there is an increase will need further evaluation for obstruction with MRCP. Continuing pain medication and aggressive IV hydration. Patient with asymptomatic bradycardia and seen by cardiology felt bradycardia from pain, benzodiazepines.  Cards recommended echo and close monitoring.       24-patient seen and examined for the first time, chart was reviewed.  Patient continues to have intermittent abdominal pain.  No other acute issues reported to me by staff at this time.  Heart rate improved to 66.  Appreciate cardiology input.  Echo is pending.      24-patient seen and examined, had some nausea yesterday.  Tolerated liquid diet yesterday.  Echo completed, results pending.  Continues to have bradycardia episodes during that time.  Patient denies abdominal pain at this time.  Potassium 5.7 however repeat potassium pending at this time.      7/10/24-patient seen and examined had some more nausea today.  He does not feel well to go home today.  Patient still wants to try soft diet today and see how he does.  I have DC'd his IV Dilaudid and Valium as patient was sedated and causing bradycardia.  We will try to manage pain orally at this time.    24-patient was seen and examined by me twice on 2028, late entry note.  Patient was discharged 24 however patient refused to be

## 2024-08-10 ENCOUNTER — APPOINTMENT (OUTPATIENT)
Facility: HOSPITAL | Age: 34
End: 2024-08-10
Payer: COMMERCIAL

## 2024-08-10 ENCOUNTER — HOSPITAL ENCOUNTER (EMERGENCY)
Facility: HOSPITAL | Age: 34
Discharge: HOME | End: 2024-08-10
Attending: EMERGENCY MEDICINE
Payer: COMMERCIAL

## 2024-08-10 VITALS
RESPIRATION RATE: 18 BRPM | DIASTOLIC BLOOD PRESSURE: 105 MMHG | SYSTOLIC BLOOD PRESSURE: 147 MMHG | HEART RATE: 80 BPM | TEMPERATURE: 97.8 F

## 2024-08-10 DIAGNOSIS — K85.20 ALCOHOL-INDUCED ACUTE PANCREATITIS WITHOUT INFECTION OR NECROSIS: Primary | ICD-10-CM

## 2024-08-10 LAB
ALBUMIN SERPL-MCNC: 3.7 G/DL (ref 3.5–5)
ALBUMIN/GLOB SERPL: 1.5 (ref 1.1–2.2)
ALP SERPL-CCNC: 82 U/L (ref 45–117)
ALT SERPL-CCNC: 30 U/L (ref 12–78)
ANION GAP SERPL CALC-SCNC: 7 MMOL/L (ref 5–15)
AST SERPL-CCNC: 36 U/L (ref 15–37)
BASOPHILS # BLD: 0 K/UL (ref 0–0.1)
BASOPHILS NFR BLD: 1 % (ref 0–1)
BILIRUB SERPL-MCNC: 1.1 MG/DL (ref 0.2–1)
BUN SERPL-MCNC: 6 MG/DL (ref 6–20)
BUN/CREAT SERPL: 7 (ref 12–20)
CALCIUM SERPL-MCNC: 8.8 MG/DL (ref 8.5–10.1)
CHLORIDE SERPL-SCNC: 109 MMOL/L (ref 97–108)
CO2 SERPL-SCNC: 26 MMOL/L (ref 21–32)
CREAT SERPL-MCNC: 0.82 MG/DL (ref 0.7–1.3)
DIFFERENTIAL METHOD BLD: ABNORMAL
EOSINOPHIL # BLD: 0 K/UL (ref 0–0.4)
EOSINOPHIL NFR BLD: 1 % (ref 0–7)
ERYTHROCYTE [DISTWIDTH] IN BLOOD BY AUTOMATED COUNT: 12.8 % (ref 11.5–14.5)
GLOBULIN SER CALC-MCNC: 2.5 G/DL (ref 2–4)
GLUCOSE SERPL-MCNC: 106 MG/DL (ref 65–100)
HCT VFR BLD AUTO: 43.2 % (ref 36.6–50.3)
HGB BLD-MCNC: 14.6 G/DL (ref 12.1–17)
IMM GRANULOCYTES # BLD AUTO: 0 K/UL (ref 0–0.04)
IMM GRANULOCYTES NFR BLD AUTO: 0 % (ref 0–0.5)
LIPASE SERPL-CCNC: 118 U/L (ref 13–75)
LYMPHOCYTES # BLD: 2.2 K/UL (ref 0.8–3.5)
LYMPHOCYTES NFR BLD: 41 % (ref 12–49)
MCH RBC QN AUTO: 29 PG (ref 26–34)
MCHC RBC AUTO-ENTMCNC: 33.8 G/DL (ref 30–36.5)
MCV RBC AUTO: 85.7 FL (ref 80–99)
MONOCYTES # BLD: 0.3 K/UL (ref 0–1)
MONOCYTES NFR BLD: 6 % (ref 5–13)
NEUTS SEG # BLD: 2.8 K/UL (ref 1.8–8)
NEUTS SEG NFR BLD: 52 % (ref 32–75)
NRBC # BLD: 0 K/UL (ref 0–0.01)
NRBC BLD-RTO: 0 PER 100 WBC
PLATELET # BLD AUTO: 119 K/UL (ref 150–400)
PMV BLD AUTO: 11 FL (ref 8.9–12.9)
POTASSIUM SERPL-SCNC: 3.1 MMOL/L (ref 3.5–5.1)
PROT SERPL-MCNC: 6.2 G/DL (ref 6.4–8.2)
RBC # BLD AUTO: 5.04 M/UL (ref 4.1–5.7)
SODIUM SERPL-SCNC: 142 MMOL/L (ref 136–145)
WBC # BLD AUTO: 5.4 K/UL (ref 4.1–11.1)

## 2024-08-10 PROCEDURE — 74177 CT ABD & PELVIS W/CONTRAST: CPT

## 2024-08-10 PROCEDURE — 96361 HYDRATE IV INFUSION ADD-ON: CPT

## 2024-08-10 PROCEDURE — 6360000004 HC RX CONTRAST MEDICATION: Performed by: STUDENT IN AN ORGANIZED HEALTH CARE EDUCATION/TRAINING PROGRAM

## 2024-08-10 PROCEDURE — 99285 EMERGENCY DEPT VISIT HI MDM: CPT

## 2024-08-10 PROCEDURE — 85025 COMPLETE CBC W/AUTO DIFF WBC: CPT

## 2024-08-10 PROCEDURE — 96374 THER/PROPH/DIAG INJ IV PUSH: CPT

## 2024-08-10 PROCEDURE — 2580000003 HC RX 258: Performed by: EMERGENCY MEDICINE

## 2024-08-10 PROCEDURE — 83690 ASSAY OF LIPASE: CPT

## 2024-08-10 PROCEDURE — 6360000002 HC RX W HCPCS: Performed by: EMERGENCY MEDICINE

## 2024-08-10 PROCEDURE — 2500000003 HC RX 250 WO HCPCS: Performed by: EMERGENCY MEDICINE

## 2024-08-10 PROCEDURE — 96375 TX/PRO/DX INJ NEW DRUG ADDON: CPT

## 2024-08-10 PROCEDURE — 80053 COMPREHEN METABOLIC PANEL: CPT

## 2024-08-10 PROCEDURE — 36415 COLL VENOUS BLD VENIPUNCTURE: CPT

## 2024-08-10 RX ORDER — SODIUM CHLORIDE, SODIUM LACTATE, POTASSIUM CHLORIDE, AND CALCIUM CHLORIDE .6; .31; .03; .02 G/100ML; G/100ML; G/100ML; G/100ML
1000 INJECTION, SOLUTION INTRAVENOUS ONCE
Status: COMPLETED | OUTPATIENT
Start: 2024-08-10 | End: 2024-08-10

## 2024-08-10 RX ORDER — DROPERIDOL 2.5 MG/ML
0.62 INJECTION, SOLUTION INTRAMUSCULAR; INTRAVENOUS EVERY 6 HOURS PRN
Status: DISCONTINUED | OUTPATIENT
Start: 2024-08-10 | End: 2024-08-10 | Stop reason: HOSPADM

## 2024-08-10 RX ORDER — OXYCODONE HYDROCHLORIDE AND ACETAMINOPHEN 5; 325 MG/1; MG/1
1 TABLET ORAL EVERY 6 HOURS PRN
Qty: 12 TABLET | Refills: 0 | Status: ON HOLD | OUTPATIENT
Start: 2024-08-10 | End: 2024-08-16 | Stop reason: HOSPADM

## 2024-08-10 RX ORDER — ONDANSETRON 4 MG/1
4 TABLET, ORALLY DISINTEGRATING ORAL 3 TIMES DAILY PRN
Qty: 21 TABLET | Refills: 0 | Status: ON HOLD | OUTPATIENT
Start: 2024-08-10 | End: 2024-08-16 | Stop reason: HOSPADM

## 2024-08-10 RX ORDER — MORPHINE SULFATE 4 MG/ML
4 INJECTION, SOLUTION INTRAMUSCULAR; INTRAVENOUS
Status: COMPLETED | OUTPATIENT
Start: 2024-08-10 | End: 2024-08-10

## 2024-08-10 RX ORDER — FENTANYL CITRATE 50 UG/ML
50 INJECTION, SOLUTION INTRAMUSCULAR; INTRAVENOUS
Status: COMPLETED | OUTPATIENT
Start: 2024-08-10 | End: 2024-08-10

## 2024-08-10 RX ORDER — ONDANSETRON 2 MG/ML
4 INJECTION INTRAMUSCULAR; INTRAVENOUS ONCE
Status: COMPLETED | OUTPATIENT
Start: 2024-08-10 | End: 2024-08-10

## 2024-08-10 RX ADMIN — SODIUM CHLORIDE, POTASSIUM CHLORIDE, SODIUM LACTATE AND CALCIUM CHLORIDE 1000 ML: 600; 310; 30; 20 INJECTION, SOLUTION INTRAVENOUS at 17:33

## 2024-08-10 RX ADMIN — DROPERIDOL 0.62 MG: 2.5 INJECTION, SOLUTION INTRAMUSCULAR; INTRAVENOUS at 19:05

## 2024-08-10 RX ADMIN — FENTANYL CITRATE 50 MCG: 50 INJECTION INTRAMUSCULAR; INTRAVENOUS at 19:27

## 2024-08-10 RX ADMIN — MORPHINE SULFATE 4 MG: 4 INJECTION, SOLUTION INTRAMUSCULAR; INTRAVENOUS at 17:33

## 2024-08-10 RX ADMIN — FAMOTIDINE 20 MG: 10 INJECTION, SOLUTION INTRAVENOUS at 19:05

## 2024-08-10 RX ADMIN — ONDANSETRON 4 MG: 2 INJECTION INTRAMUSCULAR; INTRAVENOUS at 17:33

## 2024-08-10 RX ADMIN — IOPAMIDOL 100 ML: 755 INJECTION, SOLUTION INTRAVENOUS at 17:45

## 2024-08-10 ASSESSMENT — PAIN DESCRIPTION - ORIENTATION
ORIENTATION: RIGHT
ORIENTATION: RIGHT

## 2024-08-10 ASSESSMENT — PAIN SCALES - GENERAL
PAINLEVEL_OUTOF10: 8
PAINLEVEL_OUTOF10: 9

## 2024-08-10 ASSESSMENT — PAIN DESCRIPTION - PAIN TYPE: TYPE: ACUTE PAIN

## 2024-08-10 ASSESSMENT — LIFESTYLE VARIABLES
HOW OFTEN DO YOU HAVE A DRINK CONTAINING ALCOHOL: 4 OR MORE TIMES A WEEK
HOW MANY STANDARD DRINKS CONTAINING ALCOHOL DO YOU HAVE ON A TYPICAL DAY: 7 TO 9

## 2024-08-10 ASSESSMENT — PAIN DESCRIPTION - DESCRIPTORS
DESCRIPTORS: ACHING;THROBBING
DESCRIPTORS: ACHING

## 2024-08-10 ASSESSMENT — PAIN DESCRIPTION - LOCATION
LOCATION: ABDOMEN
LOCATION: ABDOMEN

## 2024-08-10 NOTE — ED PROVIDER NOTES
[facility] EMERGENCY DEPT  EMERGENCY DEPARTMENT ENCOUNTER       Pt Name: Nathan Trejo  MRN: [id number]  Birthdate 1990  Date of evaluation: 8/10/2024  Provider: Keenan Oliveira DO   PCP: No primary care provider on file.  Note Started: 5:35 PM EDT 8/10/24     CHIEF COMPLAINT       Chief Complaint   Patient presents with    Abdominal Pain     Patient arrives via EMS c/o RLQ abdominal pain that wraps around flank x 2 days following drinking pantoja. Patient states was sober for a couple weeks before relapse about 5 days ago. Patient reports hx of seizures with detox before. Last drink was 2 shots around 8am.         HISTORY OF PRESENT ILLNESS: 1 or more elements      History From: Patient, History limited by: none     Nathan Trejo is a 33 y.o. male presents to the emergency department by EMS for evaluation of abdominal pain nausea vomiting.       Please See MDM for Additional Details of the HPI/PMH  Nursing Notes were all reviewed and agreed with or any disagreements were addressed in the HPI.     REVIEW OF SYSTEMS        Positives and Pertinent negatives as per HPI.    PAST HISTORY     Past Medical History:  Past Medical History:   Diagnosis Date    Alcohol abuse        Past Surgical History:  No past surgical history on file.    Family History:  No family history on file.    Social History:  Social History     Tobacco Use    Smoking status: Every Day     Current packs/day: 1.00     Average packs/day: 1 pack/day for 5.6 years (5.6 ttl pk-yrs)     Types: Cigarettes     Start date: 2019    Smokeless tobacco: Never   Vaping Use    Vaping status: Never Used       Allergies:  No Known Allergies    CURRENT MEDICATIONS      Previous Medications    FOLIC ACID (FOLVITE) 1 MG TABLET    Take 1 tablet by mouth daily    PANTOPRAZOLE (PROTONIX) 40 MG TABLET    Take 1 tablet by mouth every morning (before breakfast)    THIAMINE MONONITRATE (THIAMINE) 100 MG TABLET    Take 1 tablet by mouth daily       SCREENINGS               No  Medication List          I am the Primary Clinician of Record.   Keenan Oliveira DO (electronically signed)    (Please note that parts of this dictation were completed with voice recognition software. Quite often unanticipated grammatical, syntax, homophones, and other interpretive errors are inadvertently transcribed by the computer software. Please disregards these errors. Please excuse any errors that have escaped final proofreading.)          Keenan Oliveira DO  08/10/24 3421

## 2024-08-11 ENCOUNTER — HOSPITAL ENCOUNTER (INPATIENT)
Facility: HOSPITAL | Age: 34
LOS: 4 days | Discharge: LEFT BEFORE DISCHARGE | End: 2024-08-16
Attending: STUDENT IN AN ORGANIZED HEALTH CARE EDUCATION/TRAINING PROGRAM | Admitting: HOSPITALIST
Payer: MEDICAID

## 2024-08-11 DIAGNOSIS — K85.20 ALCOHOL-INDUCED ACUTE PANCREATITIS WITHOUT INFECTION OR NECROSIS: ICD-10-CM

## 2024-08-11 DIAGNOSIS — E87.6 HYPOKALEMIA: ICD-10-CM

## 2024-08-11 DIAGNOSIS — K85.20 ALCOHOL-INDUCED ACUTE PANCREATITIS, UNSPECIFIED COMPLICATION STATUS: Primary | ICD-10-CM

## 2024-08-11 LAB
ALBUMIN SERPL-MCNC: 3.9 G/DL (ref 3.5–5)
ALBUMIN/GLOB SERPL: 1.5 (ref 1.1–2.2)
ALP SERPL-CCNC: 81 U/L (ref 45–117)
ALT SERPL-CCNC: 25 U/L (ref 12–78)
ANION GAP SERPL CALC-SCNC: 17 MMOL/L (ref 5–15)
AST SERPL-CCNC: 33 U/L (ref 15–37)
BASOPHILS # BLD: 0 K/UL (ref 0–0.1)
BASOPHILS NFR BLD: 0 % (ref 0–1)
BILIRUB SERPL-MCNC: 1.2 MG/DL (ref 0.2–1)
BUN SERPL-MCNC: 6 MG/DL (ref 6–20)
BUN/CREAT SERPL: 7 (ref 12–20)
CALCIUM SERPL-MCNC: 8.6 MG/DL (ref 8.5–10.1)
CHLORIDE SERPL-SCNC: 103 MMOL/L (ref 97–108)
CO2 SERPL-SCNC: 21 MMOL/L (ref 21–32)
CREAT SERPL-MCNC: 0.89 MG/DL (ref 0.7–1.3)
DIFFERENTIAL METHOD BLD: ABNORMAL
EOSINOPHIL # BLD: 0 K/UL (ref 0–0.4)
EOSINOPHIL NFR BLD: 0 % (ref 0–7)
ERYTHROCYTE [DISTWIDTH] IN BLOOD BY AUTOMATED COUNT: 12.8 % (ref 11.5–14.5)
GLOBULIN SER CALC-MCNC: 2.6 G/DL (ref 2–4)
GLUCOSE SERPL-MCNC: 106 MG/DL (ref 65–100)
HCT VFR BLD AUTO: 42.3 % (ref 36.6–50.3)
HGB BLD-MCNC: 14.8 G/DL (ref 12.1–17)
IMM GRANULOCYTES # BLD AUTO: 0 K/UL (ref 0–0.04)
IMM GRANULOCYTES NFR BLD AUTO: 0 % (ref 0–0.5)
LIPASE SERPL-CCNC: 939 U/L (ref 13–75)
LYMPHOCYTES # BLD: 0.9 K/UL (ref 0.8–3.5)
LYMPHOCYTES NFR BLD: 11 % (ref 12–49)
MCH RBC QN AUTO: 29.5 PG (ref 26–34)
MCHC RBC AUTO-ENTMCNC: 35 G/DL (ref 30–36.5)
MCV RBC AUTO: 84.4 FL (ref 80–99)
MONOCYTES # BLD: 0.3 K/UL (ref 0–1)
MONOCYTES NFR BLD: 4 % (ref 5–13)
NEUTS SEG # BLD: 6.6 K/UL (ref 1.8–8)
NEUTS SEG NFR BLD: 85 % (ref 32–75)
NRBC # BLD: 0 K/UL (ref 0–0.01)
NRBC BLD-RTO: 0 PER 100 WBC
PLATELET # BLD AUTO: 109 K/UL (ref 150–400)
POTASSIUM SERPL-SCNC: 3.1 MMOL/L (ref 3.5–5.1)
PROT SERPL-MCNC: 6.5 G/DL (ref 6.4–8.2)
RBC # BLD AUTO: 5.01 M/UL (ref 4.1–5.7)
RBC MORPH BLD: ABNORMAL
SODIUM SERPL-SCNC: 141 MMOL/L (ref 136–145)
WBC # BLD AUTO: 7.8 K/UL (ref 4.1–11.1)

## 2024-08-11 PROCEDURE — 96361 HYDRATE IV INFUSION ADD-ON: CPT

## 2024-08-11 PROCEDURE — 6360000002 HC RX W HCPCS: Performed by: STUDENT IN AN ORGANIZED HEALTH CARE EDUCATION/TRAINING PROGRAM

## 2024-08-11 PROCEDURE — 2580000003 HC RX 258: Performed by: STUDENT IN AN ORGANIZED HEALTH CARE EDUCATION/TRAINING PROGRAM

## 2024-08-11 PROCEDURE — 96374 THER/PROPH/DIAG INJ IV PUSH: CPT

## 2024-08-11 PROCEDURE — 96375 TX/PRO/DX INJ NEW DRUG ADDON: CPT

## 2024-08-11 PROCEDURE — 36415 COLL VENOUS BLD VENIPUNCTURE: CPT

## 2024-08-11 PROCEDURE — 99285 EMERGENCY DEPT VISIT HI MDM: CPT

## 2024-08-11 PROCEDURE — 80053 COMPREHEN METABOLIC PANEL: CPT

## 2024-08-11 PROCEDURE — 83690 ASSAY OF LIPASE: CPT

## 2024-08-11 PROCEDURE — 85025 COMPLETE CBC W/AUTO DIFF WBC: CPT

## 2024-08-11 RX ORDER — MORPHINE SULFATE 4 MG/ML
4 INJECTION, SOLUTION INTRAMUSCULAR; INTRAVENOUS
Status: COMPLETED | OUTPATIENT
Start: 2024-08-11 | End: 2024-08-11

## 2024-08-11 RX ORDER — SODIUM CHLORIDE, SODIUM LACTATE, POTASSIUM CHLORIDE, AND CALCIUM CHLORIDE .6; .31; .03; .02 G/100ML; G/100ML; G/100ML; G/100ML
1000 INJECTION, SOLUTION INTRAVENOUS ONCE
Status: COMPLETED | OUTPATIENT
Start: 2024-08-11 | End: 2024-08-11

## 2024-08-11 RX ORDER — DROPERIDOL 2.5 MG/ML
0.62 INJECTION, SOLUTION INTRAMUSCULAR; INTRAVENOUS ONCE
Status: COMPLETED | OUTPATIENT
Start: 2024-08-11 | End: 2024-08-11

## 2024-08-11 RX ORDER — LORAZEPAM 2 MG/ML
0.5 INJECTION INTRAMUSCULAR ONCE
Status: COMPLETED | OUTPATIENT
Start: 2024-08-11 | End: 2024-08-11

## 2024-08-11 RX ADMIN — LORAZEPAM 0.5 MG: 2 INJECTION INTRAMUSCULAR; INTRAVENOUS at 22:58

## 2024-08-11 RX ADMIN — SODIUM CHLORIDE, POTASSIUM CHLORIDE, SODIUM LACTATE AND CALCIUM CHLORIDE 1000 ML: 600; 310; 30; 20 INJECTION, SOLUTION INTRAVENOUS at 22:03

## 2024-08-11 RX ADMIN — MORPHINE SULFATE 4 MG: 4 INJECTION INTRAVENOUS at 22:05

## 2024-08-11 RX ADMIN — DROPERIDOL 0.62 MG: 2.5 INJECTION, SOLUTION INTRAMUSCULAR; INTRAVENOUS at 22:03

## 2024-08-11 ASSESSMENT — PAIN - FUNCTIONAL ASSESSMENT: PAIN_FUNCTIONAL_ASSESSMENT: 0-10

## 2024-08-11 ASSESSMENT — PAIN DESCRIPTION - ORIENTATION: ORIENTATION: RIGHT;UPPER

## 2024-08-11 ASSESSMENT — PAIN SCALES - GENERAL: PAINLEVEL_OUTOF10: 10

## 2024-08-11 ASSESSMENT — PAIN DESCRIPTION - LOCATION: LOCATION: ABDOMEN

## 2024-08-11 ASSESSMENT — PAIN DESCRIPTION - DESCRIPTORS: DESCRIPTORS: THROBBING

## 2024-08-12 PROBLEM — K85.20 ALCOHOL INDUCED ACUTE PANCREATITIS WITHOUT NECROSIS OR INFECTION: Status: ACTIVE | Noted: 2024-08-12

## 2024-08-12 LAB
ALBUMIN SERPL-MCNC: 3.1 G/DL (ref 3.5–5)
ALBUMIN/GLOB SERPL: 1.2 (ref 1.1–2.2)
ALP SERPL-CCNC: 73 U/L (ref 45–117)
ALT SERPL-CCNC: 22 U/L (ref 12–78)
AMPHET UR QL SCN: NEGATIVE
ANION GAP SERPL CALC-SCNC: 10 MMOL/L (ref 5–15)
ANION GAP SERPL CALC-SCNC: 16 MMOL/L (ref 5–15)
ANION GAP SERPL CALC-SCNC: 8 MMOL/L (ref 5–15)
APPEARANCE UR: CLEAR
AST SERPL-CCNC: 29 U/L (ref 15–37)
BACTERIA URNS QL MICRO: NEGATIVE /HPF
BARBITURATES UR QL SCN: NEGATIVE
BENZODIAZ UR QL: NEGATIVE
BILIRUB SERPL-MCNC: 1.1 MG/DL (ref 0.2–1)
BILIRUB UR QL CFM: NEGATIVE
BUN SERPL-MCNC: 5 MG/DL (ref 6–20)
BUN SERPL-MCNC: 6 MG/DL (ref 6–20)
BUN SERPL-MCNC: 7 MG/DL (ref 6–20)
BUN/CREAT SERPL: 8 (ref 12–20)
BUN/CREAT SERPL: 8 (ref 12–20)
BUN/CREAT SERPL: 9 (ref 12–20)
CALCIUM SERPL-MCNC: 8.2 MG/DL (ref 8.5–10.1)
CANNABINOIDS UR QL SCN: POSITIVE
CHLORIDE SERPL-SCNC: 104 MMOL/L (ref 97–108)
CHLORIDE SERPL-SCNC: 106 MMOL/L (ref 97–108)
CHLORIDE SERPL-SCNC: 106 MMOL/L (ref 97–108)
CO2 SERPL-SCNC: 26 MMOL/L (ref 21–32)
CO2 SERPL-SCNC: 26 MMOL/L (ref 21–32)
CO2 SERPL-SCNC: 28 MMOL/L (ref 21–32)
COCAINE UR QL SCN: NEGATIVE
COLOR UR: ABNORMAL
CREAT SERPL-MCNC: 0.65 MG/DL (ref 0.7–1.3)
CREAT SERPL-MCNC: 0.75 MG/DL (ref 0.7–1.3)
CREAT SERPL-MCNC: 0.79 MG/DL (ref 0.7–1.3)
EPITH CASTS URNS QL MICRO: ABNORMAL /LPF
GLOBULIN SER CALC-MCNC: 2.6 G/DL (ref 2–4)
GLUCOSE SERPL-MCNC: 104 MG/DL (ref 65–100)
GLUCOSE SERPL-MCNC: 94 MG/DL (ref 65–100)
GLUCOSE SERPL-MCNC: 97 MG/DL (ref 65–100)
GLUCOSE UR STRIP.AUTO-MCNC: NEGATIVE MG/DL
HGB UR QL STRIP: NEGATIVE
KETONES UR QL STRIP.AUTO: >80 MG/DL
LEUKOCYTE ESTERASE UR QL STRIP.AUTO: ABNORMAL
Lab: ABNORMAL
MAGNESIUM SERPL-MCNC: 1.4 MG/DL (ref 1.6–2.4)
METHADONE UR QL: NEGATIVE
NITRITE UR QL STRIP.AUTO: NEGATIVE
OPIATES UR QL: POSITIVE
PCP UR QL: NEGATIVE
PH UR STRIP: 6.5 (ref 5–8)
POTASSIUM SERPL-SCNC: 3.3 MMOL/L (ref 3.5–5.1)
POTASSIUM SERPL-SCNC: 3.3 MMOL/L (ref 3.5–5.1)
POTASSIUM SERPL-SCNC: 3.5 MMOL/L (ref 3.5–5.1)
PROT SERPL-MCNC: 5.7 G/DL (ref 6.4–8.2)
PROT UR STRIP-MCNC: 100 MG/DL
RBC #/AREA URNS HPF: ABNORMAL /HPF (ref 0–5)
SODIUM SERPL-SCNC: 142 MMOL/L (ref 136–145)
SODIUM SERPL-SCNC: 142 MMOL/L (ref 136–145)
SODIUM SERPL-SCNC: 146 MMOL/L (ref 136–145)
SP GR UR REFRACTOMETRY: 1.02 (ref 1–1.03)
TRIGL SERPL-MCNC: 67 MG/DL
URINE CULTURE IF INDICATED: ABNORMAL
UROBILINOGEN UR QL STRIP.AUTO: 1 EU/DL (ref 0.2–1)
WBC URNS QL MICRO: ABNORMAL /HPF (ref 0–4)

## 2024-08-12 PROCEDURE — 2500000003 HC RX 250 WO HCPCS: Performed by: HOSPITALIST

## 2024-08-12 PROCEDURE — 6370000000 HC RX 637 (ALT 250 FOR IP): Performed by: STUDENT IN AN ORGANIZED HEALTH CARE EDUCATION/TRAINING PROGRAM

## 2024-08-12 PROCEDURE — 6360000002 HC RX W HCPCS: Performed by: HOSPITALIST

## 2024-08-12 PROCEDURE — 81001 URINALYSIS AUTO W/SCOPE: CPT

## 2024-08-12 PROCEDURE — 6370000000 HC RX 637 (ALT 250 FOR IP): Performed by: HOSPITALIST

## 2024-08-12 PROCEDURE — 83735 ASSAY OF MAGNESIUM: CPT

## 2024-08-12 PROCEDURE — 80048 BASIC METABOLIC PNL TOTAL CA: CPT

## 2024-08-12 PROCEDURE — 2580000003 HC RX 258: Performed by: HOSPITALIST

## 2024-08-12 PROCEDURE — 6360000002 HC RX W HCPCS: Performed by: STUDENT IN AN ORGANIZED HEALTH CARE EDUCATION/TRAINING PROGRAM

## 2024-08-12 PROCEDURE — 36415 COLL VENOUS BLD VENIPUNCTURE: CPT

## 2024-08-12 PROCEDURE — 1200000000 HC SEMI PRIVATE

## 2024-08-12 PROCEDURE — 84478 ASSAY OF TRIGLYCERIDES: CPT

## 2024-08-12 PROCEDURE — 93005 ELECTROCARDIOGRAM TRACING: CPT | Performed by: INTERNAL MEDICINE

## 2024-08-12 PROCEDURE — 6360000002 HC RX W HCPCS: Performed by: INTERNAL MEDICINE

## 2024-08-12 PROCEDURE — 80053 COMPREHEN METABOLIC PANEL: CPT

## 2024-08-12 PROCEDURE — 80307 DRUG TEST PRSMV CHEM ANLYZR: CPT

## 2024-08-12 PROCEDURE — 6370000000 HC RX 637 (ALT 250 FOR IP): Performed by: INTERNAL MEDICINE

## 2024-08-12 PROCEDURE — 2580000003 HC RX 258: Performed by: INTERNAL MEDICINE

## 2024-08-12 PROCEDURE — 96376 TX/PRO/DX INJ SAME DRUG ADON: CPT

## 2024-08-12 RX ORDER — SODIUM CHLORIDE 0.9 % (FLUSH) 0.9 %
5-40 SYRINGE (ML) INJECTION PRN
Status: DISCONTINUED | OUTPATIENT
Start: 2024-08-12 | End: 2024-08-16 | Stop reason: HOSPADM

## 2024-08-12 RX ORDER — ACETAMINOPHEN 650 MG/1
650 SUPPOSITORY RECTAL EVERY 6 HOURS PRN
Status: DISCONTINUED | OUTPATIENT
Start: 2024-08-12 | End: 2024-08-16 | Stop reason: HOSPADM

## 2024-08-12 RX ORDER — LORAZEPAM 2 MG/ML
4 INJECTION INTRAMUSCULAR
Status: DISCONTINUED | OUTPATIENT
Start: 2024-08-12 | End: 2024-08-16 | Stop reason: HOSPADM

## 2024-08-12 RX ORDER — ACETAMINOPHEN 325 MG/1
650 TABLET ORAL EVERY 6 HOURS PRN
Status: DISCONTINUED | OUTPATIENT
Start: 2024-08-12 | End: 2024-08-16 | Stop reason: HOSPADM

## 2024-08-12 RX ORDER — SODIUM CHLORIDE 9 MG/ML
INJECTION, SOLUTION INTRAVENOUS CONTINUOUS
Status: DISCONTINUED | OUTPATIENT
Start: 2024-08-12 | End: 2024-08-12

## 2024-08-12 RX ORDER — LORAZEPAM 1 MG/1
2 TABLET ORAL
Status: DISCONTINUED | OUTPATIENT
Start: 2024-08-12 | End: 2024-08-12

## 2024-08-12 RX ORDER — NICOTINE 21 MG/24HR
1 PATCH, TRANSDERMAL 24 HOURS TRANSDERMAL DAILY
Status: DISCONTINUED | OUTPATIENT
Start: 2024-08-12 | End: 2024-08-16 | Stop reason: HOSPADM

## 2024-08-12 RX ORDER — FOLIC ACID 1 MG/1
1 TABLET ORAL DAILY
Status: DISCONTINUED | OUTPATIENT
Start: 2024-08-12 | End: 2024-08-16 | Stop reason: HOSPADM

## 2024-08-12 RX ORDER — LORAZEPAM 2 MG/ML
2 INJECTION INTRAMUSCULAR
Status: DISCONTINUED | OUTPATIENT
Start: 2024-08-12 | End: 2024-08-12

## 2024-08-12 RX ORDER — LORAZEPAM 2 MG/ML
4 INJECTION INTRAMUSCULAR
Status: DISCONTINUED | OUTPATIENT
Start: 2024-08-12 | End: 2024-08-12

## 2024-08-12 RX ORDER — LORAZEPAM 2 MG/ML
1 INJECTION INTRAMUSCULAR
Status: DISCONTINUED | OUTPATIENT
Start: 2024-08-12 | End: 2024-08-12

## 2024-08-12 RX ORDER — METOCLOPRAMIDE HYDROCHLORIDE 5 MG/ML
5 INJECTION INTRAMUSCULAR; INTRAVENOUS EVERY 4 HOURS PRN
Status: DISCONTINUED | OUTPATIENT
Start: 2024-08-12 | End: 2024-08-16 | Stop reason: HOSPADM

## 2024-08-12 RX ORDER — DROPERIDOL 2.5 MG/ML
0.62 INJECTION, SOLUTION INTRAMUSCULAR; INTRAVENOUS ONCE
Status: COMPLETED | OUTPATIENT
Start: 2024-08-12 | End: 2024-08-12

## 2024-08-12 RX ORDER — ONDANSETRON 4 MG/1
4 TABLET, ORALLY DISINTEGRATING ORAL EVERY 8 HOURS PRN
Status: DISCONTINUED | OUTPATIENT
Start: 2024-08-12 | End: 2024-08-16 | Stop reason: HOSPADM

## 2024-08-12 RX ORDER — MAGNESIUM SULFATE IN WATER 40 MG/ML
2000 INJECTION, SOLUTION INTRAVENOUS PRN
Status: DISCONTINUED | OUTPATIENT
Start: 2024-08-12 | End: 2024-08-16 | Stop reason: HOSPADM

## 2024-08-12 RX ORDER — LORAZEPAM 1 MG/1
4 TABLET ORAL
Status: DISCONTINUED | OUTPATIENT
Start: 2024-08-12 | End: 2024-08-12

## 2024-08-12 RX ORDER — POTASSIUM CHLORIDE 750 MG/1
40 TABLET, FILM COATED, EXTENDED RELEASE ORAL PRN
Status: DISCONTINUED | OUTPATIENT
Start: 2024-08-12 | End: 2024-08-16 | Stop reason: HOSPADM

## 2024-08-12 RX ORDER — LANOLIN ALCOHOL/MO/W.PET/CERES
100 CREAM (GRAM) TOPICAL DAILY
Status: DISCONTINUED | OUTPATIENT
Start: 2024-08-12 | End: 2024-08-16 | Stop reason: HOSPADM

## 2024-08-12 RX ORDER — SODIUM CHLORIDE 9 MG/ML
INJECTION, SOLUTION INTRAVENOUS PRN
Status: DISCONTINUED | OUTPATIENT
Start: 2024-08-12 | End: 2024-08-16 | Stop reason: HOSPADM

## 2024-08-12 RX ORDER — CHLORDIAZEPOXIDE HYDROCHLORIDE 5 MG/1
10 CAPSULE, GELATIN COATED ORAL EVERY 4 HOURS PRN
Status: DISCONTINUED | OUTPATIENT
Start: 2024-08-12 | End: 2024-08-16 | Stop reason: HOSPADM

## 2024-08-12 RX ORDER — LORAZEPAM 2 MG/ML
3 INJECTION INTRAMUSCULAR
Status: DISCONTINUED | OUTPATIENT
Start: 2024-08-12 | End: 2024-08-12

## 2024-08-12 RX ORDER — SODIUM CHLORIDE 0.9 % (FLUSH) 0.9 %
5-40 SYRINGE (ML) INJECTION PRN
Status: DISCONTINUED | OUTPATIENT
Start: 2024-08-12 | End: 2024-08-14

## 2024-08-12 RX ORDER — SODIUM CHLORIDE 0.9 % (FLUSH) 0.9 %
5-40 SYRINGE (ML) INJECTION EVERY 12 HOURS SCHEDULED
Status: DISCONTINUED | OUTPATIENT
Start: 2024-08-12 | End: 2024-08-16 | Stop reason: HOSPADM

## 2024-08-12 RX ORDER — MORPHINE SULFATE 2 MG/ML
2 INJECTION, SOLUTION INTRAMUSCULAR; INTRAVENOUS
Status: DISCONTINUED | OUTPATIENT
Start: 2024-08-12 | End: 2024-08-13

## 2024-08-12 RX ORDER — POTASSIUM CHLORIDE 7.45 MG/ML
10 INJECTION INTRAVENOUS PRN
Status: DISCONTINUED | OUTPATIENT
Start: 2024-08-12 | End: 2024-08-16 | Stop reason: HOSPADM

## 2024-08-12 RX ORDER — NALOXONE HYDROCHLORIDE 0.4 MG/ML
0.4 INJECTION, SOLUTION INTRAMUSCULAR; INTRAVENOUS; SUBCUTANEOUS PRN
Status: DISCONTINUED | OUTPATIENT
Start: 2024-08-12 | End: 2024-08-16 | Stop reason: HOSPADM

## 2024-08-12 RX ORDER — ACETAMINOPHEN 500 MG
1000 TABLET ORAL
Status: COMPLETED | OUTPATIENT
Start: 2024-08-12 | End: 2024-08-12

## 2024-08-12 RX ORDER — ENOXAPARIN SODIUM 100 MG/ML
40 INJECTION SUBCUTANEOUS DAILY
Status: DISCONTINUED | OUTPATIENT
Start: 2024-08-12 | End: 2024-08-16 | Stop reason: HOSPADM

## 2024-08-12 RX ORDER — MAGNESIUM HYDROXIDE/ALUMINUM HYDROXICE/SIMETHICONE 120; 1200; 1200 MG/30ML; MG/30ML; MG/30ML
30 SUSPENSION ORAL EVERY 6 HOURS PRN
Status: DISCONTINUED | OUTPATIENT
Start: 2024-08-12 | End: 2024-08-16 | Stop reason: HOSPADM

## 2024-08-12 RX ORDER — CHLORDIAZEPOXIDE HYDROCHLORIDE 25 MG/1
75 CAPSULE, GELATIN COATED ORAL
Status: DISCONTINUED | OUTPATIENT
Start: 2024-08-12 | End: 2024-08-16 | Stop reason: HOSPADM

## 2024-08-12 RX ORDER — CHLORDIAZEPOXIDE HYDROCHLORIDE 25 MG/1
25 CAPSULE, GELATIN COATED ORAL EVERY 4 HOURS PRN
Status: DISCONTINUED | OUTPATIENT
Start: 2024-08-12 | End: 2024-08-16 | Stop reason: HOSPADM

## 2024-08-12 RX ORDER — LORAZEPAM 1 MG/1
4 TABLET ORAL
Status: DISCONTINUED | OUTPATIENT
Start: 2024-08-12 | End: 2024-08-16 | Stop reason: HOSPADM

## 2024-08-12 RX ORDER — SODIUM CHLORIDE, SODIUM LACTATE, POTASSIUM CHLORIDE, CALCIUM CHLORIDE 600; 310; 30; 20 MG/100ML; MG/100ML; MG/100ML; MG/100ML
INJECTION, SOLUTION INTRAVENOUS CONTINUOUS
Status: DISCONTINUED | OUTPATIENT
Start: 2024-08-12 | End: 2024-08-15

## 2024-08-12 RX ORDER — LORAZEPAM 1 MG/1
3 TABLET ORAL
Status: DISCONTINUED | OUTPATIENT
Start: 2024-08-12 | End: 2024-08-12

## 2024-08-12 RX ORDER — ONDANSETRON 2 MG/ML
4 INJECTION INTRAMUSCULAR; INTRAVENOUS EVERY 6 HOURS PRN
Status: DISCONTINUED | OUTPATIENT
Start: 2024-08-12 | End: 2024-08-16 | Stop reason: HOSPADM

## 2024-08-12 RX ORDER — CHLORDIAZEPOXIDE HYDROCHLORIDE 25 MG/1
50 CAPSULE, GELATIN COATED ORAL
Status: DISCONTINUED | OUTPATIENT
Start: 2024-08-12 | End: 2024-08-16 | Stop reason: HOSPADM

## 2024-08-12 RX ORDER — POTASSIUM CHLORIDE 7.45 MG/ML
10 INJECTION INTRAVENOUS
Status: COMPLETED | OUTPATIENT
Start: 2024-08-12 | End: 2024-08-12

## 2024-08-12 RX ORDER — LORAZEPAM 1 MG/1
1 TABLET ORAL
Status: DISCONTINUED | OUTPATIENT
Start: 2024-08-12 | End: 2024-08-12

## 2024-08-12 RX ORDER — SODIUM CHLORIDE 9 MG/ML
INJECTION, SOLUTION INTRAVENOUS PRN
Status: DISCONTINUED | OUTPATIENT
Start: 2024-08-12 | End: 2024-08-14

## 2024-08-12 RX ORDER — SODIUM CHLORIDE 0.9 % (FLUSH) 0.9 %
5-40 SYRINGE (ML) INJECTION EVERY 12 HOURS SCHEDULED
Status: DISCONTINUED | OUTPATIENT
Start: 2024-08-12 | End: 2024-08-14

## 2024-08-12 RX ORDER — POLYETHYLENE GLYCOL 3350 17 G/17G
17 POWDER, FOR SOLUTION ORAL DAILY PRN
Status: DISCONTINUED | OUTPATIENT
Start: 2024-08-12 | End: 2024-08-16 | Stop reason: HOSPADM

## 2024-08-12 RX ORDER — MORPHINE SULFATE 4 MG/ML
4 INJECTION, SOLUTION INTRAMUSCULAR; INTRAVENOUS
Status: COMPLETED | OUTPATIENT
Start: 2024-08-12 | End: 2024-08-12

## 2024-08-12 RX ORDER — MORPHINE SULFATE 4 MG/ML
4 INJECTION, SOLUTION INTRAMUSCULAR; INTRAVENOUS
Status: DISCONTINUED | OUTPATIENT
Start: 2024-08-12 | End: 2024-08-13

## 2024-08-12 RX ORDER — MULTIVITAMIN WITH IRON
1 TABLET ORAL DAILY
Status: DISCONTINUED | OUTPATIENT
Start: 2024-08-12 | End: 2024-08-16 | Stop reason: HOSPADM

## 2024-08-12 RX ADMIN — MORPHINE SULFATE 4 MG: 4 INJECTION INTRAVENOUS at 13:46

## 2024-08-12 RX ADMIN — MORPHINE SULFATE 4 MG: 4 INJECTION INTRAVENOUS at 22:32

## 2024-08-12 RX ADMIN — METOCLOPRAMIDE HYDROCHLORIDE 5 MG: 5 INJECTION INTRAMUSCULAR; INTRAVENOUS at 19:49

## 2024-08-12 RX ADMIN — SODIUM CHLORIDE, POTASSIUM CHLORIDE, SODIUM LACTATE AND CALCIUM CHLORIDE: 600; 310; 30; 20 INJECTION, SOLUTION INTRAVENOUS at 17:00

## 2024-08-12 RX ADMIN — FAMOTIDINE 20 MG: 10 INJECTION, SOLUTION INTRAVENOUS at 22:20

## 2024-08-12 RX ADMIN — ONDANSETRON 4 MG: 2 INJECTION INTRAMUSCULAR; INTRAVENOUS at 17:10

## 2024-08-12 RX ADMIN — MORPHINE SULFATE 4 MG: 4 INJECTION INTRAVENOUS at 20:14

## 2024-08-12 RX ADMIN — POTASSIUM CHLORIDE 10 MEQ: 7.46 INJECTION, SOLUTION INTRAVENOUS at 01:47

## 2024-08-12 RX ADMIN — POTASSIUM CHLORIDE 10 MEQ: 7.46 INJECTION, SOLUTION INTRAVENOUS at 04:32

## 2024-08-12 RX ADMIN — Medication 100 MG: at 08:39

## 2024-08-12 RX ADMIN — ACETAMINOPHEN 1000 MG: 500 TABLET ORAL at 00:19

## 2024-08-12 RX ADMIN — MAGNESIUM SULFATE HEPTAHYDRATE 2000 MG: 40 INJECTION, SOLUTION INTRAVENOUS at 11:57

## 2024-08-12 RX ADMIN — DROPERIDOL 0.62 MG: 2.5 INJECTION, SOLUTION INTRAMUSCULAR; INTRAVENOUS at 00:16

## 2024-08-12 RX ADMIN — SODIUM CHLORIDE, POTASSIUM CHLORIDE, SODIUM LACTATE AND CALCIUM CHLORIDE: 600; 310; 30; 20 INJECTION, SOLUTION INTRAVENOUS at 11:11

## 2024-08-12 RX ADMIN — POTASSIUM CHLORIDE 10 MEQ: 7.46 INJECTION, SOLUTION INTRAVENOUS at 02:32

## 2024-08-12 RX ADMIN — ONDANSETRON 4 MG: 2 INJECTION INTRAMUSCULAR; INTRAVENOUS at 04:31

## 2024-08-12 RX ADMIN — MORPHINE SULFATE 4 MG: 4 INJECTION INTRAVENOUS at 01:40

## 2024-08-12 RX ADMIN — THERA TABS 1 TABLET: TAB at 14:16

## 2024-08-12 RX ADMIN — MORPHINE SULFATE 4 MG: 4 INJECTION INTRAVENOUS at 04:30

## 2024-08-12 RX ADMIN — SODIUM CHLORIDE: 9 INJECTION, SOLUTION INTRAVENOUS at 01:46

## 2024-08-12 RX ADMIN — POTASSIUM CHLORIDE 40 MEQ: 750 TABLET, EXTENDED RELEASE ORAL at 22:21

## 2024-08-12 RX ADMIN — ONDANSETRON 4 MG: 2 INJECTION INTRAMUSCULAR; INTRAVENOUS at 11:38

## 2024-08-12 RX ADMIN — SODIUM CHLORIDE 25 ML: 9 INJECTION, SOLUTION INTRAVENOUS at 11:55

## 2024-08-12 RX ADMIN — SODIUM CHLORIDE, PRESERVATIVE FREE 10 ML: 5 INJECTION INTRAVENOUS at 08:37

## 2024-08-12 RX ADMIN — MORPHINE SULFATE 4 MG: 4 INJECTION INTRAVENOUS at 11:44

## 2024-08-12 RX ADMIN — LORAZEPAM 2 MG: 2 INJECTION INTRAMUSCULAR; INTRAVENOUS at 06:11

## 2024-08-12 RX ADMIN — MORPHINE SULFATE 4 MG: 4 INJECTION INTRAVENOUS at 08:31

## 2024-08-12 RX ADMIN — POTASSIUM CHLORIDE 10 MEQ: 7.46 INJECTION, SOLUTION INTRAVENOUS at 03:33

## 2024-08-12 RX ADMIN — MORPHINE SULFATE 4 MG: 4 INJECTION INTRAVENOUS at 15:47

## 2024-08-12 RX ADMIN — SODIUM CHLORIDE, PRESERVATIVE FREE 10 ML: 5 INJECTION INTRAVENOUS at 08:40

## 2024-08-12 RX ADMIN — ENOXAPARIN SODIUM 40 MG: 100 INJECTION SUBCUTANEOUS at 08:53

## 2024-08-12 RX ADMIN — SODIUM CHLORIDE, PRESERVATIVE FREE 10 ML: 5 INJECTION INTRAVENOUS at 22:20

## 2024-08-12 RX ADMIN — LORAZEPAM 1 MG: 2 INJECTION INTRAMUSCULAR; INTRAVENOUS at 17:10

## 2024-08-12 RX ADMIN — FAMOTIDINE 20 MG: 10 INJECTION, SOLUTION INTRAVENOUS at 02:31

## 2024-08-12 RX ADMIN — FOLIC ACID 1 MG: 1 TABLET ORAL at 14:15

## 2024-08-12 RX ADMIN — SODIUM CHLORIDE, POTASSIUM CHLORIDE, SODIUM LACTATE AND CALCIUM CHLORIDE: 600; 310; 30; 20 INJECTION, SOLUTION INTRAVENOUS at 22:38

## 2024-08-12 RX ADMIN — CHLORDIAZEPOXIDE HYDROCHLORIDE 25 MG: 25 CAPSULE ORAL at 18:42

## 2024-08-12 RX ADMIN — FAMOTIDINE 20 MG: 10 INJECTION, SOLUTION INTRAVENOUS at 08:39

## 2024-08-12 RX ADMIN — MORPHINE SULFATE 4 MG: 4 INJECTION INTRAVENOUS at 18:06

## 2024-08-12 ASSESSMENT — PAIN DESCRIPTION - LOCATION
LOCATION: ABDOMEN

## 2024-08-12 ASSESSMENT — PAIN SCALES - GENERAL
PAINLEVEL_OUTOF10: 5
PAINLEVEL_OUTOF10: 10
PAINLEVEL_OUTOF10: 6
PAINLEVEL_OUTOF10: 10
PAINLEVEL_OUTOF10: 8
PAINLEVEL_OUTOF10: 0
PAINLEVEL_OUTOF10: 9
PAINLEVEL_OUTOF10: 10
PAINLEVEL_OUTOF10: 9
PAINLEVEL_OUTOF10: 0
PAINLEVEL_OUTOF10: 10
PAINLEVEL_OUTOF10: 6
PAINLEVEL_OUTOF10: 9
PAINLEVEL_OUTOF10: 5
PAINLEVEL_OUTOF10: 8
PAINLEVEL_OUTOF10: 9
PAINLEVEL_OUTOF10: 6
PAINLEVEL_OUTOF10: 8
PAINLEVEL_OUTOF10: 9

## 2024-08-12 ASSESSMENT — PAIN - FUNCTIONAL ASSESSMENT
PAIN_FUNCTIONAL_ASSESSMENT: PREVENTS OR INTERFERES SOME ACTIVE ACTIVITIES AND ADLS
PAIN_FUNCTIONAL_ASSESSMENT: ACTIVITIES ARE NOT PREVENTED
PAIN_FUNCTIONAL_ASSESSMENT: PREVENTS OR INTERFERES SOME ACTIVE ACTIVITIES AND ADLS
PAIN_FUNCTIONAL_ASSESSMENT: ACTIVITIES ARE NOT PREVENTED
PAIN_FUNCTIONAL_ASSESSMENT: PREVENTS OR INTERFERES SOME ACTIVE ACTIVITIES AND ADLS

## 2024-08-12 ASSESSMENT — PAIN DESCRIPTION - ORIENTATION
ORIENTATION: MID;LOWER
ORIENTATION: MID
ORIENTATION: LOWER;MID
ORIENTATION: LEFT;RIGHT;ANTERIOR;MID
ORIENTATION: MID;ANTERIOR
ORIENTATION: LOWER;MID
ORIENTATION: MID;LOWER
ORIENTATION: ANTERIOR;MID
ORIENTATION: ANTERIOR;MID
ORIENTATION: MID

## 2024-08-12 ASSESSMENT — PAIN DESCRIPTION - DESCRIPTORS
DESCRIPTORS: ACHING
DESCRIPTORS: ACHING
DESCRIPTORS: DISCOMFORT;CRAMPING;ACHING
DESCRIPTORS: THROBBING;SHARP
DESCRIPTORS: TIGHTNESS
DESCRIPTORS: OTHER (COMMENT)
DESCRIPTORS: TIGHTNESS
DESCRIPTORS: ACHING
DESCRIPTORS: CRAMPING;DISCOMFORT;ACHING
DESCRIPTORS: OTHER (COMMENT)

## 2024-08-12 ASSESSMENT — PAIN DESCRIPTION - FREQUENCY
FREQUENCY: CONTINUOUS
FREQUENCY: CONTINUOUS

## 2024-08-12 ASSESSMENT — PAIN DESCRIPTION - PAIN TYPE
TYPE: ACUTE PAIN;CHRONIC PAIN
TYPE: ACUTE PAIN;CHRONIC PAIN

## 2024-08-12 NOTE — PROGRESS NOTES
[facility]: MED RECONCILIATION    Comments/Recommendations:   Source: patient interview at bedside  Patient states he is not currently taking any medications. Patient reports recently being prescribed medications listed on PTA list but he has not picked them up yet.    Confirmed allergies and preferred pharmacy.       Prior to Admission Medications:   Prior to Admission Medications   Prescriptions Last Dose Informant   folic acid (FOLVITE) 1 MG tablet not picked up yet Self   Sig: Take 1 tablet by mouth daily   ondansetron (ZOFRAN-ODT) 4 MG disintegrating tablet not picked up yet Self   Sig: Take 1 tablet by mouth 3 times daily as needed for Nausea or Vomiting   oxyCODONE-acetaminophen (PERCOCET) 5-325 MG per tablet not picked up yet Self   Sig: Take 1 tablet by mouth every 6 hours as needed for Pain for up to 3 days. Intended supply: 3 days. Take lowest dose possible to manage pain Max Daily Amount: 4 tablets   pantoprazole (PROTONIX) 40 MG tablet not picked up yet Self   Sig: Take 1 tablet by mouth every morning (before breakfast)   thiamine mononitrate (THIAMINE) 100 MG tablet not picked up yet Self   Sig: Take 1 tablet by mouth daily      Facility-Administered Medications: None           Courtney Hollingsworth RPH  Contact: [phone]

## 2024-08-12 NOTE — PROGRESS NOTES
[facility]  Hospitalist Group                                                                                          Hospitalist Progress Note  Anand Roberts MD  Office Phone: [phone]        Date of Service:  2024  NAME:  Nathan Trejo  :  1990  MRN:  [id number]       Admission Summary:   CHIEF COMPLAINT:  Vomiting  abdominal Pain  Pt presents to ED via EMS for c/o N/V & RUQ abd pain for 2 days. Pt reports to be daily drinker & has hx of alcohol induced pancreatitis & withdrawal seizures (last seizure 1 year ago). Pt reports he attempted to drink alcohol yesterday, but was unable to tolerate due to emesis. Pt reports marijuana use yesterday. Pt actively vomiting during triage.     Pt was seen at [facility] ED yesterday for same issue.      HISTORY OF PRESENT ILLNESS:     Nathan Trejo is a 33 y.o.   male with   PMHx  as stated below  including h/o alcohol pancreatitis presenting with abdominal pain, NV.  Notes this has been going on for about 2 days.  Seen at [facility] yesterday and had labs, meds, CT.  Notes he didn't feel good after leaving.  Last etoh yesterday and feels like he's withdrawing.  Smokes marijuana every day.      The patient denies any headache, blurry vision, sore throat, trouble swallowing, trouble with speech, chest pain, SOB, cough, fever, chills, urinary symptoms, constipation, recent travels, sick contacts, focal or generalized neurological symptoms, falls, injuries, rashes, contact with COVID-19 diagnosed patients, hematemesis, melena, hemoptysis, hematuria, rashes, denies starting any new medications and denies any other concerns or problems besides as mentioned above.       ED  COURSE :  Given multiple doses    IV morphine/Ativan    Interval history / Subjective:      Patient endorsed multiple episodes of emesis, nonbloody, abdominal pain epigastric tender to touch, some guarding  -States he went a few months sober, had a few    Interpersonal Safety: Not At Risk (8/12/2024)    Interpersonal Safety Domain Source: IP Abuse Screening     Physical abuse: Denies     Verbal abuse: Denies     Emotional abuse: Denies     Financial abuse: Denies     Sexual abuse: Denies   Utilities: Not At Risk (8/12/2024)    [facility] Utilities     Threatened with loss of utilities: No       Review of Systems:   Per Hpi      Vital Signs:    Last 24hrs VS reviewed since prior progress note. Most recent are:  Vitals:    08/12/24 0717   BP: 139/83   Pulse: 57   Resp: 18   Temp: 97.9 °F (36.6 °C)   SpO2: 99%         Intake/Output Summary (Last 24 hours) at 8/12/2024 0928  Last data filed at 8/12/2024 0218  Gross per 24 hour   Intake 1110 ml   Output 200 ml   Net 910 ml        Physical Examination:     I had a face to face encounter with this patient and independently examined them on 8/12/2024 as outlined below:          General: No acute distress. Well developed, well nourished.  HEENT: Eyes: perrl, no discharge or icterus.   Cardiovascular: S1, S2, rrr, no mrg  Respiratory: clear to auscultation b/l  Abdomen: abdominal tenderness some guarding, active bowel sounds on 4q, abdomen soft, no peritoneal signs  Extremities: No edema, cyanosis, (+2) bi dorsalis pedal pulse  Neurological:  a&ox3. No facial asymmetry. Normal speech.  Mental Status: calm, cooperative.      Data Review:    Review and/or order of clinical lab test  I personally reviewed  Image      I have personally and independently reviewed all pertinent labs, diagnostic studies, imaging, and have provided independent interpretation of the same.     Labs:     Recent Labs     08/10/24  1732 08/11/24  2200   WBC 5.4 7.8   HGB 14.6 14.8   HCT 43.2 42.3   * 109*     Recent Labs     08/10/24  1732 08/11/24  2200 08/12/24  0619    141 142   K 3.1* 3.1* 3.5   * 103 106   CO2 26 21 28   BUN 6 6 7   MG  --   --  1.4*     Recent Labs     08/10/24  1732 08/11/24  2200 08/12/24  0619   ALT 30 25 22   GLOB

## 2024-08-12 NOTE — PLAN OF CARE
Problem: Safety - Adult  Goal: Free from fall injury  Outcome: Progressing     Problem: Pain  Goal: Verbalizes/displays adequate comfort level or baseline comfort level  Outcome: Progressing  Flowsheets (Taken 8/12/2024 0215)  Verbalizes/displays adequate comfort level or baseline comfort level: Encourage patient to monitor pain and request assistance

## 2024-08-12 NOTE — PLAN OF CARE
Problem: Discharge Planning  Goal: Discharge to home or other facility with appropriate resources  8/12/2024 1538 by Corin Card RN  Outcome: Progressing  8/12/2024 0238 by Diana Talbot RN  Outcome: Progressing  Flowsheets (Taken 8/12/2024 0215)  Discharge to home or other facility with appropriate resources: Identify barriers to discharge with patient and caregiver     Problem: Pain  Goal: Verbalizes/displays adequate comfort level or baseline comfort level  8/12/2024 1538 by Corin Card RN  Outcome: Progressing  8/12/2024 0238 by Diana Talbot RN  Outcome: Progressing  Flowsheets (Taken 8/12/2024 0215)  Verbalizes/displays adequate comfort level or baseline comfort level: Encourage patient to monitor pain and request assistance     Problem: Safety - Adult  Goal: Free from fall injury  8/12/2024 1538 by Corin Card RN  Outcome: Progressing  8/12/2024 0238 by Diana Talbot RN  Outcome: Progressing     Problem: Skin/Tissue Integrity  Goal: Absence of new skin breakdown  Description: 1.  Monitor for areas of redness and/or skin breakdown  2.  Assess vascular access sites hourly  3.  Every 4-6 hours minimum:  Change oxygen saturation probe site  4.  Every 4-6 hours:  If on nasal continuous positive airway pressure, respiratory therapy assess nares and determine need for appliance change or resting period.  8/12/2024 1538 by Corin Card RN  Outcome: Progressing  8/12/2024 0238 by Diana Talbot RN  Outcome: Progressing

## 2024-08-12 NOTE — ED TRIAGE NOTES
Pt presents to ED via EMS for c/o N/V & RUQ abd pain for 2 days. Pt reports to be daily drinker & has hx of alcohol induced pancreatitis & withdrawal seizures (last seizure 1 year ago). Pt reports he attempted to drink alcohol yesterday, but was unable to tolerate due to emesis. Pt reports marijuana use yesterday. Pt actively vomiting during triage.    Pt was seen at [facility] ED yesterday for same issue.

## 2024-08-12 NOTE — H&P
Hospitalist Admission Note    NAME: Nathan Trejo   :  1990   MRN:  [id number]     Date/Time:  2024 2:40 AM    Patient PCP: No primary care provider on file.  _____________________________________________________________________  Given the patient's current clinical presentation, I have a high level of concern for decompensation if discharged from the emergency department.  Complex decision making was performed, which includes reviewing the patient's available past medical records, laboratory results, and x-ray films.       My assessment of this patient's clinical condition and my plan of care is as follows.    Assessment / Plan:  33yoM with PMH of pancreatitis presenting with abdominal pain, NV.  Notes this has been going on for about 2 days.  Seen at [facility] yesterday and had labs, meds, CT.  Notes he didn't feel good after leaving.  Last etoh yesterday and feels like he's withdrawing.  Smokes marijuana every day.  Epigastric pain noted on exam.  Tremulous in hands and tongue..  Notably his workup yesterday was unrevealing and as such, considered, but will not repeat CT scanTreated with with morphine, fluids, IV droperidol and 0,5mg ativan.  CIWA 5.       Acute  alcoholic  pancreatitis  N/V  and  abdominal  Pain  Etoh withdawal  Hypokalemia  Etoh abuse   current  Tobacco abuse   current      Admit to Hospitalist service    IVF  NPO  PPI  Pain control  PRN  Antiemetic PRN  Monitor Lytes   K  Mag   CIWA Protocoal  Thiamine  Folic acid MVA  Advised   alcohol   tobacco   substance abuse  cessation  Check utox          Other Past Medical History :  History of alcohol abuse and recurrent pancreatitis        Pharmacy  Consulted  &   Nurses Communication  Ordered for Home  Medication  Reconciliation.Primary hospitalitis Physician team to Follow up with that in am and reconcile home medication       Code Status: Full  DVT Prophylaxis: sq lovenox  GI Prophylaxis: not indicated  Baseline: independent  Urine Culture if Indicated CULTURE NOT INDICATED BY UA RESULT CNI     Bilirubin, Confirmatory    Collection Time: 08/12/24 12:21 AM   Result Value Ref Range    Bilirubin Confirmation, UA Negative NEG     Urine Drug Screen    Collection Time: 08/12/24  1:35 AM   Result Value Ref Range    Amphetamine, Urine Negative NEG      Barbiturates, Urine Negative NEG      Benzodiazepines, Urine Negative NEG      Cocaine, Urine Negative NEG      Methadone, Urine Negative NEG      Opiates, Urine Positive (A) NEG      Phencyclidine, Urine Negative NEG      THC, TH-Cannabinol, Urine Positive (A) NEG      Comments: (NOTE)

## 2024-08-12 NOTE — CARE COORDINATION
JASON     RUR  20 %  High Risk - Readmit  within  30 days then ER visit. X 1  IDR Rounds this am with MD and team   CHRIS 24 -48 hours depending on stability.       Review chart. Met with patient in the room.   To reassess again- patient did not feel like answering all of the questions as needed at this time. In agreement for me to check back.      Plan   PCP resources   Recovery Resources   Transportation  Pharmacy   [place] and to give patient copy of the #.     08/12/24 3525   Service Assessment   Patient Orientation Alert and Oriented;Person;Place;Situation;Self;Unable to Assess  (Unable to complete Assessment- co of pain- lot going on- still unsure of his plan)   Cognition Alert   History Provided By Patient   Primary Caregiver Self   Support Systems Other (Comment)  (did not provide - at this time)   Patient's Healthcare Decision Maker is: Patient Declined (Legal Next of Kin Remains as Decision Maker)   PCP Verified by CM No  (Wanted to wait until he gets [place])   Last Visit to PCP Within last year  (Was seeing provider in [place]- now in [place])   Prior Functional Level Independent in ADLs/IADLs   Current Functional Level Independent in ADLs/IADLs   Can patient return to prior living arrangement Yes  (Verified address - indicates can return)   Ability to make needs known: Fair   Family able to assist with home care needs: Other (comment)  (unable to assess at this time)   Would you like for me to discuss the discharge plan with any other family members/significant others, and if so, who? No   Financial Resources Medicaid  ([place] List[place] face sheet to ask Patient Access to verify for full coverage- patient indicates he has not gotten a card- asked about address on application- he thinks it was the one listed on face sheet)   Community Resources Other (Comment)  (He wants to discuss once he is aware of his situation and insurance)   CM/SW Referral Financial;No PCP;Other (see  pain- too much on his mind. he has a lot going on.)  What was the patient's/caregiver's perception as to why they think they needed to return back to the hospital?: Other (Comment) (NA)  Did you visit your Primary Care Physician after you left the hospital, before you returned this time?: No (NA, had [place] Medic[place]ad)  Why weren't you able to visit your PCP?: Other (Comment), Did not have an appointment (No provider chosen)  Did you see a specialist, such as Cardiac, Pulmonary, Orthopedic Physician, etc. after you left the hospital?: No  Who advised the patient to return to the hospital?: Self-referral  Does the patient report anything that got in the way of taking their medications?: Yes (last admit , [place] Medic[place]ad)  What reasons did they give?: Other (Comment) ([place] Medicaid/ NA)  In our efforts to provide the best possible care to you and others like you, can you think of anything that we could have done to help you after you left the hospital the first time, so that you might not have needed to return so soon?: Other (Comment) (NA_ still thinking about this.)      Noemi LORA RN   [phone]

## 2024-08-12 NOTE — ED NOTES
TRANSFER - OUT REPORT:    Verbal report given to [place] on Nathan Trejo  being transferred to [facility] Med surg room 207 for routine progression of patient care       Report consisted of patient's Situation, Background, Assessment and   Recommendations(SBAR).     Information from the following report(s) Nurse Handoff Report, ED Encounter Summary, ED SBAR, MAR, and Recent Results was reviewed with the receiving nurse.    [place] Assessment:    Presents to emergency department  because of falls (Syncope, seizure, or loss of consciousness): No  Age > 70: No  Altered Mental Status, Intoxication with alcohol or substance confusion (Disorientation, impaired judgment, poor safety awaremess, or inability to follow instructions): No  Impaired Mobility: Ambulates or transfers with assistive devices or assistance; Unable to ambulate or transer.: No  Nursing Judgement: No          Lines:   Peripheral IV 08/11/24 Left Forearm (Active)   Site Assessment Clean, dry & intact 08/11/24 2157   Line Status Blood return noted;Infusing 08/11/24 2157   Phlebitis Assessment No symptoms 08/11/24 2157   Infiltration Assessment 0 08/11/24 2157   Dressing Status New dressing applied 08/11/24 2157   Dressing Type Transparent 08/11/24 2157        Opportunity for questions and clarification was provided.      Patient transported with:  Monitor and Tech

## 2024-08-12 NOTE — ED NOTES
See triage note. Pt is alert and oriented x 4, RR even and unlabored, skin is warm and dry. Assesment completed and pt updated on plan of care.       Emergency Department Nursing Plan of Care       The Nursing Plan of Care is developed from the Nursing assessment and Emergency Department Attending provider initial evaluation.  The plan of care may be reviewed in the “ED Provider note”.    The Plan of Care was developed with the following considerations:   Patient / Family readiness to learn indicated by:verbalized understanding  Persons(s) to be included in education: patient  Barriers to Learning/Limitations:None    Signed     Carmencita Coon RN    8/11/2024   9:44 PM

## 2024-08-12 NOTE — ED PROVIDER NOTES
[facility] EMERGENCY DEPT  EMERGENCY DEPARTMENT ENCOUNTER       Pt Name: Nathan Trejo  MRN: [id number]  Birthdate 1990  Date of evaluation: 8/11/2024  Provider: Ovidio Brower MD   PCP: No primary care provider on file.  Note Started: 10:23 PM EDT 8/11/24     CHIEF COMPLAINT       Chief Complaint   Patient presents with    Abdominal Pain    Emesis        HISTORY OF PRESENT ILLNESS: 1 or more elements      History From: patient, History limited by: none     Nathan Trejo is a 33 y.o. male presenting with abdominal pain, NV.  Notes this has been going on for about 2 days.  Seen at [facility] yesterday and had labs, meds, CT.  Notes he didn't feel good after leaving.  Last etoh yesterday and feels like he's withdrawing.  Smokes marijuana every day.         Please See [facility] for Additional Details of the HPI/PMH  Nursing Notes were all reviewed and agreed with or any disagreements were addressed in the HPI.     REVIEW OF SYSTEMS        Positives and Pertinent negatives as per HPI.    PAST HISTORY     Past Medical History:  Past Medical History:   Diagnosis Date    Alcohol abuse        Past Surgical History:  No past surgical history on file.    Family History:  No family history on file.    Social History:  Social History     Tobacco Use    Smoking status: Every Day     Current packs/day: 1.00     Average packs/day: 1 pack/day for 5.6 years (5.6 ttl pk-yrs)     Types: Cigarettes     Start date: 2019    Smokeless tobacco: Never   Vaping Use    Vaping status: Never Used       Allergies:  No Known Allergies    CURRENT MEDICATIONS      Previous Medications    FOLIC ACID (FOLVITE) 1 MG TABLET    Take 1 tablet by mouth daily    ONDANSETRON (ZOFRAN-ODT) 4 MG DISINTEGRATING TABLET    Take 1 tablet by mouth 3 times daily as needed for Nausea or Vomiting    OXYCODONE-ACETAMINOPHEN (PERCOCET) 5-325 MG PER TABLET    Take 1 tablet by mouth every 6 hours as needed for Pain for up to 3 days. Intended supply: 3 days. Take lowest dose

## 2024-08-13 LAB
-: NORMAL
ANION GAP SERPL CALC-SCNC: 6 MMOL/L (ref 5–15)
BASOPHILS # BLD: 0 K/UL (ref 0–0.1)
BASOPHILS NFR BLD: 0 % (ref 0–1)
BUN SERPL-MCNC: 3 MG/DL (ref 6–20)
BUN/CREAT SERPL: 4 (ref 12–20)
CALCIUM SERPL-MCNC: 8.2 MG/DL (ref 8.5–10.1)
CHLORIDE SERPL-SCNC: 102 MMOL/L (ref 97–108)
CO2 SERPL-SCNC: 32 MMOL/L (ref 21–32)
CREAT SERPL-MCNC: 0.68 MG/DL (ref 0.7–1.3)
DIFFERENTIAL METHOD BLD: ABNORMAL
EKG ATRIAL RATE: 49 BPM
EKG DIAGNOSIS: NORMAL
EKG P AXIS: 12 DEGREES
EKG P-R INTERVAL: 126 MS
EKG Q-T INTERVAL: 454 MS
EKG QRS DURATION: 88 MS
EKG QTC CALCULATION (BAZETT): 410 MS
EKG R AXIS: 71 DEGREES
EKG T AXIS: 48 DEGREES
EKG VENTRICULAR RATE: 49 BPM
EOSINOPHIL # BLD: 0.2 K/UL (ref 0–0.4)
EOSINOPHIL NFR BLD: 3 % (ref 0–7)
ERYTHROCYTE [DISTWIDTH] IN BLOOD BY AUTOMATED COUNT: 12.7 % (ref 11.5–14.5)
FOLATE SERPL-MCNC: 15.6 NG/ML (ref 5–21)
GLUCOSE SERPL-MCNC: 82 MG/DL (ref 65–100)
HAV IGM SER QL: NONREACTIVE
HBV CORE IGM SER QL: NONREACTIVE
HBV SURFACE AG SER QL: <0.1 INDEX
HBV SURFACE AG SER QL: NEGATIVE
HCT VFR BLD AUTO: 39.1 % (ref 36.6–50.3)
HCV AB SER IA-ACNC: 0.03 INDEX
HCV AB SERPL QL IA: NONREACTIVE
HGB BLD-MCNC: 12.6 G/DL (ref 12.1–17)
IMM GRANULOCYTES # BLD AUTO: 0 K/UL (ref 0–0.04)
IMM GRANULOCYTES NFR BLD AUTO: 0 % (ref 0–0.5)
INR PPP: 1 (ref 0.9–1.1)
LYMPHOCYTES # BLD: 1.4 K/UL (ref 0.8–3.5)
LYMPHOCYTES NFR BLD: 22 % (ref 12–49)
MAGNESIUM SERPL-MCNC: 1.5 MG/DL (ref 1.6–2.4)
MCH RBC QN AUTO: 29.4 PG (ref 26–34)
MCHC RBC AUTO-ENTMCNC: 32.2 G/DL (ref 30–36.5)
MCV RBC AUTO: 91.4 FL (ref 80–99)
MONOCYTES # BLD: 0.3 K/UL (ref 0–1)
MONOCYTES NFR BLD: 5 % (ref 5–13)
NEUTS SEG # BLD: 4.4 K/UL (ref 1.8–8)
NEUTS SEG NFR BLD: 70 % (ref 32–75)
NRBC # BLD: 0 K/UL (ref 0–0.01)
NRBC BLD-RTO: 0 PER 100 WBC
PLATELET # BLD AUTO: 59 K/UL (ref 150–400)
POTASSIUM SERPL-SCNC: 3.4 MMOL/L (ref 3.5–5.1)
PROTHROMBIN TIME: 9.9 SEC (ref 9–11.1)
RBC # BLD AUTO: 4.28 M/UL (ref 4.1–5.7)
RBC MORPH BLD: ABNORMAL
SODIUM SERPL-SCNC: 140 MMOL/L (ref 136–145)
VIT B12 SERPL-MCNC: 316 PG/ML (ref 193–986)
WBC # BLD AUTO: 6.3 K/UL (ref 4.1–11.1)

## 2024-08-13 PROCEDURE — 2580000003 HC RX 258: Performed by: INTERNAL MEDICINE

## 2024-08-13 PROCEDURE — 6360000002 HC RX W HCPCS: Performed by: HOSPITALIST

## 2024-08-13 PROCEDURE — 99222 1ST HOSP IP/OBS MODERATE 55: CPT | Performed by: STUDENT IN AN ORGANIZED HEALTH CARE EDUCATION/TRAINING PROGRAM

## 2024-08-13 PROCEDURE — 85610 PROTHROMBIN TIME: CPT

## 2024-08-13 PROCEDURE — 6370000000 HC RX 637 (ALT 250 FOR IP): Performed by: INTERNAL MEDICINE

## 2024-08-13 PROCEDURE — 82746 ASSAY OF FOLIC ACID SERUM: CPT

## 2024-08-13 PROCEDURE — 80074 ACUTE HEPATITIS PANEL: CPT

## 2024-08-13 PROCEDURE — 83735 ASSAY OF MAGNESIUM: CPT

## 2024-08-13 PROCEDURE — 6360000002 HC RX W HCPCS: Performed by: INTERNAL MEDICINE

## 2024-08-13 PROCEDURE — 80048 BASIC METABOLIC PNL TOTAL CA: CPT

## 2024-08-13 PROCEDURE — 85384 FIBRINOGEN ACTIVITY: CPT

## 2024-08-13 PROCEDURE — 2580000003 HC RX 258: Performed by: HOSPITALIST

## 2024-08-13 PROCEDURE — 6370000000 HC RX 637 (ALT 250 FOR IP): Performed by: HOSPITALIST

## 2024-08-13 PROCEDURE — 85049 AUTOMATED PLATELET COUNT: CPT

## 2024-08-13 PROCEDURE — 85730 THROMBOPLASTIN TIME PARTIAL: CPT

## 2024-08-13 PROCEDURE — 85362 FIBRIN DEGRADATION PRODUCTS: CPT

## 2024-08-13 PROCEDURE — 93010 ELECTROCARDIOGRAM REPORT: CPT | Performed by: SPECIALIST

## 2024-08-13 PROCEDURE — 85379 FIBRIN DEGRADATION QUANT: CPT

## 2024-08-13 PROCEDURE — 36415 COLL VENOUS BLD VENIPUNCTURE: CPT

## 2024-08-13 PROCEDURE — 85025 COMPLETE CBC W/AUTO DIFF WBC: CPT

## 2024-08-13 PROCEDURE — 2500000003 HC RX 250 WO HCPCS: Performed by: HOSPITALIST

## 2024-08-13 PROCEDURE — 82607 VITAMIN B-12: CPT

## 2024-08-13 PROCEDURE — 1200000000 HC SEMI PRIVATE

## 2024-08-13 RX ORDER — MORPHINE SULFATE 4 MG/ML
4 INJECTION, SOLUTION INTRAMUSCULAR; INTRAVENOUS
Status: DISCONTINUED | OUTPATIENT
Start: 2024-08-13 | End: 2024-08-14

## 2024-08-13 RX ORDER — FAMOTIDINE 20 MG/1
20 TABLET, FILM COATED ORAL 2 TIMES DAILY
Status: DISCONTINUED | OUTPATIENT
Start: 2024-08-13 | End: 2024-08-16 | Stop reason: HOSPADM

## 2024-08-13 RX ORDER — MORPHINE SULFATE 2 MG/ML
2 INJECTION, SOLUTION INTRAMUSCULAR; INTRAVENOUS
Status: DISCONTINUED | OUTPATIENT
Start: 2024-08-13 | End: 2024-08-14

## 2024-08-13 RX ADMIN — POTASSIUM CHLORIDE 10 MEQ: 7.46 INJECTION, SOLUTION INTRAVENOUS at 13:04

## 2024-08-13 RX ADMIN — MORPHINE SULFATE 4 MG: 4 INJECTION INTRAVENOUS at 18:57

## 2024-08-13 RX ADMIN — METOCLOPRAMIDE HYDROCHLORIDE 5 MG: 5 INJECTION INTRAMUSCULAR; INTRAVENOUS at 19:03

## 2024-08-13 RX ADMIN — ONDANSETRON 4 MG: 2 INJECTION INTRAMUSCULAR; INTRAVENOUS at 07:46

## 2024-08-13 RX ADMIN — THERA TABS 1 TABLET: TAB at 09:41

## 2024-08-13 RX ADMIN — FAMOTIDINE 20 MG: 10 INJECTION, SOLUTION INTRAVENOUS at 07:52

## 2024-08-13 RX ADMIN — SODIUM CHLORIDE, POTASSIUM CHLORIDE, SODIUM LACTATE AND CALCIUM CHLORIDE: 600; 310; 30; 20 INJECTION, SOLUTION INTRAVENOUS at 04:35

## 2024-08-13 RX ADMIN — SODIUM CHLORIDE, PRESERVATIVE FREE 10 ML: 5 INJECTION INTRAVENOUS at 09:44

## 2024-08-13 RX ADMIN — SODIUM CHLORIDE, PRESERVATIVE FREE 10 ML: 5 INJECTION INTRAVENOUS at 09:43

## 2024-08-13 RX ADMIN — ONDANSETRON 4 MG: 2 INJECTION INTRAMUSCULAR; INTRAVENOUS at 00:12

## 2024-08-13 RX ADMIN — FAMOTIDINE 20 MG: 20 TABLET ORAL at 22:16

## 2024-08-13 RX ADMIN — Medication 100 MG: at 09:41

## 2024-08-13 RX ADMIN — MORPHINE SULFATE 4 MG: 4 INJECTION INTRAVENOUS at 22:16

## 2024-08-13 RX ADMIN — MAGNESIUM SULFATE HEPTAHYDRATE 2000 MG: 40 INJECTION, SOLUTION INTRAVENOUS at 10:46

## 2024-08-13 RX ADMIN — ONDANSETRON 4 MG: 2 INJECTION INTRAMUSCULAR; INTRAVENOUS at 15:40

## 2024-08-13 RX ADMIN — SODIUM CHLORIDE, POTASSIUM CHLORIDE, SODIUM LACTATE AND CALCIUM CHLORIDE: 600; 310; 30; 20 INJECTION, SOLUTION INTRAVENOUS at 19:57

## 2024-08-13 RX ADMIN — CHLORDIAZEPOXIDE HYDROCHLORIDE 25 MG: 25 CAPSULE ORAL at 02:33

## 2024-08-13 RX ADMIN — SODIUM CHLORIDE, PRESERVATIVE FREE 10 ML: 5 INJECTION INTRAVENOUS at 19:57

## 2024-08-13 RX ADMIN — MORPHINE SULFATE 4 MG: 4 INJECTION INTRAVENOUS at 01:12

## 2024-08-13 RX ADMIN — ONDANSETRON 4 MG: 2 INJECTION INTRAMUSCULAR; INTRAVENOUS at 22:17

## 2024-08-13 RX ADMIN — MORPHINE SULFATE 4 MG: 4 INJECTION INTRAVENOUS at 13:11

## 2024-08-13 RX ADMIN — FOLIC ACID 1 MG: 1 TABLET ORAL at 09:41

## 2024-08-13 RX ADMIN — MORPHINE SULFATE 4 MG: 4 INJECTION INTRAVENOUS at 07:57

## 2024-08-13 RX ADMIN — MORPHINE SULFATE 4 MG: 4 INJECTION INTRAVENOUS at 10:48

## 2024-08-13 RX ADMIN — MORPHINE SULFATE 4 MG: 4 INJECTION INTRAVENOUS at 15:40

## 2024-08-13 RX ADMIN — CHLORDIAZEPOXIDE HYDROCHLORIDE 25 MG: 25 CAPSULE ORAL at 18:26

## 2024-08-13 RX ADMIN — MORPHINE SULFATE 4 MG: 4 INJECTION INTRAVENOUS at 03:30

## 2024-08-13 ASSESSMENT — PAIN - FUNCTIONAL ASSESSMENT
PAIN_FUNCTIONAL_ASSESSMENT: PREVENTS OR INTERFERES SOME ACTIVE ACTIVITIES AND ADLS
PAIN_FUNCTIONAL_ASSESSMENT: PREVENTS OR INTERFERES SOME ACTIVE ACTIVITIES AND ADLS
PAIN_FUNCTIONAL_ASSESSMENT: ACTIVITIES ARE NOT PREVENTED

## 2024-08-13 ASSESSMENT — PAIN DESCRIPTION - ORIENTATION
ORIENTATION: UPPER
ORIENTATION: RIGHT;MID
ORIENTATION: RIGHT;UPPER
ORIENTATION: RIGHT;MID
ORIENTATION: RIGHT;MID
ORIENTATION: UPPER

## 2024-08-13 ASSESSMENT — PAIN SCALES - GENERAL
PAINLEVEL_OUTOF10: 0
PAINLEVEL_OUTOF10: 5
PAINLEVEL_OUTOF10: 0
PAINLEVEL_OUTOF10: 4
PAINLEVEL_OUTOF10: 6
PAINLEVEL_OUTOF10: 10
PAINLEVEL_OUTOF10: 5
PAINLEVEL_OUTOF10: 7
PAINLEVEL_OUTOF10: 9
PAINLEVEL_OUTOF10: 9
PAINLEVEL_OUTOF10: 8
PAINLEVEL_OUTOF10: 7
PAINLEVEL_OUTOF10: 10
PAINLEVEL_OUTOF10: 9

## 2024-08-13 ASSESSMENT — PAIN DESCRIPTION - PAIN TYPE
TYPE: ACUTE PAIN;CHRONIC PAIN

## 2024-08-13 ASSESSMENT — PAIN DESCRIPTION - DESCRIPTORS
DESCRIPTORS: TIGHTNESS
DESCRIPTORS: SHARP;ACHING
DESCRIPTORS: ACHING
DESCRIPTORS: TIGHTNESS
DESCRIPTORS: ACHING
DESCRIPTORS: SHARP
DESCRIPTORS: ACHING;STABBING
DESCRIPTORS: THROBBING
DESCRIPTORS: ACHING

## 2024-08-13 ASSESSMENT — PAIN DESCRIPTION - LOCATION
LOCATION: ABDOMEN

## 2024-08-13 ASSESSMENT — PAIN DESCRIPTION - DIRECTION: RADIATING_TOWARDS: LEFT SIDE

## 2024-08-13 ASSESSMENT — PAIN DESCRIPTION - ONSET
ONSET: ON-GOING
ONSET: ON-GOING

## 2024-08-13 ASSESSMENT — PAIN DESCRIPTION - FREQUENCY
FREQUENCY: CONTINUOUS

## 2024-08-13 NOTE — PROGRESS NOTES
[facility] Adult  Hospitalist Group                                                                                          Hospitalist Progress Note  Torsten Bermudez MD  Office Phone: [phone]        Date of Service:  2024  NAME:  Nathan Trejo  :  1990  MRN:  [id number]       Admission Summary:   CHIEF COMPLAINT:  Vomiting  abdominal Pain  Pt presents to ED via EMS for c/o N/V & RUQ abd pain for 2 days. Pt reports to be daily drinker & has hx of alcohol induced pancreatitis & withdrawal seizures (last seizure 1 year ago). Pt reports he attempted to drink alcohol yesterday, but was unable to tolerate due to emesis. Pt reports marijuana use yesterday. Pt actively vomiting during triage.     Pt was seen at [facility] ED yesterday for same issue.      HISTORY OF PRESENT ILLNESS:     Nathan Trejo is a 33 y.o.   male with   PMHx  as stated below  including h/o alcohol pancreatitis presenting with abdominal pain, NV.  Notes this has been going on for about 2 days.  Seen at [facility] yesterday and had labs, meds, CT.  Notes he didn't feel good after leaving.  Last etoh yesterday and feels like he's withdrawing.  Smokes marijuana every day.      The patient denies any headache, blurry vision, sore throat, trouble swallowing, trouble with speech, chest pain, SOB, cough, fever, chills, urinary symptoms, constipation, recent travels, sick contacts, focal or generalized neurological symptoms, falls, injuries, rashes, contact with COVID-19 diagnosed patients, hematemesis, melena, hemoptysis, hematuria, rashes, denies starting any new medications and denies any other concerns or problems besides as mentioned above.       ED  COURSE :  Given multiple doses    IV morphine/Ativan    Interval history / Subjective:      No acute event over night.  Pain is improving.    Currently, patient denies  hearing changes, fever, chills, weakness, chest pain, dyspnea, cough, blood in  Not on file   Alcohol Use: Alcohol Misuse (8/11/2024)    AUDIT-C     Frequency of Alcohol Consumption: 4 or more times a week     Average Number of Drinks: 7 to 9     Frequency of Binge Drinking: Daily or almost daily   Financial Resource Strain: Not on file   Food Insecurity: No Food Insecurity (8/12/2024)    Hunger Vital Sign     Worried About Running Out of Food in the Last Year: Never true     Ran Out of Food in the Last Year: Never true   Transportation Needs: No Transportation Needs (8/12/2024)    PRAPARE - Transportation     Lack of Transportation (Medical): No     Lack of Transportation (Non-Medical): No   Physical Activity: Not on file   Stress: No Stress Concern Present (8/9/2023)    Received from [employer]    [place] [employer] - Occupational Stress Questionnaire     Feeling of Stress : Not at all   Social Connections: Moderately Integrated (8/13/2024)    Social Connections ([facility] HRSN)     If for any reason you need help with day-to-day activities such as bathing, preparing meals, shopping, managing finances, etc., do you get the help you need?: Not on file   Intimate Partner Violence: Unknown (4/5/2023)    Received from [employer]    HITS     Physically Hurt: Not on file     Insult or Talk Down To: Not on file     Threaten Physical Harm: Not on file     Scream or Curse: Not on file   Depression: Not at risk (2/10/2023)    Received from [employer]    Depression     PHQ-2 Total Score: 0   Housing Stability: Low Risk  (8/12/2024)    Housing Stability Vital Sign     Unable to Pay for Housing in the Last Year: No     Number of Times Moved in the Last Year: 1     Homeless in the Last Year: No   Interpersonal Safety: Not At Risk (8/12/2024)    Interpersonal Safety Domain Source: IP Abuse Screening     Physical abuse: Denies     Verbal abuse: Denies     Emotional abuse: Denies     Financial abuse: Denies     Sexual abuse: Denies   Utilities: Not At Risk (8/12/2024)

## 2024-08-13 NOTE — CONSULTS
[facility] [place] [facility]  [address] [facility] Medical [address] [address]  W: [phone] F: [phone]    Reason for Visit:   Nathan Trejo is a 33 y.o. male who is seen in consultation at the request of Dr. Bermudez for evaluation of Acute on Chronic Thrombocytopenia.      Hematology / Oncology Treatment Information:     Hematological/Oncological Diagnosis: Thrombocytopenia    Date of Diagnosis: chronic    Initial Presentation  (HPI):     Nathan Trejo, was evaluated through a synchronous (real-time) audio-video encounter. The patient (or guardian if applicable) is aware that this is a billable service, which includes applicable co-pays. This Virtual Visit was conducted with patient's (and/or legal guardian's) consent. Patient identification was verified, and a caregiver was present when appropriate.   The patient was located at Other: [facility] inpatient  Provider was located at Facility (Appt Dept): [facility]   Confirm you are appropriately licensed, registered, or certified to deliver care in the state where the patient is located as indicated above. If you are not or unsure, please re-schedule the visit: Yes, I confirm.        Total time spent for this encounter: Not billed by time    --Nakia Nails MD on 8/13/2024 at 4:00 PM    An electronic signature was used to authenticate this note.    33 year old with hx of alcohol dependence/abuse, pancreatitis, presented with abdominal pian to [facility].  Hematology consulted for thrombocytopenia.     Chart review shows thrombocytopenia is chronic and episodic.  Likely associated in past with ETOH intake.     PLT count is down trending as inpatient.   Platelets (K/uL)   Date Value   08/13/2024 59 (L)   08/11/2024 109 (L)   08/10/2024 119 (L)   07/10/2024 108 (L)   07/09/2024 80 (L)   07/08/2024 99 (L)   07/06/2024 162   05/30/2024 70 (L)   05/29/2024 81 (L)   05/28/2024 91 (L)   05/27/2024 110 (L)  as inpatient.    - may be related to pancreatitis.  Will check DIC Panel and peripheral smear  - screen for nutritional deficits by checking B12/Folate levels. Replete if low.     Platelets (K/uL)   Date Value   08/13/2024 59 (L)   08/11/2024 109 (L)   08/10/2024 119 (L)   07/10/2024 108 (L)   07/09/2024 80 (L)   07/08/2024 99 (L)   07/06/2024 162   05/30/2024 70 (L)   05/29/2024 81 (L)   05/28/2024 91 (L)   05/27/2024 110 (L)     - Hold dvt ppx if PLT <50.  Ok for SCDs for dvt ppx    - Trend CBC daily.       Orders Placed This Visit:     > B12/Folate   > INR/PT, DIC panel, Peripheral smear  > Check CBC daily.  Would expect PLT to improve slowly if caused by alcohol induced marrow suppression.      Please call with questions.     Signed By: Nakia Nails MD      Attending Medical Oncologist   [facility]

## 2024-08-13 NOTE — PROGRESS NOTES
07:30- patient resting in bed, no signs or symptoms of distress. C/o continuous abdominal pain to RUQ. CIWA assessed at a 4.     0751- Patient vomitted aprox 75cc of clear liquid fluid.    0751- Zofran 4mg given (See MAR for details)    0757- Morphine 4mg given for pain 10/10. (See MAR)    08:11- monitor tech notified nurse of qtc interval of 0.51. MD notified.     10:46- Magnesium sulfate IV x1 started for Mag level of 1.5 (See MAR) Patient has had no more c/o of n/v this morning.    12:30- Patient started on clear liquid diet.    13:04- Potassium chloride IV x1 started for potassium level of 3.4 (See MAR)      14:02- Patient doing well on clear liquids.     15:40- pt vommitted aprox 20cc of liquid liquid. MD notified.    15:50- patient had virtual visit with hematologist for platelet count of 59.    16:00- SCDs placed on pt.    16:55- labs drawn per hematologist and sent to lab.

## 2024-08-13 NOTE — CONSULTS
% injection 5-40 mL, 5-40 mL, IntraVENous, PRN, Antionette Bermudez MD    0.9 % sodium chloride infusion, , IntraVENous, PRN, Antionette Bermudez MD    potassium chloride (KLOR-CON) extended release tablet 40 mEq, 40 mEq, Oral, PRN, 40 mEq at 08/12/24 2221 **OR** potassium bicarb-citric acid (EFFER-K) effervescent tablet 40 mEq, 40 mEq, Oral, PRN **OR** potassium chloride 10 mEq/100 mL IVPB (Peripheral Line), 10 mEq, IntraVENous, PRN, Antionette Bermudez MD    magnesium sulfate 2000 mg in 50 mL IVPB premix, 2,000 mg, IntraVENous, PRN, Antionette Bermudez MD, Last Rate: 25 mL/hr at 08/13/24 1046, 2,000 mg at 08/13/24 1046    [Held by provider] enoxaparin (LOVENOX) injection 40 mg, 40 mg, SubCUTAneous, Daily, Antionette Bermudez MD, 40 mg at 08/12/24 0853    ondansetron (ZOFRAN-ODT) disintegrating tablet 4 mg, 4 mg, Oral, Q8H PRN **OR** ondansetron (ZOFRAN) injection 4 mg, 4 mg, IntraVENous, Q6H PRN, Antionette Bermudez MD, 4 mg at 08/13/24 0746    polyethylene glycol (GLYCOLAX) packet 17 g, 17 g, Oral, Daily PRN, Antionette Bermudez MD    acetaminophen (TYLENOL) tablet 650 mg, 650 mg, Oral, Q6H PRN **OR** acetaminophen (TYLENOL) suppository 650 mg, 650 mg, Rectal, Q6H PRN, Antionette Bermudez MD    aluminum & magnesium hydroxide-simethicone (MAALOX) 200-200-20 MG/5ML suspension 30 mL, 30 mL, Oral, Q6H PRN, Antionette Bermudez MD    sodium chloride flush 0.9 % injection 5-40 mL, 5-40 mL, IntraVENous, 2 times per day, Antionette Bermudze MD, 10 mL at 08/13/24 0943    sodium chloride flush 0.9 % injection 5-40 mL, 5-40 mL, IntraVENous, PRN, Antionette Bermudez MD    0.9 % sodium chloride infusion, , IntraVENous, PRN, Antionette Bermudez MD, Stopped at 08/12/24 1415    thiamine tablet 100 mg, 100 mg, Oral, Daily, Antionette Bermudez MD, 100 mg at 08/13/24 0941    morphine (PF) injection 2 mg, 2 mg, IntraVENous, Q2H PRN **OR** morphine sulfate (PF) injection 4 mg, 4 mg, IntraVENous, Q2H PRN, Antionette Bermudez MD, 4 mg at 08/13/24 1048    naloxone (NARCAN) injection 0.4 mg, 0.4 mg, IntraVENous, PRN,

## 2024-08-13 NOTE — PROGRESS NOTES
Bedside and Verbal shift change report given to HILARIO Coombs (oncoming nurse) by HILARIO Aleman (offgoing nurse). Report included the following information Nurse Handoff Report, Index, Intake/Output, MAR, Recent Results, and Cardiac Rhythm NSR,SB .

## 2024-08-13 NOTE — PROGRESS NOTES
Patient’s case reviewed during interdisciplinary team meeting in Med Surg/Tele Unit 2.  Rev. Felicia Son MDiv, [facility]

## 2024-08-13 NOTE — PLAN OF CARE
Problem: Discharge Planning  Goal: Discharge to home or other facility with appropriate resources  Outcome: Progressing     Problem: Pain  Goal: Verbalizes/displays adequate comfort level or baseline comfort level  Outcome: Progressing     Problem: Safety - Adult  Goal: Free from fall injury  8/13/2024 1415 by Corin Card, RN  Outcome: Progressing  8/13/2024 0240 by Ash Zamudio, RN  Outcome: Progressing     Problem: Skin/Tissue Integrity  Goal: Absence of new skin breakdown  Description: 1.  Monitor for areas of redness and/or skin breakdown  2.  Assess vascular access sites hourly  3.  Every 4-6 hours minimum:  Change oxygen saturation probe site  4.  Every 4-6 hours:  If on nasal continuous positive airway pressure, respiratory therapy assess nares and determine need for appliance change or resting period.  Outcome: Progressing

## 2024-08-13 NOTE — BSMART NOTE
Initial BSMART Liaison Assessment Form     Section I - Integrated Summary    Chief Complaint is depression and grief.    LOS:  1     Presenting problem/Summary:  Met face to face with patient on medical floor with Dr Meza and NP tamar Anderson. Patient reports he is currently admitted due to pancreatitis due to his alcohol consumption. He reports that he has been drinking and needs to stop. He reports that he has been stressed and has had losses this year causing him to drink more. Patient reports that he has 6 children and is currently working to support them. He reports that he moved to [place] and is waiting for his insurance to change to [place] Medicaid on 9/1. In the mean time he does not have any way to see a PCP, therapist, or other providers. He reports wanting to have options that having insurance affords. He is open to attending AA or recovery programs but reports needing to work unless it is absolutely necessary. Briefly discussed IOP as this is usually less intensive than an inpatient rehab or PHP but allows for more support and structure than just attending meetings alone or seeing a therapist every few weeks. Patient open to this and willing to get information. Patient reports he would also be open to grief support groups and therapy for this. Patient reports that he is willing to get help but that his children and working to make money are his priority right now. 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 Patient reports pain and nausea. He reports not eating for a few days due to the pain and nausea. He denies SI, HI, and hallucinations.    Precipitant Factors are chronic alcohol use.    The information is given by the patient.  Current Psychiatrist and/or  is none reported.  Previous Hospitalizations/Treatment: not for psychiatric    Lethality Assessment:  The potential for suicide is not noted.    The potential for homicide is not noted.  The patient has not been a

## 2024-08-14 LAB
ANION GAP SERPL CALC-SCNC: 5 MMOL/L (ref 5–15)
APTT: 28 SEC (ref 24–33)
BUN SERPL-MCNC: 2 MG/DL (ref 6–20)
BUN/CREAT SERPL: 3 (ref 12–20)
CALCIUM SERPL-MCNC: 8.3 MG/DL (ref 8.5–10.1)
CHLORIDE SERPL-SCNC: 100 MMOL/L (ref 97–108)
CO2 SERPL-SCNC: 30 MMOL/L (ref 21–32)
CREAT SERPL-MCNC: 0.62 MG/DL (ref 0.7–1.3)
D-DIMER QUANTITATIVE: 1.24 MG/L FEU (ref 0–0.49)
FIBRINOGEN ACTIVITY: 301 MG/DL (ref 193–507)
FSP PPP LA-ACNC: 5 UG/ML
GLUCOSE SERPL-MCNC: 85 MG/DL (ref 65–100)
INR BLD: 1 (ref 0.9–1.2)
LIPASE SERPL-CCNC: 124 U/L (ref 13–75)
PERIPHERAL SMEAR, MD REVIEW: NORMAL
PLATELET # BLD AUTO: 75 X10E3/UL (ref 150–450)
PLATELET # BLD AUTO: 83 K/UL (ref 150–400)
POTASSIUM SERPL-SCNC: 4 MMOL/L (ref 3.5–5.1)
PROTHROMBIN TIME: 10.9 SEC (ref 9.1–12)
SODIUM SERPL-SCNC: 135 MMOL/L (ref 136–145)

## 2024-08-14 PROCEDURE — 6360000002 HC RX W HCPCS: Performed by: INTERNAL MEDICINE

## 2024-08-14 PROCEDURE — 6370000000 HC RX 637 (ALT 250 FOR IP): Performed by: HOSPITALIST

## 2024-08-14 PROCEDURE — 6370000000 HC RX 637 (ALT 250 FOR IP): Performed by: INTERNAL MEDICINE

## 2024-08-14 PROCEDURE — 85049 AUTOMATED PLATELET COUNT: CPT

## 2024-08-14 PROCEDURE — 6360000002 HC RX W HCPCS: Performed by: HOSPITALIST

## 2024-08-14 PROCEDURE — 1200000000 HC SEMI PRIVATE

## 2024-08-14 PROCEDURE — 2580000003 HC RX 258: Performed by: INTERNAL MEDICINE

## 2024-08-14 PROCEDURE — 2580000003 HC RX 258: Performed by: HOSPITALIST

## 2024-08-14 PROCEDURE — 36415 COLL VENOUS BLD VENIPUNCTURE: CPT

## 2024-08-14 PROCEDURE — 80048 BASIC METABOLIC PNL TOTAL CA: CPT

## 2024-08-14 PROCEDURE — 83690 ASSAY OF LIPASE: CPT

## 2024-08-14 RX ORDER — MORPHINE SULFATE 2 MG/ML
2 INJECTION, SOLUTION INTRAMUSCULAR; INTRAVENOUS EVERY 4 HOURS PRN
Status: DISCONTINUED | OUTPATIENT
Start: 2024-08-14 | End: 2024-08-16 | Stop reason: HOSPADM

## 2024-08-14 RX ADMIN — CHLORDIAZEPOXIDE HYDROCHLORIDE 10 MG: 5 CAPSULE ORAL at 18:20

## 2024-08-14 RX ADMIN — ONDANSETRON 4 MG: 2 INJECTION INTRAMUSCULAR; INTRAVENOUS at 20:35

## 2024-08-14 RX ADMIN — THERA TABS 1 TABLET: TAB at 09:17

## 2024-08-14 RX ADMIN — FAMOTIDINE 20 MG: 20 TABLET ORAL at 09:17

## 2024-08-14 RX ADMIN — MORPHINE SULFATE 4 MG: 4 INJECTION INTRAVENOUS at 17:21

## 2024-08-14 RX ADMIN — MORPHINE SULFATE 4 MG: 4 INJECTION INTRAVENOUS at 20:35

## 2024-08-14 RX ADMIN — MORPHINE SULFATE 4 MG: 4 INJECTION INTRAVENOUS at 04:27

## 2024-08-14 RX ADMIN — MORPHINE SULFATE 4 MG: 4 INJECTION INTRAVENOUS at 09:11

## 2024-08-14 RX ADMIN — Medication 100 MG: at 09:17

## 2024-08-14 RX ADMIN — SODIUM CHLORIDE, PRESERVATIVE FREE 10 ML: 5 INJECTION INTRAVENOUS at 09:20

## 2024-08-14 RX ADMIN — ONDANSETRON 4 MG: 2 INJECTION INTRAMUSCULAR; INTRAVENOUS at 13:54

## 2024-08-14 RX ADMIN — MORPHINE SULFATE 4 MG: 4 INJECTION INTRAVENOUS at 01:21

## 2024-08-14 RX ADMIN — MORPHINE SULFATE 4 MG: 4 INJECTION INTRAVENOUS at 13:54

## 2024-08-14 RX ADMIN — SODIUM CHLORIDE, PRESERVATIVE FREE 20 ML: 5 INJECTION INTRAVENOUS at 09:15

## 2024-08-14 RX ADMIN — SODIUM CHLORIDE, POTASSIUM CHLORIDE, SODIUM LACTATE AND CALCIUM CHLORIDE: 600; 310; 30; 20 INJECTION, SOLUTION INTRAVENOUS at 04:01

## 2024-08-14 RX ADMIN — FAMOTIDINE 20 MG: 20 TABLET ORAL at 20:35

## 2024-08-14 RX ADMIN — CHLORDIAZEPOXIDE HYDROCHLORIDE 10 MG: 5 CAPSULE ORAL at 01:21

## 2024-08-14 RX ADMIN — FOLIC ACID 1 MG: 1 TABLET ORAL at 09:17

## 2024-08-14 RX ADMIN — ONDANSETRON 4 MG: 2 INJECTION INTRAMUSCULAR; INTRAVENOUS at 05:00

## 2024-08-14 RX ADMIN — METOCLOPRAMIDE HYDROCHLORIDE 5 MG: 5 INJECTION INTRAMUSCULAR; INTRAVENOUS at 09:10

## 2024-08-14 ASSESSMENT — PAIN DESCRIPTION - ONSET
ONSET: ON-GOING

## 2024-08-14 ASSESSMENT — PAIN DESCRIPTION - ORIENTATION
ORIENTATION: RIGHT;UPPER

## 2024-08-14 ASSESSMENT — PAIN DESCRIPTION - FREQUENCY
FREQUENCY: CONTINUOUS

## 2024-08-14 ASSESSMENT — PAIN SCALES - GENERAL
PAINLEVEL_OUTOF10: 10
PAINLEVEL_OUTOF10: 8
PAINLEVEL_OUTOF10: 6
PAINLEVEL_OUTOF10: 8
PAINLEVEL_OUTOF10: 6
PAINLEVEL_OUTOF10: 8
PAINLEVEL_OUTOF10: 8
PAINLEVEL_OUTOF10: 9
PAINLEVEL_OUTOF10: 10

## 2024-08-14 ASSESSMENT — PAIN DESCRIPTION - PAIN TYPE
TYPE: ACUTE PAIN;CHRONIC PAIN

## 2024-08-14 ASSESSMENT — PAIN DESCRIPTION - LOCATION
LOCATION: ABDOMEN

## 2024-08-14 ASSESSMENT — PAIN - FUNCTIONAL ASSESSMENT
PAIN_FUNCTIONAL_ASSESSMENT: ACTIVITIES ARE NOT PREVENTED
PAIN_FUNCTIONAL_ASSESSMENT: PREVENTS OR INTERFERES SOME ACTIVE ACTIVITIES AND ADLS

## 2024-08-14 ASSESSMENT — PAIN DESCRIPTION - DESCRIPTORS
DESCRIPTORS: THROBBING
DESCRIPTORS: THROBBING
DESCRIPTORS: SHARP;SHOOTING
DESCRIPTORS: THROBBING;TIGHTNESS
DESCRIPTORS: TIGHTNESS

## 2024-08-14 ASSESSMENT — PAIN DESCRIPTION - DIRECTION: RADIATING_TOWARDS: LEFT SIDE

## 2024-08-14 NOTE — PROGRESS NOTES
1925- Bedside report received from offgoing nurse HILARIO Coombs. Pt lyining at bed. He just get his pain ,meds. Pt said he is feeling better.    1945- Take his vitals sign. No any sign  and symptoms of distress.     2200 - gave him morphine and night meds.      0100- Pt called for pain meds. Gave him pain meds. CI WA score 5 gave him librium 10 mg.    0030- gave him pain meds. Lab draw and send to labs.    0400- gave him nausea medicine.

## 2024-08-14 NOTE — PROGRESS NOTES
Comprehensive Nutrition Assessment    Type and Reason for Visit:      Nutrition Recommendations/Plan:   As medically appropriate advance diet to regular low fat  Supplements added to meal trays.       Malnutrition Assessment:  Malnutrition Status:  At risk for malnutrition (Comment) (ETOH abuse, low weight/BMI.) (08/14/24 1751)           Nutrition Assessment:    Pt admitted with n/v ETOH-induced pancreatitis.  Lipase much improve today, pt tolerating full liquids.  Hx notable for ETOH abuse/dependence, other substance abuse, pancreatitis, seizures (last seizure was one year ago).    Nutrition Related Findings:    Pt tolerating full liquids; meds: thiamine, folate, MV Wound Type: None       Lab Results   Component Value Date/Time     08/14/2024 04:54 AM    K 4.0 08/14/2024 04:54 AM     08/14/2024 04:54 AM    CO2 30 08/14/2024 04:54 AM    BUN 2 08/14/2024 04:54 AM    CREATININE 0.62 08/14/2024 04:54 AM    GLUCOSE 85 08/14/2024 04:54 AM    CALCIUM 8.3 08/14/2024 04:54 AM    PHOS 4.9 07/10/2024 05:51 AM    MG 1.5 08/13/2024 07:03 AM          Estimated Daily Nutrient Needs:  Energy Requirements Based On: Kcal/kg  Weight Used for Energy Requirements: Current  Energy (kcal/day): 2485  Weight Used for Protein Requirements: Current  Protein (g/day): 92  Method Used for Fluid Requirements: 1 ml/kcal  Fluid (ml/day): 2485    Nutrition Related Findings:   Edema: None                    Last BM: 08/10/24    Wounds:   Wound Type: None      Current Nutrition Intake & Therapies:    Average Meal Intake: 51-75%  Average Supplements Intake: None Ordered  ADULT DIET; Full Liquid; Low Fat (less than or equal to 50 gm/day)  Meal Intake:   No data found.  Supplement Intake:  No data found.  Nutrition Support: none      Anthropometric Measures:  Height: 185.4 cm (6' 1\")  Ideal Body Weight (IBW): 184 lbs (84 kg)       Current Body Weight: 65.4 kg (144 lb 3.2 oz), 78.4 % IBW. Weight Source: Bed Scale  Current BMI (kg/m2): 19         Weight Adjustment For: No Adjustment                 BMI Categories: Normal Weight (BMI 18.5-24.9)    Wt Readings from Last 10 Encounters:   08/12/24 65.4 kg (144 lb 3.2 oz)   07/11/24 73.5 kg (162 lb 0.6 oz)   05/27/24 72.6 kg (160 lb)           Nutrition Diagnosis:   Inadequate protein-energy intake, In context of acute illness or injury, Limited adherence to nutrition-related recommendations related to acute injury/trauma, altered GI function, pain as evidenced by intake 51-75%, poor intake prior to admission, lab values, nausea, vomiting    Nutrition Interventions:   Food and/or Nutrient Delivery: Modify Current Diet (As medically appropriate)  Nutrition Education/Counseling: No recommendation at this time  Coordination of Nutrition Care: Continue to monitor while inpatient       Goals:  Previous Goal Met: Progressing toward Goal(s)  Goals: Meet at least 75% of estimated needs, prior to discharge       Nutrition Monitoring and Evaluation:   Behavioral-Environmental Outcomes: Readiness for Change  Food/Nutrient Intake Outcomes: Food and Nutrient Intake, Supplement Intake  Physical Signs/Symptoms Outcomes: Biochemical Data, GI Status, Nausea or Vomiting, Meal Time Behavior    Discharge Planning:    Too soon to determine     Christopher Traylor, RD  [phone]

## 2024-08-14 NOTE — BSMART NOTE
BSMART Liaison Team Note     LOS:  2     Patient goal(s) for today: take medications as prescribed, make needs known in an appropriate manner, none reported  BSMART Liaison team focus/goals: assess needs, provide support and education, coordinate care    Progress note: Attempted to follow up with patient. He was not feeling well so visit kept short. Patient given a list of MH resources with providers known to accept Medicaid with ** next to them. Patient given a list of SA resources including those that have IOP which patient was interested in yesterday. Patient given the [facility] flyer. Patient given the [facility] flyer to seek support groups and resources there. Lastly, patient given the list of grief resources at [facility] [place] as well as other grief resources. Primary nurse was present during interaction and is aware of resources given to patient.    Barriers to Discharge: medical     Outpatient provider(s):  none, given resources  Insurance info/prescription coverage:  Medicaid    Diagnosis: Alcohol Use Disorder  Past Medical History:   Diagnosis Date    Alcohol abuse         Plan:  Defer to medical team. Patient does not meet criteria for inpatient psychiatric admission. Liaison team to follow up with patient weekly or as requested.   Follow up Psych Consult placed? No .   Psychiatrist updated? No        Participating treatment team members: Samanta Jimenes, [facility]

## 2024-08-14 NOTE — PROGRESS NOTES
Non-billable note    Chart reviewed, labs reviewed.     PLT count slowly improving.  PT/INR normal.    Lab Results   Component Value Date    APTT 28 08/13/2024     PTT normal    No evidence of coagulopathy.     D-Dimer slightly elevated consistent with pancreatitis.   Smear shows no increase in schistocytes. DIC unlikely here.     Suspect consumption from inflammatory pancreatitis.     PLT should improve with treatment of pancreatitis.     Lab Results   Component Value Date    WBC 6.3 08/13/2024    HGB 12.6 08/13/2024    HCT 39.1 08/13/2024    MCV 91.4 08/13/2024    PLT 83 (L) 08/14/2024     Other cell lines intact.     Hematology will sign off. Please call with questions .      Nakia Nails MD    Attending Medical Oncologist   [facility]

## 2024-08-14 NOTE — PROGRESS NOTES
[facility] Adult  Hospitalist Group                                                                                          Hospitalist Progress Note  Torsten Bermudez MD  Office Phone: [phone]        Date of Service:  2024  NAME:  Nathan Trejo  :  1990  MRN:  [id number]       Admission Summary:   CHIEF COMPLAINT:  Vomiting  abdominal Pain  Pt presents to ED via EMS for c/o N/V & RUQ abd pain for 2 days. Pt reports to be daily drinker & has hx of alcohol induced pancreatitis & withdrawal seizures (last seizure 1 year ago). Pt reports he attempted to drink alcohol yesterday, but was unable to tolerate due to emesis. Pt reports marijuana use yesterday. Pt actively vomiting during triage.     Pt was seen at [facility] ED yesterday for same issue.      HISTORY OF PRESENT ILLNESS:     Nathan Trejo is a 33 y.o.   male with   PMHx  as stated below  including h/o alcohol pancreatitis presenting with abdominal pain, NV.  Notes this has been going on for about 2 days.  Seen at [facility] yesterday and had labs, meds, CT.  Notes he didn't feel good after leaving.  Last etoh yesterday and feels like he's withdrawing.  Smokes marijuana every day.      The patient denies any headache, blurry vision, sore throat, trouble swallowing, trouble with speech, chest pain, SOB, cough, fever, chills, urinary symptoms, constipation, recent travels, sick contacts, focal or generalized neurological symptoms, falls, injuries, rashes, contact with COVID-19 diagnosed patients, hematemesis, melena, hemoptysis, hematuria, rashes, denies starting any new medications and denies any other concerns or problems besides as mentioned above.       ED  COURSE :  Given multiple doses    IV morphine/Ativan    Interval history / Subjective:      Patient did not have any acute event overnight.  Had some nausea however tolerating clear liquids.    Currently, patient denies  hearing changes, fever, chills,  MD Aidan

## 2024-08-15 LAB — PLATELET # BLD AUTO: 87 K/UL (ref 150–400)

## 2024-08-15 PROCEDURE — 2580000003 HC RX 258: Performed by: INTERNAL MEDICINE

## 2024-08-15 PROCEDURE — 6370000000 HC RX 637 (ALT 250 FOR IP): Performed by: INTERNAL MEDICINE

## 2024-08-15 PROCEDURE — 36415 COLL VENOUS BLD VENIPUNCTURE: CPT

## 2024-08-15 PROCEDURE — 2580000003 HC RX 258: Performed by: HOSPITALIST

## 2024-08-15 PROCEDURE — 1200000000 HC SEMI PRIVATE

## 2024-08-15 PROCEDURE — 85049 AUTOMATED PLATELET COUNT: CPT

## 2024-08-15 PROCEDURE — 6360000002 HC RX W HCPCS: Performed by: INTERNAL MEDICINE

## 2024-08-15 PROCEDURE — 6370000000 HC RX 637 (ALT 250 FOR IP): Performed by: HOSPITALIST

## 2024-08-15 PROCEDURE — 6360000002 HC RX W HCPCS: Performed by: HOSPITALIST

## 2024-08-15 RX ADMIN — FOLIC ACID 1 MG: 1 TABLET ORAL at 09:58

## 2024-08-15 RX ADMIN — CHLORDIAZEPOXIDE HYDROCHLORIDE 10 MG: 5 CAPSULE ORAL at 22:17

## 2024-08-15 RX ADMIN — MORPHINE SULFATE 2 MG: 2 INJECTION, SOLUTION INTRAMUSCULAR; INTRAVENOUS at 00:34

## 2024-08-15 RX ADMIN — Medication 100 MG: at 09:58

## 2024-08-15 RX ADMIN — ONDANSETRON 4 MG: 2 INJECTION INTRAMUSCULAR; INTRAVENOUS at 10:14

## 2024-08-15 RX ADMIN — CHLORDIAZEPOXIDE HYDROCHLORIDE 10 MG: 5 CAPSULE ORAL at 15:20

## 2024-08-15 RX ADMIN — FAMOTIDINE 20 MG: 20 TABLET ORAL at 22:17

## 2024-08-15 RX ADMIN — SODIUM CHLORIDE, PRESERVATIVE FREE 10 ML: 5 INJECTION INTRAVENOUS at 22:24

## 2024-08-15 RX ADMIN — SODIUM CHLORIDE, PRESERVATIVE FREE 10 ML: 5 INJECTION INTRAVENOUS at 10:07

## 2024-08-15 RX ADMIN — SODIUM CHLORIDE, POTASSIUM CHLORIDE, SODIUM LACTATE AND CALCIUM CHLORIDE: 600; 310; 30; 20 INJECTION, SOLUTION INTRAVENOUS at 05:28

## 2024-08-15 RX ADMIN — THERA TABS 1 TABLET: TAB at 09:58

## 2024-08-15 RX ADMIN — MORPHINE SULFATE 2 MG: 2 INJECTION, SOLUTION INTRAMUSCULAR; INTRAVENOUS at 10:02

## 2024-08-15 RX ADMIN — MORPHINE SULFATE 2 MG: 2 INJECTION, SOLUTION INTRAMUSCULAR; INTRAVENOUS at 22:17

## 2024-08-15 RX ADMIN — FAMOTIDINE 20 MG: 20 TABLET ORAL at 09:58

## 2024-08-15 RX ADMIN — ONDANSETRON 4 MG: 2 INJECTION INTRAMUSCULAR; INTRAVENOUS at 17:00

## 2024-08-15 RX ADMIN — MORPHINE SULFATE 2 MG: 2 INJECTION, SOLUTION INTRAMUSCULAR; INTRAVENOUS at 18:01

## 2024-08-15 RX ADMIN — MORPHINE SULFATE 2 MG: 2 INJECTION, SOLUTION INTRAMUSCULAR; INTRAVENOUS at 14:04

## 2024-08-15 RX ADMIN — CHLORDIAZEPOXIDE HYDROCHLORIDE 10 MG: 5 CAPSULE ORAL at 05:23

## 2024-08-15 RX ADMIN — MORPHINE SULFATE 2 MG: 2 INJECTION, SOLUTION INTRAMUSCULAR; INTRAVENOUS at 05:22

## 2024-08-15 ASSESSMENT — PAIN DESCRIPTION - PAIN TYPE
TYPE: ACUTE PAIN;CHRONIC PAIN

## 2024-08-15 ASSESSMENT — PAIN SCALES - GENERAL
PAINLEVEL_OUTOF10: 3
PAINLEVEL_OUTOF10: 5
PAINLEVEL_OUTOF10: 3
PAINLEVEL_OUTOF10: 4
PAINLEVEL_OUTOF10: 7
PAINLEVEL_OUTOF10: 8
PAINLEVEL_OUTOF10: 9
PAINLEVEL_OUTOF10: 8
PAINLEVEL_OUTOF10: 7
PAINLEVEL_OUTOF10: 7
PAINLEVEL_OUTOF10: 4
PAINLEVEL_OUTOF10: 6

## 2024-08-15 ASSESSMENT — PAIN DESCRIPTION - ORIENTATION
ORIENTATION: RIGHT;UPPER
ORIENTATION: MID
ORIENTATION: RIGHT;UPPER
ORIENTATION: MID
ORIENTATION: RIGHT;UPPER
ORIENTATION: RIGHT;UPPER
ORIENTATION: MID

## 2024-08-15 ASSESSMENT — PAIN - FUNCTIONAL ASSESSMENT
PAIN_FUNCTIONAL_ASSESSMENT: ACTIVITIES ARE NOT PREVENTED

## 2024-08-15 ASSESSMENT — PAIN DESCRIPTION - DESCRIPTORS
DESCRIPTORS: ACHING;SHARP
DESCRIPTORS: ACHING;SHARP
DESCRIPTORS: ACHING
DESCRIPTORS: ACHING;SHARP
DESCRIPTORS: ACHING;SHARP
DESCRIPTORS: ACHING
DESCRIPTORS: SHARP
DESCRIPTORS: DISCOMFORT

## 2024-08-15 ASSESSMENT — PAIN DESCRIPTION - LOCATION
LOCATION: ABDOMEN

## 2024-08-15 ASSESSMENT — PAIN DESCRIPTION - FREQUENCY
FREQUENCY: CONTINUOUS

## 2024-08-15 ASSESSMENT — PAIN DESCRIPTION - ONSET
ONSET: ON-GOING

## 2024-08-15 NOTE — PROGRESS NOTES
[facility] Adult  Hospitalist Group                                                                                          Hospitalist Progress Note  Torsten Bermudez MD  Office Phone: [phone]        Date of Service:  8/15/2024  NAME:  Nathan Trejo  :  1990  MRN:  [id number]       Admission Summary:   CHIEF COMPLAINT:  Vomiting  abdominal Pain  Pt presents to ED via EMS for c/o N/V & RUQ abd pain for 2 days. Pt reports to be daily drinker & has hx of alcohol induced pancreatitis & withdrawal seizures (last seizure 1 year ago). Pt reports he attempted to drink alcohol yesterday, but was unable to tolerate due to emesis. Pt reports marijuana use yesterday. Pt actively vomiting during triage.     Pt was seen at [facility] ED yesterday for same issue.      HISTORY OF PRESENT ILLNESS:     Nathan Trejo is a 33 y.o.   male with   PMHx  as stated below  including h/o alcohol pancreatitis presenting with abdominal pain, NV.  Notes this has been going on for about 2 days.  Seen at [facility] yesterday and had labs, meds, CT.  Notes he didn't feel good after leaving.  Last etoh yesterday and feels like he's withdrawing.  Smokes marijuana every day.      The patient denies any headache, blurry vision, sore throat, trouble swallowing, trouble with speech, chest pain, SOB, cough, fever, chills, urinary symptoms, constipation, recent travels, sick contacts, focal or generalized neurological symptoms, falls, injuries, rashes, contact with COVID-19 diagnosed patients, hematemesis, melena, hemoptysis, hematuria, rashes, denies starting any new medications and denies any other concerns or problems besides as mentioned above.       ED  COURSE :  Given multiple doses    IV morphine/Ativan    Interval history / Subjective:      Patient is tolerating liquid diet.  Wants to advance to regular diet.    Currently, patient denies fever, chills, weakness, chest pain, dyspnea, cough, blood in    * 140 135*   K 3.3* 3.4* 4.0    102 100   CO2 26 32 30   BUN 5* 3* 2*   MG  --  1.5*  --      No results for input(s): \"ALT\", \"TP\", \"GLOB\", \"GGT\" in the last 72 hours.    Invalid input(s): \"SGOT\", \"GPT\", \"AP\", \"TBIL\", \"TBILI\", \"ALB\", \"AML\", \"AMYP\", \"LPSE\", \"HLPSE\"    Recent Labs     08/13/24  1631   INR 1.0  1.0   APTT 28      No results for input(s): \"TIBC\" in the last 72 hours.    Invalid input(s): \"FE\", \"PSAT\", \"FERR\"   No results found for: \"RBCF\"   No results for input(s): \"PH\", \"PCO2\", \"PO2\" in the last 72 hours.  No results for input(s): \"CPK\" in the last 72 hours.    Invalid input(s): \"CPKMB\", \"CKNDX\", \"TROIQ\"  Lab Results   Component Value Date/Time    CHOL 118 07/08/2024 06:16 AM    HDL 54 07/08/2024 06:16 AM    LDL 44.6 07/08/2024 06:16 AM     No results found for: \"GLUCPOC\"  [unfilled]    Notes reviewed from all clinical/nonclinical/nursing services involved in patient's clinical care. Care coordination discussions were held with appropriate clinical/nonclinical/ nursing providers based on care coordination needs.         Patients current active Medications were reviewed, considered, added and adjusted based on the clinical condition today.      Home Medications were reconciled to the best of my ability given all available resources at the time of admission. Route is PO if not otherwise noted.      Admission Status:[id number]:::1}      Medications Reviewed:     Current Facility-Administered Medications   Medication Dose Route Frequency    morphine (PF) injection 2 mg  2 mg IntraVENous Q4H PRN    famotidine (PEPCID) tablet 20 mg  20 mg Oral BID    lactated ringers IV soln infusion   IntraVENous Continuous    potassium chloride (KLOR-CON) extended release tablet 40 mEq  40 mEq Oral PRN    Or    potassium bicarb-citric acid (EFFER-K) effervescent tablet 40 mEq  40 mEq Oral PRN    Or    potassium chloride 10 mEq/100 mL IVPB (Peripheral Line)  10 mEq IntraVENous PRN    magnesium sulfate 2000 mg in

## 2024-08-15 NOTE — PROGRESS NOTES
visit for the patient in Med Surg/Tele Unit 2.  Reviewed patient's chart and spoke with patient's nurse. Introduced self and chaplaincy. Pt shared that he has support at home in his parents and the cross he wears around his neck. He was fine otherwise. No current needs. Advised of  availability.  Rev. Felicia Son MDiv,

## 2024-08-15 NOTE — PROGRESS NOTES
1915- Bedside report received from off going nurse SHERLEY Rebolledo. Pt lying at bed. Pt complain of pain. He take pain meds at 1721. He can only get at 2017. Explain the plan to decrease the pain medicine to pt and the time of medication. He say he wants to get pain meds.    2000- Take his vitals. CIWA SCORE - 3.    2030- gave him morphine and  zofran.    2035- Gave him ice water and popsicle.    2355- Pt called for pain meds. Night provider changed the morphine order from every 3 hrs to  4 hrs. I explain the time. He said he will wait for time.    0030- gave him morphine and popsicle.       0500-  Pt complain of having sweat and pain. Changed his bed linen. Pt wipe his body. Gave him Morphine and librium (CIWA score-5).      0515- Labs draw and send to labs.

## 2024-08-15 NOTE — PROGRESS NOTES
07:57- patient is resting in bed and refusing morning vitals to be taken at this time. No signs and symptoms of distress. Will continue to monitor and provide care.     08:15- pt allowed tech to take vitals except temp.    08:30- Pt allowed nurse to take axillary temp. VSS. Refusing meds at this time and stated he would like to take them all when his pain med is due. Will continue to monitor and provide care.     09:30- went to give medications and patient was in a deep sleep. He had stated earlier that he had a \"rough\" night and didn't get much sleep. Will wait to give medications when he awakes.     09:58- pt awake and is in better spirits. He said that he finally got sleep. All medications due given.     15:20- 10mg Librium given for CIWA of 5 (See MAR)    16:13- CIWA is now a 3 and pt states he is feeling better.     19:10- pt's abdominal pain controlled with morphine 2mg IV every 4 hours (See MAR) Pt able to tolerate food without vomitting today. Bedside report given to HILARIO Willis.

## 2024-08-15 NOTE — PLAN OF CARE
Problem: Discharge Planning  Goal: Discharge to home or other facility with appropriate resources  8/15/2024 0904 by Corin Card RN  Outcome: Progressing  8/14/2024 2100 by Alba Macias RN  Outcome: Progressing     Problem: Pain  Goal: Verbalizes/displays adequate comfort level or baseline comfort level  8/15/2024 0904 by Corin Card RN  Outcome: Progressing  8/14/2024 2100 by Alba Macias RN  Outcome: Progressing     Problem: Safety - Adult  Goal: Free from fall injury  Outcome: Progressing     Problem: Skin/Tissue Integrity  Goal: Absence of new skin breakdown  Description: 1.  Monitor for areas of redness and/or skin breakdown  2.  Assess vascular access sites hourly  3.  Every 4-6 hours minimum:  Change oxygen saturation probe site  4.  Every 4-6 hours:  If on nasal continuous positive airway pressure, respiratory therapy assess nares and determine need for appliance change or resting period.  Outcome: Progressing

## 2024-08-16 VITALS
WEIGHT: 144.2 LBS | HEART RATE: 67 BPM | SYSTOLIC BLOOD PRESSURE: 133 MMHG | RESPIRATION RATE: 16 BRPM | BODY MASS INDEX: 19.11 KG/M2 | HEIGHT: 73 IN | DIASTOLIC BLOOD PRESSURE: 105 MMHG | TEMPERATURE: 97.5 F | OXYGEN SATURATION: 99 %

## 2024-08-16 PROCEDURE — 6370000000 HC RX 637 (ALT 250 FOR IP): Performed by: HOSPITALIST

## 2024-08-16 PROCEDURE — 6370000000 HC RX 637 (ALT 250 FOR IP): Performed by: INTERNAL MEDICINE

## 2024-08-16 PROCEDURE — 2580000003 HC RX 258: Performed by: HOSPITALIST

## 2024-08-16 PROCEDURE — 6360000002 HC RX W HCPCS: Performed by: HOSPITALIST

## 2024-08-16 PROCEDURE — 6360000002 HC RX W HCPCS: Performed by: INTERNAL MEDICINE

## 2024-08-16 RX ORDER — ONDANSETRON 4 MG/1
4 TABLET, ORALLY DISINTEGRATING ORAL EVERY 8 HOURS PRN
Qty: 5 TABLET | Refills: 0 | Status: SHIPPED | OUTPATIENT
Start: 2024-08-16

## 2024-08-16 RX ORDER — MULTIVITAMIN WITH IRON
1 TABLET ORAL DAILY
Qty: 30 TABLET | Refills: 0 | Status: SHIPPED | OUTPATIENT
Start: 2024-08-17

## 2024-08-16 RX ORDER — LANOLIN ALCOHOL/MO/W.PET/CERES
100 CREAM (GRAM) TOPICAL DAILY
Qty: 30 TABLET | Refills: 0 | Status: SHIPPED | OUTPATIENT
Start: 2024-08-17

## 2024-08-16 RX ORDER — OXYCODONE HYDROCHLORIDE AND ACETAMINOPHEN 5; 325 MG/1; MG/1
1 TABLET ORAL EVERY 6 HOURS PRN
Qty: 5 TABLET | Refills: 0 | Status: CANCELLED | OUTPATIENT
Start: 2024-08-16 | End: 2024-08-18

## 2024-08-16 RX ORDER — FOLIC ACID 1 MG/1
1 TABLET ORAL DAILY
Qty: 30 TABLET | Refills: 0 | Status: SHIPPED | OUTPATIENT
Start: 2024-08-16

## 2024-08-16 RX ADMIN — MORPHINE SULFATE 2 MG: 2 INJECTION, SOLUTION INTRAMUSCULAR; INTRAVENOUS at 07:48

## 2024-08-16 RX ADMIN — MORPHINE SULFATE 2 MG: 2 INJECTION, SOLUTION INTRAMUSCULAR; INTRAVENOUS at 03:29

## 2024-08-16 RX ADMIN — Medication 100 MG: at 07:47

## 2024-08-16 RX ADMIN — ONDANSETRON 4 MG: 2 INJECTION INTRAMUSCULAR; INTRAVENOUS at 07:48

## 2024-08-16 RX ADMIN — FAMOTIDINE 20 MG: 20 TABLET ORAL at 07:47

## 2024-08-16 RX ADMIN — FOLIC ACID 1 MG: 1 TABLET ORAL at 07:46

## 2024-08-16 RX ADMIN — SODIUM CHLORIDE, PRESERVATIVE FREE 10 ML: 5 INJECTION INTRAVENOUS at 07:49

## 2024-08-16 RX ADMIN — THERA TABS 1 TABLET: TAB at 07:46

## 2024-08-16 ASSESSMENT — PAIN SCALES - GENERAL
PAINLEVEL_OUTOF10: 9
PAINLEVEL_OUTOF10: 4
PAINLEVEL_OUTOF10: 8
PAINLEVEL_OUTOF10: 8

## 2024-08-16 ASSESSMENT — PAIN DESCRIPTION - PAIN TYPE: TYPE: ACUTE PAIN;CHRONIC PAIN

## 2024-08-16 ASSESSMENT — PAIN DESCRIPTION - ORIENTATION
ORIENTATION: UPPER
ORIENTATION: RIGHT;UPPER

## 2024-08-16 ASSESSMENT — PAIN DESCRIPTION - DESCRIPTORS
DESCRIPTORS: ACHING
DESCRIPTORS: SHARP

## 2024-08-16 ASSESSMENT — PAIN DESCRIPTION - ONSET: ONSET: ON-GOING

## 2024-08-16 ASSESSMENT — PAIN DESCRIPTION - LOCATION
LOCATION: ABDOMEN
LOCATION: ABDOMEN

## 2024-08-16 ASSESSMENT — PAIN - FUNCTIONAL ASSESSMENT: PAIN_FUNCTIONAL_ASSESSMENT: ACTIVITIES ARE NOT PREVENTED

## 2024-08-16 ASSESSMENT — PAIN DESCRIPTION - FREQUENCY: FREQUENCY: CONTINUOUS

## 2024-08-16 ASSESSMENT — PAIN DESCRIPTION - DIRECTION: RADIATING_TOWARDS: LEFT SIDE

## 2024-08-16 NOTE — PROGRESS NOTES
Per Dr. Bermudez, he went in the patients room and discussed the patient being discharged today, the patient requested it be before 11am so that he would not miss his ride.  Dr. Bermudez told him that he would do the best that he could to get him out by 11am and when the patients nurse went to his room he had eloped.  She and several other nurses went out and looked for the patient to see if he was still with in sight because there was not evidence of his IV being removed. The patients nurse notified me that she was concerned that the patient may still have his IV in.  I told her to contact the patient and contact the police. She tried calling the patient but was only able to get the patients luis fernando Mitchell who said she would make sure he knew to come back and have the IV taken out. The patients luis fernando Mitchell called back up to the unit and told them that he had taken out his IV.  The police said they would do a welfare check on the patient.

## 2024-08-16 NOTE — PROGRESS NOTES
1910- Bedside report received from off going nurse HILARIO Coombs. Pt lying at bed. Pt state pain level 5. Pt wants his pain meds at 2200.     1949- Take his vitals. Pt able to tolerate regular diet.    2200- Pt was sweatting. His blanket is wet. Changed new blanket.  Gave him librium( ciwa score-6) and morphine.    0310- Pt called for pain meds. Pain scale -9. Gave him morphine.

## 2024-08-16 NOTE — DISCHARGE INSTRUCTIONS
How Severe Is Your Skin Lesion?: moderate It is important that you take the medication exactly as they are prescribed.   Keep your medication in the bottles provided by the pharmacist and keep a list of the medication names, dosages, and times to be taken in your wallet.   Do not take other medications without consulting your doctor.       NOTIFY YOUR PHYSICIAN OR GO TO THE NEAREST EMERGENCY ROOM FOR ANY OF THE FOLLOWING:   Fever over 101 degrees for 24 hours.   Chest pain, shortness of breath, fever, chills, nausea, vomiting, diarrhea, change in mentation, falling, weakness, bleeding. Severe pain or pain not relieved by medications.  Or, any other signs or symptoms that you may have questions about.      Note: You were admitted to hospital with acute pancreatitis (inflammation of pancreas).  You need to follow-up with primary care physician for continued healthcare management/screenings.  You need to use low-fat diet.  Avoid alcohol or added sugars.   Have Your Skin Lesions Been Treated?: not been treated Is This A New Presentation, Or A Follow-Up?: Skin Lesion

## 2024-08-16 NOTE — PROGRESS NOTES
[facility] Adult  Hospitalist Group                                                                                          Hospitalist Progress Note  Torsten Bermudez MD  Office Phone: [phone]        Date of Service:  2024  NAME:  Nathan Trejo  :  1990  MRN:  [id number]       Admission Summary:   CHIEF COMPLAINT:  Vomiting  abdominal Pain  Pt presents to ED via EMS for c/o N/V & RUQ abd pain for 2 days. Pt reports to be daily drinker & has hx of alcohol induced pancreatitis & withdrawal seizures (last seizure 1 year ago). Pt reports he attempted to drink alcohol yesterday, but was unable to tolerate due to emesis. Pt reports marijuana use yesterday. Pt actively vomiting during triage.     Pt was seen at [facility] ED yesterday for same issue.      HISTORY OF PRESENT ILLNESS:     Nathan Trejo is a 33 y.o.   male with   PMHx  as stated below  including h/o alcohol pancreatitis presenting with abdominal pain, NV.  Notes this has been going on for about 2 days.  Seen at [facility] yesterday and had labs, meds, CT.  Notes he didn't feel good after leaving.  Last etoh yesterday and feels like he's withdrawing.  Smokes marijuana every day.      The patient denies any headache, blurry vision, sore throat, trouble swallowing, trouble with speech, chest pain, SOB, cough, fever, chills, urinary symptoms, constipation, recent travels, sick contacts, focal or generalized neurological symptoms, falls, injuries, rashes, contact with COVID-19 diagnosed patients, hematemesis, melena, hemoptysis, hematuria, rashes, denies starting any new medications and denies any other concerns or problems besides as mentioned above.       ED  COURSE :  Given multiple doses    IV morphine/Ativan    Interval history / Subjective:      Patient is tolerating diet.  No vomiting episode.  Abdominal pain is much better as per patient.    Currently, patient denies fever, chills, weakness, chest pain,

## 2024-08-16 NOTE — PROGRESS NOTES
Got informed about patient elopement. Patient left with the IV line. Called the police regarding the elopement and gave description. They said they will call back if they have any information. Called the family listed and she said she reached the patient and he said he reached back to the hospital and talk to someone. Dr davenport informed that the patient called him and said that he pulled out his IV. Police was informed about the call and also mentioned that we did not see him pull the IV out so he might or might not have the IV. Dr davenport informed about the situation,nursing supervisor and nurse juan luis aware and information provided during IDR rounds.

## 2024-08-16 NOTE — PROGRESS NOTES
Called patient again to check on patient's wellbeing and wanted to confirm if patient picked up prescriptions. Pt was previously told about discharge instructions/need for follow-up verbally, however pt eloped before paper work could be given to pt. Left voicemail to call back. Case management will do follow-up call.

## 2024-08-16 NOTE — CARE COORDINATION
JASON     Care Management Discharge Assessment     20 %   RUR:  Readmission? Yes -   1st IM letter given? No/NA   1st  letter given: No /NA   Patient recently [place] for [place].   Inpatient- no compliance letter needed.      Talked to MD this am and patient was anticipated for discharge-   Patient eloped.   See notes from MD and Nursing- follow-up with patient .    CM  to follow-up call      Medicaid # sesar Rush     [id number]      Next Steps: Follow up  Instructions: You should be gettng a card soon and to select a specific plan- if do not get a letter please call your Department of . You can give provider the # isted above.    ORLANDO ROUSSEAU [facility]     [address] [place] [id number] Phone: [phone]     Next Steps: Follow up on 8/20/2024  Instructions: New Provider.  PCP   Leatha Balderas-  appointment  8-20-24 @ 2:30PM.  if you need to change this appointment please call the office.    [facility] - Peer Recovery Warm Line     Call [phone]  [facility] -  & 7 days a week-8:00AM -12 Midnight.     Next Steps: Follow up  Instructions: Please follow-up with resources as discussed-       Noemi LORA RN   [phone]

## 2024-08-16 NOTE — PROGRESS NOTES
0968 Pt eloped from unit AMA, possibly with PIV in place. Called pt to ask if we could remove it and give him his paperwork.    6427 Spoke with Malka Juarez on the phone  who is listed as pt's girlfriend. Asked her to ask him to come back so we could remove his IV.

## 2024-08-19 ENCOUNTER — FOLLOWUP TELEPHONE ENCOUNTER (OUTPATIENT)
Facility: HOSPITAL | Age: 34
End: 2024-08-19

## 2024-08-19 NOTE — TELEPHONE ENCOUNTER
CM called patient by telephone to perform post discharge assessment and for the purpose of follow up call from inpatient discharge to check on environmental challenges/medications/appointment follow up/and questions/concerns.     Pt did not answer call today; CM left a HIPAA compliant vm. Will attempt a 2nd before the end of the week.     Asia Granado LMSW, CM

## 2025-01-28 ENCOUNTER — APPOINTMENT (OUTPATIENT)
Facility: HOSPITAL | Age: 35
DRG: 005 | End: 2025-01-28
Payer: MEDICAID

## 2025-01-28 ENCOUNTER — HOSPITAL ENCOUNTER (INPATIENT)
Facility: HOSPITAL | Age: 35
LOS: 28 days | Discharge: NURSING, REHAB OR LONG-TERM CARE | DRG: 005 | End: 2025-02-25
Attending: EMERGENCY MEDICINE | Admitting: INTERNAL MEDICINE
Payer: MEDICAID

## 2025-01-28 DIAGNOSIS — R11.2 NAUSEA AND VOMITING IN ADULT: ICD-10-CM

## 2025-01-28 DIAGNOSIS — J96.01 ACUTE RESPIRATORY FAILURE WITH HYPOXIA (HCC): ICD-10-CM

## 2025-01-28 DIAGNOSIS — K76.82 HEPATIC ENCEPHALOPATHY (HCC): ICD-10-CM

## 2025-01-28 DIAGNOSIS — K85.20 ALCOHOL-INDUCED ACUTE PANCREATITIS WITHOUT INFECTION OR NECROSIS: Primary | ICD-10-CM

## 2025-01-28 DIAGNOSIS — R07.9 CHEST PAIN, UNSPECIFIED TYPE: ICD-10-CM

## 2025-01-28 DIAGNOSIS — K85.90 PANCREATITIS, UNSPECIFIED PANCREATITIS TYPE: ICD-10-CM

## 2025-01-28 LAB
ALBUMIN SERPL-MCNC: 4.5 G/DL (ref 3.5–5.2)
ALBUMIN/GLOB SERPL: 2 (ref 1.1–2.2)
ALP SERPL-CCNC: 71 U/L (ref 40–129)
ALT SERPL-CCNC: 159 U/L (ref 10–50)
AMPHET UR QL SCN: NEGATIVE
ANION GAP SERPL CALC-SCNC: 14 MMOL/L (ref 2–12)
APPEARANCE UR: CLEAR
AST SERPL-CCNC: 219 U/L (ref 10–50)
BACTERIA URNS QL MICRO: NEGATIVE /HPF
BARBITURATES UR QL SCN: NEGATIVE
BASOPHILS # BLD: 0.04 K/UL (ref 0–0.1)
BASOPHILS NFR BLD: 1 % (ref 0–1)
BENZODIAZ UR QL: NEGATIVE
BILIRUB SERPL-MCNC: 1.5 MG/DL (ref 0.2–1)
BILIRUB UR QL: NEGATIVE
BUN SERPL-MCNC: 11 MG/DL (ref 6–20)
BUN/CREAT SERPL: 12 (ref 12–20)
CALCIUM SERPL-MCNC: 9.3 MG/DL (ref 8.6–10)
CANNABINOIDS UR QL SCN: POSITIVE
CHLORIDE SERPL-SCNC: 94 MMOL/L (ref 98–107)
CO2 SERPL-SCNC: 23 MMOL/L (ref 22–29)
COCAINE UR QL SCN: NEGATIVE
COLOR UR: ABNORMAL
CREAT SERPL-MCNC: 0.94 MG/DL (ref 0.7–1.2)
DIFFERENTIAL METHOD BLD: ABNORMAL
EOSINOPHIL # BLD: 0.01 K/UL (ref 0–0.4)
EOSINOPHIL NFR BLD: 0.3 % (ref 0–7)
EPITH CASTS URNS QL MICRO: ABNORMAL /LPF
ERYTHROCYTE [DISTWIDTH] IN BLOOD BY AUTOMATED COUNT: 12.1 % (ref 11.5–14.5)
ETHANOL SERPL-MCNC: <10 MG/DL (ref 0–10)
GLOBULIN SER CALC-MCNC: 2.3 G/DL (ref 2–4)
GLUCOSE SERPL-MCNC: 284 MG/DL (ref 65–100)
GLUCOSE UR STRIP.AUTO-MCNC: 500 MG/DL
HCT VFR BLD AUTO: 45.6 % (ref 36.6–50.3)
HGB BLD-MCNC: 15.3 G/DL (ref 12.1–17)
HGB UR QL STRIP: NEGATIVE
IMM GRANULOCYTES # BLD AUTO: 0.01 K/UL (ref 0–0.04)
IMM GRANULOCYTES NFR BLD AUTO: 0.3 % (ref 0–0.5)
KETONES UR QL STRIP.AUTO: ABNORMAL MG/DL
LEUKOCYTE ESTERASE UR QL STRIP.AUTO: NEGATIVE
LIPASE SERPL-CCNC: 263 U/L (ref 13–60)
LYMPHOCYTES # BLD: 0.51 K/UL (ref 0.8–3.5)
LYMPHOCYTES NFR BLD: 13.2 % (ref 12–49)
Lab: ABNORMAL
MAGNESIUM SERPL-MCNC: 1.8 MG/DL (ref 1.6–2.6)
MCH RBC QN AUTO: 28.6 PG (ref 26–34)
MCHC RBC AUTO-ENTMCNC: 33.6 G/DL (ref 30–36.5)
MCV RBC AUTO: 85.2 FL (ref 80–99)
METHADONE UR QL: NEGATIVE
MONOCYTES # BLD: 0.25 K/UL (ref 0–1)
MONOCYTES NFR BLD: 6.3 % (ref 5–13)
NEUTS SEG # BLD: 3.08 K/UL (ref 1.8–8)
NEUTS SEG NFR BLD: 78.9 % (ref 32–75)
NITRITE UR QL STRIP.AUTO: NEGATIVE
NRBC # BLD: 0 K/UL (ref 0–0.01)
NRBC BLD-RTO: 0 PER 100 WBC
OPIATES UR QL: POSITIVE
PCP UR QL: NEGATIVE
PH UR STRIP: 5.5 (ref 5–8)
PLATELET # BLD AUTO: 117 K/UL (ref 150–400)
PMV BLD AUTO: 11.4 FL (ref 8.9–12.9)
POTASSIUM SERPL-SCNC: 4.2 MMOL/L (ref 3.5–5.1)
PROT SERPL-MCNC: 6.8 G/DL (ref 6.4–8.3)
PROT UR STRIP-MCNC: NEGATIVE MG/DL
RBC # BLD AUTO: 5.35 M/UL (ref 4.1–5.7)
RBC #/AREA URNS HPF: ABNORMAL /HPF (ref 0–5)
RBC MORPH BLD: ABNORMAL
SODIUM SERPL-SCNC: 131 MMOL/L (ref 136–145)
SP GR UR REFRACTOMETRY: >1.03 (ref 1–1.03)
TROPONIN T SERPL HS-MCNC: 6.2 NG/L (ref 0–22)
UROBILINOGEN UR QL STRIP.AUTO: 0.2 EU/DL (ref 0.2–1)
WBC # BLD AUTO: 3.9 K/UL (ref 4.1–11.1)
WBC URNS QL MICRO: ABNORMAL /HPF (ref 0–4)

## 2025-01-28 PROCEDURE — 2500000003 HC RX 250 WO HCPCS: Performed by: INTERNAL MEDICINE

## 2025-01-28 PROCEDURE — 84484 ASSAY OF TROPONIN QUANT: CPT

## 2025-01-28 PROCEDURE — 80307 DRUG TEST PRSMV CHEM ANLYZR: CPT

## 2025-01-28 PROCEDURE — 81001 URINALYSIS AUTO W/SCOPE: CPT

## 2025-01-28 PROCEDURE — 6370000000 HC RX 637 (ALT 250 FOR IP): Performed by: EMERGENCY MEDICINE

## 2025-01-28 PROCEDURE — 6360000004 HC RX CONTRAST MEDICATION: Performed by: EMERGENCY MEDICINE

## 2025-01-28 PROCEDURE — 82077 ASSAY SPEC XCP UR&BREATH IA: CPT

## 2025-01-28 PROCEDURE — 83036 HEMOGLOBIN GLYCOSYLATED A1C: CPT

## 2025-01-28 PROCEDURE — 96374 THER/PROPH/DIAG INJ IV PUSH: CPT

## 2025-01-28 PROCEDURE — 2500000003 HC RX 250 WO HCPCS: Performed by: EMERGENCY MEDICINE

## 2025-01-28 PROCEDURE — 6370000000 HC RX 637 (ALT 250 FOR IP): Performed by: INTERNAL MEDICINE

## 2025-01-28 PROCEDURE — 6360000002 HC RX W HCPCS: Performed by: INTERNAL MEDICINE

## 2025-01-28 PROCEDURE — 6360000002 HC RX W HCPCS: Performed by: EMERGENCY MEDICINE

## 2025-01-28 PROCEDURE — 96376 TX/PRO/DX INJ SAME DRUG ADON: CPT

## 2025-01-28 PROCEDURE — 99285 EMERGENCY DEPT VISIT HI MDM: CPT

## 2025-01-28 PROCEDURE — 85025 COMPLETE CBC W/AUTO DIFF WBC: CPT

## 2025-01-28 PROCEDURE — 70450 CT HEAD/BRAIN W/O DYE: CPT

## 2025-01-28 PROCEDURE — 2580000003 HC RX 258: Performed by: INTERNAL MEDICINE

## 2025-01-28 PROCEDURE — 96375 TX/PRO/DX INJ NEW DRUG ADDON: CPT

## 2025-01-28 PROCEDURE — 93005 ELECTROCARDIOGRAM TRACING: CPT | Performed by: EMERGENCY MEDICINE

## 2025-01-28 PROCEDURE — 36415 COLL VENOUS BLD VENIPUNCTURE: CPT

## 2025-01-28 PROCEDURE — 1100000000 HC RM PRIVATE

## 2025-01-28 PROCEDURE — 74177 CT ABD & PELVIS W/CONTRAST: CPT

## 2025-01-28 PROCEDURE — 99282 EMERGENCY DEPT VISIT SF MDM: CPT

## 2025-01-28 PROCEDURE — 83735 ASSAY OF MAGNESIUM: CPT

## 2025-01-28 PROCEDURE — 2580000003 HC RX 258: Performed by: EMERGENCY MEDICINE

## 2025-01-28 PROCEDURE — 83690 ASSAY OF LIPASE: CPT

## 2025-01-28 PROCEDURE — 80053 COMPREHEN METABOLIC PANEL: CPT

## 2025-01-28 RX ORDER — SODIUM CHLORIDE 0.9 % (FLUSH) 0.9 %
5-40 SYRINGE (ML) INJECTION PRN
Status: DISCONTINUED | OUTPATIENT
Start: 2025-01-28 | End: 2025-02-02

## 2025-01-28 RX ORDER — ACETAMINOPHEN 325 MG/1
650 TABLET ORAL EVERY 6 HOURS PRN
Status: DISCONTINUED | OUTPATIENT
Start: 2025-01-28 | End: 2025-01-30

## 2025-01-28 RX ORDER — MORPHINE SULFATE 2 MG/ML
2 INJECTION, SOLUTION INTRAMUSCULAR; INTRAVENOUS EVERY 4 HOURS PRN
Status: DISCONTINUED | OUTPATIENT
Start: 2025-01-28 | End: 2025-01-29

## 2025-01-28 RX ORDER — LORAZEPAM 1 MG/1
4 TABLET ORAL
Status: DISCONTINUED | OUTPATIENT
Start: 2025-01-28 | End: 2025-02-02

## 2025-01-28 RX ORDER — MORPHINE SULFATE 4 MG/ML
4 INJECTION, SOLUTION INTRAMUSCULAR; INTRAVENOUS
Status: COMPLETED | OUTPATIENT
Start: 2025-01-28 | End: 2025-01-28

## 2025-01-28 RX ORDER — MULTIVITAMIN WITH IRON
1 TABLET ORAL DAILY
Status: DISCONTINUED | OUTPATIENT
Start: 2025-01-28 | End: 2025-02-02

## 2025-01-28 RX ORDER — POTASSIUM CHLORIDE 750 MG/1
40 TABLET, EXTENDED RELEASE ORAL PRN
Status: DISCONTINUED | OUTPATIENT
Start: 2025-01-28 | End: 2025-02-02

## 2025-01-28 RX ORDER — LORAZEPAM 2 MG/ML
1 INJECTION INTRAMUSCULAR
Status: DISCONTINUED | OUTPATIENT
Start: 2025-01-28 | End: 2025-02-02

## 2025-01-28 RX ORDER — POTASSIUM CHLORIDE 7.45 MG/ML
10 INJECTION INTRAVENOUS PRN
Status: DISCONTINUED | OUTPATIENT
Start: 2025-01-28 | End: 2025-02-02

## 2025-01-28 RX ORDER — NALOXONE HYDROCHLORIDE 0.4 MG/ML
0.4 INJECTION, SOLUTION INTRAMUSCULAR; INTRAVENOUS; SUBCUTANEOUS PRN
Status: DISCONTINUED | OUTPATIENT
Start: 2025-01-28 | End: 2025-02-25 | Stop reason: HOSPADM

## 2025-01-28 RX ORDER — POLYETHYLENE GLYCOL 3350 17 G/17G
17 POWDER, FOR SOLUTION ORAL DAILY PRN
Status: DISCONTINUED | OUTPATIENT
Start: 2025-01-28 | End: 2025-02-02

## 2025-01-28 RX ORDER — GAUZE BANDAGE 2" X 2"
100 BANDAGE TOPICAL DAILY
Status: DISCONTINUED | OUTPATIENT
Start: 2025-01-28 | End: 2025-01-29 | Stop reason: SDUPTHER

## 2025-01-28 RX ORDER — PHENOBARBITAL 32.4 MG/1
32.4 TABLET ORAL 2 TIMES DAILY
Status: COMPLETED | OUTPATIENT
Start: 2025-01-29 | End: 2025-01-30

## 2025-01-28 RX ORDER — ACETAMINOPHEN 650 MG/1
650 SUPPOSITORY RECTAL EVERY 6 HOURS PRN
Status: DISCONTINUED | OUTPATIENT
Start: 2025-01-28 | End: 2025-01-30

## 2025-01-28 RX ORDER — ENOXAPARIN SODIUM 100 MG/ML
40 INJECTION SUBCUTANEOUS DAILY
Status: DISCONTINUED | OUTPATIENT
Start: 2025-01-28 | End: 2025-01-28

## 2025-01-28 RX ORDER — SODIUM CHLORIDE 0.9 % (FLUSH) 0.9 %
5-40 SYRINGE (ML) INJECTION EVERY 12 HOURS SCHEDULED
Status: DISCONTINUED | OUTPATIENT
Start: 2025-01-28 | End: 2025-02-01 | Stop reason: SDUPTHER

## 2025-01-28 RX ORDER — LORAZEPAM 1 MG/1
1 TABLET ORAL
Status: DISCONTINUED | OUTPATIENT
Start: 2025-01-28 | End: 2025-02-02

## 2025-01-28 RX ORDER — LORAZEPAM 2 MG/ML
3 INJECTION INTRAMUSCULAR
Status: DISCONTINUED | OUTPATIENT
Start: 2025-01-28 | End: 2025-02-02

## 2025-01-28 RX ORDER — SODIUM CHLORIDE 0.9 % (FLUSH) 0.9 %
5-40 SYRINGE (ML) INJECTION EVERY 12 HOURS SCHEDULED
Status: DISCONTINUED | OUTPATIENT
Start: 2025-01-28 | End: 2025-02-02

## 2025-01-28 RX ORDER — SODIUM CHLORIDE 0.9 % (FLUSH) 0.9 %
5-40 SYRINGE (ML) INJECTION PRN
Status: DISCONTINUED | OUTPATIENT
Start: 2025-01-28 | End: 2025-02-25 | Stop reason: HOSPADM

## 2025-01-28 RX ORDER — SODIUM CHLORIDE 9 MG/ML
INJECTION, SOLUTION INTRAVENOUS PRN
Status: DISCONTINUED | OUTPATIENT
Start: 2025-01-28 | End: 2025-02-25 | Stop reason: HOSPADM

## 2025-01-28 RX ORDER — LORAZEPAM 1 MG/1
3 TABLET ORAL
Status: DISCONTINUED | OUTPATIENT
Start: 2025-01-28 | End: 2025-02-02

## 2025-01-28 RX ORDER — GAUZE BANDAGE 2" X 2"
100 BANDAGE TOPICAL DAILY
Status: DISCONTINUED | OUTPATIENT
Start: 2025-01-28 | End: 2025-01-31

## 2025-01-28 RX ORDER — 0.9 % SODIUM CHLORIDE 0.9 %
1000 INTRAVENOUS SOLUTION INTRAVENOUS ONCE
Status: COMPLETED | OUTPATIENT
Start: 2025-01-28 | End: 2025-01-28

## 2025-01-28 RX ORDER — MAGNESIUM SULFATE IN WATER 40 MG/ML
2000 INJECTION, SOLUTION INTRAVENOUS PRN
Status: DISCONTINUED | OUTPATIENT
Start: 2025-01-28 | End: 2025-02-25 | Stop reason: HOSPADM

## 2025-01-28 RX ORDER — PHENOBARBITAL 32.4 MG/1
32.4 TABLET ORAL 4 TIMES DAILY
Status: COMPLETED | OUTPATIENT
Start: 2025-01-28 | End: 2025-01-29

## 2025-01-28 RX ORDER — LORAZEPAM 1 MG/1
2 TABLET ORAL
Status: DISCONTINUED | OUTPATIENT
Start: 2025-01-28 | End: 2025-02-02

## 2025-01-28 RX ORDER — FOLIC ACID 1 MG/1
1 TABLET ORAL DAILY
Status: DISCONTINUED | OUTPATIENT
Start: 2025-01-28 | End: 2025-01-31

## 2025-01-28 RX ORDER — ENOXAPARIN SODIUM 100 MG/ML
40 INJECTION SUBCUTANEOUS DAILY
Status: DISCONTINUED | OUTPATIENT
Start: 2025-01-29 | End: 2025-01-30

## 2025-01-28 RX ORDER — LORAZEPAM 2 MG/ML
2 INJECTION INTRAMUSCULAR
Status: DISCONTINUED | OUTPATIENT
Start: 2025-01-28 | End: 2025-02-02

## 2025-01-28 RX ORDER — LORAZEPAM 2 MG/ML
1 INJECTION INTRAMUSCULAR ONCE
Status: COMPLETED | OUTPATIENT
Start: 2025-01-28 | End: 2025-01-28

## 2025-01-28 RX ORDER — ONDANSETRON 2 MG/ML
4 INJECTION INTRAMUSCULAR; INTRAVENOUS
Status: COMPLETED | OUTPATIENT
Start: 2025-01-28 | End: 2025-01-28

## 2025-01-28 RX ORDER — IOPAMIDOL 755 MG/ML
100 INJECTION, SOLUTION INTRAVASCULAR
Status: COMPLETED | OUTPATIENT
Start: 2025-01-28 | End: 2025-01-28

## 2025-01-28 RX ORDER — ONDANSETRON 4 MG/1
4 TABLET, ORALLY DISINTEGRATING ORAL EVERY 8 HOURS PRN
Status: DISCONTINUED | OUTPATIENT
Start: 2025-01-28 | End: 2025-02-02

## 2025-01-28 RX ORDER — ONDANSETRON 2 MG/ML
4 INJECTION INTRAMUSCULAR; INTRAVENOUS EVERY 6 HOURS PRN
Status: DISCONTINUED | OUTPATIENT
Start: 2025-01-28 | End: 2025-02-02

## 2025-01-28 RX ORDER — PHENOBARBITAL 32.4 MG/1
16.2 TABLET ORAL 2 TIMES DAILY
Status: DISCONTINUED | OUTPATIENT
Start: 2025-01-30 | End: 2025-01-30

## 2025-01-28 RX ORDER — LORAZEPAM 2 MG/ML
4 INJECTION INTRAMUSCULAR
Status: DISCONTINUED | OUTPATIENT
Start: 2025-01-28 | End: 2025-02-02

## 2025-01-28 RX ORDER — SODIUM CHLORIDE, SODIUM LACTATE, POTASSIUM CHLORIDE, CALCIUM CHLORIDE 600; 310; 30; 20 MG/100ML; MG/100ML; MG/100ML; MG/100ML
INJECTION, SOLUTION INTRAVENOUS CONTINUOUS
Status: DISCONTINUED | OUTPATIENT
Start: 2025-01-28 | End: 2025-02-05

## 2025-01-28 RX ADMIN — Medication 100 MG: at 20:08

## 2025-01-28 RX ADMIN — MORPHINE SULFATE 4 MG: 4 INJECTION INTRAVENOUS at 20:12

## 2025-01-28 RX ADMIN — MORPHINE SULFATE 2 MG: 2 INJECTION, SOLUTION INTRAMUSCULAR; INTRAVENOUS at 22:23

## 2025-01-28 RX ADMIN — SODIUM CHLORIDE, POTASSIUM CHLORIDE, SODIUM LACTATE AND CALCIUM CHLORIDE: 600; 310; 30; 20 INJECTION, SOLUTION INTRAVENOUS at 20:31

## 2025-01-28 RX ADMIN — IOPAMIDOL 100 ML: 755 INJECTION, SOLUTION INTRAVENOUS at 18:48

## 2025-01-28 RX ADMIN — SODIUM CHLORIDE, PRESERVATIVE FREE 10 ML: 5 INJECTION INTRAVENOUS at 22:40

## 2025-01-28 RX ADMIN — SODIUM CHLORIDE 1000 ML: 9 INJECTION, SOLUTION INTRAVENOUS at 18:57

## 2025-01-28 RX ADMIN — SODIUM CHLORIDE, PRESERVATIVE FREE 10 ML: 5 INJECTION INTRAVENOUS at 20:15

## 2025-01-28 RX ADMIN — MORPHINE SULFATE 4 MG: 4 INJECTION INTRAVENOUS at 18:34

## 2025-01-28 RX ADMIN — ONDANSETRON 4 MG: 2 INJECTION, SOLUTION INTRAMUSCULAR; INTRAVENOUS at 18:34

## 2025-01-28 RX ADMIN — LORAZEPAM 1 MG: 2 INJECTION, SOLUTION INTRAMUSCULAR; INTRAVENOUS at 20:10

## 2025-01-28 RX ADMIN — PHENOBARBITAL 32.4 MG: 32.4 TABLET ORAL at 21:27

## 2025-01-28 ASSESSMENT — PAIN DESCRIPTION - LOCATION
LOCATION: ABDOMEN

## 2025-01-28 ASSESSMENT — PAIN DESCRIPTION - PAIN TYPE: TYPE: ACUTE PAIN

## 2025-01-28 ASSESSMENT — PAIN - FUNCTIONAL ASSESSMENT: PAIN_FUNCTIONAL_ASSESSMENT: 0-10

## 2025-01-28 ASSESSMENT — PAIN SCALES - GENERAL
PAINLEVEL_OUTOF10: 10

## 2025-01-28 ASSESSMENT — PAIN SCALES - WONG BAKER: WONGBAKER_NUMERICALRESPONSE: NO HURT

## 2025-01-28 ASSESSMENT — PAIN DESCRIPTION - ORIENTATION: ORIENTATION: MID;UPPER

## 2025-01-28 ASSESSMENT — PAIN DESCRIPTION - DESCRIPTORS: DESCRIPTORS: ACHING

## 2025-01-28 NOTE — ED TRIAGE NOTES
Patient arrives via EMS from [facility] with co nausea and vomiting. Per EMS initial call was for unconsciousness, girlfriend called stating she found him on the couch unconscious.    Per EMS patient was A&Ox 4 on arrival.    Patient is an 8-12 pack a day beer drinker. States he has not drank in 2 days because he had a stomach virus.    Patient reports hx of pancreatitis. States it feels like he is having a flare

## 2025-01-28 NOTE — ED PROVIDER NOTES
[facility] EMERGENCY DEPARTMENT  EMERGENCY DEPARTMENT ENCOUNTER      Pt Name: Nathan Trejo  MRN: [id number]  Birthdate 1990  Date of evaluation: 1/28/2025  Provider: Gabriel Juarez DO      HISTORY OF PRESENT ILLNESS      HPI    34-year-old male with a history of EtOH abuse and prior alcoholic pancreatitis presents to the emergency department by EMS stating that he has had increasing abdominal pain and nausea and vomiting for the last 3 to 4 days.  He is a regular daily approximately 12 pack drinker per day.  He has not drank anything in 2 days because he thought that he had a stomach virus or recurrent pancreatitis.  He denies any noted fever.  No history of prior abdominal surgeries.  He states that he has a history of prior alcohol withdrawal seizures and today his girlfriend found him to be unconscious and harder to wake than normal but then he reportedly was awoken and he now arrives in the ED GCS 15 in no distress.  He states that he does not think that he had a seizure.    Nursing Notes were reviewed.    REVIEW OF SYSTEMS         Review of Systems        PAST MEDICAL HISTORY     Past Medical History:   Diagnosis Date    Alcohol abuse          SURGICAL HISTORY     No past surgical history on file.      CURRENT MEDICATIONS       Previous Medications    FOLIC ACID (FOLVITE) 1 MG TABLET    Take 1 tablet by mouth daily    MULTIPLE VITAMIN (MULTIVITAMIN) TABS TABLET    Take 1 tablet by mouth daily    ONDANSETRON (ZOFRAN-ODT) 4 MG DISINTEGRATING TABLET    Take 1 tablet by mouth every 8 hours as needed for Nausea or Vomiting    PANTOPRAZOLE (PROTONIX) 40 MG TABLET    Take 1 tablet by mouth every morning (before breakfast)    THIAMINE 100 MG TABLET    Take 1 tablet by mouth daily       ALLERGIES     Patient has no known allergies.    FAMILY HISTORY     No family history on file.       SOCIAL HISTORY       Social History     Socioeconomic History    Marital status: Single   Tobacco Use    Smoking status: Every

## 2025-01-29 LAB
-: NORMAL
ANION GAP SERPL CALC-SCNC: 5 MMOL/L (ref 2–12)
BASOPHILS # BLD: 0.03 K/UL (ref 0–0.1)
BASOPHILS NFR BLD: 0.5 % (ref 0–1)
BUN SERPL-MCNC: 6 MG/DL (ref 6–20)
BUN/CREAT SERPL: 7 (ref 12–20)
CALCIUM SERPL-MCNC: 8.9 MG/DL (ref 8.5–10.1)
CHLORIDE SERPL-SCNC: 102 MMOL/L (ref 97–108)
CHOLEST SERPL-MCNC: 98 MG/DL
CO2 SERPL-SCNC: 25 MMOL/L (ref 21–32)
CREAT SERPL-MCNC: 0.82 MG/DL (ref 0.7–1.3)
DIFFERENTIAL METHOD BLD: ABNORMAL
EKG ATRIAL RATE: 60 BPM
EKG DIAGNOSIS: NORMAL
EKG P AXIS: 32 DEGREES
EKG P-R INTERVAL: 116 MS
EKG Q-T INTERVAL: 440 MS
EKG QRS DURATION: 92 MS
EKG QTC CALCULATION (BAZETT): 440 MS
EKG R AXIS: 84 DEGREES
EKG T AXIS: 79 DEGREES
EKG VENTRICULAR RATE: 60 BPM
EOSINOPHIL # BLD: 0.01 K/UL (ref 0–0.4)
EOSINOPHIL NFR BLD: 0.2 % (ref 0–7)
ERYTHROCYTE [DISTWIDTH] IN BLOOD BY AUTOMATED COUNT: 12.2 % (ref 11.5–14.5)
EST. AVERAGE GLUCOSE BLD GHB EST-MCNC: 97 MG/DL
FLUAV RNA SPEC QL NAA+PROBE: NOT DETECTED
FLUBV RNA SPEC QL NAA+PROBE: NOT DETECTED
GLUCOSE SERPL-MCNC: 170 MG/DL (ref 65–100)
HAV IGM SER QL: NONREACTIVE
HBA1C MFR BLD: 5 % (ref 4–5.6)
HBV CORE IGM SER QL: NONREACTIVE
HBV SURFACE AG SER QL: <0.1 INDEX
HBV SURFACE AG SER QL: NEGATIVE
HCT VFR BLD AUTO: 44.9 % (ref 36.6–50.3)
HCV AB SER IA-ACNC: 0.17 INDEX
HCV AB SERPL QL IA: NONREACTIVE
HDLC SERPL-MCNC: 80 MG/DL
HDLC SERPL: 1.2 (ref 0–5)
HGB BLD-MCNC: 14.9 G/DL (ref 12.1–17)
IMM GRANULOCYTES # BLD AUTO: 0.02 K/UL (ref 0–0.04)
IMM GRANULOCYTES NFR BLD AUTO: 0.3 % (ref 0–0.5)
LDLC SERPL CALC-MCNC: 10.2 MG/DL (ref 0–100)
LYMPHOCYTES # BLD: 0.41 K/UL (ref 0.8–3.5)
LYMPHOCYTES NFR BLD: 6.2 % (ref 12–49)
MCH RBC QN AUTO: 28.3 PG (ref 26–34)
MCHC RBC AUTO-ENTMCNC: 33.2 G/DL (ref 30–36.5)
MCV RBC AUTO: 85.2 FL (ref 80–99)
MONOCYTES # BLD: 0.24 K/UL (ref 0–1)
MONOCYTES NFR BLD: 3.6 % (ref 5–13)
NEUTS SEG # BLD: 5.93 K/UL (ref 1.8–8)
NEUTS SEG NFR BLD: 89.2 % (ref 32–75)
NRBC # BLD: 0 K/UL (ref 0–0.01)
NRBC BLD-RTO: 0 PER 100 WBC
PHOSPHATE SERPL-MCNC: 2.8 MG/DL (ref 2.6–4.7)
PLATELET # BLD AUTO: 115 K/UL (ref 150–400)
PMV BLD AUTO: 12.2 FL (ref 8.9–12.9)
POTASSIUM SERPL-SCNC: 3.8 MMOL/L (ref 3.5–5.1)
RBC # BLD AUTO: 5.27 M/UL (ref 4.1–5.7)
SARS-COV-2 RNA RESP QL NAA+PROBE: NOT DETECTED
SODIUM SERPL-SCNC: 132 MMOL/L (ref 136–145)
SOURCE: NORMAL
TRIGL SERPL-MCNC: 39 MG/DL
VIT B12 SERPL-MCNC: 880 PG/ML (ref 193–986)
VLDLC SERPL CALC-MCNC: 7.8 MG/DL
WBC # BLD AUTO: 6.6 K/UL (ref 4.1–11.1)

## 2025-01-29 PROCEDURE — 80061 LIPID PANEL: CPT

## 2025-01-29 PROCEDURE — 83690 ASSAY OF LIPASE: CPT

## 2025-01-29 PROCEDURE — 2580000003 HC RX 258: Performed by: STUDENT IN AN ORGANIZED HEALTH CARE EDUCATION/TRAINING PROGRAM

## 2025-01-29 PROCEDURE — 6360000002 HC RX W HCPCS: Performed by: EMERGENCY MEDICINE

## 2025-01-29 PROCEDURE — 6370000000 HC RX 637 (ALT 250 FOR IP): Performed by: PHYSICIAN ASSISTANT

## 2025-01-29 PROCEDURE — 6360000002 HC RX W HCPCS: Performed by: INTERNAL MEDICINE

## 2025-01-29 PROCEDURE — 94761 N-INVAS EAR/PLS OXIMETRY MLT: CPT

## 2025-01-29 PROCEDURE — 2500000003 HC RX 250 WO HCPCS: Performed by: INTERNAL MEDICINE

## 2025-01-29 PROCEDURE — 6370000000 HC RX 637 (ALT 250 FOR IP): Performed by: INTERNAL MEDICINE

## 2025-01-29 PROCEDURE — 85025 COMPLETE CBC W/AUTO DIFF WBC: CPT

## 2025-01-29 PROCEDURE — 80074 ACUTE HEPATITIS PANEL: CPT

## 2025-01-29 PROCEDURE — 1100000000 HC RM PRIVATE

## 2025-01-29 PROCEDURE — 93010 ELECTROCARDIOGRAM REPORT: CPT | Performed by: SPECIALIST

## 2025-01-29 PROCEDURE — 6360000002 HC RX W HCPCS: Performed by: STUDENT IN AN ORGANIZED HEALTH CARE EDUCATION/TRAINING PROGRAM

## 2025-01-29 PROCEDURE — 36415 COLL VENOUS BLD VENIPUNCTURE: CPT

## 2025-01-29 PROCEDURE — 82607 VITAMIN B-12: CPT

## 2025-01-29 PROCEDURE — 80048 BASIC METABOLIC PNL TOTAL CA: CPT

## 2025-01-29 PROCEDURE — 80076 HEPATIC FUNCTION PANEL: CPT

## 2025-01-29 PROCEDURE — 6370000000 HC RX 637 (ALT 250 FOR IP): Performed by: EMERGENCY MEDICINE

## 2025-01-29 PROCEDURE — 87636 SARSCOV2 & INF A&B AMP PRB: CPT

## 2025-01-29 PROCEDURE — 84100 ASSAY OF PHOSPHORUS: CPT

## 2025-01-29 RX ORDER — OXYCODONE HYDROCHLORIDE 5 MG/1
5 TABLET ORAL EVERY 4 HOURS PRN
Status: DISCONTINUED | OUTPATIENT
Start: 2025-01-29 | End: 2025-02-02

## 2025-01-29 RX ORDER — MORPHINE SULFATE 2 MG/ML
2 INJECTION, SOLUTION INTRAMUSCULAR; INTRAVENOUS EVERY 4 HOURS PRN
Status: DISCONTINUED | OUTPATIENT
Start: 2025-01-29 | End: 2025-01-29

## 2025-01-29 RX ORDER — BISACODYL 5 MG/1
10 TABLET, DELAYED RELEASE ORAL DAILY
Status: DISCONTINUED | OUTPATIENT
Start: 2025-01-29 | End: 2025-02-02

## 2025-01-29 RX ADMIN — LORAZEPAM 2 MG: 1 TABLET ORAL at 00:35

## 2025-01-29 RX ADMIN — SODIUM CHLORIDE, PRESERVATIVE FREE 10 ML: 5 INJECTION INTRAVENOUS at 10:06

## 2025-01-29 RX ADMIN — OXYCODONE 5 MG: 5 TABLET ORAL at 04:43

## 2025-01-29 RX ADMIN — Medication 100 MG: at 08:43

## 2025-01-29 RX ADMIN — MORPHINE SULFATE 2 MG: 2 INJECTION, SOLUTION INTRAMUSCULAR; INTRAVENOUS at 07:56

## 2025-01-29 RX ADMIN — SODIUM CHLORIDE, PRESERVATIVE FREE 10 ML: 5 INJECTION INTRAVENOUS at 19:37

## 2025-01-29 RX ADMIN — BISACODYL 10 MG: 5 TABLET, COATED ORAL at 17:55

## 2025-01-29 RX ADMIN — LORAZEPAM 2 MG: 2 INJECTION INTRAMUSCULAR; INTRAVENOUS at 16:36

## 2025-01-29 RX ADMIN — ENOXAPARIN SODIUM 40 MG: 100 INJECTION SUBCUTANEOUS at 08:44

## 2025-01-29 RX ADMIN — OXYCODONE 5 MG: 5 TABLET ORAL at 00:54

## 2025-01-29 RX ADMIN — THERA TABS 1 TABLET: TAB at 08:43

## 2025-01-29 RX ADMIN — Medication 3 MG: at 00:36

## 2025-01-29 RX ADMIN — HYDROMORPHONE HYDROCHLORIDE 0.5 MG: 1 INJECTION, SOLUTION INTRAMUSCULAR; INTRAVENOUS; SUBCUTANEOUS at 19:36

## 2025-01-29 RX ADMIN — FOLIC ACID 1 MG: 1 TABLET ORAL at 08:45

## 2025-01-29 RX ADMIN — PHENOBARBITAL 32.4 MG: 32.4 TABLET ORAL at 19:44

## 2025-01-29 RX ADMIN — SODIUM CHLORIDE, POTASSIUM CHLORIDE, SODIUM LACTATE AND CALCIUM CHLORIDE: 600; 310; 30; 20 INJECTION, SOLUTION INTRAVENOUS at 17:55

## 2025-01-29 RX ADMIN — PHENOBARBITAL 32.4 MG: 32.4 TABLET ORAL at 17:55

## 2025-01-29 RX ADMIN — LORAZEPAM 2 MG: 2 INJECTION INTRAMUSCULAR; INTRAVENOUS at 05:34

## 2025-01-29 RX ADMIN — LORAZEPAM 2 MG: 2 INJECTION INTRAMUSCULAR; INTRAVENOUS at 22:07

## 2025-01-29 RX ADMIN — PHENOBARBITAL 32.4 MG: 32.4 TABLET ORAL at 13:41

## 2025-01-29 RX ADMIN — SODIUM CHLORIDE, POTASSIUM CHLORIDE, SODIUM LACTATE AND CALCIUM CHLORIDE: 600; 310; 30; 20 INJECTION, SOLUTION INTRAVENOUS at 12:29

## 2025-01-29 RX ADMIN — ONDANSETRON 4 MG: 2 INJECTION INTRAMUSCULAR; INTRAVENOUS at 19:43

## 2025-01-29 RX ADMIN — Medication 100 MG: at 08:45

## 2025-01-29 RX ADMIN — HYDROMORPHONE HYDROCHLORIDE 0.5 MG: 1 INJECTION, SOLUTION INTRAMUSCULAR; INTRAVENOUS; SUBCUTANEOUS at 11:08

## 2025-01-29 RX ADMIN — PHENOBARBITAL 32.4 MG: 32.4 TABLET ORAL at 08:43

## 2025-01-29 RX ADMIN — OXYCODONE 5 MG: 5 TABLET ORAL at 22:06

## 2025-01-29 RX ADMIN — HYDROMORPHONE HYDROCHLORIDE 0.25 MG: 1 INJECTION, SOLUTION INTRAMUSCULAR; INTRAVENOUS; SUBCUTANEOUS at 14:00

## 2025-01-29 ASSESSMENT — PAIN DESCRIPTION - DESCRIPTORS
DESCRIPTORS: ACHING
DESCRIPTORS: ACHING
DESCRIPTORS: ACHING;BURNING
DESCRIPTORS: THROBBING
DESCRIPTORS: ACHING

## 2025-01-29 ASSESSMENT — PAIN SCALES - GENERAL
PAINLEVEL_OUTOF10: 0
PAINLEVEL_OUTOF10: 10
PAINLEVEL_OUTOF10: 7
PAINLEVEL_OUTOF10: 10
PAINLEVEL_OUTOF10: 7
PAINLEVEL_OUTOF10: 2
PAINLEVEL_OUTOF10: 9
PAINLEVEL_OUTOF10: 10
PAINLEVEL_OUTOF10: 2
PAINLEVEL_OUTOF10: 10

## 2025-01-29 ASSESSMENT — PAIN - FUNCTIONAL ASSESSMENT
PAIN_FUNCTIONAL_ASSESSMENT: ACTIVITIES ARE NOT PREVENTED
PAIN_FUNCTIONAL_ASSESSMENT: ACTIVITIES ARE NOT PREVENTED

## 2025-01-29 ASSESSMENT — PAIN DESCRIPTION - ORIENTATION
ORIENTATION: ANTERIOR

## 2025-01-29 ASSESSMENT — PAIN DESCRIPTION - PAIN TYPE
TYPE: ACUTE PAIN
TYPE: ACUTE PAIN

## 2025-01-29 ASSESSMENT — PAIN DESCRIPTION - ONSET
ONSET: ON-GOING
ONSET: ON-GOING

## 2025-01-29 ASSESSMENT — PAIN DESCRIPTION - LOCATION
LOCATION: ABDOMEN

## 2025-01-29 ASSESSMENT — PAIN DESCRIPTION - FREQUENCY
FREQUENCY: CONTINUOUS
FREQUENCY: CONTINUOUS

## 2025-01-29 NOTE — H&P
History and Physical    Date of Service:  1/28/2025  Primary Care Provider: No primary care provider on file.  Source of information: Patient, Family, External Medical Records    Chief Complaint: Loss of Consciousness and Abdominal Pain      History of Presenting Illness:   Nathan Trejo is a very pleasant 34 y.o. male with a past medical history of alcohol abuse and alcoholic pancreatitis presents to the emergency room for the same.    Patient states he had a viral illness for the past couple of days.  He thought it was strep throat and took an old antibiotic in his cabinet, but he does not know which one it was.  He states he usually drinks about a 12 pack of beer a day, sometimes as much is a 24 pack.  However, when he developed a sore throat with fevers and chills he stopped drinking alcohol and he has had none in 2 days.  He states whenever he stops drinking he develops pancreatitis.  Today he woke up with epigastric pain that radiates to his back.  It has been persistent, worsening throughout the day, and is now a 10/10.    He has no tremors anxiety or signs of alcohol withdrawal at this point    In the ER patient had an elevated lipase and evidence of acute pancreatitis on CT.  Internal medicine was consulted for alcoholic pancreatitis       REVIEW OF SYSTEMS:  A comprehensive review of systems was negative except for that written in the History of Present Illness.     Past Medical History:   Diagnosis Date    Alcohol abuse       No past surgical history on file.  Prior to Admission medications    Medication Sig Start Date End Date Taking? Authorizing Provider   ondansetron (ZOFRAN-ODT) 4 MG disintegrating tablet Take 1 tablet by mouth every 8 hours as needed for Nausea or Vomiting 8/16/24   Aidan, MD Bridger   Multiple Vitamin (MULTIVITAMIN) TABS tablet Take 1 tablet by mouth daily 8/17/24   Aidan, MD Bridger   thiamine 100 MG tablet Take 1 tablet by mouth daily 8/17/24   Aidan, MD Bridger

## 2025-01-29 NOTE — CARE COORDINATION
Care Management Initial Assessment  1/29/2025 9:49 AM  If patient is discharged prior to next notation, then this note serves as note for discharge by case management.    Reason for Admission:   Pancreatitis without necrosis or infection [K85.90]         Patient Admission Status: Inpatient  Date Admitted to [facility]: 1/28/25  RUR: Readmission Risk Score: 10    Hospitalization in the last 30 days (Readmission):  No        Advance Care Planning:  Code Status: Full Code  Primary Healthcare Decision Maker:    Advance Directive: has ACP Docs     __________________________________________________________________________  Assessment:      01/29/25 0946   Service Assessment   Patient Orientation Alert and Oriented   Cognition Alert   History Provided By Patient;Medical Record   Primary Caregiver Self   Support Systems Spouse/Significant Other   Patient's Healthcare Decision Maker is: Patient Declined (Legal Next of Kin Remains as Decision Maker)   PCP Verified by CM No  (recently got [facility] Medicaid)   Prior Functional Level Independent in ADLs/IADLs   Current Functional Level Independent in ADLs/IADLs   Can patient return to prior living arrangement Yes   Ability to make needs known: Good   Family able to assist with home care needs: Yes   Would you like for me to discuss the discharge plan with any other family members/significant others, and if so, who? No   Financial Resources Medicaid  ([employer] Medicaid)   Community Resources None   Social/Functional History   Lives With Significant other   Type of Home House   Home Equipment None   Prior Level of Assist for ADLs Independent   Prior Level of Assist for Homemaking Independent   Ambulation Assistance Independent   Prior Level of Assist for Transfers Independent   Active  Yes   Mode of Transportation Car   Discharge Planning   Type of Residence House   Current Services Prior To Admission None   Potential Assistance Needed Other (Comment)  (TBD)   Type of Home Care

## 2025-01-29 NOTE — ED NOTES
TRANSFER - OUT REPORT:    Verbal report given to Rola RICH on Nathan Trejo  being transferred to [facility] ED for routine progression of patient care       Report consisted of patient's Situation, Background, Assessment and   Recommendations(SBAR).     Information from the following report(s) Index, ED Encounter Summary, ED SBAR, Adult Overview, MAR, Cardiac Rhythm Sinus Ilya, Neuro Assessment, and Event Log was reviewed with the receiving nurse.    [place] Fall Assessment:    Presents to emergency department  because of falls (Syncope, seizure, or loss of consciousness): Yes  Age > 70: No  Altered Mental Status, Intoxication with alcohol or substance confusion (Disorientation, impaired judgment, poor safety awaremess, or inability to follow instructions): Yes  Impaired Mobility: Ambulates or transfers with assistive devices or assistance; Unable to ambulate or transer.: No  Nursing Judgement: Yes          Lines:   Peripheral IV 01/28/25 Right Antecubital (Active)   Site Assessment Clean, dry & intact 01/28/25 1816   Line Status Blood return noted;Flushed;Specimen collected 01/28/25 1816   Phlebitis Assessment No symptoms 01/28/25 1816   Infiltration Assessment 0 01/28/25 1816        Opportunity for questions and clarification was provided.      Patient transported with:  Monitor

## 2025-01-30 ENCOUNTER — APPOINTMENT (OUTPATIENT)
Facility: HOSPITAL | Age: 35
DRG: 005 | End: 2025-01-30
Payer: MEDICAID

## 2025-01-30 PROBLEM — R74.01 TRANSAMINITIS: Status: ACTIVE | Noted: 2025-01-29

## 2025-01-30 PROBLEM — D69.6 THROMBOCYTOPENIA: Status: ACTIVE | Noted: 2025-01-30

## 2025-01-30 PROBLEM — K76.0 HEPATIC STEATOSIS: Status: ACTIVE | Noted: 2025-01-30

## 2025-01-30 LAB
ALBUMIN SERPL-MCNC: 3 G/DL (ref 3.5–5)
ALBUMIN SERPL-MCNC: 3.3 G/DL (ref 3.5–5)
ALBUMIN SERPL-MCNC: 3.8 G/DL (ref 3.5–5)
ALBUMIN/GLOB SERPL: 1.3 (ref 1.1–2.2)
ALBUMIN/GLOB SERPL: 1.4 (ref 1.1–2.2)
ALBUMIN/GLOB SERPL: 1.4 (ref 1.1–2.2)
ALP SERPL-CCNC: 69 U/L (ref 45–117)
ALP SERPL-CCNC: 76 U/L (ref 45–117)
ALP SERPL-CCNC: 80 U/L (ref 45–117)
ALT SERPL-CCNC: 656 U/L (ref 12–78)
ALT SERPL-CCNC: >3500 U/L (ref 12–78)
ALT SERPL-CCNC: >3500 U/L (ref 12–78)
AMMONIA PLAS-SCNC: 31 UMOL/L
ANION GAP SERPL CALC-SCNC: 4 MMOL/L (ref 2–12)
ANION GAP SERPL CALC-SCNC: 8 MMOL/L (ref 2–12)
APAP SERPL-MCNC: <2 UG/ML (ref 10–30)
AST SERPL-CCNC: 1324 U/L (ref 15–37)
AST SERPL-CCNC: >2000 U/L (ref 15–37)
AST SERPL-CCNC: >2000 U/L (ref 15–37)
BASOPHILS # BLD: 0.02 K/UL (ref 0–0.1)
BASOPHILS NFR BLD: 0.4 % (ref 0–1)
BILIRUB DIRECT SERPL-MCNC: 1.3 MG/DL (ref 0–0.2)
BILIRUB DIRECT SERPL-MCNC: 1.5 MG/DL (ref 0–0.2)
BILIRUB SERPL-MCNC: 2.6 MG/DL (ref 0.2–1)
BILIRUB SERPL-MCNC: 2.8 MG/DL (ref 0.2–1)
BILIRUB SERPL-MCNC: 3.2 MG/DL (ref 0.2–1)
BUN SERPL-MCNC: 8 MG/DL (ref 6–20)
BUN SERPL-MCNC: 9 MG/DL (ref 6–20)
BUN/CREAT SERPL: 10 (ref 12–20)
BUN/CREAT SERPL: 8 (ref 12–20)
CALCIUM SERPL-MCNC: 8.3 MG/DL (ref 8.5–10.1)
CALCIUM SERPL-MCNC: 8.4 MG/DL (ref 8.5–10.1)
CHLORIDE SERPL-SCNC: 100 MMOL/L (ref 97–108)
CHLORIDE SERPL-SCNC: 103 MMOL/L (ref 97–108)
CK SERPL-CCNC: 127 U/L (ref 39–308)
CO2 SERPL-SCNC: 27 MMOL/L (ref 21–32)
CO2 SERPL-SCNC: 28 MMOL/L (ref 21–32)
COMMENT:: NORMAL
CREAT SERPL-MCNC: 0.91 MG/DL (ref 0.7–1.3)
CREAT SERPL-MCNC: 0.96 MG/DL (ref 0.7–1.3)
DIFFERENTIAL METHOD BLD: ABNORMAL
EOSINOPHIL # BLD: 0.03 K/UL (ref 0–0.4)
EOSINOPHIL NFR BLD: 0.6 % (ref 0–7)
ERYTHROCYTE [DISTWIDTH] IN BLOOD BY AUTOMATED COUNT: 12.1 % (ref 11.5–14.5)
GLOBULIN SER CALC-MCNC: 2.3 G/DL (ref 2–4)
GLOBULIN SER CALC-MCNC: 2.4 G/DL (ref 2–4)
GLOBULIN SER CALC-MCNC: 2.7 G/DL (ref 2–4)
GLUCOSE SERPL-MCNC: 133 MG/DL (ref 65–100)
GLUCOSE SERPL-MCNC: 91 MG/DL (ref 65–100)
HCT VFR BLD AUTO: 43.7 % (ref 36.6–50.3)
HGB BLD-MCNC: 14.3 G/DL (ref 12.1–17)
IGG SERPL-MCNC: 668 MG/DL (ref 700–1600)
IMM GRANULOCYTES # BLD AUTO: 0.01 K/UL (ref 0–0.04)
IMM GRANULOCYTES NFR BLD AUTO: 0.2 % (ref 0–0.5)
INR PPP: 1.9 (ref 0.9–1.1)
LIPASE SERPL-CCNC: 352 U/L (ref 13–75)
LYMPHOCYTES # BLD: 0.65 K/UL (ref 0.8–3.5)
LYMPHOCYTES NFR BLD: 12.3 % (ref 12–49)
MCH RBC QN AUTO: 28 PG (ref 26–34)
MCHC RBC AUTO-ENTMCNC: 32.7 G/DL (ref 30–36.5)
MCV RBC AUTO: 85.5 FL (ref 80–99)
MONOCYTES # BLD: 0.1 K/UL (ref 0–1)
MONOCYTES NFR BLD: 1.9 % (ref 5–13)
NEUTS SEG # BLD: 4.49 K/UL (ref 1.8–8)
NEUTS SEG NFR BLD: 84.6 % (ref 32–75)
NRBC # BLD: 0 K/UL (ref 0–0.01)
NRBC BLD-RTO: 0 PER 100 WBC
PHOSPHATE SERPL-MCNC: 2 MG/DL (ref 2.6–4.7)
PLATELET # BLD AUTO: 73 K/UL (ref 150–400)
PMV BLD AUTO: 12.3 FL (ref 8.9–12.9)
POTASSIUM SERPL-SCNC: 3.8 MMOL/L (ref 3.5–5.1)
POTASSIUM SERPL-SCNC: 3.8 MMOL/L (ref 3.5–5.1)
PROT SERPL-MCNC: 5.3 G/DL (ref 6.4–8.2)
PROT SERPL-MCNC: 5.7 G/DL (ref 6.4–8.2)
PROT SERPL-MCNC: 6.5 G/DL (ref 6.4–8.2)
PROTHROMBIN TIME: 19.2 SEC (ref 9.2–11.2)
RBC # BLD AUTO: 5.11 M/UL (ref 4.1–5.7)
RBC MORPH BLD: ABNORMAL
SODIUM SERPL-SCNC: 135 MMOL/L (ref 136–145)
SODIUM SERPL-SCNC: 135 MMOL/L (ref 136–145)
SPECIMEN HOLD: NORMAL
T4 FREE SERPL-MCNC: 1.1 NG/DL (ref 0.8–1.5)
TSH SERPL DL<=0.05 MIU/L-ACNC: 0.55 UIU/ML (ref 0.36–3.74)
WBC # BLD AUTO: 5.3 K/UL (ref 4.1–11.1)

## 2025-01-30 PROCEDURE — 6370000000 HC RX 637 (ALT 250 FOR IP): Performed by: INTERNAL MEDICINE

## 2025-01-30 PROCEDURE — 82784 ASSAY IGA/IGD/IGG/IGM EACH: CPT

## 2025-01-30 PROCEDURE — 6370000000 HC RX 637 (ALT 250 FOR IP): Performed by: PHYSICIAN ASSISTANT

## 2025-01-30 PROCEDURE — 2060000000 HC ICU INTERMEDIATE R&B

## 2025-01-30 PROCEDURE — 86645 CMV ANTIBODY IGM: CPT

## 2025-01-30 PROCEDURE — 85025 COMPLETE CBC W/AUTO DIFF WBC: CPT

## 2025-01-30 PROCEDURE — 6360000002 HC RX W HCPCS: Performed by: EMERGENCY MEDICINE

## 2025-01-30 PROCEDURE — 85610 PROTHROMBIN TIME: CPT

## 2025-01-30 PROCEDURE — 82140 ASSAY OF AMMONIA: CPT

## 2025-01-30 PROCEDURE — 84443 ASSAY THYROID STIM HORMONE: CPT

## 2025-01-30 PROCEDURE — 6360000002 HC RX W HCPCS: Performed by: INTERNAL MEDICINE

## 2025-01-30 PROCEDURE — 82390 ASSAY OF CERULOPLASMIN: CPT

## 2025-01-30 PROCEDURE — 86015 ACTIN ANTIBODY EACH: CPT

## 2025-01-30 PROCEDURE — 36415 COLL VENOUS BLD VENIPUNCTURE: CPT

## 2025-01-30 PROCEDURE — 86038 ANTINUCLEAR ANTIBODIES: CPT

## 2025-01-30 PROCEDURE — 6360000002 HC RX W HCPCS: Performed by: STUDENT IN AN ORGANIZED HEALTH CARE EDUCATION/TRAINING PROGRAM

## 2025-01-30 PROCEDURE — 76705 ECHO EXAM OF ABDOMEN: CPT

## 2025-01-30 PROCEDURE — 84100 ASSAY OF PHOSPHORUS: CPT

## 2025-01-30 PROCEDURE — 80143 DRUG ASSAY ACETAMINOPHEN: CPT

## 2025-01-30 PROCEDURE — 2580000003 HC RX 258: Performed by: STUDENT IN AN ORGANIZED HEALTH CARE EDUCATION/TRAINING PROGRAM

## 2025-01-30 PROCEDURE — 2500000003 HC RX 250 WO HCPCS: Performed by: INTERNAL MEDICINE

## 2025-01-30 PROCEDURE — 86665 EPSTEIN-BARR CAPSID VCA: CPT

## 2025-01-30 PROCEDURE — 80053 COMPREHEN METABOLIC PANEL: CPT

## 2025-01-30 PROCEDURE — 84439 ASSAY OF FREE THYROXINE: CPT

## 2025-01-30 PROCEDURE — 6360000002 HC RX W HCPCS: Performed by: PHYSICIAN ASSISTANT

## 2025-01-30 PROCEDURE — 86381 MITOCHONDRIAL ANTIBODY EACH: CPT

## 2025-01-30 PROCEDURE — 80076 HEPATIC FUNCTION PANEL: CPT

## 2025-01-30 PROCEDURE — 6370000000 HC RX 637 (ALT 250 FOR IP): Performed by: EMERGENCY MEDICINE

## 2025-01-30 PROCEDURE — 82550 ASSAY OF CK (CPK): CPT

## 2025-01-30 PROCEDURE — 2580000003 HC RX 258: Performed by: PHYSICIAN ASSISTANT

## 2025-01-30 PROCEDURE — 86644 CMV ANTIBODY: CPT

## 2025-01-30 PROCEDURE — 94761 N-INVAS EAR/PLS OXIMETRY MLT: CPT

## 2025-01-30 RX ORDER — HYDROMORPHONE HYDROCHLORIDE 1 MG/ML
0.25 INJECTION, SOLUTION INTRAMUSCULAR; INTRAVENOUS; SUBCUTANEOUS
Status: DISCONTINUED | OUTPATIENT
Start: 2025-01-30 | End: 2025-02-12

## 2025-01-30 RX ORDER — HYDROMORPHONE HYDROCHLORIDE 1 MG/ML
0.5 INJECTION, SOLUTION INTRAMUSCULAR; INTRAVENOUS; SUBCUTANEOUS
Status: DISCONTINUED | OUTPATIENT
Start: 2025-01-30 | End: 2025-02-12

## 2025-01-30 RX ADMIN — HYDROMORPHONE HYDROCHLORIDE 0.5 MG: 1 INJECTION, SOLUTION INTRAMUSCULAR; INTRAVENOUS; SUBCUTANEOUS at 12:12

## 2025-01-30 RX ADMIN — SODIUM CHLORIDE, PRESERVATIVE FREE 10 ML: 5 INJECTION INTRAVENOUS at 20:53

## 2025-01-30 RX ADMIN — HYDROMORPHONE HYDROCHLORIDE 0.5 MG: 1 INJECTION, SOLUTION INTRAMUSCULAR; INTRAVENOUS; SUBCUTANEOUS at 23:48

## 2025-01-30 RX ADMIN — LORAZEPAM 1 MG: 1 TABLET ORAL at 18:28

## 2025-01-30 RX ADMIN — OXYCODONE 5 MG: 5 TABLET ORAL at 10:17

## 2025-01-30 RX ADMIN — SODIUM CHLORIDE, PRESERVATIVE FREE 10 ML: 5 INJECTION INTRAVENOUS at 20:54

## 2025-01-30 RX ADMIN — OXYCODONE 5 MG: 5 TABLET ORAL at 03:47

## 2025-01-30 RX ADMIN — HYDROMORPHONE HYDROCHLORIDE 0.5 MG: 1 INJECTION, SOLUTION INTRAMUSCULAR; INTRAVENOUS; SUBCUTANEOUS at 01:03

## 2025-01-30 RX ADMIN — SODIUM CHLORIDE, PRESERVATIVE FREE 10 ML: 5 INJECTION INTRAVENOUS at 10:05

## 2025-01-30 RX ADMIN — SODIUM CHLORIDE, POTASSIUM CHLORIDE, SODIUM LACTATE AND CALCIUM CHLORIDE: 600; 310; 30; 20 INJECTION, SOLUTION INTRAVENOUS at 03:49

## 2025-01-30 RX ADMIN — ENOXAPARIN SODIUM 40 MG: 100 INJECTION SUBCUTANEOUS at 10:05

## 2025-01-30 RX ADMIN — BISACODYL 10 MG: 5 TABLET, COATED ORAL at 10:04

## 2025-01-30 RX ADMIN — HYDROMORPHONE HYDROCHLORIDE 0.5 MG: 1 INJECTION, SOLUTION INTRAMUSCULAR; INTRAVENOUS; SUBCUTANEOUS at 05:16

## 2025-01-30 RX ADMIN — Medication 100 MG: at 10:04

## 2025-01-30 RX ADMIN — PHENOBARBITAL 32.4 MG: 32.4 TABLET ORAL at 10:04

## 2025-01-30 RX ADMIN — ACETYLCYSTEINE 3740 MG: 200 INJECTION, SOLUTION INTRAVENOUS at 14:57

## 2025-01-30 RX ADMIN — SODIUM CHLORIDE, PRESERVATIVE FREE 10 ML: 5 INJECTION INTRAVENOUS at 10:03

## 2025-01-30 RX ADMIN — ACETYLCYSTEINE 7480 MG: 200 INJECTION, SOLUTION INTRAVENOUS at 19:40

## 2025-01-30 RX ADMIN — PHYTONADIONE 10 MG: 10 INJECTION, EMULSION INTRAMUSCULAR; INTRAVENOUS; SUBCUTANEOUS at 15:08

## 2025-01-30 RX ADMIN — SODIUM CHLORIDE, POTASSIUM CHLORIDE, SODIUM LACTATE AND CALCIUM CHLORIDE: 600; 310; 30; 20 INJECTION, SOLUTION INTRAVENOUS at 12:21

## 2025-01-30 RX ADMIN — FOLIC ACID 1 MG: 1 TABLET ORAL at 10:04

## 2025-01-30 RX ADMIN — HYDROMORPHONE HYDROCHLORIDE 0.5 MG: 1 INJECTION, SOLUTION INTRAMUSCULAR; INTRAVENOUS; SUBCUTANEOUS at 15:03

## 2025-01-30 RX ADMIN — HYDROMORPHONE HYDROCHLORIDE 0.25 MG: 1 INJECTION, SOLUTION INTRAMUSCULAR; INTRAVENOUS; SUBCUTANEOUS at 19:49

## 2025-01-30 RX ADMIN — LORAZEPAM 3 MG: 2 INJECTION INTRAMUSCULAR; INTRAVENOUS at 23:47

## 2025-01-30 RX ADMIN — SODIUM CHLORIDE, POTASSIUM CHLORIDE, SODIUM LACTATE AND CALCIUM CHLORIDE: 600; 310; 30; 20 INJECTION, SOLUTION INTRAVENOUS at 19:56

## 2025-01-30 RX ADMIN — LORAZEPAM 2 MG: 2 INJECTION INTRAMUSCULAR; INTRAVENOUS at 19:48

## 2025-01-30 RX ADMIN — ACETYLCYSTEINE 11220 MG: 200 INJECTION, SOLUTION INTRAVENOUS at 13:43

## 2025-01-30 RX ADMIN — ONDANSETRON 4 MG: 2 INJECTION INTRAMUSCULAR; INTRAVENOUS at 18:29

## 2025-01-30 RX ADMIN — THERA TABS 1 TABLET: TAB at 10:04

## 2025-01-30 ASSESSMENT — PAIN DESCRIPTION - DESCRIPTORS
DESCRIPTORS: ACHING
DESCRIPTORS: SHARP
DESCRIPTORS: ACHING
DESCRIPTORS: SHARP;SHOOTING
DESCRIPTORS: ACHING

## 2025-01-30 ASSESSMENT — PAIN SCALES - GENERAL
PAINLEVEL_OUTOF10: 0
PAINLEVEL_OUTOF10: 5
PAINLEVEL_OUTOF10: 7
PAINLEVEL_OUTOF10: 0
PAINLEVEL_OUTOF10: 8
PAINLEVEL_OUTOF10: 6
PAINLEVEL_OUTOF10: 7
PAINLEVEL_OUTOF10: 2

## 2025-01-30 ASSESSMENT — PAIN DESCRIPTION - PAIN TYPE
TYPE: ACUTE PAIN

## 2025-01-30 ASSESSMENT — PAIN - FUNCTIONAL ASSESSMENT
PAIN_FUNCTIONAL_ASSESSMENT: ACTIVITIES ARE NOT PREVENTED

## 2025-01-30 ASSESSMENT — PAIN DESCRIPTION - FREQUENCY
FREQUENCY: CONTINUOUS

## 2025-01-30 ASSESSMENT — PAIN DESCRIPTION - ORIENTATION
ORIENTATION: ANTERIOR

## 2025-01-30 ASSESSMENT — PAIN DESCRIPTION - LOCATION
LOCATION: ABDOMEN

## 2025-01-30 ASSESSMENT — PAIN DESCRIPTION - ONSET
ONSET: ON-GOING

## 2025-01-30 ASSESSMENT — PAIN SCALES - WONG BAKER: WONGBAKER_NUMERICALRESPONSE: HURTS A LITTLE BIT

## 2025-01-31 PROBLEM — E87.6 HYPOKALEMIA: Status: ACTIVE | Noted: 2025-01-31

## 2025-01-31 PROBLEM — G93.41 ACUTE METABOLIC ENCEPHALOPATHY: Status: ACTIVE | Noted: 2025-01-31

## 2025-01-31 LAB
ALBUMIN SERPL-MCNC: 2.9 G/DL (ref 3.5–5)
ALBUMIN SERPL-MCNC: 3 G/DL (ref 3.5–5)
ALBUMIN/GLOB SERPL: 1.1 (ref 1.1–2.2)
ALBUMIN/GLOB SERPL: 1.3 (ref 1.1–2.2)
ALP SERPL-CCNC: 68 U/L (ref 45–117)
ALP SERPL-CCNC: 77 U/L (ref 45–117)
ALT SERPL-CCNC: >3500 U/L (ref 12–78)
ALT SERPL-CCNC: >3500 U/L (ref 12–78)
AMMONIA PLAS-SCNC: 24 UMOL/L
AMMONIA PLAS-SCNC: 42 UMOL/L
ANA SER QL: NEGATIVE
ANION GAP SERPL CALC-SCNC: 5 MMOL/L (ref 2–12)
ANION GAP SERPL CALC-SCNC: 5 MMOL/L (ref 2–12)
ARTERIAL PATENCY WRIST A: POSITIVE
AST SERPL-CCNC: >2000 U/L (ref 15–37)
AST SERPL-CCNC: >2000 U/L (ref 15–37)
BASE EXCESS BLD CALC-SCNC: 5.3 MMOL/L
BASE EXCESS BLDV CALC-SCNC: 1.3 MMOL/L
BASOPHILS # BLD: 0.01 K/UL (ref 0–0.1)
BASOPHILS NFR BLD: 0.7 % (ref 0–1)
BDY SITE: ABNORMAL
BDY SITE: ABNORMAL
BILIRUB SERPL-MCNC: 3.3 MG/DL (ref 0.2–1)
BILIRUB SERPL-MCNC: 4.7 MG/DL (ref 0.2–1)
BUN SERPL-MCNC: 3 MG/DL (ref 6–20)
BUN SERPL-MCNC: 5 MG/DL (ref 6–20)
BUN/CREAT SERPL: 4 (ref 12–20)
BUN/CREAT SERPL: 7 (ref 12–20)
CALCIUM SERPL-MCNC: 8.3 MG/DL (ref 8.5–10.1)
CALCIUM SERPL-MCNC: 8.3 MG/DL (ref 8.5–10.1)
CHLORIDE SERPL-SCNC: 100 MMOL/L (ref 97–108)
CHLORIDE SERPL-SCNC: 108 MMOL/L (ref 97–108)
CO2 SERPL-SCNC: 26 MMOL/L (ref 21–32)
CO2 SERPL-SCNC: 29 MMOL/L (ref 21–32)
CREAT SERPL-MCNC: 0.68 MG/DL (ref 0.7–1.3)
CREAT SERPL-MCNC: 0.79 MG/DL (ref 0.7–1.3)
DIFFERENTIAL METHOD BLD: ABNORMAL
EOSINOPHIL # BLD: 0.06 K/UL (ref 0–0.4)
EOSINOPHIL NFR BLD: 4.1 % (ref 0–7)
ERYTHROCYTE [DISTWIDTH] IN BLOOD BY AUTOMATED COUNT: 11.9 % (ref 11.5–14.5)
GAS FLOW.O2 O2 DELIVERY SYS: ABNORMAL
GLOBULIN SER CALC-MCNC: 2.2 G/DL (ref 2–4)
GLOBULIN SER CALC-MCNC: 2.8 G/DL (ref 2–4)
GLUCOSE SERPL-MCNC: 111 MG/DL (ref 65–100)
GLUCOSE SERPL-MCNC: 120 MG/DL (ref 65–100)
HCO3 BLD-SCNC: 32.2 MMOL/L (ref 21–28)
HCO3 BLDV-SCNC: 25 MMOL/L (ref 23–28)
HCT VFR BLD AUTO: 36.7 % (ref 36.6–50.3)
HGB BLD-MCNC: 12.6 G/DL (ref 12.1–17)
IMM GRANULOCYTES # BLD AUTO: 0 K/UL (ref 0–0.04)
IMM GRANULOCYTES NFR BLD AUTO: 0 % (ref 0–0.5)
INR PPP: 1.3 (ref 0.9–1.1)
INR PPP: 1.6 (ref 0.9–1.1)
INR PPP: 1.7 (ref 0.9–1.1)
LYMPHOCYTES # BLD: 0.47 K/UL (ref 0.8–3.5)
LYMPHOCYTES NFR BLD: 31.1 % (ref 12–49)
MAGNESIUM SERPL-MCNC: 1.5 MG/DL (ref 1.6–2.4)
MCH RBC QN AUTO: 28.3 PG (ref 26–34)
MCHC RBC AUTO-ENTMCNC: 34.3 G/DL (ref 30–36.5)
MCV RBC AUTO: 82.5 FL (ref 80–99)
MONOCYTES # BLD: 0.11 K/UL (ref 0–1)
MONOCYTES NFR BLD: 7.4 % (ref 5–13)
NEUTS SEG # BLD: 0.85 K/UL (ref 1.8–8)
NEUTS SEG NFR BLD: 56.7 % (ref 32–75)
NRBC # BLD: 0 K/UL (ref 0–0.01)
NRBC BLD-RTO: 0 PER 100 WBC
O2/TOTAL GAS SETTING VFR VENT: 1 %
PCO2 BLD: 55.6 MMHG (ref 35–48)
PCO2 BLDV: 37.9 MMHG (ref 41–51)
PH BLD: 7.37 (ref 7.35–7.45)
PH BLDV: 7.44 (ref 7.32–7.42)
PHOSPHATE SERPL-MCNC: 1.1 MG/DL (ref 2.6–4.7)
PLATELET # BLD AUTO: 60 K/UL (ref 150–400)
PMV BLD AUTO: 12.7 FL (ref 8.9–12.9)
PO2 BLD: 125 MMHG (ref 83–108)
PO2 BLDV: 111 MMHG (ref 25–40)
POTASSIUM SERPL-SCNC: 3.1 MMOL/L (ref 3.5–5.1)
POTASSIUM SERPL-SCNC: 3.9 MMOL/L (ref 3.5–5.1)
PROT SERPL-MCNC: 5.1 G/DL (ref 6.4–8.2)
PROT SERPL-MCNC: 5.8 G/DL (ref 6.4–8.2)
PROTHROMBIN TIME: 13.5 SEC (ref 9.2–11.2)
PROTHROMBIN TIME: 16.8 SEC (ref 9.2–11.2)
PROTHROMBIN TIME: 17.4 SEC (ref 9.2–11.2)
RBC # BLD AUTO: 4.45 M/UL (ref 4.1–5.7)
RBC MORPH BLD: ABNORMAL
SAO2 % BLD: 98.6 % (ref 92–97)
SAO2 % BLDV: 98 % (ref 65–88)
SAO2% DEVICE SAO2% SENSOR NAME: ABNORMAL
SODIUM SERPL-SCNC: 134 MMOL/L (ref 136–145)
SODIUM SERPL-SCNC: 139 MMOL/L (ref 136–145)
SPECIMEN SITE: ABNORMAL
SPECIMEN TYPE: ABNORMAL
WBC # BLD AUTO: 1.5 K/UL (ref 4.1–11.1)

## 2025-01-31 PROCEDURE — 2580000003 HC RX 258: Performed by: STUDENT IN AN ORGANIZED HEALTH CARE EDUCATION/TRAINING PROGRAM

## 2025-01-31 PROCEDURE — 36600 WITHDRAWAL OF ARTERIAL BLOOD: CPT

## 2025-01-31 PROCEDURE — 2500000003 HC RX 250 WO HCPCS: Performed by: EMERGENCY MEDICINE

## 2025-01-31 PROCEDURE — 87529 HSV DNA AMP PROBE: CPT

## 2025-01-31 PROCEDURE — 6360000002 HC RX W HCPCS: Performed by: EMERGENCY MEDICINE

## 2025-01-31 PROCEDURE — 2500000003 HC RX 250 WO HCPCS: Performed by: INTERNAL MEDICINE

## 2025-01-31 PROCEDURE — 85610 PROTHROMBIN TIME: CPT

## 2025-01-31 PROCEDURE — 6360000002 HC RX W HCPCS: Performed by: INTERNAL MEDICINE

## 2025-01-31 PROCEDURE — 94761 N-INVAS EAR/PLS OXIMETRY MLT: CPT

## 2025-01-31 PROCEDURE — 2580000003 HC RX 258: Performed by: PHYSICIAN ASSISTANT

## 2025-01-31 PROCEDURE — 36415 COLL VENOUS BLD VENIPUNCTURE: CPT

## 2025-01-31 PROCEDURE — 2500000003 HC RX 250 WO HCPCS: Performed by: NURSE PRACTITIONER

## 2025-01-31 PROCEDURE — 2580000003 HC RX 258: Performed by: INTERNAL MEDICINE

## 2025-01-31 PROCEDURE — 85025 COMPLETE CBC W/AUTO DIFF WBC: CPT

## 2025-01-31 PROCEDURE — 80053 COMPREHEN METABOLIC PANEL: CPT

## 2025-01-31 PROCEDURE — 82140 ASSAY OF AMMONIA: CPT

## 2025-01-31 PROCEDURE — 2580000003 HC RX 258: Performed by: HOSPITALIST

## 2025-01-31 PROCEDURE — 83735 ASSAY OF MAGNESIUM: CPT

## 2025-01-31 PROCEDURE — 2000000000 HC ICU R&B

## 2025-01-31 PROCEDURE — 84100 ASSAY OF PHOSPHORUS: CPT

## 2025-01-31 PROCEDURE — 6360000002 HC RX W HCPCS: Performed by: STUDENT IN AN ORGANIZED HEALTH CARE EDUCATION/TRAINING PROGRAM

## 2025-01-31 PROCEDURE — 6360000002 HC RX W HCPCS: Performed by: NURSE PRACTITIONER

## 2025-01-31 PROCEDURE — 6360000002 HC RX W HCPCS: Performed by: HOSPITALIST

## 2025-01-31 PROCEDURE — 82803 BLOOD GASES ANY COMBINATION: CPT

## 2025-01-31 PROCEDURE — 2500000003 HC RX 250 WO HCPCS: Performed by: HOSPITALIST

## 2025-01-31 PROCEDURE — 6360000002 HC RX W HCPCS: Performed by: PHYSICIAN ASSISTANT

## 2025-01-31 PROCEDURE — 6360000002 HC RX W HCPCS

## 2025-01-31 RX ORDER — POTASSIUM CHLORIDE 7.45 MG/ML
10 INJECTION INTRAVENOUS
Status: DISCONTINUED | OUTPATIENT
Start: 2025-01-31 | End: 2025-01-31 | Stop reason: SDUPTHER

## 2025-01-31 RX ORDER — LORAZEPAM 2 MG/ML
4 INJECTION INTRAMUSCULAR ONCE
Status: COMPLETED | OUTPATIENT
Start: 2025-01-31 | End: 2025-01-31

## 2025-01-31 RX ORDER — FENTANYL CITRATE 50 UG/ML
INJECTION, SOLUTION INTRAMUSCULAR; INTRAVENOUS
Status: COMPLETED
Start: 2025-01-31 | End: 2025-01-31

## 2025-01-31 RX ORDER — MIDAZOLAM HYDROCHLORIDE 1 MG/ML
5 INJECTION, SOLUTION INTRAMUSCULAR; INTRAVENOUS ONCE
Status: COMPLETED | OUTPATIENT
Start: 2025-01-31 | End: 2025-01-31

## 2025-01-31 RX ORDER — THIAMINE HYDROCHLORIDE 100 MG/ML
100 INJECTION, SOLUTION INTRAMUSCULAR; INTRAVENOUS DAILY
Status: DISCONTINUED | OUTPATIENT
Start: 2025-01-31 | End: 2025-02-01

## 2025-01-31 RX ORDER — MIDAZOLAM HYDROCHLORIDE 1 MG/ML
INJECTION, SOLUTION INTRAMUSCULAR; INTRAVENOUS
Status: COMPLETED
Start: 2025-01-31 | End: 2025-01-31

## 2025-01-31 RX ORDER — POTASSIUM CHLORIDE 7.45 MG/ML
10 INJECTION INTRAVENOUS
Status: COMPLETED | OUTPATIENT
Start: 2025-01-31 | End: 2025-01-31

## 2025-01-31 RX ORDER — LACTULOSE 10 G/15ML
20 SOLUTION ORAL 3 TIMES DAILY
Status: DISCONTINUED | OUTPATIENT
Start: 2025-01-31 | End: 2025-02-01

## 2025-01-31 RX ORDER — FENTANYL CITRATE 50 UG/ML
50 INJECTION, SOLUTION INTRAMUSCULAR; INTRAVENOUS ONCE
Status: COMPLETED | OUTPATIENT
Start: 2025-01-31 | End: 2025-01-31

## 2025-01-31 RX ORDER — DEXMEDETOMIDINE HYDROCHLORIDE 4 UG/ML
.1-1.5 INJECTION, SOLUTION INTRAVENOUS CONTINUOUS
Status: DISCONTINUED | OUTPATIENT
Start: 2025-01-31 | End: 2025-02-01

## 2025-01-31 RX ADMIN — POTASSIUM PHOSPHATE 30 MMOL: 236; 224 INJECTION, SOLUTION INTRAVENOUS at 12:34

## 2025-01-31 RX ADMIN — SODIUM CHLORIDE 1 MG: 9 INJECTION, SOLUTION INTRAVENOUS at 11:23

## 2025-01-31 RX ADMIN — WATER 10 MG: 1 INJECTION INTRAMUSCULAR; INTRAVENOUS; SUBCUTANEOUS at 07:20

## 2025-01-31 RX ADMIN — LORAZEPAM 4 MG: 2 INJECTION, SOLUTION INTRAMUSCULAR; INTRAVENOUS at 07:25

## 2025-01-31 RX ADMIN — WATER 10 MG: 1 INJECTION INTRAMUSCULAR; INTRAVENOUS; SUBCUTANEOUS at 21:18

## 2025-01-31 RX ADMIN — DEXMEDETOMIDINE HYDROCHLORIDE 1.3 MCG/KG/HR: 400 INJECTION INTRAVENOUS at 10:26

## 2025-01-31 RX ADMIN — FENTANYL CITRATE 50 MCG: 50 INJECTION INTRAMUSCULAR; INTRAVENOUS at 07:27

## 2025-01-31 RX ADMIN — LORAZEPAM 4 MG: 2 INJECTION INTRAMUSCULAR; INTRAVENOUS at 05:33

## 2025-01-31 RX ADMIN — POTASSIUM CHLORIDE 10 MEQ: 7.46 INJECTION, SOLUTION INTRAVENOUS at 16:29

## 2025-01-31 RX ADMIN — PHYTONADIONE 10 MG: 10 INJECTION, EMULSION INTRAMUSCULAR; INTRAVENOUS; SUBCUTANEOUS at 11:21

## 2025-01-31 RX ADMIN — THIAMINE HYDROCHLORIDE 100 MG: 100 INJECTION, SOLUTION INTRAMUSCULAR; INTRAVENOUS at 10:29

## 2025-01-31 RX ADMIN — FENTANYL CITRATE 50 MCG: 50 INJECTION, SOLUTION INTRAMUSCULAR; INTRAVENOUS at 07:27

## 2025-01-31 RX ADMIN — SODIUM CHLORIDE, PRESERVATIVE FREE 10 ML: 5 INJECTION INTRAVENOUS at 22:09

## 2025-01-31 RX ADMIN — HYDROMORPHONE HYDROCHLORIDE 0.25 MG: 1 INJECTION, SOLUTION INTRAMUSCULAR; INTRAVENOUS; SUBCUTANEOUS at 03:06

## 2025-01-31 RX ADMIN — SODIUM CHLORIDE, POTASSIUM CHLORIDE, SODIUM LACTATE AND CALCIUM CHLORIDE: 600; 310; 30; 20 INJECTION, SOLUTION INTRAVENOUS at 00:00

## 2025-01-31 RX ADMIN — LORAZEPAM 2 MG: 2 INJECTION INTRAMUSCULAR; INTRAVENOUS at 21:04

## 2025-01-31 RX ADMIN — ACETYLCYSTEINE 7480 MG: 200 INJECTION, SOLUTION INTRAVENOUS at 18:20

## 2025-01-31 RX ADMIN — LORAZEPAM 4 MG: 2 INJECTION INTRAMUSCULAR; INTRAVENOUS at 22:31

## 2025-01-31 RX ADMIN — SODIUM CHLORIDE, POTASSIUM CHLORIDE, SODIUM LACTATE AND CALCIUM CHLORIDE: 600; 310; 30; 20 INJECTION, SOLUTION INTRAVENOUS at 05:37

## 2025-01-31 RX ADMIN — LORAZEPAM 3 MG: 2 INJECTION INTRAMUSCULAR; INTRAVENOUS at 03:11

## 2025-01-31 RX ADMIN — SODIUM CHLORIDE, PRESERVATIVE FREE 10 ML: 5 INJECTION INTRAVENOUS at 10:30

## 2025-01-31 RX ADMIN — MAGNESIUM SULFATE HEPTAHYDRATE 2000 MG: 40 INJECTION, SOLUTION INTRAVENOUS at 10:29

## 2025-01-31 RX ADMIN — LORAZEPAM 3 MG: 2 INJECTION INTRAMUSCULAR; INTRAVENOUS at 02:11

## 2025-01-31 RX ADMIN — POTASSIUM CHLORIDE 10 MEQ: 7.46 INJECTION, SOLUTION INTRAVENOUS at 18:19

## 2025-01-31 RX ADMIN — DEXMEDETOMIDINE HYDROCHLORIDE 0.7 MCG/KG/HR: 400 INJECTION INTRAVENOUS at 14:30

## 2025-01-31 RX ADMIN — POTASSIUM CHLORIDE 10 MEQ: 7.46 INJECTION, SOLUTION INTRAVENOUS at 17:20

## 2025-01-31 RX ADMIN — MIDAZOLAM HYDROCHLORIDE 5 MG: 1 INJECTION, SOLUTION INTRAMUSCULAR; INTRAVENOUS at 07:27

## 2025-01-31 RX ADMIN — LORAZEPAM 4 MG: 2 INJECTION INTRAMUSCULAR; INTRAVENOUS at 07:05

## 2025-01-31 RX ADMIN — POTASSIUM CHLORIDE 10 MEQ: 7.46 INJECTION, SOLUTION INTRAVENOUS at 15:30

## 2025-01-31 RX ADMIN — DEXMEDETOMIDINE HYDROCHLORIDE 1.5 MCG/KG/HR: 400 INJECTION INTRAVENOUS at 07:15

## 2025-01-31 RX ADMIN — LORAZEPAM 1 MG: 2 INJECTION INTRAMUSCULAR; INTRAVENOUS at 00:59

## 2025-01-31 RX ADMIN — MIDAZOLAM 5 MG: 1 INJECTION INTRAMUSCULAR; INTRAVENOUS at 07:27

## 2025-01-31 RX ADMIN — LORAZEPAM 3 MG: 2 INJECTION INTRAMUSCULAR; INTRAVENOUS at 19:33

## 2025-01-31 ASSESSMENT — PAIN DESCRIPTION - LOCATION: LOCATION: ABDOMEN

## 2025-01-31 ASSESSMENT — PAIN SCALES - GENERAL: PAINLEVEL_OUTOF10: 6

## 2025-01-31 NOTE — CARE COORDINATION
Packet prepared for [facility] that includes clinicals up to this vanessa.  Clinicals after this note will need to be added to packet for [facility].  If [facility] goes to [facility],will need to call CT or MRI requesting PACS be pushed over for [facility] to be able to have access to imaging done here.    Packet is in pt.'s bedside chart slot.    Letty Morrell RN

## 2025-01-31 NOTE — CARE COORDINATION
Attending updated me that [facility] does not have any beds.  [facility] expressed interest in pt but wants to insure pt has a good support system when he gets out of the hospital.  I tried to call pt.'s sister Artie Trejo @ hospitalist's request and left her a VM @ [phone] to call me back today.    Letty Morrell RN

## 2025-01-31 NOTE — CARE COORDINATION
Care Management Progress Note    Reason for Admission:   Nausea and vomiting in adult [R11.2]  Alcohol-induced acute pancreatitis without infection or necrosis [K85.20]  Pancreatitis without necrosis or infection [K85.90]         Patient Admission Status: Inpatient  RUR: 15%  Hospitalization in the last 30 days (Readmission):  no        Transition of care plan:  [Medical]  Agitated,threatening,aggressive  GI :concerns for acute liver failure   See GI's note about possible need for transfer to [facility] hepatology  Elevated LFTs  6.GI recommends NAC   7.acetylcysteine infusion  8.Precedex gtt  9.electrolyte replacement as needed   [DC plan]  Contingent on pt.'s clinical status   Discharge plan communicated with patient and/or discharge caregiver: no    Date 1st IMM letter given: [place] [employer] [place]  Outpatient follow-Pt was given a packet of resources on IOP and inpatient alcohol and substance abuse programs in the ED  Transport at discharge:  medicaid       Letty Morrell RN    Potential transfer to [facility]

## 2025-01-31 NOTE — CARE COORDINATION
I  contacted sister,Artie Trejo.  Pt has been drinking on ad off x 10 years.  Was in rehab for ETOH last year and maintained sobriety for 3-4 months.sister lives in [place], [place]  Sister states pt has a good support system that includes sister,cousin Nancy who will visit pt and girlfriend,Dominique .  Sister plans to discuss plan with girlfriend.  Sister states that other than marijuana and ETOH,she is not aware of any other illicit substance use /abuse.    Letty Morrell RN

## 2025-02-01 ENCOUNTER — APPOINTMENT (OUTPATIENT)
Facility: HOSPITAL | Age: 35
DRG: 005 | End: 2025-02-01
Payer: MEDICAID

## 2025-02-01 PROBLEM — J96.01 ACUTE RESPIRATORY FAILURE WITH HYPOXIA (HCC): Status: ACTIVE | Noted: 2025-02-01

## 2025-02-01 PROBLEM — K72.00 ACUTE LIVER FAILURE: Status: ACTIVE | Noted: 2025-01-30

## 2025-02-01 LAB
ALBUMIN SERPL-MCNC: 2.7 G/DL (ref 3.5–5)
ALBUMIN SERPL-MCNC: 3 G/DL (ref 3.5–5)
ALBUMIN/GLOB SERPL: 0.9 (ref 1.1–2.2)
ALBUMIN/GLOB SERPL: 1.1 (ref 1.1–2.2)
ALP SERPL-CCNC: 75 U/L (ref 45–117)
ALP SERPL-CCNC: 95 U/L (ref 45–117)
ALT SERPL-CCNC: 2786 U/L (ref 12–78)
ALT SERPL-CCNC: >3500 U/L (ref 12–78)
ANION GAP SERPL CALC-SCNC: 5 MMOL/L (ref 2–12)
ANION GAP SERPL CALC-SCNC: 7 MMOL/L (ref 2–12)
APPEARANCE UR: CLEAR
ARTERIAL PATENCY WRIST A: POSITIVE
AST SERPL-CCNC: 1755 U/L (ref 15–37)
AST SERPL-CCNC: 958 U/L (ref 15–37)
BACTERIA URNS QL MICRO: NEGATIVE /HPF
BASE EXCESS BLD CALC-SCNC: 2 MMOL/L
BASOPHILS # BLD: 0.02 K/UL (ref 0–0.1)
BASOPHILS # BLD: 0.02 K/UL (ref 0–0.1)
BASOPHILS NFR BLD: 0.5 % (ref 0–1)
BASOPHILS NFR BLD: 0.6 % (ref 0–1)
BDY SITE: ABNORMAL
BILIRUB SERPL-MCNC: 5.9 MG/DL (ref 0.2–1)
BILIRUB SERPL-MCNC: 6.3 MG/DL (ref 0.2–1)
BILIRUB UR QL CFM: POSITIVE
BUN SERPL-MCNC: 6 MG/DL (ref 6–20)
BUN SERPL-MCNC: 6 MG/DL (ref 6–20)
BUN/CREAT SERPL: 8 (ref 12–20)
BUN/CREAT SERPL: 9 (ref 12–20)
CALCIUM SERPL-MCNC: 8.5 MG/DL (ref 8.5–10.1)
CALCIUM SERPL-MCNC: 8.6 MG/DL (ref 8.5–10.1)
CERULOPLASMIN SERPL-MCNC: 15.9 MG/DL (ref 16–31)
CHLORIDE SERPL-SCNC: 105 MMOL/L (ref 97–108)
CHLORIDE SERPL-SCNC: 106 MMOL/L (ref 97–108)
CMV IGG SERPL IA-ACNC: >10 U/ML (ref 0–0.59)
CMV IGM SERPL IA-ACNC: <30 AU/ML (ref 0–29.9)
CO2 SERPL-SCNC: 25 MMOL/L (ref 21–32)
CO2 SERPL-SCNC: 26 MMOL/L (ref 21–32)
COLOR UR: ABNORMAL
CREAT SERPL-MCNC: 0.69 MG/DL (ref 0.7–1.3)
CREAT SERPL-MCNC: 0.77 MG/DL (ref 0.7–1.3)
DIFFERENTIAL METHOD BLD: ABNORMAL
DIFFERENTIAL METHOD BLD: ABNORMAL
EBV VCA IGM SER-ACNC: <36 U/ML (ref 0–35.9)
EOSINOPHIL # BLD: 0.12 K/UL (ref 0–0.4)
EOSINOPHIL # BLD: 0.12 K/UL (ref 0–0.4)
EOSINOPHIL NFR BLD: 3.1 % (ref 0–7)
EOSINOPHIL NFR BLD: 3.6 % (ref 0–7)
EPITH CASTS URNS QL MICRO: ABNORMAL /LPF
ERYTHROCYTE [DISTWIDTH] IN BLOOD BY AUTOMATED COUNT: 12.3 % (ref 11.5–14.5)
ERYTHROCYTE [DISTWIDTH] IN BLOOD BY AUTOMATED COUNT: 12.5 % (ref 11.5–14.5)
GAS FLOW.O2 O2 DELIVERY SYS: ABNORMAL
GAS FLOW.O2 SETTING OXYMISER: 12 BPM
GLOBULIN SER CALC-MCNC: 2.8 G/DL (ref 2–4)
GLOBULIN SER CALC-MCNC: 2.9 G/DL (ref 2–4)
GLUCOSE SERPL-MCNC: 112 MG/DL (ref 65–100)
GLUCOSE SERPL-MCNC: 125 MG/DL (ref 65–100)
GLUCOSE UR STRIP.AUTO-MCNC: NEGATIVE MG/DL
HCO3 BLD-SCNC: 26.2 MMOL/L (ref 21–28)
HCT VFR BLD AUTO: 39.2 % (ref 36.6–50.3)
HCT VFR BLD AUTO: 41.3 % (ref 36.6–50.3)
HGB BLD-MCNC: 13.1 G/DL (ref 12.1–17)
HGB BLD-MCNC: 13.6 G/DL (ref 12.1–17)
HGB UR QL STRIP: NEGATIVE
HYALINE CASTS URNS QL MICRO: ABNORMAL /LPF (ref 0–2)
IMM GRANULOCYTES # BLD AUTO: 0.01 K/UL (ref 0–0.04)
IMM GRANULOCYTES # BLD AUTO: 0.01 K/UL (ref 0–0.04)
IMM GRANULOCYTES NFR BLD AUTO: 0.3 % (ref 0–0.5)
IMM GRANULOCYTES NFR BLD AUTO: 0.3 % (ref 0–0.5)
INR PPP: 1.2 (ref 0.9–1.1)
KETONES UR QL STRIP.AUTO: NEGATIVE MG/DL
LEUKOCYTE ESTERASE UR QL STRIP.AUTO: ABNORMAL
LYMPHOCYTES # BLD: 0.59 K/UL (ref 0.8–3.5)
LYMPHOCYTES # BLD: 0.89 K/UL (ref 0.8–3.5)
LYMPHOCYTES NFR BLD: 17.8 % (ref 12–49)
LYMPHOCYTES NFR BLD: 22.7 % (ref 12–49)
MAGNESIUM SERPL-MCNC: 1.9 MG/DL (ref 1.6–2.4)
MCH RBC QN AUTO: 27.5 PG (ref 26–34)
MCH RBC QN AUTO: 28 PG (ref 26–34)
MCHC RBC AUTO-ENTMCNC: 32.9 G/DL (ref 30–36.5)
MCHC RBC AUTO-ENTMCNC: 33.4 G/DL (ref 30–36.5)
MCV RBC AUTO: 83.6 FL (ref 80–99)
MCV RBC AUTO: 83.8 FL (ref 80–99)
MONOCYTES # BLD: 0.33 K/UL (ref 0–1)
MONOCYTES # BLD: 0.45 K/UL (ref 0–1)
MONOCYTES NFR BLD: 10 % (ref 5–13)
MONOCYTES NFR BLD: 11.5 % (ref 5–13)
NEUTS SEG # BLD: 2.23 K/UL (ref 1.8–8)
NEUTS SEG # BLD: 2.41 K/UL (ref 1.8–8)
NEUTS SEG NFR BLD: 61.9 % (ref 32–75)
NEUTS SEG NFR BLD: 67.7 % (ref 32–75)
NITRITE UR QL STRIP.AUTO: NEGATIVE
NRBC # BLD: 0 K/UL (ref 0–0.01)
NRBC # BLD: 0 K/UL (ref 0–0.01)
NRBC BLD-RTO: 0 PER 100 WBC
NRBC BLD-RTO: 0 PER 100 WBC
O2/TOTAL GAS SETTING VFR VENT: 40 %
PCO2 BLD: 38.7 MMHG (ref 35–48)
PEEP RESPIRATORY: 5 CMH2O
PH BLD: 7.44 (ref 7.35–7.45)
PH UR STRIP: 7 (ref 5–8)
PLATELET # BLD AUTO: 87 K/UL (ref 150–400)
PLATELET # BLD AUTO: 90 K/UL (ref 150–400)
PMV BLD AUTO: 11.7 FL (ref 8.9–12.9)
PMV BLD AUTO: 11.9 FL (ref 8.9–12.9)
PO2 BLD: 132 MMHG (ref 83–108)
POTASSIUM SERPL-SCNC: 3.2 MMOL/L (ref 3.5–5.1)
POTASSIUM SERPL-SCNC: 3.3 MMOL/L (ref 3.5–5.1)
PROCALCITONIN SERPL-MCNC: 2.14 NG/ML
PROT SERPL-MCNC: 5.6 G/DL (ref 6.4–8.2)
PROT SERPL-MCNC: 5.8 G/DL (ref 6.4–8.2)
PROT UR STRIP-MCNC: NEGATIVE MG/DL
PROTHROMBIN TIME: 12.7 SEC (ref 9.2–11.2)
RBC # BLD AUTO: 4.68 M/UL (ref 4.1–5.7)
RBC # BLD AUTO: 4.94 M/UL (ref 4.1–5.7)
RBC #/AREA URNS HPF: ABNORMAL /HPF (ref 0–5)
RBC MORPH BLD: ABNORMAL
RBC MORPH BLD: ABNORMAL
SAO2 % BLD: 99.1 % (ref 92–97)
SODIUM SERPL-SCNC: 137 MMOL/L (ref 136–145)
SODIUM SERPL-SCNC: 137 MMOL/L (ref 136–145)
SP GR UR REFRACTOMETRY: 1.01 (ref 1–1.03)
SPECIMEN TYPE: ABNORMAL
URINE CULTURE IF INDICATED: ABNORMAL
UROBILINOGEN UR QL STRIP.AUTO: 2 EU/DL (ref 0.2–1)
VT SETTING VENT: 450 ML
WBC # BLD AUTO: 3.3 K/UL (ref 4.1–11.1)
WBC # BLD AUTO: 3.9 K/UL (ref 4.1–11.1)
WBC URNS QL MICRO: ABNORMAL /HPF (ref 0–4)

## 2025-02-01 PROCEDURE — 2500000003 HC RX 250 WO HCPCS: Performed by: INTERNAL MEDICINE

## 2025-02-01 PROCEDURE — 36600 WITHDRAWAL OF ARTERIAL BLOOD: CPT

## 2025-02-01 PROCEDURE — 6360000002 HC RX W HCPCS: Performed by: EMERGENCY MEDICINE

## 2025-02-01 PROCEDURE — 2580000003 HC RX 258: Performed by: PHYSICIAN ASSISTANT

## 2025-02-01 PROCEDURE — 2580000003 HC RX 258: Performed by: NURSE PRACTITIONER

## 2025-02-01 PROCEDURE — 2000000000 HC ICU R&B

## 2025-02-01 PROCEDURE — 2500000003 HC RX 250 WO HCPCS: Performed by: EMERGENCY MEDICINE

## 2025-02-01 PROCEDURE — 87040 BLOOD CULTURE FOR BACTERIA: CPT

## 2025-02-01 PROCEDURE — 36415 COLL VENOUS BLD VENIPUNCTURE: CPT

## 2025-02-01 PROCEDURE — 84145 PROCALCITONIN (PCT): CPT

## 2025-02-01 PROCEDURE — 6370000000 HC RX 637 (ALT 250 FOR IP): Performed by: NURSE PRACTITIONER

## 2025-02-01 PROCEDURE — 94761 N-INVAS EAR/PLS OXIMETRY MLT: CPT

## 2025-02-01 PROCEDURE — 71045 X-RAY EXAM CHEST 1 VIEW: CPT

## 2025-02-01 PROCEDURE — 0BH17EZ INSERTION OF ENDOTRACHEAL AIRWAY INTO TRACHEA, VIA NATURAL OR ARTIFICIAL OPENING: ICD-10-PCS | Performed by: NURSE PRACTITIONER

## 2025-02-01 PROCEDURE — 85610 PROTHROMBIN TIME: CPT

## 2025-02-01 PROCEDURE — 6360000002 HC RX W HCPCS: Performed by: INTERNAL MEDICINE

## 2025-02-01 PROCEDURE — 82525 ASSAY OF COPPER: CPT

## 2025-02-01 PROCEDURE — 2500000003 HC RX 250 WO HCPCS: Performed by: NURSE PRACTITIONER

## 2025-02-01 PROCEDURE — 6360000002 HC RX W HCPCS: Performed by: PHYSICIAN ASSISTANT

## 2025-02-01 PROCEDURE — 82803 BLOOD GASES ANY COMBINATION: CPT

## 2025-02-01 PROCEDURE — 74018 RADEX ABDOMEN 1 VIEW: CPT

## 2025-02-01 PROCEDURE — 2500000003 HC RX 250 WO HCPCS: Performed by: HOSPITALIST

## 2025-02-01 PROCEDURE — 51701 INSERT BLADDER CATHETER: CPT

## 2025-02-01 PROCEDURE — 83735 ASSAY OF MAGNESIUM: CPT

## 2025-02-01 PROCEDURE — 51798 US URINE CAPACITY MEASURE: CPT

## 2025-02-01 PROCEDURE — 94002 VENT MGMT INPAT INIT DAY: CPT

## 2025-02-01 PROCEDURE — 2580000003 HC RX 258: Performed by: HOSPITALIST

## 2025-02-01 PROCEDURE — 80053 COMPREHEN METABOLIC PANEL: CPT

## 2025-02-01 PROCEDURE — 81001 URINALYSIS AUTO W/SCOPE: CPT

## 2025-02-01 PROCEDURE — 31500 INSERT EMERGENCY AIRWAY: CPT

## 2025-02-01 PROCEDURE — 2580000003 HC RX 258: Performed by: INTERNAL MEDICINE

## 2025-02-01 PROCEDURE — 6360000002 HC RX W HCPCS: Performed by: NURSE PRACTITIONER

## 2025-02-01 PROCEDURE — 6370000000 HC RX 637 (ALT 250 FOR IP): Performed by: INTERNAL MEDICINE

## 2025-02-01 PROCEDURE — 6360000002 HC RX W HCPCS

## 2025-02-01 PROCEDURE — 5A1955Z RESPIRATORY VENTILATION, GREATER THAN 96 CONSECUTIVE HOURS: ICD-10-PCS | Performed by: INTERNAL MEDICINE

## 2025-02-01 PROCEDURE — 85025 COMPLETE CBC W/AUTO DIFF WBC: CPT

## 2025-02-01 RX ORDER — PHENOBARBITAL 32.4 MG/1
64.8 TABLET ORAL EVERY 6 HOURS PRN
Status: DISCONTINUED | OUTPATIENT
Start: 2025-02-01 | End: 2025-02-01

## 2025-02-01 RX ORDER — PHENOBARBITAL 32.4 MG/1
32.4 TABLET ORAL EVERY 6 HOURS PRN
Status: DISCONTINUED | OUTPATIENT
Start: 2025-02-02 | End: 2025-02-01

## 2025-02-01 RX ORDER — PHENOBARBITAL 32.4 MG/1
64.8 TABLET ORAL EVERY 6 HOURS PRN
Status: DISCONTINUED | OUTPATIENT
Start: 2025-02-01 | End: 2025-02-01 | Stop reason: SDUPTHER

## 2025-02-01 RX ORDER — PHENOBARBITAL 32.4 MG/1
32.4 TABLET ORAL EVERY 6 HOURS PRN
Status: DISCONTINUED | OUTPATIENT
Start: 2025-02-02 | End: 2025-02-01 | Stop reason: SDUPTHER

## 2025-02-01 RX ORDER — ETOMIDATE 2 MG/ML
20 INJECTION INTRAVENOUS ONCE
Status: COMPLETED | OUTPATIENT
Start: 2025-02-01 | End: 2025-02-01

## 2025-02-01 RX ORDER — ROCURONIUM BROMIDE 10 MG/ML
100 INJECTION, SOLUTION INTRAVENOUS ONCE
Status: COMPLETED | OUTPATIENT
Start: 2025-02-01 | End: 2025-02-01

## 2025-02-01 RX ORDER — PROPOFOL 10 MG/ML
INJECTION, EMULSION INTRAVENOUS
Status: COMPLETED
Start: 2025-02-01 | End: 2025-02-01

## 2025-02-01 RX ORDER — CHLORHEXIDINE GLUCONATE ORAL RINSE 1.2 MG/ML
15 SOLUTION DENTAL 2 TIMES DAILY
Status: DISCONTINUED | OUTPATIENT
Start: 2025-02-01 | End: 2025-02-21

## 2025-02-01 RX ORDER — PHENOBARBITAL 32.4 MG/1
32.4 TABLET ORAL 4 TIMES DAILY
Status: DISCONTINUED | OUTPATIENT
Start: 2025-02-02 | End: 2025-02-01

## 2025-02-01 RX ORDER — PHENOBARBITAL 32.4 MG/1
64.8 TABLET ORAL 4 TIMES DAILY
Status: DISCONTINUED | OUTPATIENT
Start: 2025-02-01 | End: 2025-02-01

## 2025-02-01 RX ORDER — MINERAL OIL AND WHITE PETROLATUM 150; 830 MG/G; MG/G
OINTMENT OPHTHALMIC EVERY 4 HOURS
Status: DISCONTINUED | OUTPATIENT
Start: 2025-02-01 | End: 2025-02-20

## 2025-02-01 RX ORDER — SODIUM CHLORIDE 0.9 % (FLUSH) 0.9 %
5-40 SYRINGE (ML) INJECTION EVERY 12 HOURS SCHEDULED
Status: DISCONTINUED | OUTPATIENT
Start: 2025-02-01 | End: 2025-02-01 | Stop reason: SDUPTHER

## 2025-02-01 RX ORDER — GAUZE BANDAGE 2" X 2"
100 BANDAGE TOPICAL DAILY
Status: DISCONTINUED | OUTPATIENT
Start: 2025-02-08 | End: 2025-02-02

## 2025-02-01 RX ORDER — SODIUM CHLORIDE 9 MG/ML
INJECTION, SOLUTION INTRAVENOUS PRN
Status: DISCONTINUED | OUTPATIENT
Start: 2025-02-01 | End: 2025-02-25 | Stop reason: HOSPADM

## 2025-02-01 RX ORDER — PHENOBARBITAL 32.4 MG/1
32.4 TABLET ORAL 4 TIMES DAILY
Status: DISCONTINUED | OUTPATIENT
Start: 2025-02-02 | End: 2025-02-02

## 2025-02-01 RX ORDER — THIAMINE HYDROCHLORIDE 100 MG/ML
500 INJECTION, SOLUTION INTRAMUSCULAR; INTRAVENOUS EVERY 8 HOURS
Status: DISCONTINUED | OUTPATIENT
Start: 2025-02-01 | End: 2025-02-02

## 2025-02-01 RX ORDER — PHENOBARBITAL 32.4 MG/1
16.2 TABLET ORAL EVERY 6 HOURS PRN
Status: DISCONTINUED | OUTPATIENT
Start: 2025-02-04 | End: 2025-02-01

## 2025-02-01 RX ORDER — GLIPIZIDE 10 MG/1
1 TABLET ORAL EVERY 4 HOURS
Status: DISCONTINUED | OUTPATIENT
Start: 2025-02-01 | End: 2025-02-20

## 2025-02-01 RX ORDER — PHENOBARBITAL 32.4 MG/1
16.2 TABLET ORAL EVERY 6 HOURS PRN
Status: DISCONTINUED | OUTPATIENT
Start: 2025-02-04 | End: 2025-02-01 | Stop reason: SDUPTHER

## 2025-02-01 RX ORDER — LACTULOSE 10 G/15ML
20 SOLUTION ORAL 3 TIMES DAILY
Status: DISCONTINUED | OUTPATIENT
Start: 2025-02-01 | End: 2025-02-02

## 2025-02-01 RX ORDER — PHENOBARBITAL 32.4 MG/1
16.2 TABLET ORAL 2 TIMES DAILY
Status: DISCONTINUED | OUTPATIENT
Start: 2025-02-04 | End: 2025-02-02

## 2025-02-01 RX ORDER — PHENOBARBITAL 32.4 MG/1
16.2 TABLET ORAL 2 TIMES DAILY
Status: DISCONTINUED | OUTPATIENT
Start: 2025-02-04 | End: 2025-02-01

## 2025-02-01 RX ORDER — THIAMINE HYDROCHLORIDE 100 MG/ML
250 INJECTION, SOLUTION INTRAMUSCULAR; INTRAVENOUS EVERY 24 HOURS
Status: DISCONTINUED | OUTPATIENT
Start: 2025-02-03 | End: 2025-02-02

## 2025-02-01 RX ORDER — PHENOBARBITAL 32.4 MG/1
32.4 TABLET ORAL 2 TIMES DAILY
Status: DISCONTINUED | OUTPATIENT
Start: 2025-02-03 | End: 2025-02-02

## 2025-02-01 RX ORDER — PHENOBARBITAL 32.4 MG/1
32.4 TABLET ORAL 2 TIMES DAILY
Status: DISCONTINUED | OUTPATIENT
Start: 2025-02-03 | End: 2025-02-01

## 2025-02-01 RX ORDER — PROPOFOL 10 MG/ML
5-50 INJECTION, EMULSION INTRAVENOUS CONTINUOUS
Status: DISCONTINUED | OUTPATIENT
Start: 2025-02-01 | End: 2025-02-03

## 2025-02-01 RX ORDER — PHENOBARBITAL 32.4 MG/1
64.8 TABLET ORAL 4 TIMES DAILY
Status: COMPLETED | OUTPATIENT
Start: 2025-02-01 | End: 2025-02-01

## 2025-02-01 RX ORDER — SODIUM CHLORIDE 0.9 % (FLUSH) 0.9 %
5-40 SYRINGE (ML) INJECTION PRN
Status: DISCONTINUED | OUTPATIENT
Start: 2025-02-01 | End: 2025-02-25 | Stop reason: HOSPADM

## 2025-02-01 RX ADMIN — SODIUM CHLORIDE, PRESERVATIVE FREE 10 ML: 5 INJECTION INTRAVENOUS at 08:27

## 2025-02-01 RX ADMIN — LORAZEPAM 2 MG: 2 INJECTION INTRAMUSCULAR; INTRAVENOUS at 17:19

## 2025-02-01 RX ADMIN — THIAMINE HYDROCHLORIDE 500 MG: 100 INJECTION, SOLUTION INTRAMUSCULAR; INTRAVENOUS at 12:24

## 2025-02-01 RX ADMIN — SODIUM CHLORIDE, POTASSIUM CHLORIDE, SODIUM LACTATE AND CALCIUM CHLORIDE: 600; 310; 30; 20 INJECTION, SOLUTION INTRAVENOUS at 10:56

## 2025-02-01 RX ADMIN — SODIUM CHLORIDE, PRESERVATIVE FREE 20 MG: 5 INJECTION INTRAVENOUS at 21:19

## 2025-02-01 RX ADMIN — CHLORHEXIDINE GLUCONATE 15 ML: 1.2 RINSE ORAL at 19:45

## 2025-02-01 RX ADMIN — PROPOFOL 20 MCG/KG/MIN: 10 INJECTION, EMULSION INTRAVENOUS at 04:37

## 2025-02-01 RX ADMIN — DEXTRAN 70, GLYCERIN, HYPROMELLOSE 1 DROP: 1; 2; 3 SOLUTION/ DROPS OPHTHALMIC at 22:24

## 2025-02-01 RX ADMIN — LACTULOSE 20 G: 10 SOLUTION ORAL at 08:26

## 2025-02-01 RX ADMIN — LACTULOSE 20 G: 10 SOLUTION ORAL at 21:19

## 2025-02-01 RX ADMIN — SODIUM CHLORIDE, PRESERVATIVE FREE 20 MG: 5 INJECTION INTRAVENOUS at 08:26

## 2025-02-01 RX ADMIN — LORAZEPAM 4 MG: 2 INJECTION INTRAMUSCULAR; INTRAVENOUS at 06:40

## 2025-02-01 RX ADMIN — LORAZEPAM 2 MG: 2 INJECTION INTRAMUSCULAR; INTRAVENOUS at 04:22

## 2025-02-01 RX ADMIN — SODIUM CHLORIDE 1 MG: 9 INJECTION, SOLUTION INTRAVENOUS at 09:24

## 2025-02-01 RX ADMIN — PHENOBARBITAL 64.8 MG: 32.4 TABLET ORAL at 15:53

## 2025-02-01 RX ADMIN — DEXMEDETOMIDINE HYDROCHLORIDE 1.5 MCG/KG/HR: 400 INJECTION INTRAVENOUS at 02:37

## 2025-02-01 RX ADMIN — THIAMINE HYDROCHLORIDE 500 MG: 100 INJECTION, SOLUTION INTRAMUSCULAR; INTRAVENOUS at 21:20

## 2025-02-01 RX ADMIN — LACTULOSE 20 G: 10 SOLUTION ORAL at 12:24

## 2025-02-01 RX ADMIN — DEXTRAN 70, GLYCERIN, HYPROMELLOSE 1 DROP: 1; 2; 3 SOLUTION/ DROPS OPHTHALMIC at 08:25

## 2025-02-01 RX ADMIN — SODIUM CHLORIDE: 9 INJECTION, SOLUTION INTRAVENOUS at 09:23

## 2025-02-01 RX ADMIN — DEXTRAN 70, GLYCERIN, HYPROMELLOSE 1 DROP: 1; 2; 3 SOLUTION/ DROPS OPHTHALMIC at 15:54

## 2025-02-01 RX ADMIN — ROCURONIUM BROMIDE 100 MG: 10 INJECTION, SOLUTION INTRAVENOUS at 04:34

## 2025-02-01 RX ADMIN — ONDANSETRON 4 MG: 2 INJECTION INTRAMUSCULAR; INTRAVENOUS at 22:19

## 2025-02-01 RX ADMIN — SODIUM CHLORIDE, PRESERVATIVE FREE 10 ML: 5 INJECTION INTRAVENOUS at 21:21

## 2025-02-01 RX ADMIN — POTASSIUM BICARBONATE 40 MEQ: 782 TABLET, EFFERVESCENT ORAL at 10:15

## 2025-02-01 RX ADMIN — PHENOBARBITAL 64.8 MG: 32.4 TABLET ORAL at 08:29

## 2025-02-01 RX ADMIN — ETOMIDATE 20 MG: 2 INJECTION, SOLUTION INTRAVENOUS at 04:34

## 2025-02-01 RX ADMIN — PROPOFOL 25 MCG/KG/MIN: 10 INJECTION, EMULSION INTRAVENOUS at 12:38

## 2025-02-01 RX ADMIN — THIAMINE HYDROCHLORIDE 500 MG: 100 INJECTION, SOLUTION INTRAMUSCULAR; INTRAVENOUS at 06:41

## 2025-02-01 RX ADMIN — DEXTRAN 70, GLYCERIN, HYPROMELLOSE 1 DROP: 1; 2; 3 SOLUTION/ DROPS OPHTHALMIC at 19:36

## 2025-02-01 RX ADMIN — SODIUM CHLORIDE, POTASSIUM CHLORIDE, SODIUM LACTATE AND CALCIUM CHLORIDE: 600; 310; 30; 20 INJECTION, SOLUTION INTRAVENOUS at 20:12

## 2025-02-01 RX ADMIN — PHYTONADIONE 10 MG: 10 INJECTION, EMULSION INTRAMUSCULAR; INTRAVENOUS; SUBCUTANEOUS at 10:09

## 2025-02-01 RX ADMIN — PHENOBARBITAL 64.8 MG: 32.4 TABLET ORAL at 21:20

## 2025-02-01 RX ADMIN — PROPOFOL 30 MCG/KG/MIN: 10 INJECTION, EMULSION INTRAVENOUS at 19:32

## 2025-02-01 RX ADMIN — PROPOFOL 200 MG: 10 INJECTION, EMULSION INTRAVENOUS at 04:46

## 2025-02-01 RX ADMIN — LORAZEPAM 2 MG: 2 INJECTION INTRAMUSCULAR; INTRAVENOUS at 02:11

## 2025-02-01 RX ADMIN — CHLORHEXIDINE GLUCONATE 15 ML: 1.2 RINSE ORAL at 08:26

## 2025-02-01 RX ADMIN — DEXTRAN 70, GLYCERIN, HYPROMELLOSE 1 DROP: 1; 2; 3 SOLUTION/ DROPS OPHTHALMIC at 10:15

## 2025-02-01 RX ADMIN — PHENOBARBITAL 64.8 MG: 32.4 TABLET ORAL at 12:23

## 2025-02-01 ASSESSMENT — PAIN SCALES - GENERAL
PAINLEVEL_OUTOF10: 0

## 2025-02-01 ASSESSMENT — PULMONARY FUNCTION TESTS
PIF_VALUE: 19
PIF_VALUE: 16
PIF_VALUE: 15
PIF_VALUE: 20
PIF_VALUE: 16

## 2025-02-01 NOTE — SIGNIFICANT EVENT
Patient remains agitated despite high doses of sedatives. He is kicking and spitting at staff, disoriented, and attempting to get out of bed and leave. Decision was made to intubate him for safety so that his withdrawal symptoms could be more aggressively treated.     Rosaura Daily, RADHA - CNP  2/1/2025  5:39 AM

## 2025-02-02 LAB
ALBUMIN SERPL-MCNC: 2.5 G/DL (ref 3.5–5)
ALBUMIN SERPL-MCNC: 2.6 G/DL (ref 3.5–5)
ALBUMIN/GLOB SERPL: 0.9 (ref 1.1–2.2)
ALBUMIN/GLOB SERPL: 0.9 (ref 1.1–2.2)
ALP SERPL-CCNC: 88 U/L (ref 45–117)
ALP SERPL-CCNC: 95 U/L (ref 45–117)
ALT SERPL-CCNC: 1667 U/L (ref 12–78)
ALT SERPL-CCNC: 2096 U/L (ref 12–78)
ANION GAP SERPL CALC-SCNC: 6 MMOL/L (ref 2–12)
ANION GAP SERPL CALC-SCNC: 6 MMOL/L (ref 2–12)
AST SERPL-CCNC: 234 U/L (ref 15–37)
AST SERPL-CCNC: 401 U/L (ref 15–37)
BILIRUB SERPL-MCNC: 3.7 MG/DL (ref 0.2–1)
BILIRUB SERPL-MCNC: 4.2 MG/DL (ref 0.2–1)
BUN SERPL-MCNC: 4 MG/DL (ref 6–20)
BUN SERPL-MCNC: 5 MG/DL (ref 6–20)
BUN/CREAT SERPL: 6 (ref 12–20)
BUN/CREAT SERPL: 7 (ref 12–20)
CALCIUM SERPL-MCNC: 7.9 MG/DL (ref 8.5–10.1)
CALCIUM SERPL-MCNC: 7.9 MG/DL (ref 8.5–10.1)
CHLORIDE SERPL-SCNC: 105 MMOL/L (ref 97–108)
CHLORIDE SERPL-SCNC: 108 MMOL/L (ref 97–108)
CO2 SERPL-SCNC: 24 MMOL/L (ref 21–32)
CO2 SERPL-SCNC: 26 MMOL/L (ref 21–32)
CREAT SERPL-MCNC: 0.6 MG/DL (ref 0.7–1.3)
CREAT SERPL-MCNC: 0.79 MG/DL (ref 0.7–1.3)
GLOBULIN SER CALC-MCNC: 2.8 G/DL (ref 2–4)
GLOBULIN SER CALC-MCNC: 2.8 G/DL (ref 2–4)
GLUCOSE SERPL-MCNC: 76 MG/DL (ref 65–100)
GLUCOSE SERPL-MCNC: 87 MG/DL (ref 65–100)
INR PPP: 1.2 (ref 0.9–1.1)
MAGNESIUM SERPL-MCNC: 1.8 MG/DL (ref 1.6–2.4)
MITOCHONDRIA M2 IGG SER-ACNC: <20 UNITS (ref 0–20)
POTASSIUM SERPL-SCNC: 2.9 MMOL/L (ref 3.5–5.1)
POTASSIUM SERPL-SCNC: 3.8 MMOL/L (ref 3.5–5.1)
PROT SERPL-MCNC: 5.3 G/DL (ref 6.4–8.2)
PROT SERPL-MCNC: 5.4 G/DL (ref 6.4–8.2)
PROTHROMBIN TIME: 12.3 SEC (ref 9.2–11.2)
SMA IGG SER-ACNC: 9 UNITS (ref 0–19)
SODIUM SERPL-SCNC: 137 MMOL/L (ref 136–145)
SODIUM SERPL-SCNC: 138 MMOL/L (ref 136–145)

## 2025-02-02 PROCEDURE — 51798 US URINE CAPACITY MEASURE: CPT

## 2025-02-02 PROCEDURE — 2500000003 HC RX 250 WO HCPCS: Performed by: EMERGENCY MEDICINE

## 2025-02-02 PROCEDURE — 6370000000 HC RX 637 (ALT 250 FOR IP): Performed by: INTERNAL MEDICINE

## 2025-02-02 PROCEDURE — 94003 VENT MGMT INPAT SUBQ DAY: CPT

## 2025-02-02 PROCEDURE — 2580000003 HC RX 258: Performed by: NURSE PRACTITIONER

## 2025-02-02 PROCEDURE — 85610 PROTHROMBIN TIME: CPT

## 2025-02-02 PROCEDURE — 83735 ASSAY OF MAGNESIUM: CPT

## 2025-02-02 PROCEDURE — 6360000002 HC RX W HCPCS: Performed by: INTERNAL MEDICINE

## 2025-02-02 PROCEDURE — 94761 N-INVAS EAR/PLS OXIMETRY MLT: CPT

## 2025-02-02 PROCEDURE — 51702 INSERT TEMP BLADDER CATH: CPT

## 2025-02-02 PROCEDURE — 2580000003 HC RX 258: Performed by: INTERNAL MEDICINE

## 2025-02-02 PROCEDURE — 6360000002 HC RX W HCPCS: Performed by: NURSE PRACTITIONER

## 2025-02-02 PROCEDURE — 80053 COMPREHEN METABOLIC PANEL: CPT

## 2025-02-02 PROCEDURE — 2580000003 HC RX 258: Performed by: HOSPITALIST

## 2025-02-02 PROCEDURE — 2500000003 HC RX 250 WO HCPCS: Performed by: NURSE PRACTITIONER

## 2025-02-02 PROCEDURE — 51701 INSERT BLADDER CATHETER: CPT

## 2025-02-02 PROCEDURE — 2000000000 HC ICU R&B

## 2025-02-02 PROCEDURE — 6360000002 HC RX W HCPCS: Performed by: EMERGENCY MEDICINE

## 2025-02-02 PROCEDURE — 6360000002 HC RX W HCPCS: Performed by: PHYSICIAN ASSISTANT

## 2025-02-02 PROCEDURE — 2500000003 HC RX 250 WO HCPCS: Performed by: HOSPITALIST

## 2025-02-02 PROCEDURE — 36415 COLL VENOUS BLD VENIPUNCTURE: CPT

## 2025-02-02 PROCEDURE — 6370000000 HC RX 637 (ALT 250 FOR IP): Performed by: NURSE PRACTITIONER

## 2025-02-02 RX ORDER — POTASSIUM CHLORIDE 7.45 MG/ML
10 INJECTION INTRAVENOUS PRN
Status: DISCONTINUED | OUTPATIENT
Start: 2025-02-02 | End: 2025-02-22

## 2025-02-02 RX ORDER — LORAZEPAM 2 MG/ML
2 INJECTION INTRAMUSCULAR
Status: DISCONTINUED | OUTPATIENT
Start: 2025-02-02 | End: 2025-02-04

## 2025-02-02 RX ORDER — PHENOBARBITAL SODIUM 65 MG/ML
65 INJECTION, SOLUTION INTRAMUSCULAR; INTRAVENOUS 2 TIMES DAILY
Status: COMPLETED | OUTPATIENT
Start: 2025-02-03 | End: 2025-02-04

## 2025-02-02 RX ORDER — LORAZEPAM 1 MG/1
1 TABLET ORAL
Status: DISCONTINUED | OUTPATIENT
Start: 2025-02-02 | End: 2025-02-04

## 2025-02-02 RX ORDER — POTASSIUM CHLORIDE 750 MG/1
40 TABLET, EXTENDED RELEASE ORAL PRN
Status: DISCONTINUED | OUTPATIENT
Start: 2025-02-02 | End: 2025-02-22

## 2025-02-02 RX ORDER — ONDANSETRON 4 MG/1
4 TABLET, ORALLY DISINTEGRATING ORAL EVERY 8 HOURS PRN
Status: DISCONTINUED | OUTPATIENT
Start: 2025-02-02 | End: 2025-02-22

## 2025-02-02 RX ORDER — FENTANYL CITRATE-0.9 % NACL/PF 10 MCG/ML
25-200 PLASTIC BAG, INJECTION (ML) INTRAVENOUS CONTINUOUS
Status: DISCONTINUED | OUTPATIENT
Start: 2025-02-02 | End: 2025-02-03

## 2025-02-02 RX ORDER — LORAZEPAM 1 MG/1
3 TABLET ORAL
Status: DISCONTINUED | OUTPATIENT
Start: 2025-02-02 | End: 2025-02-04

## 2025-02-02 RX ORDER — PHENOBARBITAL SODIUM 65 MG/ML
32.5 INJECTION, SOLUTION INTRAMUSCULAR; INTRAVENOUS DAILY
Status: DISCONTINUED | OUTPATIENT
Start: 2025-02-06 | End: 2025-02-06

## 2025-02-02 RX ORDER — ONDANSETRON 2 MG/ML
4 INJECTION INTRAMUSCULAR; INTRAVENOUS EVERY 6 HOURS PRN
Status: DISCONTINUED | OUTPATIENT
Start: 2025-02-02 | End: 2025-02-22

## 2025-02-02 RX ORDER — LORAZEPAM 2 MG/ML
4 INJECTION INTRAMUSCULAR
Status: DISCONTINUED | OUTPATIENT
Start: 2025-02-02 | End: 2025-02-04

## 2025-02-02 RX ORDER — OXYCODONE HYDROCHLORIDE 5 MG/1
5 TABLET ORAL EVERY 4 HOURS PRN
Status: DISCONTINUED | OUTPATIENT
Start: 2025-02-02 | End: 2025-02-22

## 2025-02-02 RX ORDER — LORAZEPAM 1 MG/1
4 TABLET ORAL
Status: DISCONTINUED | OUTPATIENT
Start: 2025-02-02 | End: 2025-02-04

## 2025-02-02 RX ORDER — LORAZEPAM 1 MG/1
2 TABLET ORAL
Status: DISCONTINUED | OUTPATIENT
Start: 2025-02-02 | End: 2025-02-04

## 2025-02-02 RX ORDER — PHENOBARBITAL SODIUM 65 MG/ML
65 INJECTION, SOLUTION INTRAMUSCULAR; INTRAVENOUS EVERY 6 HOURS SCHEDULED
Status: COMPLETED | OUTPATIENT
Start: 2025-02-02 | End: 2025-02-03

## 2025-02-02 RX ORDER — MULTIVITAMIN WITH IRON
1 TABLET ORAL DAILY
Status: DISCONTINUED | OUTPATIENT
Start: 2025-02-03 | End: 2025-02-22

## 2025-02-02 RX ORDER — THIAMINE HYDROCHLORIDE 100 MG/ML
500 INJECTION, SOLUTION INTRAMUSCULAR; INTRAVENOUS EVERY 8 HOURS
Status: COMPLETED | OUTPATIENT
Start: 2025-02-02 | End: 2025-02-02

## 2025-02-02 RX ORDER — POLYETHYLENE GLYCOL 3350 17 G/17G
17 POWDER, FOR SOLUTION ORAL DAILY PRN
Status: DISCONTINUED | OUTPATIENT
Start: 2025-02-02 | End: 2025-02-22

## 2025-02-02 RX ORDER — LORAZEPAM 2 MG/ML
1 INJECTION INTRAMUSCULAR
Status: DISCONTINUED | OUTPATIENT
Start: 2025-02-02 | End: 2025-02-04

## 2025-02-02 RX ORDER — PHENOBARBITAL SODIUM 65 MG/ML
32.5 INJECTION, SOLUTION INTRAMUSCULAR; INTRAVENOUS 2 TIMES DAILY
Status: DISPENSED | OUTPATIENT
Start: 2025-02-05 | End: 2025-02-06

## 2025-02-02 RX ORDER — LORAZEPAM 2 MG/ML
3 INJECTION INTRAMUSCULAR
Status: DISCONTINUED | OUTPATIENT
Start: 2025-02-02 | End: 2025-02-04

## 2025-02-02 RX ORDER — THIAMINE HYDROCHLORIDE 100 MG/ML
250 INJECTION, SOLUTION INTRAMUSCULAR; INTRAVENOUS EVERY 24 HOURS
Status: DISCONTINUED | OUTPATIENT
Start: 2025-02-03 | End: 2025-02-03

## 2025-02-02 RX ORDER — MIDAZOLAM HYDROCHLORIDE 1 MG/ML
2 INJECTION, SOLUTION INTRAMUSCULAR; INTRAVENOUS ONCE
Status: COMPLETED | OUTPATIENT
Start: 2025-02-02 | End: 2025-02-02

## 2025-02-02 RX ORDER — GAUZE BANDAGE 2" X 2"
100 BANDAGE TOPICAL DAILY
Status: DISCONTINUED | OUTPATIENT
Start: 2025-02-08 | End: 2025-02-03

## 2025-02-02 RX ORDER — HYDROMORPHONE HYDROCHLORIDE 1 MG/ML
1 INJECTION, SOLUTION INTRAMUSCULAR; INTRAVENOUS; SUBCUTANEOUS ONCE
Status: COMPLETED | OUTPATIENT
Start: 2025-02-02 | End: 2025-02-02

## 2025-02-02 RX ADMIN — PROPOFOL 40 MCG/KG/MIN: 10 INJECTION, EMULSION INTRAVENOUS at 02:27

## 2025-02-02 RX ADMIN — POTASSIUM CHLORIDE 10 MEQ: 7.46 INJECTION, SOLUTION INTRAVENOUS at 10:09

## 2025-02-02 RX ADMIN — SODIUM CHLORIDE, PRESERVATIVE FREE 20 MG: 5 INJECTION INTRAVENOUS at 08:00

## 2025-02-02 RX ADMIN — SODIUM CHLORIDE, POTASSIUM CHLORIDE, SODIUM LACTATE AND CALCIUM CHLORIDE: 600; 310; 30; 20 INJECTION, SOLUTION INTRAVENOUS at 16:07

## 2025-02-02 RX ADMIN — THIAMINE HYDROCHLORIDE 500 MG: 100 INJECTION, SOLUTION INTRAMUSCULAR; INTRAVENOUS at 22:05

## 2025-02-02 RX ADMIN — CHLORHEXIDINE GLUCONATE 15 ML: 1.2 RINSE ORAL at 10:13

## 2025-02-02 RX ADMIN — POTASSIUM CHLORIDE 10 MEQ: 7.46 INJECTION, SOLUTION INTRAVENOUS at 09:07

## 2025-02-02 RX ADMIN — Medication 25 MCG/HR: at 10:10

## 2025-02-02 RX ADMIN — DEXTRAN 70, GLYCERIN, HYPROMELLOSE 1 DROP: 1; 2; 3 SOLUTION/ DROPS OPHTHALMIC at 14:27

## 2025-02-02 RX ADMIN — POTASSIUM CHLORIDE 10 MEQ: 7.46 INJECTION, SOLUTION INTRAVENOUS at 12:55

## 2025-02-02 RX ADMIN — LACTULOSE 20 G: 10 SOLUTION ORAL at 08:00

## 2025-02-02 RX ADMIN — THIAMINE HYDROCHLORIDE 500 MG: 100 INJECTION, SOLUTION INTRAMUSCULAR; INTRAVENOUS at 05:24

## 2025-02-02 RX ADMIN — PHENOBARBITAL SODIUM 65 MG: 65 INJECTION, SOLUTION INTRAMUSCULAR; INTRAVENOUS at 17:29

## 2025-02-02 RX ADMIN — PROPOFOL 35 MCG/KG/MIN: 10 INJECTION, EMULSION INTRAVENOUS at 16:08

## 2025-02-02 RX ADMIN — DEXTRAN 70, GLYCERIN, HYPROMELLOSE 1 DROP: 1; 2; 3 SOLUTION/ DROPS OPHTHALMIC at 08:01

## 2025-02-02 RX ADMIN — LORAZEPAM 4 MG: 2 INJECTION INTRAMUSCULAR; INTRAVENOUS at 02:53

## 2025-02-02 RX ADMIN — PROPOFOL 45 MCG/KG/MIN: 10 INJECTION, EMULSION INTRAVENOUS at 22:04

## 2025-02-02 RX ADMIN — PROPOFOL 45 MCG/KG/MIN: 10 INJECTION, EMULSION INTRAVENOUS at 10:33

## 2025-02-02 RX ADMIN — DEXTRAN 70, GLYCERIN, HYPROMELLOSE 1 DROP: 1; 2; 3 SOLUTION/ DROPS OPHTHALMIC at 22:10

## 2025-02-02 RX ADMIN — POTASSIUM CHLORIDE 10 MEQ: 7.46 INJECTION, SOLUTION INTRAVENOUS at 06:51

## 2025-02-02 RX ADMIN — DEXTRAN 70, GLYCERIN, HYPROMELLOSE 1 DROP: 1; 2; 3 SOLUTION/ DROPS OPHTHALMIC at 10:10

## 2025-02-02 RX ADMIN — SODIUM CHLORIDE, POTASSIUM CHLORIDE, SODIUM LACTATE AND CALCIUM CHLORIDE: 600; 310; 30; 20 INJECTION, SOLUTION INTRAVENOUS at 05:25

## 2025-02-02 RX ADMIN — MIDAZOLAM 2 MG: 1 INJECTION INTRAMUSCULAR; INTRAVENOUS at 03:02

## 2025-02-02 RX ADMIN — PROPOFOL 50 MCG/KG/MIN: 10 INJECTION, EMULSION INTRAVENOUS at 05:56

## 2025-02-02 RX ADMIN — PHENOBARBITAL 32.4 MG: 32.4 TABLET ORAL at 08:00

## 2025-02-02 RX ADMIN — POTASSIUM CHLORIDE 10 MEQ: 7.46 INJECTION, SOLUTION INTRAVENOUS at 07:59

## 2025-02-02 RX ADMIN — SODIUM CHLORIDE 1 MG: 9 INJECTION, SOLUTION INTRAVENOUS at 07:59

## 2025-02-02 RX ADMIN — SODIUM CHLORIDE, PRESERVATIVE FREE 20 MG: 5 INJECTION INTRAVENOUS at 21:04

## 2025-02-02 RX ADMIN — CHLORHEXIDINE GLUCONATE 15 ML: 1.2 RINSE ORAL at 21:10

## 2025-02-02 RX ADMIN — SODIUM CHLORIDE, PRESERVATIVE FREE 10 ML: 5 INJECTION INTRAVENOUS at 08:01

## 2025-02-02 RX ADMIN — THERA TABS 1 TABLET: TAB at 08:00

## 2025-02-02 RX ADMIN — SODIUM CHLORIDE: 9 INJECTION, SOLUTION INTRAVENOUS at 07:59

## 2025-02-02 RX ADMIN — Medication 125 MCG/HR: at 20:56

## 2025-02-02 RX ADMIN — DEXTRAN 70, GLYCERIN, HYPROMELLOSE 1 DROP: 1; 2; 3 SOLUTION/ DROPS OPHTHALMIC at 02:32

## 2025-02-02 RX ADMIN — POTASSIUM CHLORIDE 10 MEQ: 7.46 INJECTION, SOLUTION INTRAVENOUS at 11:41

## 2025-02-02 RX ADMIN — THIAMINE HYDROCHLORIDE 500 MG: 100 INJECTION, SOLUTION INTRAMUSCULAR; INTRAVENOUS at 11:41

## 2025-02-02 RX ADMIN — DEXTRAN 70, GLYCERIN, HYPROMELLOSE 1 DROP: 1; 2; 3 SOLUTION/ DROPS OPHTHALMIC at 17:29

## 2025-02-02 RX ADMIN — HYDROMORPHONE HYDROCHLORIDE 1 MG: 1 INJECTION, SOLUTION INTRAMUSCULAR; INTRAVENOUS; SUBCUTANEOUS at 03:13

## 2025-02-02 RX ADMIN — PHENOBARBITAL SODIUM 65 MG: 65 INJECTION, SOLUTION INTRAMUSCULAR; INTRAVENOUS at 11:41

## 2025-02-02 ASSESSMENT — PULMONARY FUNCTION TESTS
PIF_VALUE: 15
PIF_VALUE: 17
PIF_VALUE: 16
PIF_VALUE: 17
PIF_VALUE: 16

## 2025-02-02 ASSESSMENT — PAIN SCALES - GENERAL
PAINLEVEL_OUTOF10: 2
PAINLEVEL_OUTOF10: 0

## 2025-02-03 ENCOUNTER — APPOINTMENT (OUTPATIENT)
Facility: HOSPITAL | Age: 35
DRG: 005 | End: 2025-02-03
Payer: MEDICAID

## 2025-02-03 LAB
ALBUMIN SERPL-MCNC: 2.4 G/DL (ref 3.5–5)
ALBUMIN/GLOB SERPL: 0.9 (ref 1.1–2.2)
ALP SERPL-CCNC: 87 U/L (ref 45–117)
ALT SERPL-CCNC: 1266 U/L (ref 12–78)
ANION GAP SERPL CALC-SCNC: 5 MMOL/L (ref 2–12)
AST SERPL-CCNC: 146 U/L (ref 15–37)
BASOPHILS # BLD: 0.02 K/UL (ref 0–0.1)
BASOPHILS NFR BLD: 0.4 % (ref 0–1)
BILIRUB SERPL-MCNC: 2.5 MG/DL (ref 0.2–1)
BUN SERPL-MCNC: 6 MG/DL (ref 6–20)
BUN/CREAT SERPL: 9 (ref 12–20)
CALCIUM SERPL-MCNC: 8 MG/DL (ref 8.5–10.1)
CHLORIDE SERPL-SCNC: 105 MMOL/L (ref 97–108)
CK SERPL-CCNC: 77 U/L (ref 39–308)
CO2 SERPL-SCNC: 26 MMOL/L (ref 21–32)
CREAT SERPL-MCNC: 0.65 MG/DL (ref 0.7–1.3)
DIFFERENTIAL METHOD BLD: ABNORMAL
EOSINOPHIL # BLD: 0.19 K/UL (ref 0–0.4)
EOSINOPHIL NFR BLD: 3.6 % (ref 0–7)
ERYTHROCYTE [DISTWIDTH] IN BLOOD BY AUTOMATED COUNT: 12.8 % (ref 11.5–14.5)
GLOBULIN SER CALC-MCNC: 2.8 G/DL (ref 2–4)
GLUCOSE SERPL-MCNC: 91 MG/DL (ref 65–100)
HCT VFR BLD AUTO: 35.5 % (ref 36.6–50.3)
HGB BLD-MCNC: 11.7 G/DL (ref 12.1–17)
IMM GRANULOCYTES # BLD AUTO: 0.03 K/UL (ref 0–0.04)
IMM GRANULOCYTES NFR BLD AUTO: 0.6 % (ref 0–0.5)
LYMPHOCYTES # BLD: 0.98 K/UL (ref 0.8–3.5)
LYMPHOCYTES NFR BLD: 18.6 % (ref 12–49)
MAGNESIUM SERPL-MCNC: 1.6 MG/DL (ref 1.6–2.4)
MCH RBC QN AUTO: 28.2 PG (ref 26–34)
MCHC RBC AUTO-ENTMCNC: 33 G/DL (ref 30–36.5)
MCV RBC AUTO: 85.5 FL (ref 80–99)
MONOCYTES # BLD: 0.72 K/UL (ref 0–1)
MONOCYTES NFR BLD: 13.7 % (ref 5–13)
NEUTS SEG # BLD: 3.32 K/UL (ref 1.8–8)
NEUTS SEG NFR BLD: 63.1 % (ref 32–75)
NRBC # BLD: 0 K/UL (ref 0–0.01)
NRBC BLD-RTO: 0 PER 100 WBC
PHOSPHATE SERPL-MCNC: 2 MG/DL (ref 2.6–4.7)
PLATELET # BLD AUTO: 104 K/UL (ref 150–400)
PMV BLD AUTO: 12.1 FL (ref 8.9–12.9)
POTASSIUM SERPL-SCNC: 3.1 MMOL/L (ref 3.5–5.1)
PROCALCITONIN SERPL-MCNC: 0.67 NG/ML
PROT SERPL-MCNC: 5.2 G/DL (ref 6.4–8.2)
RBC # BLD AUTO: 4.15 M/UL (ref 4.1–5.7)
SODIUM SERPL-SCNC: 136 MMOL/L (ref 136–145)
WBC # BLD AUTO: 5.3 K/UL (ref 4.1–11.1)

## 2025-02-03 PROCEDURE — 6360000002 HC RX W HCPCS: Performed by: HOSPITALIST

## 2025-02-03 PROCEDURE — 83735 ASSAY OF MAGNESIUM: CPT

## 2025-02-03 PROCEDURE — 2580000003 HC RX 258: Performed by: HOSPITALIST

## 2025-02-03 PROCEDURE — 6370000000 HC RX 637 (ALT 250 FOR IP): Performed by: HOSPITALIST

## 2025-02-03 PROCEDURE — 2500000003 HC RX 250 WO HCPCS: Performed by: HOSPITALIST

## 2025-02-03 PROCEDURE — 2700000000 HC OXYGEN THERAPY PER DAY

## 2025-02-03 PROCEDURE — 84100 ASSAY OF PHOSPHORUS: CPT

## 2025-02-03 PROCEDURE — 85025 COMPLETE CBC W/AUTO DIFF WBC: CPT

## 2025-02-03 PROCEDURE — 71045 X-RAY EXAM CHEST 1 VIEW: CPT

## 2025-02-03 PROCEDURE — 6360000002 HC RX W HCPCS: Performed by: INTERNAL MEDICINE

## 2025-02-03 PROCEDURE — 82525 ASSAY OF COPPER: CPT

## 2025-02-03 PROCEDURE — 84145 PROCALCITONIN (PCT): CPT

## 2025-02-03 PROCEDURE — 80053 COMPREHEN METABOLIC PANEL: CPT

## 2025-02-03 PROCEDURE — 6360000002 HC RX W HCPCS

## 2025-02-03 PROCEDURE — 6370000000 HC RX 637 (ALT 250 FOR IP): Performed by: INTERNAL MEDICINE

## 2025-02-03 PROCEDURE — 6360000002 HC RX W HCPCS: Performed by: STUDENT IN AN ORGANIZED HEALTH CARE EDUCATION/TRAINING PROGRAM

## 2025-02-03 PROCEDURE — 82550 ASSAY OF CK (CPK): CPT

## 2025-02-03 PROCEDURE — 2500000003 HC RX 250 WO HCPCS: Performed by: NURSE PRACTITIONER

## 2025-02-03 PROCEDURE — 51701 INSERT BLADDER CATHETER: CPT

## 2025-02-03 PROCEDURE — 2580000003 HC RX 258: Performed by: INTERNAL MEDICINE

## 2025-02-03 PROCEDURE — 2580000003 HC RX 258: Performed by: NURSE PRACTITIONER

## 2025-02-03 PROCEDURE — 6360000002 HC RX W HCPCS: Performed by: NURSE PRACTITIONER

## 2025-02-03 PROCEDURE — 2000000000 HC ICU R&B

## 2025-02-03 PROCEDURE — 36415 COLL VENOUS BLD VENIPUNCTURE: CPT

## 2025-02-03 PROCEDURE — 0BH17EZ INSERTION OF ENDOTRACHEAL AIRWAY INTO TRACHEA, VIA NATURAL OR ARTIFICIAL OPENING: ICD-10-PCS | Performed by: HOSPITALIST

## 2025-02-03 PROCEDURE — 6360000002 HC RX W HCPCS: Performed by: PHYSICIAN ASSISTANT

## 2025-02-03 PROCEDURE — 94761 N-INVAS EAR/PLS OXIMETRY MLT: CPT

## 2025-02-03 PROCEDURE — 94003 VENT MGMT INPAT SUBQ DAY: CPT

## 2025-02-03 PROCEDURE — 51798 US URINE CAPACITY MEASURE: CPT

## 2025-02-03 PROCEDURE — 74018 RADEX ABDOMEN 1 VIEW: CPT

## 2025-02-03 RX ORDER — QUETIAPINE FUMARATE 100 MG/1
100 TABLET, FILM COATED ORAL 2 TIMES DAILY
Status: DISCONTINUED | OUTPATIENT
Start: 2025-02-03 | End: 2025-02-04

## 2025-02-03 RX ORDER — ETOMIDATE 2 MG/ML
30 INJECTION INTRAVENOUS ONCE
Status: COMPLETED | OUTPATIENT
Start: 2025-02-03 | End: 2025-02-03

## 2025-02-03 RX ORDER — MAGNESIUM SULFATE 1 G/100ML
1000 INJECTION INTRAVENOUS ONCE
Status: COMPLETED | OUTPATIENT
Start: 2025-02-03 | End: 2025-02-03

## 2025-02-03 RX ORDER — DEXMEDETOMIDINE HYDROCHLORIDE 4 UG/ML
.1-1.5 INJECTION, SOLUTION INTRAVENOUS CONTINUOUS
Status: DISCONTINUED | OUTPATIENT
Start: 2025-02-03 | End: 2025-02-07

## 2025-02-03 RX ORDER — MIDAZOLAM HYDROCHLORIDE 1 MG/ML
5 INJECTION, SOLUTION INTRAMUSCULAR; INTRAVENOUS ONCE
Status: COMPLETED | OUTPATIENT
Start: 2025-02-03 | End: 2025-02-03

## 2025-02-03 RX ORDER — PROPOFOL 10 MG/ML
5-50 INJECTION, EMULSION INTRAVENOUS CONTINUOUS
Status: DISCONTINUED | OUTPATIENT
Start: 2025-02-03 | End: 2025-02-07

## 2025-02-03 RX ORDER — MIDAZOLAM HYDROCHLORIDE 1 MG/ML
1 INJECTION, SOLUTION INTRAMUSCULAR; INTRAVENOUS ONCE
Status: COMPLETED | OUTPATIENT
Start: 2025-02-03 | End: 2025-02-03

## 2025-02-03 RX ORDER — PHENOBARBITAL SODIUM 65 MG/ML
INJECTION, SOLUTION INTRAMUSCULAR; INTRAVENOUS
Status: DISCONTINUED
Start: 2025-02-03 | End: 2025-02-03 | Stop reason: WASHOUT

## 2025-02-03 RX ORDER — ROCURONIUM BROMIDE 10 MG/ML
120 INJECTION, SOLUTION INTRAVENOUS ONCE
Status: COMPLETED | OUTPATIENT
Start: 2025-02-03 | End: 2025-02-03

## 2025-02-03 RX ORDER — MIDAZOLAM HYDROCHLORIDE 1 MG/ML
INJECTION, SOLUTION INTRAMUSCULAR; INTRAVENOUS
Status: DISPENSED
Start: 2025-02-03 | End: 2025-02-04

## 2025-02-03 RX ORDER — MIDAZOLAM HYDROCHLORIDE 1 MG/ML
INJECTION, SOLUTION INTRAMUSCULAR; INTRAVENOUS
Status: COMPLETED
Start: 2025-02-03 | End: 2025-02-03

## 2025-02-03 RX ORDER — MIDAZOLAM HYDROCHLORIDE 1 MG/ML
2 INJECTION, SOLUTION INTRAMUSCULAR; INTRAVENOUS ONCE
Status: COMPLETED | OUTPATIENT
Start: 2025-02-03 | End: 2025-02-03

## 2025-02-03 RX ORDER — FENTANYL CITRATE-0.9 % NACL/PF 10 MCG/ML
25-200 PLASTIC BAG, INJECTION (ML) INTRAVENOUS CONTINUOUS
Status: DISCONTINUED | OUTPATIENT
Start: 2025-02-03 | End: 2025-02-12

## 2025-02-03 RX ORDER — ENOXAPARIN SODIUM 100 MG/ML
40 INJECTION SUBCUTANEOUS EVERY 24 HOURS
Status: DISCONTINUED | OUTPATIENT
Start: 2025-02-03 | End: 2025-02-25 | Stop reason: HOSPADM

## 2025-02-03 RX ORDER — THIAMINE HYDROCHLORIDE 100 MG/ML
100 INJECTION, SOLUTION INTRAMUSCULAR; INTRAVENOUS DAILY
Status: DISCONTINUED | OUTPATIENT
Start: 2025-02-04 | End: 2025-02-07

## 2025-02-03 RX ADMIN — MIDAZOLAM HYDROCHLORIDE 5 MG: 1 INJECTION, SOLUTION INTRAMUSCULAR; INTRAVENOUS at 14:35

## 2025-02-03 RX ADMIN — MIDAZOLAM 5 MG: 1 INJECTION INTRAMUSCULAR; INTRAVENOUS at 14:35

## 2025-02-03 RX ADMIN — SODIUM CHLORIDE 1 MG: 9 INJECTION, SOLUTION INTRAVENOUS at 08:05

## 2025-02-03 RX ADMIN — POTASSIUM BICARBONATE 40 MEQ: 782 TABLET, EFFERVESCENT ORAL at 10:47

## 2025-02-03 RX ADMIN — THIAMINE HYDROCHLORIDE 250 MG: 100 INJECTION, SOLUTION INTRAMUSCULAR; INTRAVENOUS at 05:18

## 2025-02-03 RX ADMIN — PROPOFOL 50 MCG/KG/MIN: 10 INJECTION, EMULSION INTRAVENOUS at 15:42

## 2025-02-03 RX ADMIN — PROPOFOL 40 MCG/KG/MIN: 10 INJECTION, EMULSION INTRAVENOUS at 20:17

## 2025-02-03 RX ADMIN — Medication 125 MCG/HR: at 05:00

## 2025-02-03 RX ADMIN — THERA TABS 1 TABLET: TAB at 07:53

## 2025-02-03 RX ADMIN — Medication 125 MCG/HR: at 06:45

## 2025-02-03 RX ADMIN — ENOXAPARIN SODIUM 40 MG: 100 INJECTION SUBCUTANEOUS at 10:45

## 2025-02-03 RX ADMIN — ETOMIDATE 30 MG: 2 INJECTION, SOLUTION INTRAVENOUS at 14:38

## 2025-02-03 RX ADMIN — PROPOFOL 45 MCG/KG/MIN: 10 INJECTION, EMULSION INTRAVENOUS at 22:16

## 2025-02-03 RX ADMIN — Medication 500 MG: at 11:43

## 2025-02-03 RX ADMIN — PROPOFOL 5 MG: 10 INJECTION, EMULSION INTRAVENOUS at 14:55

## 2025-02-03 RX ADMIN — DEXMEDETOMIDINE HYDROCHLORIDE 1.5 MCG/KG/HR: 400 INJECTION INTRAVENOUS at 10:45

## 2025-02-03 RX ADMIN — DEXMEDETOMIDINE HYDROCHLORIDE 1 MCG/KG/HR: 400 INJECTION INTRAVENOUS at 14:17

## 2025-02-03 RX ADMIN — ROCURONIUM BROMIDE 120 MG: 10 INJECTION, SOLUTION INTRAVENOUS at 14:38

## 2025-02-03 RX ADMIN — DEXTRAN 70, GLYCERIN, HYPROMELLOSE 1 DROP: 1; 2; 3 SOLUTION/ DROPS OPHTHALMIC at 06:36

## 2025-02-03 RX ADMIN — CHLORHEXIDINE GLUCONATE 15 ML: 1.2 RINSE ORAL at 07:53

## 2025-02-03 RX ADMIN — MIDAZOLAM 2 MG: 1 INJECTION INTRAMUSCULAR; INTRAVENOUS at 22:39

## 2025-02-03 RX ADMIN — SODIUM CHLORIDE, PRESERVATIVE FREE 20 MG: 5 INJECTION INTRAVENOUS at 07:53

## 2025-02-03 RX ADMIN — PHENOBARBITAL SODIUM 65 MG: 65 INJECTION INTRAMUSCULAR at 18:31

## 2025-02-03 RX ADMIN — POTASSIUM BICARBONATE 40 MEQ: 782 TABLET, EFFERVESCENT ORAL at 05:20

## 2025-02-03 RX ADMIN — DEXTRAN 70, GLYCERIN, HYPROMELLOSE 1 DROP: 1; 2; 3 SOLUTION/ DROPS OPHTHALMIC at 09:03

## 2025-02-03 RX ADMIN — HYDROMORPHONE HYDROCHLORIDE 0.5 MG: 1 INJECTION, SOLUTION INTRAMUSCULAR; INTRAVENOUS; SUBCUTANEOUS at 05:45

## 2025-02-03 RX ADMIN — DEXTRAN 70, GLYCERIN, HYPROMELLOSE 1 DROP: 1; 2; 3 SOLUTION/ DROPS OPHTHALMIC at 18:34

## 2025-02-03 RX ADMIN — PHENOBARBITAL SODIUM 65 MG: 65 INJECTION, SOLUTION INTRAMUSCULAR; INTRAVENOUS at 06:48

## 2025-02-03 RX ADMIN — Medication 150 MCG/HR: at 05:43

## 2025-02-03 RX ADMIN — MAGNESIUM SULFATE HEPTAHYDRATE 1000 MG: 1 INJECTION, SOLUTION INTRAVENOUS at 10:47

## 2025-02-03 RX ADMIN — Medication 100 MCG/HR: at 21:37

## 2025-02-03 RX ADMIN — POTASSIUM BICARBONATE 40 MEQ: 782 TABLET, EFFERVESCENT ORAL at 08:10

## 2025-02-03 RX ADMIN — Medication 100 MCG/HR: at 20:20

## 2025-02-03 RX ADMIN — PROPOFOL 35 MCG/KG/MIN: 10 INJECTION, EMULSION INTRAVENOUS at 20:59

## 2025-02-03 RX ADMIN — SODIUM CHLORIDE, POTASSIUM CHLORIDE, SODIUM LACTATE AND CALCIUM CHLORIDE: 600; 310; 30; 20 INJECTION, SOLUTION INTRAVENOUS at 01:29

## 2025-02-03 RX ADMIN — SODIUM CHLORIDE, POTASSIUM CHLORIDE, SODIUM LACTATE AND CALCIUM CHLORIDE: 600; 310; 30; 20 INJECTION, SOLUTION INTRAVENOUS at 12:05

## 2025-02-03 RX ADMIN — SODIUM CHLORIDE, POTASSIUM CHLORIDE, SODIUM LACTATE AND CALCIUM CHLORIDE: 600; 310; 30; 20 INJECTION, SOLUTION INTRAVENOUS at 22:53

## 2025-02-03 RX ADMIN — PROPOFOL 40 MCG/KG/MIN: 10 INJECTION, EMULSION INTRAVENOUS at 06:42

## 2025-02-03 RX ADMIN — DEXTRAN 70, GLYCERIN, HYPROMELLOSE 1 DROP: 1; 2; 3 SOLUTION/ DROPS OPHTHALMIC at 01:27

## 2025-02-03 RX ADMIN — SODIUM CHLORIDE, PRESERVATIVE FREE 20 MG: 5 INJECTION INTRAVENOUS at 21:54

## 2025-02-03 RX ADMIN — PHENOBARBITAL SODIUM 65 MG: 65 INJECTION, SOLUTION INTRAMUSCULAR; INTRAVENOUS at 01:24

## 2025-02-03 RX ADMIN — PROPOFOL 45 MCG/KG/MIN: 10 INJECTION, EMULSION INTRAVENOUS at 02:39

## 2025-02-03 RX ADMIN — DEXTRAN 70, GLYCERIN, HYPROMELLOSE 1 DROP: 1; 2; 3 SOLUTION/ DROPS OPHTHALMIC at 14:17

## 2025-02-03 RX ADMIN — Medication 150 MCG/HR: at 22:33

## 2025-02-03 RX ADMIN — CHLORHEXIDINE GLUCONATE 15 ML: 1.2 RINSE ORAL at 21:02

## 2025-02-03 RX ADMIN — DEXTRAN 70, GLYCERIN, HYPROMELLOSE 1 DROP: 1; 2; 3 SOLUTION/ DROPS OPHTHALMIC at 22:07

## 2025-02-03 ASSESSMENT — PAIN SCALES - WONG BAKER
WONGBAKER_NUMERICALRESPONSE: NO HURT

## 2025-02-03 ASSESSMENT — PULMONARY FUNCTION TESTS
PIF_VALUE: 14
PIF_VALUE: 18

## 2025-02-03 ASSESSMENT — PAIN SCALES - GENERAL
PAINLEVEL_OUTOF10: 0

## 2025-02-03 NOTE — CARE COORDINATION
Care Management Progress Note    Reason for Admission:   Nausea and vomiting in adult [R11.2]  Alcohol-induced acute pancreatitis without infection or necrosis [K85.20]  Pancreatitis without necrosis or infection [K85.90]         Patient Admission Status: Inpatient  RUR: 15%  Hospitalization in the last 30 days (Readmission):  no        Transition of care plan:  [Medical]  In IDR:hopeful extubation today  When extubated,it will helpful to have family present  Repletion of electrolytes  Precedex gtt started  Spiking fevers  Propofol stopped  Lovenox added   On iv pepcid  Last known drink 1/26/25  Encephalopathic  Significant liver failure   [DC plan]  Both [facility] and [facility] have now declined pt   Following pt for discharge needs  Resources placed in pt.;'s bedside chart to be given to pt closer to discharge on substance abuse IOPs and inpatient rehab facilities for alcohol/substance abuse  Discharge plan communicated with patient and/or discharge caregiver: briefly with sister but no details    Date 1st IMM letter given: [place] [employer] [place]   Outpatient follow-up.contingent upon what pt agrees to do post-discharge,will need new PCP established but too early to request  Transport at discharge:  family      Letty MorrellRN

## 2025-02-04 ENCOUNTER — APPOINTMENT (OUTPATIENT)
Facility: HOSPITAL | Age: 35
DRG: 005 | End: 2025-02-04
Payer: MEDICAID

## 2025-02-04 LAB
ALBUMIN SERPL-MCNC: 2.4 G/DL (ref 3.5–5)
ALBUMIN/GLOB SERPL: 0.8 (ref 1.1–2.2)
ALP SERPL-CCNC: 87 U/L (ref 45–117)
ALT SERPL-CCNC: 953 U/L (ref 12–78)
ANION GAP SERPL CALC-SCNC: 4 MMOL/L (ref 2–12)
AST SERPL-CCNC: 89 U/L (ref 15–37)
BASOPHILS # BLD: 0.02 K/UL (ref 0–0.1)
BASOPHILS NFR BLD: 0.4 % (ref 0–1)
BILIRUB SERPL-MCNC: 1.8 MG/DL (ref 0.2–1)
BUN SERPL-MCNC: 4 MG/DL (ref 6–20)
BUN/CREAT SERPL: 6 (ref 12–20)
CALCIUM SERPL-MCNC: 8.3 MG/DL (ref 8.5–10.1)
CHLORIDE SERPL-SCNC: 108 MMOL/L (ref 97–108)
CO2 SERPL-SCNC: 26 MMOL/L (ref 21–32)
CREAT SERPL-MCNC: 0.67 MG/DL (ref 0.7–1.3)
DIFFERENTIAL METHOD BLD: ABNORMAL
EOSINOPHIL # BLD: 0.19 K/UL (ref 0–0.4)
EOSINOPHIL NFR BLD: 4.2 % (ref 0–7)
ERYTHROCYTE [DISTWIDTH] IN BLOOD BY AUTOMATED COUNT: 12.7 % (ref 11.5–14.5)
GLOBULIN SER CALC-MCNC: 3 G/DL (ref 2–4)
GLUCOSE BLD STRIP.AUTO-MCNC: 100 MG/DL (ref 65–117)
GLUCOSE SERPL-MCNC: 85 MG/DL (ref 65–100)
HCT VFR BLD AUTO: 33.4 % (ref 36.6–50.3)
HGB BLD-MCNC: 10.8 G/DL (ref 12.1–17)
HSV1 DNA SPEC QL NAA+PROBE: NEGATIVE
HSV2 DNA SPEC QL NAA+PROBE: NEGATIVE
IMM GRANULOCYTES # BLD AUTO: 0.02 K/UL (ref 0–0.04)
IMM GRANULOCYTES NFR BLD AUTO: 0.4 % (ref 0–0.5)
LYMPHOCYTES # BLD: 1.26 K/UL (ref 0.8–3.5)
LYMPHOCYTES NFR BLD: 27.9 % (ref 12–49)
MAGNESIUM SERPL-MCNC: 1.9 MG/DL (ref 1.6–2.4)
MCH RBC QN AUTO: 27.4 PG (ref 26–34)
MCHC RBC AUTO-ENTMCNC: 32.3 G/DL (ref 30–36.5)
MCV RBC AUTO: 84.8 FL (ref 80–99)
MONOCYTES # BLD: 0.6 K/UL (ref 0–1)
MONOCYTES NFR BLD: 13.3 % (ref 5–13)
NEUTS SEG # BLD: 2.43 K/UL (ref 1.8–8)
NEUTS SEG NFR BLD: 53.8 % (ref 32–75)
NRBC # BLD: 0 K/UL (ref 0–0.01)
NRBC BLD-RTO: 0 PER 100 WBC
PHOSPHATE SERPL-MCNC: 2.4 MG/DL (ref 2.6–4.7)
PLATELET # BLD AUTO: 118 K/UL (ref 150–400)
PMV BLD AUTO: 12.2 FL (ref 8.9–12.9)
POTASSIUM SERPL-SCNC: 3.4 MMOL/L (ref 3.5–5.1)
PROT SERPL-MCNC: 5.4 G/DL (ref 6.4–8.2)
RBC # BLD AUTO: 3.94 M/UL (ref 4.1–5.7)
SERVICE CMNT-IMP: NORMAL
SODIUM SERPL-SCNC: 138 MMOL/L (ref 136–145)
SPECIMEN SOURCE: NORMAL
TRIGL SERPL-MCNC: 170 MG/DL
WBC # BLD AUTO: 4.5 K/UL (ref 4.1–11.1)

## 2025-02-04 PROCEDURE — 2500000003 HC RX 250 WO HCPCS: Performed by: NURSE PRACTITIONER

## 2025-02-04 PROCEDURE — 36600 WITHDRAWAL OF ARTERIAL BLOOD: CPT

## 2025-02-04 PROCEDURE — 83735 ASSAY OF MAGNESIUM: CPT

## 2025-02-04 PROCEDURE — 36415 COLL VENOUS BLD VENIPUNCTURE: CPT

## 2025-02-04 PROCEDURE — 2580000003 HC RX 258: Performed by: INTERNAL MEDICINE

## 2025-02-04 PROCEDURE — 6360000002 HC RX W HCPCS

## 2025-02-04 PROCEDURE — 6370000000 HC RX 637 (ALT 250 FOR IP): Performed by: HOSPITALIST

## 2025-02-04 PROCEDURE — 51701 INSERT BLADDER CATHETER: CPT

## 2025-02-04 PROCEDURE — 6360000002 HC RX W HCPCS: Performed by: HOSPITALIST

## 2025-02-04 PROCEDURE — 82803 BLOOD GASES ANY COMBINATION: CPT

## 2025-02-04 PROCEDURE — 82962 GLUCOSE BLOOD TEST: CPT

## 2025-02-04 PROCEDURE — 80053 COMPREHEN METABOLIC PANEL: CPT

## 2025-02-04 PROCEDURE — 85025 COMPLETE CBC W/AUTO DIFF WBC: CPT

## 2025-02-04 PROCEDURE — 6360000002 HC RX W HCPCS: Performed by: INTERNAL MEDICINE

## 2025-02-04 PROCEDURE — 94003 VENT MGMT INPAT SUBQ DAY: CPT

## 2025-02-04 PROCEDURE — 2580000003 HC RX 258: Performed by: NURSE PRACTITIONER

## 2025-02-04 PROCEDURE — 71045 X-RAY EXAM CHEST 1 VIEW: CPT

## 2025-02-04 PROCEDURE — 51798 US URINE CAPACITY MEASURE: CPT

## 2025-02-04 PROCEDURE — 2580000003 HC RX 258: Performed by: HOSPITALIST

## 2025-02-04 PROCEDURE — 84478 ASSAY OF TRIGLYCERIDES: CPT

## 2025-02-04 PROCEDURE — 2500000003 HC RX 250 WO HCPCS: Performed by: HOSPITALIST

## 2025-02-04 PROCEDURE — 84100 ASSAY OF PHOSPHORUS: CPT

## 2025-02-04 PROCEDURE — 6370000000 HC RX 637 (ALT 250 FOR IP): Performed by: INTERNAL MEDICINE

## 2025-02-04 PROCEDURE — 2000000000 HC ICU R&B

## 2025-02-04 RX ORDER — MIDAZOLAM HYDROCHLORIDE 1 MG/ML
INJECTION, SOLUTION INTRAMUSCULAR; INTRAVENOUS
Status: COMPLETED
Start: 2025-02-04 | End: 2025-02-04

## 2025-02-04 RX ORDER — MIDAZOLAM HYDROCHLORIDE 1 MG/ML
5 INJECTION, SOLUTION INTRAMUSCULAR; INTRAVENOUS ONCE
Status: COMPLETED | OUTPATIENT
Start: 2025-02-04 | End: 2025-02-04

## 2025-02-04 RX ORDER — MIDAZOLAM HYDROCHLORIDE 1 MG/ML
2 INJECTION, SOLUTION INTRAMUSCULAR; INTRAVENOUS ONCE
Status: COMPLETED | OUTPATIENT
Start: 2025-02-04 | End: 2025-02-04

## 2025-02-04 RX ORDER — MIDAZOLAM HYDROCHLORIDE 1 MG/ML
1-20 INJECTION, SOLUTION INTRAVENOUS CONTINUOUS
Status: DISCONTINUED | OUTPATIENT
Start: 2025-02-04 | End: 2025-02-12

## 2025-02-04 RX ORDER — QUETIAPINE FUMARATE 100 MG/1
200 TABLET, FILM COATED ORAL 2 TIMES DAILY
Status: DISCONTINUED | OUTPATIENT
Start: 2025-02-04 | End: 2025-02-11

## 2025-02-04 RX ORDER — DEXMEDETOMIDINE HYDROCHLORIDE 4 UG/ML
.1-1.5 INJECTION, SOLUTION INTRAVENOUS CONTINUOUS
Status: DISCONTINUED | OUTPATIENT
Start: 2025-02-04 | End: 2025-02-04

## 2025-02-04 RX ADMIN — Medication 200 MCG/HR: at 04:14

## 2025-02-04 RX ADMIN — SODIUM CHLORIDE, POTASSIUM CHLORIDE, SODIUM LACTATE AND CALCIUM CHLORIDE: 600; 310; 30; 20 INJECTION, SOLUTION INTRAVENOUS at 08:53

## 2025-02-04 RX ADMIN — QUETIAPINE FUMARATE 200 MG: 100 TABLET ORAL at 22:02

## 2025-02-04 RX ADMIN — Medication 500 MG: at 07:46

## 2025-02-04 RX ADMIN — MIDAZOLAM HYDROCHLORIDE 2 MG/HR: 1 INJECTION, SOLUTION INTRAVENOUS at 07:44

## 2025-02-04 RX ADMIN — Medication 200 MCG/HR: at 07:51

## 2025-02-04 RX ADMIN — PROPOFOL 50 MCG/KG/MIN: 10 INJECTION, EMULSION INTRAVENOUS at 00:55

## 2025-02-04 RX ADMIN — DEXTRAN 70, GLYCERIN, HYPROMELLOSE 1 DROP: 1; 2; 3 SOLUTION/ DROPS OPHTHALMIC at 23:57

## 2025-02-04 RX ADMIN — SODIUM CHLORIDE: 9 INJECTION, SOLUTION INTRAVENOUS at 08:59

## 2025-02-04 RX ADMIN — Medication 175 MCG/HR: at 02:39

## 2025-02-04 RX ADMIN — THERA TABS 1 TABLET: TAB at 07:46

## 2025-02-04 RX ADMIN — SODIUM CHLORIDE, POTASSIUM CHLORIDE, SODIUM LACTATE AND CALCIUM CHLORIDE: 600; 310; 30; 20 INJECTION, SOLUTION INTRAVENOUS at 17:27

## 2025-02-04 RX ADMIN — PROPOFOL 45 MCG/KG/MIN: 10 INJECTION, EMULSION INTRAVENOUS at 15:28

## 2025-02-04 RX ADMIN — PROPOFOL 50 MCG/KG/MIN: 10 INJECTION, EMULSION INTRAVENOUS at 20:48

## 2025-02-04 RX ADMIN — MIDAZOLAM HYDROCHLORIDE 5 MG: 1 INJECTION, SOLUTION INTRAMUSCULAR; INTRAVENOUS at 07:34

## 2025-02-04 RX ADMIN — PHENOBARBITAL SODIUM 65 MG: 65 INJECTION INTRAMUSCULAR at 07:46

## 2025-02-04 RX ADMIN — PROPOFOL 50 MCG/KG/MIN: 10 INJECTION, EMULSION INTRAVENOUS at 05:52

## 2025-02-04 RX ADMIN — SODIUM CHLORIDE, PRESERVATIVE FREE 20 MG: 5 INJECTION INTRAVENOUS at 22:00

## 2025-02-04 RX ADMIN — POTASSIUM BICARBONATE 40 MEQ: 782 TABLET, EFFERVESCENT ORAL at 06:39

## 2025-02-04 RX ADMIN — DEXTRAN 70, GLYCERIN, HYPROMELLOSE 1 DROP: 1; 2; 3 SOLUTION/ DROPS OPHTHALMIC at 04:56

## 2025-02-04 RX ADMIN — MIDAZOLAM HYDROCHLORIDE 2 MG: 1 INJECTION, SOLUTION INTRAMUSCULAR; INTRAVENOUS at 04:15

## 2025-02-04 RX ADMIN — MIDAZOLAM 5 MG: 1 INJECTION INTRAMUSCULAR; INTRAVENOUS at 07:34

## 2025-02-04 RX ADMIN — CHLORHEXIDINE GLUCONATE 15 ML: 1.2 RINSE ORAL at 07:47

## 2025-02-04 RX ADMIN — Medication 200 MCG/HR: at 22:04

## 2025-02-04 RX ADMIN — DEXTRAN 70, GLYCERIN, HYPROMELLOSE 1 DROP: 1; 2; 3 SOLUTION/ DROPS OPHTHALMIC at 15:28

## 2025-02-04 RX ADMIN — CHLORHEXIDINE GLUCONATE 15 ML: 1.2 RINSE ORAL at 22:34

## 2025-02-04 RX ADMIN — Medication 200 MCG/HR: at 17:27

## 2025-02-04 RX ADMIN — ENOXAPARIN SODIUM 40 MG: 100 INJECTION SUBCUTANEOUS at 10:23

## 2025-02-04 RX ADMIN — QUETIAPINE FUMARATE 100 MG: 100 TABLET ORAL at 01:24

## 2025-02-04 RX ADMIN — DEXTRAN 70, GLYCERIN, HYPROMELLOSE 1 DROP: 1; 2; 3 SOLUTION/ DROPS OPHTHALMIC at 10:23

## 2025-02-04 RX ADMIN — PROPOFOL 45 MCG/KG/MIN: 10 INJECTION, EMULSION INTRAVENOUS at 10:22

## 2025-02-04 RX ADMIN — SODIUM CHLORIDE, PRESERVATIVE FREE 20 MG: 5 INJECTION INTRAVENOUS at 07:45

## 2025-02-04 RX ADMIN — DEXTRAN 70, GLYCERIN, HYPROMELLOSE 1 DROP: 1; 2; 3 SOLUTION/ DROPS OPHTHALMIC at 06:40

## 2025-02-04 RX ADMIN — Medication 175 MCG/HR: at 05:54

## 2025-02-04 RX ADMIN — SODIUM CHLORIDE 1 MG: 9 INJECTION, SOLUTION INTRAVENOUS at 09:00

## 2025-02-04 RX ADMIN — DEXTRAN 70, GLYCERIN, HYPROMELLOSE 1 DROP: 1; 2; 3 SOLUTION/ DROPS OPHTHALMIC at 22:00

## 2025-02-04 RX ADMIN — THIAMINE HYDROCHLORIDE 100 MG: 100 INJECTION, SOLUTION INTRAMUSCULAR; INTRAVENOUS at 07:45

## 2025-02-04 RX ADMIN — Medication 200 MCG/HR: at 12:50

## 2025-02-04 RX ADMIN — Medication 200 MCG/HR: at 07:25

## 2025-02-04 RX ADMIN — MIDAZOLAM 2 MG: 1 INJECTION INTRAMUSCULAR; INTRAVENOUS at 04:15

## 2025-02-04 RX ADMIN — QUETIAPINE FUMARATE 100 MG: 100 TABLET ORAL at 07:46

## 2025-02-04 RX ADMIN — PHENOBARBITAL SODIUM 65 MG: 65 INJECTION INTRAMUSCULAR at 22:01

## 2025-02-04 ASSESSMENT — PULMONARY FUNCTION TESTS
PIF_VALUE: 16
PIF_VALUE: 14
PIF_VALUE: 18
PIF_VALUE: 16
PIF_VALUE: 16

## 2025-02-04 ASSESSMENT — PAIN SCALES - GENERAL: PAINLEVEL_OUTOF10: 0

## 2025-02-04 NOTE — CARE COORDINATION
Care Management Progress Note    Reason for Admission:   Nausea and vomiting in adult [R11.2]  Alcohol-induced acute pancreatitis without infection or necrosis [K85.20]  Pancreatitis without necrosis or infection [K85.90]         Patient Admission Status: Inpatient  RUR: 16%  Hospitalization in the last 30 days (Readmission):  no        Transition of care plan:  [Medical]  Extubated on 2/3/25 and reintubated due to acute agitation  On versed gtt  Propofol gtt  Fentanyl gtt   Goal today :decreased agitation  [DC plan]  Pt received a packet of substance abuse resources when in ED but will give/discuss again when pt is appropriate  Discharge plan communicated with patient and/or discharge caregiver: not @ this juncture  It is not known yet if pt will benefit from psychiatric intervention,SA intervention or what pt will be willing to accept or if pt will be willing to invest himself in his recovery   Date 1st IMM letter given[place] [employer] [employer]  called and provided with clinicals  Outpatient follow-up.contingent upon clinical needs and what pt agrees upon when eventually stable for discharge  Transport at discharge:  to be determined    SisterArtie Trejo is pt.'s decision-maker @ [phone]    Letty Morrell RN

## 2025-02-05 ENCOUNTER — APPOINTMENT (OUTPATIENT)
Facility: HOSPITAL | Age: 35
DRG: 005 | End: 2025-02-05
Payer: MEDICAID

## 2025-02-05 LAB
ALBUMIN SERPL-MCNC: 2.3 G/DL (ref 3.5–5)
ALBUMIN/GLOB SERPL: 0.7 (ref 1.1–2.2)
ALP SERPL-CCNC: 110 U/L (ref 45–117)
ALT SERPL-CCNC: 658 U/L (ref 12–78)
ANION GAP SERPL CALC-SCNC: 3 MMOL/L (ref 2–12)
ARTERIAL PATENCY WRIST A: POSITIVE
ARTERIAL PATENCY WRIST A: YES
AST SERPL-CCNC: 56 U/L (ref 15–37)
BASE EXCESS BLD CALC-SCNC: 3.5 MMOL/L
BASE EXCESS BLDA CALC-SCNC: 3.1 MMOL/L
BASOPHILS # BLD: 0.03 K/UL (ref 0–0.1)
BASOPHILS NFR BLD: 0.5 % (ref 0–1)
BDY SITE: ABNORMAL
BDY SITE: ABNORMAL
BILIRUB SERPL-MCNC: 1.6 MG/DL (ref 0.2–1)
BUN SERPL-MCNC: 4 MG/DL (ref 6–20)
BUN/CREAT SERPL: 7 (ref 12–20)
CALCIUM SERPL-MCNC: 8.4 MG/DL (ref 8.5–10.1)
CHLORIDE SERPL-SCNC: 109 MMOL/L (ref 97–108)
CO2 SERPL-SCNC: 29 MMOL/L (ref 21–32)
CREAT SERPL-MCNC: 0.56 MG/DL (ref 0.7–1.3)
DIFFERENTIAL METHOD BLD: ABNORMAL
EOSINOPHIL # BLD: 0.19 K/UL (ref 0–0.4)
EOSINOPHIL NFR BLD: 3.3 % (ref 0–7)
ERYTHROCYTE [DISTWIDTH] IN BLOOD BY AUTOMATED COUNT: 13 % (ref 11.5–14.5)
FIO2 ON VENT: 30 %
GAS FLOW.O2 O2 DELIVERY SYS: ABNORMAL
GAS FLOW.O2 SETTING OXYMISER: 12 BPM
GAS FLOW.O2 SETTING OXYMISER: 14
GLOBULIN SER CALC-MCNC: 3.3 G/DL (ref 2–4)
GLUCOSE BLD STRIP.AUTO-MCNC: 97 MG/DL (ref 65–117)
GLUCOSE SERPL-MCNC: 120 MG/DL (ref 65–100)
HCO3 BLD-SCNC: 29.3 MMOL/L (ref 21–28)
HCO3 BLDA-SCNC: 25 MMOL/L (ref 22–26)
HCT VFR BLD AUTO: 35.2 % (ref 36.6–50.3)
HGB BLD-MCNC: 11.3 G/DL (ref 12.1–17)
IMM GRANULOCYTES # BLD AUTO: 0.03 K/UL (ref 0–0.04)
IMM GRANULOCYTES NFR BLD AUTO: 0.5 % (ref 0–0.5)
LYMPHOCYTES # BLD: 1.06 K/UL (ref 0.8–3.5)
LYMPHOCYTES NFR BLD: 18.2 % (ref 12–49)
MAGNESIUM SERPL-MCNC: 2 MG/DL (ref 1.6–2.4)
MCH RBC QN AUTO: 28.3 PG (ref 26–34)
MCHC RBC AUTO-ENTMCNC: 32.1 G/DL (ref 30–36.5)
MCV RBC AUTO: 88.2 FL (ref 80–99)
MONOCYTES # BLD: 0.62 K/UL (ref 0–1)
MONOCYTES NFR BLD: 10.7 % (ref 5–13)
NEUTS SEG # BLD: 3.89 K/UL (ref 1.8–8)
NEUTS SEG NFR BLD: 66.8 % (ref 32–75)
NRBC # BLD: 0 K/UL (ref 0–0.01)
NRBC BLD-RTO: 0 PER 100 WBC
O2/TOTAL GAS SETTING VFR VENT: 30 %
PCO2 BLD: 48.5 MMHG (ref 35–48)
PCO2 BLDA: 32 MMHG (ref 35–45)
PEEP RESPIRATORY: 5
PEEP RESPIRATORY: 5 CMH2O
PH BLD: 7.39 (ref 7.35–7.45)
PH BLDA: 7.51 (ref 7.35–7.45)
PHOSPHATE SERPL-MCNC: 3.7 MG/DL (ref 2.6–4.7)
PLATELET # BLD AUTO: 142 K/UL (ref 150–400)
PMV BLD AUTO: 11.5 FL (ref 8.9–12.9)
PO2 BLD: 101 MMHG (ref 83–108)
PO2 BLDA: 97 MMHG (ref 80–100)
POTASSIUM SERPL-SCNC: 3.3 MMOL/L (ref 3.5–5.1)
PROT SERPL-MCNC: 5.6 G/DL (ref 6.4–8.2)
RBC # BLD AUTO: 3.99 M/UL (ref 4.1–5.7)
SAO2 % BLD: 97.7 % (ref 92–97)
SAO2 % BLD: 98 % (ref 92–97)
SAO2% DEVICE SAO2% SENSOR NAME: ABNORMAL
SERVICE CMNT-IMP: ABNORMAL
SERVICE CMNT-IMP: NORMAL
SODIUM SERPL-SCNC: 141 MMOL/L (ref 136–145)
SPECIMEN SITE: ABNORMAL
SPECIMEN TYPE: ABNORMAL
VENTILATION MODE VENT: ABNORMAL
VENTILATION MODE VENT: ABNORMAL
VT SETTING VENT: 420 ML
VT SETTING VENT: 450
WBC # BLD AUTO: 5.8 K/UL (ref 4.1–11.1)

## 2025-02-05 PROCEDURE — 6360000002 HC RX W HCPCS: Performed by: INTERNAL MEDICINE

## 2025-02-05 PROCEDURE — 36600 WITHDRAWAL OF ARTERIAL BLOOD: CPT

## 2025-02-05 PROCEDURE — 6370000000 HC RX 637 (ALT 250 FOR IP): Performed by: INTERNAL MEDICINE

## 2025-02-05 PROCEDURE — 2580000003 HC RX 258: Performed by: NURSE PRACTITIONER

## 2025-02-05 PROCEDURE — 2580000003 HC RX 258: Performed by: HOSPITALIST

## 2025-02-05 PROCEDURE — 2000000000 HC ICU R&B

## 2025-02-05 PROCEDURE — 6370000000 HC RX 637 (ALT 250 FOR IP): Performed by: HOSPITALIST

## 2025-02-05 PROCEDURE — 71045 X-RAY EXAM CHEST 1 VIEW: CPT

## 2025-02-05 PROCEDURE — 87205 SMEAR GRAM STAIN: CPT

## 2025-02-05 PROCEDURE — 2580000003 HC RX 258: Performed by: INTERNAL MEDICINE

## 2025-02-05 PROCEDURE — 36415 COLL VENOUS BLD VENIPUNCTURE: CPT

## 2025-02-05 PROCEDURE — 2500000003 HC RX 250 WO HCPCS: Performed by: NURSE PRACTITIONER

## 2025-02-05 PROCEDURE — 83735 ASSAY OF MAGNESIUM: CPT

## 2025-02-05 PROCEDURE — 87070 CULTURE OTHR SPECIMN AEROBIC: CPT

## 2025-02-05 PROCEDURE — 94667 MNPJ CHEST WALL 1ST: CPT

## 2025-02-05 PROCEDURE — 84100 ASSAY OF PHOSPHORUS: CPT

## 2025-02-05 PROCEDURE — 94761 N-INVAS EAR/PLS OXIMETRY MLT: CPT

## 2025-02-05 PROCEDURE — 80053 COMPREHEN METABOLIC PANEL: CPT

## 2025-02-05 PROCEDURE — 82962 GLUCOSE BLOOD TEST: CPT

## 2025-02-05 PROCEDURE — 94003 VENT MGMT INPAT SUBQ DAY: CPT

## 2025-02-05 PROCEDURE — 85025 COMPLETE CBC W/AUTO DIFF WBC: CPT

## 2025-02-05 PROCEDURE — 87185 SC STD ENZYME DETCJ PER NZM: CPT

## 2025-02-05 PROCEDURE — 87077 CULTURE AEROBIC IDENTIFY: CPT

## 2025-02-05 PROCEDURE — 2500000003 HC RX 250 WO HCPCS: Performed by: HOSPITALIST

## 2025-02-05 PROCEDURE — 6360000002 HC RX W HCPCS: Performed by: HOSPITALIST

## 2025-02-05 RX ORDER — MIDAZOLAM HYDROCHLORIDE 1 MG/ML
5 INJECTION, SOLUTION INTRAMUSCULAR; INTRAVENOUS ONCE
Status: COMPLETED | OUTPATIENT
Start: 2025-02-05 | End: 2025-02-06

## 2025-02-05 RX ADMIN — CHLORHEXIDINE GLUCONATE 15 ML: 1.2 RINSE ORAL at 20:25

## 2025-02-05 RX ADMIN — PROPOFOL 40 MCG/KG/MIN: 10 INJECTION, EMULSION INTRAVENOUS at 22:13

## 2025-02-05 RX ADMIN — THERA TABS 1 TABLET: TAB at 08:56

## 2025-02-05 RX ADMIN — THIAMINE HYDROCHLORIDE 100 MG: 100 INJECTION, SOLUTION INTRAMUSCULAR; INTRAVENOUS at 08:56

## 2025-02-05 RX ADMIN — DEXTRAN 70, GLYCERIN, HYPROMELLOSE 1 DROP: 1; 2; 3 SOLUTION/ DROPS OPHTHALMIC at 09:11

## 2025-02-05 RX ADMIN — Medication 200 MCG/HR: at 12:54

## 2025-02-05 RX ADMIN — SODIUM CHLORIDE, PRESERVATIVE FREE 20 MG: 5 INJECTION INTRAVENOUS at 08:56

## 2025-02-05 RX ADMIN — DEXTRAN 70, GLYCERIN, HYPROMELLOSE 1 DROP: 1; 2; 3 SOLUTION/ DROPS OPHTHALMIC at 16:05

## 2025-02-05 RX ADMIN — CHLORHEXIDINE GLUCONATE 15 ML: 1.2 RINSE ORAL at 07:54

## 2025-02-05 RX ADMIN — MIDAZOLAM HYDROCHLORIDE 5 MG: 1 INJECTION, SOLUTION INTRAMUSCULAR; INTRAVENOUS at 12:35

## 2025-02-05 RX ADMIN — SODIUM CHLORIDE, POTASSIUM CHLORIDE, SODIUM LACTATE AND CALCIUM CHLORIDE: 600; 310; 30; 20 INJECTION, SOLUTION INTRAVENOUS at 03:15

## 2025-02-05 RX ADMIN — PROPOFOL 50 MCG/KG/MIN: 10 INJECTION, EMULSION INTRAVENOUS at 00:21

## 2025-02-05 RX ADMIN — ENOXAPARIN SODIUM 40 MG: 100 INJECTION SUBCUTANEOUS at 11:50

## 2025-02-05 RX ADMIN — PROPOFOL 40 MCG/KG/MIN: 10 INJECTION, EMULSION INTRAVENOUS at 17:07

## 2025-02-05 RX ADMIN — MIDAZOLAM HYDROCHLORIDE 3.5 MG/HR: 1 INJECTION, SOLUTION INTRAVENOUS at 22:15

## 2025-02-05 RX ADMIN — Medication 200 MCG/HR: at 22:14

## 2025-02-05 RX ADMIN — QUETIAPINE FUMARATE 200 MG: 100 TABLET ORAL at 20:27

## 2025-02-05 RX ADMIN — SODIUM CHLORIDE 1 MG: 9 INJECTION, SOLUTION INTRAVENOUS at 09:10

## 2025-02-05 RX ADMIN — DEXTRAN 70, GLYCERIN, HYPROMELLOSE 1 DROP: 1; 2; 3 SOLUTION/ DROPS OPHTHALMIC at 19:09

## 2025-02-05 RX ADMIN — DEXTRAN 70, GLYCERIN, HYPROMELLOSE 1 DROP: 1; 2; 3 SOLUTION/ DROPS OPHTHALMIC at 06:11

## 2025-02-05 RX ADMIN — Medication 200 MCG/HR: at 08:06

## 2025-02-05 RX ADMIN — DEXTRAN 70, GLYCERIN, HYPROMELLOSE 1 DROP: 1; 2; 3 SOLUTION/ DROPS OPHTHALMIC at 03:04

## 2025-02-05 RX ADMIN — Medication 200 MCG/HR: at 17:42

## 2025-02-05 RX ADMIN — PHENOBARBITAL SODIUM 32.5 MG: 65 INJECTION INTRAMUSCULAR at 20:26

## 2025-02-05 RX ADMIN — PROPOFOL 45 MCG/KG/MIN: 10 INJECTION, EMULSION INTRAVENOUS at 05:34

## 2025-02-05 RX ADMIN — Medication 200 MCG/HR: at 03:03

## 2025-02-05 RX ADMIN — SODIUM CHLORIDE, PRESERVATIVE FREE 20 MG: 5 INJECTION INTRAVENOUS at 20:27

## 2025-02-05 RX ADMIN — POTASSIUM BICARBONATE 40 MEQ: 782 TABLET, EFFERVESCENT ORAL at 06:46

## 2025-02-05 RX ADMIN — PROPOFOL 35 MCG/KG/MIN: 10 INJECTION, EMULSION INTRAVENOUS at 12:14

## 2025-02-05 RX ADMIN — PROPOFOL 5 ML/HR: 10 INJECTION, EMULSION INTRAVENOUS at 12:35

## 2025-02-05 RX ADMIN — QUETIAPINE FUMARATE 200 MG: 100 TABLET ORAL at 08:56

## 2025-02-05 ASSESSMENT — PULMONARY FUNCTION TESTS
PIF_VALUE: 15
PIF_VALUE: 14
PIF_VALUE: 15

## 2025-02-05 ASSESSMENT — PAIN SCALES - GENERAL
PAINLEVEL_OUTOF10: 0
PAINLEVEL_OUTOF10: 0

## 2025-02-05 NOTE — CARE COORDINATION
Care Management Progress Note    Reason for Admission:   Nausea and vomiting in adult [R11.2]  Alcohol-induced acute pancreatitis without infection or necrosis [K85.20]  Pancreatitis without necrosis or infection [K85.90]         Patient Admission Status: Inpatient  RUR: 15%  Hospitalization in the last 30 days (Readmission):  no        Transition of care plan:  [Medical]  Weaning sedation as toleratyed by pt  At time of IDR:  Pt was on versed gtt  Propofol gtt  Fentanyl gtt  Electrolyte repletion  8.Peep 5 ,30% Fio2  SAT/SBT if possible  [DC plan]  Following pt for future discharge needs   Discharge plan communicated with patient and/or discharge caregiver: no    Date 1st IMM letter given: [place] [employer]  Outpatient follow-up.to be determined   Transport at discharge:  hopefully family     Sister:Artie Trejo is pt.'s decision-maker @ [phone]       Letty Morrell RN

## 2025-02-06 ENCOUNTER — APPOINTMENT (OUTPATIENT)
Facility: HOSPITAL | Age: 35
DRG: 005 | End: 2025-02-06
Payer: MEDICAID

## 2025-02-06 LAB
ALBUMIN SERPL-MCNC: 2.3 G/DL (ref 3.5–5)
ALBUMIN/GLOB SERPL: 0.8 (ref 1.1–2.2)
ALP SERPL-CCNC: 106 U/L (ref 45–117)
ALT SERPL-CCNC: 476 U/L (ref 12–78)
ANION GAP SERPL CALC-SCNC: 4 MMOL/L (ref 2–12)
ARTERIAL PATENCY WRIST A: YES
AST SERPL-CCNC: 47 U/L (ref 15–37)
BASE EXCESS BLDA CALC-SCNC: 4.2 MMOL/L
BASOPHILS # BLD: 0.03 K/UL (ref 0–0.1)
BASOPHILS NFR BLD: 0.5 % (ref 0–1)
BDY SITE: ABNORMAL
BILIRUB SERPL-MCNC: 1 MG/DL (ref 0.2–1)
BUN SERPL-MCNC: 6 MG/DL (ref 6–20)
BUN/CREAT SERPL: 10 (ref 12–20)
CALCIUM SERPL-MCNC: 8.3 MG/DL (ref 8.5–10.1)
CHLORIDE SERPL-SCNC: 107 MMOL/L (ref 97–108)
CO2 SERPL-SCNC: 29 MMOL/L (ref 21–32)
CREAT SERPL-MCNC: 0.58 MG/DL (ref 0.7–1.3)
DIFFERENTIAL METHOD BLD: ABNORMAL
EOSINOPHIL # BLD: 0.2 K/UL (ref 0–0.4)
EOSINOPHIL NFR BLD: 3.1 % (ref 0–7)
ERYTHROCYTE [DISTWIDTH] IN BLOOD BY AUTOMATED COUNT: 12.9 % (ref 11.5–14.5)
FIO2 ON VENT: 30 %
GAS FLOW.O2 SETTING OXYMISER: 12
GLOBULIN SER CALC-MCNC: 2.9 G/DL (ref 2–4)
GLUCOSE BLD STRIP.AUTO-MCNC: 93 MG/DL (ref 65–117)
GLUCOSE SERPL-MCNC: 105 MG/DL (ref 65–100)
HCO3 BLDA-SCNC: 30 MMOL/L (ref 22–26)
HCT VFR BLD AUTO: 36.9 % (ref 36.6–50.3)
HGB BLD-MCNC: 11.7 G/DL (ref 12.1–17)
IMM GRANULOCYTES # BLD AUTO: 0.03 K/UL (ref 0–0.04)
IMM GRANULOCYTES NFR BLD AUTO: 0.5 % (ref 0–0.5)
LYMPHOCYTES # BLD: 0.84 K/UL (ref 0.8–3.5)
LYMPHOCYTES NFR BLD: 13 % (ref 12–49)
MAGNESIUM SERPL-MCNC: 1.8 MG/DL (ref 1.6–2.4)
MCH RBC QN AUTO: 28 PG (ref 26–34)
MCHC RBC AUTO-ENTMCNC: 31.7 G/DL (ref 30–36.5)
MCV RBC AUTO: 88.3 FL (ref 80–99)
MONOCYTES # BLD: 0.66 K/UL (ref 0–1)
MONOCYTES NFR BLD: 10.2 % (ref 5–13)
NEUTS SEG # BLD: 4.68 K/UL (ref 1.8–8)
NEUTS SEG NFR BLD: 72.7 % (ref 32–75)
NRBC # BLD: 0 K/UL (ref 0–0.01)
NRBC BLD-RTO: 0 PER 100 WBC
PCO2 BLDA: 46 MMHG (ref 35–45)
PEEP RESPIRATORY: 5
PH BLDA: 7.42 (ref 7.35–7.45)
PHOSPHATE SERPL-MCNC: 3.1 MG/DL (ref 2.6–4.7)
PLATELET # BLD AUTO: 177 K/UL (ref 150–400)
PMV BLD AUTO: 11.6 FL (ref 8.9–12.9)
PO2 BLDA: 83 MMHG (ref 80–100)
POTASSIUM SERPL-SCNC: 3.6 MMOL/L (ref 3.5–5.1)
PROT SERPL-MCNC: 5.2 G/DL (ref 6.4–8.2)
RBC # BLD AUTO: 4.18 M/UL (ref 4.1–5.7)
SAO2 % BLD: 96 % (ref 92–97)
SAO2% DEVICE SAO2% SENSOR NAME: ABNORMAL
SERVICE CMNT-IMP: ABNORMAL
SERVICE CMNT-IMP: NORMAL
SODIUM SERPL-SCNC: 140 MMOL/L (ref 136–145)
SPECIMEN SITE: ABNORMAL
TRIGL SERPL-MCNC: 133 MG/DL
VENTILATION MODE VENT: ABNORMAL
VT SETTING VENT: 420
WBC # BLD AUTO: 6.4 K/UL (ref 4.1–11.1)

## 2025-02-06 PROCEDURE — 84100 ASSAY OF PHOSPHORUS: CPT

## 2025-02-06 PROCEDURE — 82803 BLOOD GASES ANY COMBINATION: CPT

## 2025-02-06 PROCEDURE — 2580000003 HC RX 258: Performed by: HOSPITALIST

## 2025-02-06 PROCEDURE — 2500000003 HC RX 250 WO HCPCS: Performed by: NURSE PRACTITIONER

## 2025-02-06 PROCEDURE — 6370000000 HC RX 637 (ALT 250 FOR IP): Performed by: HOSPITALIST

## 2025-02-06 PROCEDURE — 80053 COMPREHEN METABOLIC PANEL: CPT

## 2025-02-06 PROCEDURE — 36600 WITHDRAWAL OF ARTERIAL BLOOD: CPT

## 2025-02-06 PROCEDURE — 2580000003 HC RX 258: Performed by: PHYSICIAN ASSISTANT

## 2025-02-06 PROCEDURE — 6370000000 HC RX 637 (ALT 250 FOR IP): Performed by: INTERNAL MEDICINE

## 2025-02-06 PROCEDURE — 2500000003 HC RX 250 WO HCPCS: Performed by: HOSPITALIST

## 2025-02-06 PROCEDURE — 83735 ASSAY OF MAGNESIUM: CPT

## 2025-02-06 PROCEDURE — 6360000002 HC RX W HCPCS: Performed by: HOSPITALIST

## 2025-02-06 PROCEDURE — 84478 ASSAY OF TRIGLYCERIDES: CPT

## 2025-02-06 PROCEDURE — 36415 COLL VENOUS BLD VENIPUNCTURE: CPT

## 2025-02-06 PROCEDURE — 94761 N-INVAS EAR/PLS OXIMETRY MLT: CPT

## 2025-02-06 PROCEDURE — 2500000003 HC RX 250 WO HCPCS: Performed by: PHYSICIAN ASSISTANT

## 2025-02-06 PROCEDURE — 94003 VENT MGMT INPAT SUBQ DAY: CPT

## 2025-02-06 PROCEDURE — 6360000002 HC RX W HCPCS: Performed by: STUDENT IN AN ORGANIZED HEALTH CARE EDUCATION/TRAINING PROGRAM

## 2025-02-06 PROCEDURE — 2000000000 HC ICU R&B

## 2025-02-06 PROCEDURE — 2580000003 HC RX 258: Performed by: NURSE PRACTITIONER

## 2025-02-06 PROCEDURE — 82962 GLUCOSE BLOOD TEST: CPT

## 2025-02-06 PROCEDURE — 85025 COMPLETE CBC W/AUTO DIFF WBC: CPT

## 2025-02-06 PROCEDURE — 71045 X-RAY EXAM CHEST 1 VIEW: CPT

## 2025-02-06 PROCEDURE — 6360000002 HC RX W HCPCS

## 2025-02-06 RX ORDER — MIDAZOLAM HYDROCHLORIDE 5 MG/5ML
5 INJECTION, SOLUTION INTRAMUSCULAR; INTRAVENOUS ONCE
Status: DISCONTINUED | OUTPATIENT
Start: 2025-02-06 | End: 2025-02-07

## 2025-02-06 RX ORDER — MIDAZOLAM HYDROCHLORIDE 1 MG/ML
5 INJECTION, SOLUTION INTRAMUSCULAR; INTRAVENOUS ONCE
Status: COMPLETED | OUTPATIENT
Start: 2025-02-06 | End: 2025-02-06

## 2025-02-06 RX ORDER — MIDAZOLAM HYDROCHLORIDE 1 MG/ML
INJECTION, SOLUTION INTRAMUSCULAR; INTRAVENOUS
Status: COMPLETED
Start: 2025-02-06 | End: 2025-02-06

## 2025-02-06 RX ORDER — OLANZAPINE 5 MG/1
10 TABLET ORAL ONCE
Status: COMPLETED | OUTPATIENT
Start: 2025-02-06 | End: 2025-02-06

## 2025-02-06 RX ADMIN — DEXTRAN 70, GLYCERIN, HYPROMELLOSE 1 DROP: 1; 2; 3 SOLUTION/ DROPS OPHTHALMIC at 11:00

## 2025-02-06 RX ADMIN — DEXTRAN 70, GLYCERIN, HYPROMELLOSE 1 DROP: 1; 2; 3 SOLUTION/ DROPS OPHTHALMIC at 19:04

## 2025-02-06 RX ADMIN — QUETIAPINE FUMARATE 200 MG: 100 TABLET ORAL at 21:45

## 2025-02-06 RX ADMIN — CHLORHEXIDINE GLUCONATE 15 ML: 1.2 RINSE ORAL at 08:37

## 2025-02-06 RX ADMIN — DEXTRAN 70, GLYCERIN, HYPROMELLOSE 1 DROP: 1; 2; 3 SOLUTION/ DROPS OPHTHALMIC at 16:00

## 2025-02-06 RX ADMIN — DEXTRAN 70, GLYCERIN, HYPROMELLOSE 1 DROP: 1; 2; 3 SOLUTION/ DROPS OPHTHALMIC at 08:00

## 2025-02-06 RX ADMIN — Medication 200 MCG/HR: at 17:53

## 2025-02-06 RX ADMIN — PROPOFOL 50 MCG/KG/MIN: 10 INJECTION, EMULSION INTRAVENOUS at 04:25

## 2025-02-06 RX ADMIN — SODIUM CHLORIDE, PRESERVATIVE FREE 20 MG: 5 INJECTION INTRAVENOUS at 21:46

## 2025-02-06 RX ADMIN — SODIUM CHLORIDE 0.2 MG/KG/HR: 9 INJECTION, SOLUTION INTRAVENOUS at 20:18

## 2025-02-06 RX ADMIN — ENOXAPARIN SODIUM 40 MG: 100 INJECTION SUBCUTANEOUS at 11:28

## 2025-02-06 RX ADMIN — Medication 200 MCG/HR: at 03:02

## 2025-02-06 RX ADMIN — DEXMEDETOMIDINE HYDROCHLORIDE 1 MCG/KG/HR: 400 INJECTION INTRAVENOUS at 14:49

## 2025-02-06 RX ADMIN — Medication 200 MCG/HR: at 08:22

## 2025-02-06 RX ADMIN — MIDAZOLAM HYDROCHLORIDE 5 MG: 1 INJECTION, SOLUTION INTRAMUSCULAR; INTRAVENOUS at 18:31

## 2025-02-06 RX ADMIN — THERA TABS 1 TABLET: TAB at 08:22

## 2025-02-06 RX ADMIN — SODIUM CHLORIDE 1 MG: 9 INJECTION, SOLUTION INTRAVENOUS at 08:24

## 2025-02-06 RX ADMIN — SODIUM CHLORIDE, PRESERVATIVE FREE 20 MG: 5 INJECTION INTRAVENOUS at 08:22

## 2025-02-06 RX ADMIN — HYDROMORPHONE HYDROCHLORIDE 0.5 MG: 1 INJECTION, SOLUTION INTRAMUSCULAR; INTRAVENOUS; SUBCUTANEOUS at 19:29

## 2025-02-06 RX ADMIN — DEXTRAN 70, GLYCERIN, HYPROMELLOSE 1 DROP: 1; 2; 3 SOLUTION/ DROPS OPHTHALMIC at 22:38

## 2025-02-06 RX ADMIN — PROPOFOL 40 MCG/KG/MIN: 10 INJECTION, EMULSION INTRAVENOUS at 09:38

## 2025-02-06 RX ADMIN — DEXMEDETOMIDINE HYDROCHLORIDE 1 MCG/KG/HR: 400 INJECTION INTRAVENOUS at 10:12

## 2025-02-06 RX ADMIN — MIDAZOLAM 5 MG: 1 INJECTION INTRAMUSCULAR; INTRAVENOUS at 18:31

## 2025-02-06 RX ADMIN — DEXMEDETOMIDINE HYDROCHLORIDE 1.5 MCG/KG/HR: 400 INJECTION INTRAVENOUS at 20:34

## 2025-02-06 RX ADMIN — MIDAZOLAM HYDROCHLORIDE 5 MG: 1 INJECTION, SOLUTION INTRAMUSCULAR; INTRAVENOUS at 14:43

## 2025-02-06 RX ADMIN — Medication 200 MCG/HR: at 23:00

## 2025-02-06 RX ADMIN — POLYETHYLENE GLYCOL 3350 17 G: 17 POWDER, FOR SOLUTION ORAL at 22:24

## 2025-02-06 RX ADMIN — Medication 200 MCG/HR: at 13:02

## 2025-02-06 RX ADMIN — MIDAZOLAM HYDROCHLORIDE 10 MG/HR: 1 INJECTION, SOLUTION INTRAVENOUS at 19:06

## 2025-02-06 RX ADMIN — THIAMINE HYDROCHLORIDE 100 MG: 100 INJECTION, SOLUTION INTRAMUSCULAR; INTRAVENOUS at 08:23

## 2025-02-06 RX ADMIN — OLANZAPINE 10 MG: 5 TABLET, FILM COATED ORAL at 10:13

## 2025-02-06 RX ADMIN — POTASSIUM BICARBONATE 40 MEQ: 782 TABLET, EFFERVESCENT ORAL at 10:13

## 2025-02-06 RX ADMIN — QUETIAPINE FUMARATE 200 MG: 100 TABLET ORAL at 08:22

## 2025-02-06 RX ADMIN — CHLORHEXIDINE GLUCONATE 15 ML: 1.2 RINSE ORAL at 21:46

## 2025-02-06 RX ADMIN — Medication 200 MCG/HR: at 07:38

## 2025-02-06 RX ADMIN — DEXTRAN 70, GLYCERIN, HYPROMELLOSE 1 DROP: 1; 2; 3 SOLUTION/ DROPS OPHTHALMIC at 04:23

## 2025-02-06 ASSESSMENT — PULMONARY FUNCTION TESTS
PIF_VALUE: 17
PIF_VALUE: 15
PIF_VALUE: 18
PIF_VALUE: 15
PIF_VALUE: 14
PIF_VALUE: 17

## 2025-02-06 NOTE — CARE COORDINATION
2/6/25  3:16 PM    Care Management Progress Note    Reason for Admission:   Nausea and vomiting in adult [R11.2]  Alcohol-induced acute pancreatitis without infection or necrosis [K85.20]  Pancreatitis without necrosis or infection [K85.90]         Patient Admission Status: Inpatient  RUR: 16%  Hospitalization in the last 30 days (Readmission):  No        Transition of care plan:  Ongoing medical management  Discharge plan: Home with family  Outpatient follow-up.  Transport at discharge: Family    GUIDO Judd  [facility]      Available via [employer]

## 2025-02-07 ENCOUNTER — APPOINTMENT (OUTPATIENT)
Facility: HOSPITAL | Age: 35
DRG: 005 | End: 2025-02-07
Payer: MEDICAID

## 2025-02-07 LAB
ALBUMIN SERPL-MCNC: 2.4 G/DL (ref 3.5–5)
ALBUMIN/GLOB SERPL: 0.7 (ref 1.1–2.2)
ALP SERPL-CCNC: 112 U/L (ref 45–117)
ALT SERPL-CCNC: 384 U/L (ref 12–78)
ANION GAP SERPL CALC-SCNC: 5 MMOL/L (ref 2–12)
ARTERIAL PATENCY WRIST A: POSITIVE
AST SERPL-CCNC: 40 U/L (ref 15–37)
BACTERIA SPEC CULT: NORMAL
BACTERIA SPEC CULT: NORMAL
BASE EXCESS BLD CALC-SCNC: 17.7 MMOL/L
BASOPHILS # BLD: 0.04 K/UL (ref 0–0.1)
BASOPHILS NFR BLD: 0.5 % (ref 0–1)
BDY SITE: ABNORMAL
BILIRUB SERPL-MCNC: 1.1 MG/DL (ref 0.2–1)
BUN SERPL-MCNC: 8 MG/DL (ref 6–20)
BUN/CREAT SERPL: 17 (ref 12–20)
CALCIUM SERPL-MCNC: 8.8 MG/DL (ref 8.5–10.1)
CHLORIDE SERPL-SCNC: 106 MMOL/L (ref 97–108)
CO2 SERPL-SCNC: 26 MMOL/L (ref 21–32)
CREAT SERPL-MCNC: 0.47 MG/DL (ref 0.7–1.3)
DIFFERENTIAL METHOD BLD: ABNORMAL
EOSINOPHIL # BLD: 0.22 K/UL (ref 0–0.4)
EOSINOPHIL NFR BLD: 2.5 % (ref 0–7)
ERYTHROCYTE [DISTWIDTH] IN BLOOD BY AUTOMATED COUNT: 12.5 % (ref 11.5–14.5)
GAS FLOW.O2 O2 DELIVERY SYS: ABNORMAL
GAS FLOW.O2 SETTING OXYMISER: 12 BPM
GLOBULIN SER CALC-MCNC: 3.4 G/DL (ref 2–4)
GLUCOSE SERPL-MCNC: 129 MG/DL (ref 65–100)
HCO3 BLD-SCNC: 41 MMOL/L (ref 21–28)
HCT VFR BLD AUTO: 39 % (ref 36.6–50.3)
HGB BLD-MCNC: 12.3 G/DL (ref 12.1–17)
IMM GRANULOCYTES # BLD AUTO: 0.05 K/UL (ref 0–0.04)
IMM GRANULOCYTES NFR BLD AUTO: 0.6 % (ref 0–0.5)
LYMPHOCYTES # BLD: 1.1 K/UL (ref 0.8–3.5)
LYMPHOCYTES NFR BLD: 12.7 % (ref 12–49)
MAGNESIUM SERPL-MCNC: 1.8 MG/DL (ref 1.6–2.4)
MCH RBC QN AUTO: 27.7 PG (ref 26–34)
MCHC RBC AUTO-ENTMCNC: 31.5 G/DL (ref 30–36.5)
MCV RBC AUTO: 87.8 FL (ref 80–99)
MONOCYTES # BLD: 0.76 K/UL (ref 0–1)
MONOCYTES NFR BLD: 8.8 % (ref 5–13)
NEUTS SEG # BLD: 6.46 K/UL (ref 1.8–8)
NEUTS SEG NFR BLD: 74.9 % (ref 32–75)
NRBC # BLD: 0 K/UL (ref 0–0.01)
NRBC BLD-RTO: 0 PER 100 WBC
O2/TOTAL GAS SETTING VFR VENT: 45 %
PCO2 BLD: 41.1 MMHG (ref 35–48)
PEEP RESPIRATORY: 5 CMH2O
PH BLD: 7.61 (ref 7.35–7.45)
PHOSPHATE SERPL-MCNC: 3.5 MG/DL (ref 2.6–4.7)
PLATELET # BLD AUTO: 216 K/UL (ref 150–400)
PMV BLD AUTO: 11.4 FL (ref 8.9–12.9)
PO2 BLD: 56 MMHG (ref 83–108)
POTASSIUM SERPL-SCNC: 4.5 MMOL/L (ref 3.5–5.1)
PROT SERPL-MCNC: 5.8 G/DL (ref 6.4–8.2)
RBC # BLD AUTO: 4.44 M/UL (ref 4.1–5.7)
SAO2 % BLD: 92.9 % (ref 92–97)
SERVICE CMNT-IMP: ABNORMAL
SERVICE CMNT-IMP: NORMAL
SERVICE CMNT-IMP: NORMAL
SODIUM SERPL-SCNC: 137 MMOL/L (ref 136–145)
SPECIMEN TYPE: ABNORMAL
VENTILATION MODE VENT: ABNORMAL
VT SETTING VENT: 420 ML
WBC # BLD AUTO: 8.6 K/UL (ref 4.1–11.1)

## 2025-02-07 PROCEDURE — 36415 COLL VENOUS BLD VENIPUNCTURE: CPT

## 2025-02-07 PROCEDURE — 80053 COMPREHEN METABOLIC PANEL: CPT

## 2025-02-07 PROCEDURE — 2500000003 HC RX 250 WO HCPCS: Performed by: NURSE PRACTITIONER

## 2025-02-07 PROCEDURE — 2580000003 HC RX 258: Performed by: HOSPITALIST

## 2025-02-07 PROCEDURE — 94003 VENT MGMT INPAT SUBQ DAY: CPT

## 2025-02-07 PROCEDURE — 6370000000 HC RX 637 (ALT 250 FOR IP): Performed by: HOSPITALIST

## 2025-02-07 PROCEDURE — 6370000000 HC RX 637 (ALT 250 FOR IP): Performed by: PHYSICIAN ASSISTANT

## 2025-02-07 PROCEDURE — 6370000000 HC RX 637 (ALT 250 FOR IP): Performed by: INTERNAL MEDICINE

## 2025-02-07 PROCEDURE — 85025 COMPLETE CBC W/AUTO DIFF WBC: CPT

## 2025-02-07 PROCEDURE — 71045 X-RAY EXAM CHEST 1 VIEW: CPT

## 2025-02-07 PROCEDURE — 2700000000 HC OXYGEN THERAPY PER DAY

## 2025-02-07 PROCEDURE — 2000000000 HC ICU R&B

## 2025-02-07 PROCEDURE — 84100 ASSAY OF PHOSPHORUS: CPT

## 2025-02-07 PROCEDURE — 6360000002 HC RX W HCPCS: Performed by: HOSPITALIST

## 2025-02-07 PROCEDURE — 82803 BLOOD GASES ANY COMBINATION: CPT

## 2025-02-07 PROCEDURE — 36600 WITHDRAWAL OF ARTERIAL BLOOD: CPT

## 2025-02-07 PROCEDURE — 2580000003 HC RX 258: Performed by: NURSE PRACTITIONER

## 2025-02-07 PROCEDURE — 83735 ASSAY OF MAGNESIUM: CPT

## 2025-02-07 PROCEDURE — 2500000003 HC RX 250 WO HCPCS: Performed by: HOSPITALIST

## 2025-02-07 PROCEDURE — 94761 N-INVAS EAR/PLS OXIMETRY MLT: CPT

## 2025-02-07 RX ORDER — FOLIC ACID 1 MG/1
1 TABLET ORAL DAILY
Status: DISCONTINUED | OUTPATIENT
Start: 2025-02-07 | End: 2025-02-22

## 2025-02-07 RX ORDER — DEXMEDETOMIDINE HYDROCHLORIDE 4 UG/ML
.1-1.5 INJECTION, SOLUTION INTRAVENOUS CONTINUOUS
Status: DISCONTINUED | OUTPATIENT
Start: 2025-02-07 | End: 2025-02-12

## 2025-02-07 RX ORDER — POLYETHYLENE GLYCOL 3350 17 G/17G
17 POWDER, FOR SOLUTION ORAL DAILY
Status: DISCONTINUED | OUTPATIENT
Start: 2025-02-07 | End: 2025-02-07 | Stop reason: SDUPTHER

## 2025-02-07 RX ORDER — 0.9 % SODIUM CHLORIDE 0.9 %
1000 INTRAVENOUS SOLUTION INTRAVENOUS ONCE
Status: COMPLETED | OUTPATIENT
Start: 2025-02-07 | End: 2025-02-07

## 2025-02-07 RX ORDER — PROPOFOL 10 MG/ML
5-50 INJECTION, EMULSION INTRAVENOUS CONTINUOUS
Status: DISCONTINUED | OUTPATIENT
Start: 2025-02-07 | End: 2025-02-07

## 2025-02-07 RX ORDER — POLYETHYLENE GLYCOL 3350 17 G/17G
17 POWDER, FOR SOLUTION ORAL 2 TIMES DAILY
Status: DISCONTINUED | OUTPATIENT
Start: 2025-02-07 | End: 2025-02-07

## 2025-02-07 RX ORDER — SENNA AND DOCUSATE SODIUM 50; 8.6 MG/1; MG/1
2 TABLET, FILM COATED ORAL 2 TIMES DAILY
Status: DISCONTINUED | OUTPATIENT
Start: 2025-02-07 | End: 2025-02-12

## 2025-02-07 RX ORDER — GAUZE BANDAGE 2" X 2"
100 BANDAGE TOPICAL DAILY
Status: DISCONTINUED | OUTPATIENT
Start: 2025-02-07 | End: 2025-02-22

## 2025-02-07 RX ORDER — PROPOFOL 10 MG/ML
5-50 INJECTION, EMULSION INTRAVENOUS CONTINUOUS
Status: DISCONTINUED | OUTPATIENT
Start: 2025-02-07 | End: 2025-02-12

## 2025-02-07 RX ORDER — SENNA AND DOCUSATE SODIUM 50; 8.6 MG/1; MG/1
2 TABLET, FILM COATED ORAL 2 TIMES DAILY
Status: DISCONTINUED | OUTPATIENT
Start: 2025-02-07 | End: 2025-02-07

## 2025-02-07 RX ORDER — POLYETHYLENE GLYCOL 3350 17 G/17G
17 POWDER, FOR SOLUTION ORAL 2 TIMES DAILY
Status: DISCONTINUED | OUTPATIENT
Start: 2025-02-07 | End: 2025-02-22

## 2025-02-07 RX ADMIN — DEXTRAN 70, GLYCERIN, HYPROMELLOSE 1 DROP: 1; 2; 3 SOLUTION/ DROPS OPHTHALMIC at 07:03

## 2025-02-07 RX ADMIN — DEXMEDETOMIDINE HYDROCHLORIDE 1.4 MCG/KG/HR: 400 INJECTION INTRAVENOUS at 13:09

## 2025-02-07 RX ADMIN — QUETIAPINE FUMARATE 200 MG: 100 TABLET ORAL at 09:33

## 2025-02-07 RX ADMIN — CHLORHEXIDINE GLUCONATE 15 ML: 1.2 RINSE ORAL at 21:23

## 2025-02-07 RX ADMIN — SODIUM CHLORIDE, PRESERVATIVE FREE 20 MG: 5 INJECTION INTRAVENOUS at 09:18

## 2025-02-07 RX ADMIN — DEXMEDETOMIDINE HYDROCHLORIDE 1.4 MCG/KG/HR: 400 INJECTION INTRAVENOUS at 08:30

## 2025-02-07 RX ADMIN — DEXMEDETOMIDINE HYDROCHLORIDE 1.4 MCG/KG/HR: 400 INJECTION INTRAVENOUS at 22:04

## 2025-02-07 RX ADMIN — PROPOFOL 25 MCG/KG/MIN: 10 INJECTION, EMULSION INTRAVENOUS at 15:12

## 2025-02-07 RX ADMIN — DEXMEDETOMIDINE HYDROCHLORIDE 1.4 MCG/KG/HR: 400 INJECTION INTRAVENOUS at 17:52

## 2025-02-07 RX ADMIN — CHLORHEXIDINE GLUCONATE 15 ML: 1.2 RINSE ORAL at 09:20

## 2025-02-07 RX ADMIN — SODIUM CHLORIDE 1000 ML: 9 INJECTION, SOLUTION INTRAVENOUS at 18:48

## 2025-02-07 RX ADMIN — FOLIC ACID 1 MG: 1 TABLET ORAL at 09:18

## 2025-02-07 RX ADMIN — Medication 200 MCG/HR: at 18:40

## 2025-02-07 RX ADMIN — QUETIAPINE FUMARATE 200 MG: 100 TABLET ORAL at 21:41

## 2025-02-07 RX ADMIN — SENNOSIDES AND DOCUSATE SODIUM 2 TABLET: 50; 8.6 TABLET ORAL at 09:18

## 2025-02-07 RX ADMIN — DEXMEDETOMIDINE HYDROCHLORIDE 1.5 MCG/KG/HR: 400 INJECTION INTRAVENOUS at 04:24

## 2025-02-07 RX ADMIN — ENOXAPARIN SODIUM 40 MG: 100 INJECTION SUBCUTANEOUS at 11:15

## 2025-02-07 RX ADMIN — MIDAZOLAM HYDROCHLORIDE 4 MG/HR: 1 INJECTION, SOLUTION INTRAVENOUS at 15:49

## 2025-02-07 RX ADMIN — WATER 1000 MG: 1 INJECTION INTRAMUSCULAR; INTRAVENOUS; SUBCUTANEOUS at 09:33

## 2025-02-07 RX ADMIN — DEXMEDETOMIDINE HYDROCHLORIDE 1.5 MCG/KG/HR: 400 INJECTION INTRAVENOUS at 00:24

## 2025-02-07 RX ADMIN — Medication 200 MCG/HR: at 13:17

## 2025-02-07 RX ADMIN — Medication 200 MCG/HR: at 08:32

## 2025-02-07 RX ADMIN — Medication 100 MG: at 09:18

## 2025-02-07 RX ADMIN — DEXTRAN 70, GLYCERIN, HYPROMELLOSE 1 DROP: 1; 2; 3 SOLUTION/ DROPS OPHTHALMIC at 21:22

## 2025-02-07 RX ADMIN — SENNOSIDES AND DOCUSATE SODIUM 2 TABLET: 50; 8.6 TABLET ORAL at 21:40

## 2025-02-07 RX ADMIN — Medication 200 MCG/HR: at 03:43

## 2025-02-07 RX ADMIN — DEXTRAN 70, GLYCERIN, HYPROMELLOSE 1 DROP: 1; 2; 3 SOLUTION/ DROPS OPHTHALMIC at 15:14

## 2025-02-07 RX ADMIN — POLYETHYLENE GLYCOL 3350 17 G: 17 POWDER, FOR SOLUTION ORAL at 09:20

## 2025-02-07 RX ADMIN — DEXTRAN 70, GLYCERIN, HYPROMELLOSE 1 DROP: 1; 2; 3 SOLUTION/ DROPS OPHTHALMIC at 09:19

## 2025-02-07 RX ADMIN — THERA TABS 1 TABLET: TAB at 09:18

## 2025-02-07 RX ADMIN — PROPOFOL 40 MCG/KG/MIN: 10 INJECTION, EMULSION INTRAVENOUS at 10:05

## 2025-02-07 RX ADMIN — DEXTRAN 70, GLYCERIN, HYPROMELLOSE 1 DROP: 1; 2; 3 SOLUTION/ DROPS OPHTHALMIC at 02:21

## 2025-02-07 RX ADMIN — SODIUM CHLORIDE, PRESERVATIVE FREE 20 MG: 5 INJECTION INTRAVENOUS at 21:41

## 2025-02-07 RX ADMIN — MIDAZOLAM HYDROCHLORIDE 10 MG/HR: 1 INJECTION, SOLUTION INTRAVENOUS at 03:44

## 2025-02-07 RX ADMIN — POLYETHYLENE GLYCOL 3350 17 G: 17 POWDER, FOR SOLUTION ORAL at 21:41

## 2025-02-07 ASSESSMENT — PULMONARY FUNCTION TESTS
PIF_VALUE: 18
PIF_VALUE: 15
PIF_VALUE: 14
PIF_VALUE: 15
PIF_VALUE: 15
PIF_VALUE: 19
PIF_VALUE: 22

## 2025-02-07 ASSESSMENT — PAIN SCALES - GENERAL
PAINLEVEL_OUTOF10: 0

## 2025-02-08 LAB
ALBUMIN SERPL-MCNC: 2.3 G/DL (ref 3.5–5)
ALBUMIN/GLOB SERPL: 0.7 (ref 1.1–2.2)
ALP SERPL-CCNC: 109 U/L (ref 45–117)
ALT SERPL-CCNC: 274 U/L (ref 12–78)
ANION GAP SERPL CALC-SCNC: 5 MMOL/L (ref 2–12)
AST SERPL-CCNC: 39 U/L (ref 15–37)
BACTERIA SPEC CULT: ABNORMAL
BACTERIA SPEC CULT: ABNORMAL
BILIRUB SERPL-MCNC: 1 MG/DL (ref 0.2–1)
BUN SERPL-MCNC: 9 MG/DL (ref 6–20)
BUN/CREAT SERPL: 18 (ref 12–20)
CALCIUM SERPL-MCNC: 8.4 MG/DL (ref 8.5–10.1)
CHLORIDE SERPL-SCNC: 105 MMOL/L (ref 97–108)
CO2 SERPL-SCNC: 27 MMOL/L (ref 21–32)
CREAT SERPL-MCNC: 0.51 MG/DL (ref 0.7–1.3)
ERYTHROCYTE [DISTWIDTH] IN BLOOD BY AUTOMATED COUNT: 12.7 % (ref 11.5–14.5)
GLOBULIN SER CALC-MCNC: 3.5 G/DL (ref 2–4)
GLUCOSE SERPL-MCNC: 127 MG/DL (ref 65–100)
GRAM STN SPEC: ABNORMAL
HCT VFR BLD AUTO: 37.2 % (ref 36.6–50.3)
HGB BLD-MCNC: 12 G/DL (ref 12.1–17)
MCH RBC QN AUTO: 28.4 PG (ref 26–34)
MCHC RBC AUTO-ENTMCNC: 32.3 G/DL (ref 30–36.5)
MCV RBC AUTO: 87.9 FL (ref 80–99)
NRBC # BLD: 0 K/UL (ref 0–0.01)
NRBC BLD-RTO: 0 PER 100 WBC
PLATELET # BLD AUTO: 224 K/UL (ref 150–400)
PMV BLD AUTO: 11.5 FL (ref 8.9–12.9)
POTASSIUM SERPL-SCNC: 3.9 MMOL/L (ref 3.5–5.1)
PROT SERPL-MCNC: 5.8 G/DL (ref 6.4–8.2)
RBC # BLD AUTO: 4.23 M/UL (ref 4.1–5.7)
SERVICE CMNT-IMP: ABNORMAL
SODIUM SERPL-SCNC: 137 MMOL/L (ref 136–145)
WBC # BLD AUTO: 9.3 K/UL (ref 4.1–11.1)

## 2025-02-08 PROCEDURE — 94761 N-INVAS EAR/PLS OXIMETRY MLT: CPT

## 2025-02-08 PROCEDURE — 2500000003 HC RX 250 WO HCPCS: Performed by: HOSPITALIST

## 2025-02-08 PROCEDURE — 2580000003 HC RX 258: Performed by: NURSE PRACTITIONER

## 2025-02-08 PROCEDURE — 36415 COLL VENOUS BLD VENIPUNCTURE: CPT

## 2025-02-08 PROCEDURE — 80053 COMPREHEN METABOLIC PANEL: CPT

## 2025-02-08 PROCEDURE — 2500000003 HC RX 250 WO HCPCS: Performed by: NURSE PRACTITIONER

## 2025-02-08 PROCEDURE — 6360000002 HC RX W HCPCS: Performed by: HOSPITALIST

## 2025-02-08 PROCEDURE — 6370000000 HC RX 637 (ALT 250 FOR IP): Performed by: HOSPITALIST

## 2025-02-08 PROCEDURE — 2700000000 HC OXYGEN THERAPY PER DAY

## 2025-02-08 PROCEDURE — 85027 COMPLETE CBC AUTOMATED: CPT

## 2025-02-08 PROCEDURE — 6370000000 HC RX 637 (ALT 250 FOR IP): Performed by: INTERNAL MEDICINE

## 2025-02-08 PROCEDURE — 89220 SPUTUM SPECIMEN COLLECTION: CPT

## 2025-02-08 PROCEDURE — 2000000000 HC ICU R&B

## 2025-02-08 PROCEDURE — 94003 VENT MGMT INPAT SUBQ DAY: CPT

## 2025-02-08 RX ORDER — MIDAZOLAM HYDROCHLORIDE 1 MG/ML
5 INJECTION, SOLUTION INTRAMUSCULAR; INTRAVENOUS PRN
Status: DISCONTINUED | OUTPATIENT
Start: 2025-02-08 | End: 2025-02-11

## 2025-02-08 RX ADMIN — THERA TABS 1 TABLET: TAB at 07:21

## 2025-02-08 RX ADMIN — PROPOFOL 25 MCG/KG/MIN: 10 INJECTION, EMULSION INTRAVENOUS at 15:22

## 2025-02-08 RX ADMIN — DEXTRAN 70, GLYCERIN, HYPROMELLOSE 1 DROP: 1; 2; 3 SOLUTION/ DROPS OPHTHALMIC at 00:02

## 2025-02-08 RX ADMIN — MIDAZOLAM HYDROCHLORIDE 5 MG: 1 INJECTION, SOLUTION INTRAMUSCULAR; INTRAVENOUS at 18:17

## 2025-02-08 RX ADMIN — DEXMEDETOMIDINE HYDROCHLORIDE 1.4 MCG/KG/HR: 400 INJECTION INTRAVENOUS at 10:45

## 2025-02-08 RX ADMIN — MIDAZOLAM HYDROCHLORIDE 2 MG: 1 INJECTION, SOLUTION INTRAMUSCULAR; INTRAVENOUS at 15:15

## 2025-02-08 RX ADMIN — DEXTRAN 70, GLYCERIN, HYPROMELLOSE 1 DROP: 1; 2; 3 SOLUTION/ DROPS OPHTHALMIC at 14:06

## 2025-02-08 RX ADMIN — WATER 1000 MG: 1 INJECTION INTRAMUSCULAR; INTRAVENOUS; SUBCUTANEOUS at 08:39

## 2025-02-08 RX ADMIN — MIDAZOLAM HYDROCHLORIDE 3 MG: 1 INJECTION, SOLUTION INTRAMUSCULAR; INTRAVENOUS at 15:20

## 2025-02-08 RX ADMIN — Medication 200 MCG/HR: at 00:02

## 2025-02-08 RX ADMIN — CHLORHEXIDINE GLUCONATE 15 ML: 1.2 RINSE ORAL at 07:21

## 2025-02-08 RX ADMIN — Medication 200 MCG/HR: at 12:58

## 2025-02-08 RX ADMIN — Medication 100 MG: at 07:20

## 2025-02-08 RX ADMIN — DEXMEDETOMIDINE HYDROCHLORIDE 1.4 MCG/KG/HR: 400 INJECTION INTRAVENOUS at 18:32

## 2025-02-08 RX ADMIN — Medication 200 MCG/HR: at 03:26

## 2025-02-08 RX ADMIN — ENOXAPARIN SODIUM 40 MG: 100 INJECTION SUBCUTANEOUS at 10:44

## 2025-02-08 RX ADMIN — SODIUM CHLORIDE, PRESERVATIVE FREE 20 MG: 5 INJECTION INTRAVENOUS at 19:34

## 2025-02-08 RX ADMIN — SODIUM CHLORIDE, PRESERVATIVE FREE 20 MG: 5 INJECTION INTRAVENOUS at 07:20

## 2025-02-08 RX ADMIN — DEXMEDETOMIDINE HYDROCHLORIDE 1.4 MCG/KG/HR: 400 INJECTION INTRAVENOUS at 06:26

## 2025-02-08 RX ADMIN — QUETIAPINE FUMARATE 200 MG: 100 TABLET ORAL at 07:20

## 2025-02-08 RX ADMIN — QUETIAPINE FUMARATE 200 MG: 100 TABLET ORAL at 19:36

## 2025-02-08 RX ADMIN — CHLORHEXIDINE GLUCONATE 15 ML: 1.2 RINSE ORAL at 19:32

## 2025-02-08 RX ADMIN — DEXMEDETOMIDINE HYDROCHLORIDE 1.4 MCG/KG/HR: 400 INJECTION INTRAVENOUS at 14:39

## 2025-02-08 RX ADMIN — DEXTRAN 70, GLYCERIN, HYPROMELLOSE 1 DROP: 1; 2; 3 SOLUTION/ DROPS OPHTHALMIC at 10:57

## 2025-02-08 RX ADMIN — DEXMEDETOMIDINE HYDROCHLORIDE 1.4 MCG/KG/HR: 400 INJECTION INTRAVENOUS at 22:54

## 2025-02-08 RX ADMIN — MIDAZOLAM HYDROCHLORIDE 10 MG/HR: 1 INJECTION, SOLUTION INTRAVENOUS at 15:26

## 2025-02-08 RX ADMIN — PROPOFOL 30 MCG/KG/MIN: 10 INJECTION, EMULSION INTRAVENOUS at 23:01

## 2025-02-08 RX ADMIN — SENNOSIDES AND DOCUSATE SODIUM 2 TABLET: 50; 8.6 TABLET ORAL at 19:35

## 2025-02-08 RX ADMIN — DEXTRAN 70, GLYCERIN, HYPROMELLOSE 1 DROP: 1; 2; 3 SOLUTION/ DROPS OPHTHALMIC at 06:24

## 2025-02-08 RX ADMIN — DEXTRAN 70, GLYCERIN, HYPROMELLOSE 1 DROP: 1; 2; 3 SOLUTION/ DROPS OPHTHALMIC at 21:59

## 2025-02-08 RX ADMIN — POLYETHYLENE GLYCOL 3350 17 G: 17 POWDER, FOR SOLUTION ORAL at 19:34

## 2025-02-08 RX ADMIN — POLYETHYLENE GLYCOL 3350 17 G: 17 POWDER, FOR SOLUTION ORAL at 07:21

## 2025-02-08 RX ADMIN — DEXTRAN 70, GLYCERIN, HYPROMELLOSE 1 DROP: 1; 2; 3 SOLUTION/ DROPS OPHTHALMIC at 03:17

## 2025-02-08 RX ADMIN — PROPOFOL 25 MCG/KG/MIN: 10 INJECTION, EMULSION INTRAVENOUS at 01:43

## 2025-02-08 RX ADMIN — Medication 200 MCG/HR: at 22:25

## 2025-02-08 RX ADMIN — Medication 200 MCG/HR: at 08:25

## 2025-02-08 RX ADMIN — DEXTRAN 70, GLYCERIN, HYPROMELLOSE 1 DROP: 1; 2; 3 SOLUTION/ DROPS OPHTHALMIC at 18:33

## 2025-02-08 RX ADMIN — PROPOFOL 25 MCG/KG/MIN: 10 INJECTION, EMULSION INTRAVENOUS at 06:23

## 2025-02-08 RX ADMIN — MIDAZOLAM HYDROCHLORIDE 8 MG/HR: 1 INJECTION, SOLUTION INTRAVENOUS at 06:33

## 2025-02-08 RX ADMIN — Medication 200 MCG/HR: at 17:34

## 2025-02-08 RX ADMIN — DEXMEDETOMIDINE HYDROCHLORIDE 1.4 MCG/KG/HR: 400 INJECTION INTRAVENOUS at 02:14

## 2025-02-08 RX ADMIN — FOLIC ACID 1 MG: 1 TABLET ORAL at 07:21

## 2025-02-08 RX ADMIN — SENNOSIDES AND DOCUSATE SODIUM 2 TABLET: 50; 8.6 TABLET ORAL at 07:20

## 2025-02-08 ASSESSMENT — PULMONARY FUNCTION TESTS
PIF_VALUE: 19
PIF_VALUE: 18
PIF_VALUE: 17
PIF_VALUE: 16
PIF_VALUE: 17

## 2025-02-08 ASSESSMENT — PAIN SCALES - GENERAL: PAINLEVEL_OUTOF10: 0

## 2025-02-09 LAB
ANION GAP SERPL CALC-SCNC: 4 MMOL/L (ref 2–12)
ARTERIAL PATENCY WRIST A: POSITIVE
BASE EXCESS BLD CALC-SCNC: 4.5 MMOL/L
BDY SITE: ABNORMAL
BUN SERPL-MCNC: 8 MG/DL (ref 6–20)
BUN/CREAT SERPL: 17 (ref 12–20)
CALCIUM SERPL-MCNC: 9.3 MG/DL (ref 8.5–10.1)
CHLORIDE SERPL-SCNC: 105 MMOL/L (ref 97–108)
CO2 SERPL-SCNC: 29 MMOL/L (ref 21–32)
CREAT SERPL-MCNC: 0.47 MG/DL (ref 0.7–1.3)
ERYTHROCYTE [DISTWIDTH] IN BLOOD BY AUTOMATED COUNT: 12.6 % (ref 11.5–14.5)
GAS FLOW.O2 O2 DELIVERY SYS: ABNORMAL
GAS FLOW.O2 SETTING OXYMISER: 12 BPM
GLUCOSE SERPL-MCNC: 116 MG/DL (ref 65–100)
HCO3 BLD-SCNC: 30.8 MMOL/L (ref 21–28)
HCT VFR BLD AUTO: 36.5 % (ref 36.6–50.3)
HGB BLD-MCNC: 11.9 G/DL (ref 12.1–17)
MAGNESIUM SERPL-MCNC: 2 MG/DL (ref 1.6–2.4)
MCH RBC QN AUTO: 27.7 PG (ref 26–34)
MCHC RBC AUTO-ENTMCNC: 32.6 G/DL (ref 30–36.5)
MCV RBC AUTO: 85.1 FL (ref 80–99)
NRBC # BLD: 0 K/UL (ref 0–0.01)
NRBC BLD-RTO: 0 PER 100 WBC
O2/TOTAL GAS SETTING VFR VENT: 70 %
PCO2 BLD: 51.8 MMHG (ref 35–48)
PEEP RESPIRATORY: 8 CMH2O
PH BLD: 7.38 (ref 7.35–7.45)
PHOSPHATE SERPL-MCNC: 3.8 MG/DL (ref 2.6–4.7)
PLATELET # BLD AUTO: 260 K/UL (ref 150–400)
PMV BLD AUTO: 12 FL (ref 8.9–12.9)
PO2 BLD: 128 MMHG (ref 83–108)
POTASSIUM SERPL-SCNC: 3.7 MMOL/L (ref 3.5–5.1)
RBC # BLD AUTO: 4.29 M/UL (ref 4.1–5.7)
SAO2 % BLD: 98.8 % (ref 92–97)
SODIUM SERPL-SCNC: 138 MMOL/L (ref 136–145)
SPECIMEN TYPE: ABNORMAL
VENTILATION MODE VENT: ABNORMAL
VT SETTING VENT: 380 ML
WBC # BLD AUTO: 8.8 K/UL (ref 4.1–11.1)

## 2025-02-09 PROCEDURE — 6370000000 HC RX 637 (ALT 250 FOR IP): Performed by: INTERNAL MEDICINE

## 2025-02-09 PROCEDURE — 36415 COLL VENOUS BLD VENIPUNCTURE: CPT

## 2025-02-09 PROCEDURE — 82803 BLOOD GASES ANY COMBINATION: CPT

## 2025-02-09 PROCEDURE — 2500000003 HC RX 250 WO HCPCS: Performed by: HOSPITALIST

## 2025-02-09 PROCEDURE — 89220 SPUTUM SPECIMEN COLLECTION: CPT

## 2025-02-09 PROCEDURE — 36600 WITHDRAWAL OF ARTERIAL BLOOD: CPT

## 2025-02-09 PROCEDURE — 2580000003 HC RX 258: Performed by: NURSE PRACTITIONER

## 2025-02-09 PROCEDURE — 80048 BASIC METABOLIC PNL TOTAL CA: CPT

## 2025-02-09 PROCEDURE — 2500000003 HC RX 250 WO HCPCS: Performed by: NURSE PRACTITIONER

## 2025-02-09 PROCEDURE — 94761 N-INVAS EAR/PLS OXIMETRY MLT: CPT

## 2025-02-09 PROCEDURE — 6370000000 HC RX 637 (ALT 250 FOR IP): Performed by: HOSPITALIST

## 2025-02-09 PROCEDURE — 85027 COMPLETE CBC AUTOMATED: CPT

## 2025-02-09 PROCEDURE — 94003 VENT MGMT INPAT SUBQ DAY: CPT

## 2025-02-09 PROCEDURE — 84100 ASSAY OF PHOSPHORUS: CPT

## 2025-02-09 PROCEDURE — 6360000002 HC RX W HCPCS: Performed by: HOSPITALIST

## 2025-02-09 PROCEDURE — 2000000000 HC ICU R&B

## 2025-02-09 PROCEDURE — 83735 ASSAY OF MAGNESIUM: CPT

## 2025-02-09 RX ADMIN — QUETIAPINE FUMARATE 200 MG: 100 TABLET ORAL at 09:19

## 2025-02-09 RX ADMIN — PROPOFOL 25 MCG/KG/MIN: 10 INJECTION, EMULSION INTRAVENOUS at 14:23

## 2025-02-09 RX ADMIN — FOLIC ACID 1 MG: 1 TABLET ORAL at 09:19

## 2025-02-09 RX ADMIN — MIDAZOLAM HYDROCHLORIDE 9 MG/HR: 1 INJECTION, SOLUTION INTRAVENOUS at 21:15

## 2025-02-09 RX ADMIN — DEXMEDETOMIDINE HYDROCHLORIDE 1.3 MCG/KG/HR: 400 INJECTION INTRAVENOUS at 16:10

## 2025-02-09 RX ADMIN — Medication 100 MG: at 09:19

## 2025-02-09 RX ADMIN — Medication 200 MCG/HR: at 03:36

## 2025-02-09 RX ADMIN — DEXMEDETOMIDINE HYDROCHLORIDE 1.4 MCG/KG/HR: 400 INJECTION INTRAVENOUS at 07:19

## 2025-02-09 RX ADMIN — CHLORHEXIDINE GLUCONATE 15 ML: 1.2 RINSE ORAL at 20:23

## 2025-02-09 RX ADMIN — DEXMEDETOMIDINE HYDROCHLORIDE 1.1 MCG/KG/HR: 400 INJECTION INTRAVENOUS at 21:16

## 2025-02-09 RX ADMIN — SENNOSIDES AND DOCUSATE SODIUM 2 TABLET: 50; 8.6 TABLET ORAL at 09:19

## 2025-02-09 RX ADMIN — SENNOSIDES AND DOCUSATE SODIUM 2 TABLET: 50; 8.6 TABLET ORAL at 20:23

## 2025-02-09 RX ADMIN — THERA TABS 1 TABLET: TAB at 09:19

## 2025-02-09 RX ADMIN — DEXMEDETOMIDINE HYDROCHLORIDE 1.4 MCG/KG/HR: 400 INJECTION INTRAVENOUS at 03:11

## 2025-02-09 RX ADMIN — POLYETHYLENE GLYCOL 3350 17 G: 17 POWDER, FOR SOLUTION ORAL at 09:32

## 2025-02-09 RX ADMIN — DEXTRAN 70, GLYCERIN, HYPROMELLOSE 1 DROP: 1; 2; 3 SOLUTION/ DROPS OPHTHALMIC at 02:31

## 2025-02-09 RX ADMIN — DEXTRAN 70, GLYCERIN, HYPROMELLOSE 1 DROP: 1; 2; 3 SOLUTION/ DROPS OPHTHALMIC at 21:48

## 2025-02-09 RX ADMIN — Medication 200 MCG/HR: at 18:10

## 2025-02-09 RX ADMIN — QUETIAPINE FUMARATE 200 MG: 100 TABLET ORAL at 20:22

## 2025-02-09 RX ADMIN — DEXTRAN 70, GLYCERIN, HYPROMELLOSE 1 DROP: 1; 2; 3 SOLUTION/ DROPS OPHTHALMIC at 05:53

## 2025-02-09 RX ADMIN — DEXTRAN 70, GLYCERIN, HYPROMELLOSE 1 DROP: 1; 2; 3 SOLUTION/ DROPS OPHTHALMIC at 14:24

## 2025-02-09 RX ADMIN — WATER 1000 MG: 1 INJECTION INTRAMUSCULAR; INTRAVENOUS; SUBCUTANEOUS at 09:21

## 2025-02-09 RX ADMIN — SODIUM CHLORIDE, PRESERVATIVE FREE 20 MG: 5 INJECTION INTRAVENOUS at 09:24

## 2025-02-09 RX ADMIN — Medication 200 MCG/HR: at 13:29

## 2025-02-09 RX ADMIN — MIDAZOLAM HYDROCHLORIDE 10 MG/HR: 1 INJECTION, SOLUTION INTRAVENOUS at 11:42

## 2025-02-09 RX ADMIN — SODIUM CHLORIDE, PRESERVATIVE FREE 20 MG: 5 INJECTION INTRAVENOUS at 20:22

## 2025-02-09 RX ADMIN — PROPOFOL 20 MCG/KG/MIN: 10 INJECTION, EMULSION INTRAVENOUS at 06:57

## 2025-02-09 RX ADMIN — Medication 200 MCG/HR: at 08:37

## 2025-02-09 RX ADMIN — DEXTRAN 70, GLYCERIN, HYPROMELLOSE 1 DROP: 1; 2; 3 SOLUTION/ DROPS OPHTHALMIC at 11:16

## 2025-02-09 RX ADMIN — CHLORHEXIDINE GLUCONATE 15 ML: 1.2 RINSE ORAL at 09:40

## 2025-02-09 RX ADMIN — POLYETHYLENE GLYCOL 3350 17 G: 17 POWDER, FOR SOLUTION ORAL at 20:22

## 2025-02-09 RX ADMIN — DEXTRAN 70, GLYCERIN, HYPROMELLOSE 1 DROP: 1; 2; 3 SOLUTION/ DROPS OPHTHALMIC at 18:29

## 2025-02-09 RX ADMIN — DEXMEDETOMIDINE HYDROCHLORIDE 1.5 MCG/KG/HR: 400 INJECTION INTRAVENOUS at 11:37

## 2025-02-09 RX ADMIN — MIDAZOLAM HYDROCHLORIDE 9 MG/HR: 1 INJECTION, SOLUTION INTRAVENOUS at 00:35

## 2025-02-09 RX ADMIN — Medication 200 MCG/HR: at 23:09

## 2025-02-09 RX ADMIN — ENOXAPARIN SODIUM 40 MG: 100 INJECTION SUBCUTANEOUS at 11:16

## 2025-02-09 ASSESSMENT — PAIN SCALES - GENERAL
PAINLEVEL_OUTOF10: 0

## 2025-02-09 ASSESSMENT — PULMONARY FUNCTION TESTS
PIF_VALUE: 27
PIF_VALUE: 17
PIF_VALUE: 19
PIF_VALUE: 20
PIF_VALUE: 25
PIF_VALUE: 15
PIF_VALUE: 16

## 2025-02-10 ENCOUNTER — APPOINTMENT (OUTPATIENT)
Facility: HOSPITAL | Age: 35
DRG: 005 | End: 2025-02-10
Payer: MEDICAID

## 2025-02-10 LAB
ANION GAP SERPL CALC-SCNC: 5 MMOL/L (ref 2–12)
ARTERIAL PATENCY WRIST A: POSITIVE
BASE EXCESS BLD CALC-SCNC: 8 MMOL/L
BDY SITE: ABNORMAL
BUN SERPL-MCNC: 9 MG/DL (ref 6–20)
BUN/CREAT SERPL: 20 (ref 12–20)
CALCIUM SERPL-MCNC: 7.7 MG/DL (ref 8.5–10.1)
CHLORIDE SERPL-SCNC: 111 MMOL/L (ref 97–108)
CO2 SERPL-SCNC: 26 MMOL/L (ref 21–32)
CREAT SERPL-MCNC: 0.46 MG/DL (ref 0.7–1.3)
ERYTHROCYTE [DISTWIDTH] IN BLOOD BY AUTOMATED COUNT: 12.5 % (ref 11.5–14.5)
GAS FLOW.O2 O2 DELIVERY SYS: ABNORMAL
GAS FLOW.O2 SETTING OXYMISER: 12 BPM
GLUCOSE SERPL-MCNC: 97 MG/DL (ref 65–100)
HCO3 BLD-SCNC: 31.8 MMOL/L (ref 21–28)
HCT VFR BLD AUTO: 39.1 % (ref 36.6–50.3)
HGB BLD-MCNC: 12.3 G/DL (ref 12.1–17)
MCH RBC QN AUTO: 27.3 PG (ref 26–34)
MCHC RBC AUTO-ENTMCNC: 31.5 G/DL (ref 30–36.5)
MCV RBC AUTO: 86.9 FL (ref 80–99)
NRBC # BLD: 0 K/UL (ref 0–0.01)
NRBC BLD-RTO: 0 PER 100 WBC
O2/TOTAL GAS SETTING VFR VENT: 50 %
PCO2 BLD: 40.2 MMHG (ref 35–48)
PEEP RESPIRATORY: 8 CMH2O
PH BLD: 7.51 (ref 7.35–7.45)
PLATELET # BLD AUTO: 264 K/UL (ref 150–400)
PMV BLD AUTO: 12 FL (ref 8.9–12.9)
PO2 BLD: 65 MMHG (ref 83–108)
POTASSIUM SERPL-SCNC: 3.2 MMOL/L (ref 3.5–5.1)
PROCALCITONIN SERPL-MCNC: 0.32 NG/ML
RBC # BLD AUTO: 4.5 M/UL (ref 4.1–5.7)
SAO2 % BLD: 94.2 % (ref 92–97)
SODIUM SERPL-SCNC: 142 MMOL/L (ref 136–145)
SPECIMEN TYPE: ABNORMAL
TRIGL SERPL-MCNC: 132 MG/DL
VENTILATION MODE VENT: ABNORMAL
WBC # BLD AUTO: 7.2 K/UL (ref 4.1–11.1)

## 2025-02-10 PROCEDURE — 6370000000 HC RX 637 (ALT 250 FOR IP): Performed by: HOSPITALIST

## 2025-02-10 PROCEDURE — 2580000003 HC RX 258: Performed by: NURSE PRACTITIONER

## 2025-02-10 PROCEDURE — 2500000003 HC RX 250 WO HCPCS: Performed by: NURSE PRACTITIONER

## 2025-02-10 PROCEDURE — 84478 ASSAY OF TRIGLYCERIDES: CPT

## 2025-02-10 PROCEDURE — 6360000002 HC RX W HCPCS: Performed by: HOSPITALIST

## 2025-02-10 PROCEDURE — 6370000000 HC RX 637 (ALT 250 FOR IP): Performed by: INTERNAL MEDICINE

## 2025-02-10 PROCEDURE — 2500000003 HC RX 250 WO HCPCS: Performed by: HOSPITALIST

## 2025-02-10 PROCEDURE — 0B9F8ZX DRAINAGE OF RIGHT LOWER LUNG LOBE, VIA NATURAL OR ARTIFICIAL OPENING ENDOSCOPIC, DIAGNOSTIC: ICD-10-PCS | Performed by: HOSPITALIST

## 2025-02-10 PROCEDURE — 2000000000 HC ICU R&B

## 2025-02-10 PROCEDURE — 80048 BASIC METABOLIC PNL TOTAL CA: CPT

## 2025-02-10 PROCEDURE — 31624 DX BRONCHOSCOPE/LAVAGE: CPT

## 2025-02-10 PROCEDURE — 87070 CULTURE OTHR SPECIMN AEROBIC: CPT

## 2025-02-10 PROCEDURE — 94761 N-INVAS EAR/PLS OXIMETRY MLT: CPT

## 2025-02-10 PROCEDURE — 36415 COLL VENOUS BLD VENIPUNCTURE: CPT

## 2025-02-10 PROCEDURE — 84145 PROCALCITONIN (PCT): CPT

## 2025-02-10 PROCEDURE — 71045 X-RAY EXAM CHEST 1 VIEW: CPT

## 2025-02-10 PROCEDURE — 82803 BLOOD GASES ANY COMBINATION: CPT

## 2025-02-10 PROCEDURE — 85027 COMPLETE CBC AUTOMATED: CPT

## 2025-02-10 PROCEDURE — 36600 WITHDRAWAL OF ARTERIAL BLOOD: CPT

## 2025-02-10 PROCEDURE — 0BC68ZZ EXTIRPATION OF MATTER FROM RIGHT LOWER LOBE BRONCHUS, VIA NATURAL OR ARTIFICIAL OPENING ENDOSCOPIC: ICD-10-PCS | Performed by: HOSPITALIST

## 2025-02-10 PROCEDURE — 0BCB8ZZ EXTIRPATION OF MATTER FROM LEFT LOWER LOBE BRONCHUS, VIA NATURAL OR ARTIFICIAL OPENING ENDOSCOPIC: ICD-10-PCS | Performed by: HOSPITALIST

## 2025-02-10 PROCEDURE — 87205 SMEAR GRAM STAIN: CPT

## 2025-02-10 PROCEDURE — 2580000003 HC RX 258: Performed by: HOSPITALIST

## 2025-02-10 PROCEDURE — 94003 VENT MGMT INPAT SUBQ DAY: CPT

## 2025-02-10 RX ORDER — EPINEPHRINE 1 MG/ML(1)
AMPUL (ML) INJECTION
Status: DISCONTINUED
Start: 2025-02-10 | End: 2025-02-10 | Stop reason: WASHOUT

## 2025-02-10 RX ORDER — EPINEPHRINE 1 MG/ML
INJECTION, SOLUTION, CONCENTRATE INTRAVENOUS
Status: DISCONTINUED
Start: 2025-02-10 | End: 2025-02-10 | Stop reason: WASHOUT

## 2025-02-10 RX ORDER — LIDOCAINE HYDROCHLORIDE 40 MG/ML
SOLUTION TOPICAL ONCE
Status: COMPLETED | OUTPATIENT
Start: 2025-02-10 | End: 2025-02-10

## 2025-02-10 RX ORDER — ACETAMINOPHEN 325 MG/1
650 TABLET ORAL ONCE
Status: COMPLETED | OUTPATIENT
Start: 2025-02-10 | End: 2025-02-10

## 2025-02-10 RX ORDER — ACETYLCYSTEINE 200 MG/ML
INJECTION INTRAVENOUS
Status: DISCONTINUED
Start: 2025-02-10 | End: 2025-02-10 | Stop reason: WASHOUT

## 2025-02-10 RX ORDER — SODIUM CHLORIDE 9 MG/ML
INJECTION, SOLUTION INTRAVENOUS CONTINUOUS
Status: DISCONTINUED | OUTPATIENT
Start: 2025-02-10 | End: 2025-02-11

## 2025-02-10 RX ADMIN — WATER 2000 MG: 1 INJECTION INTRAMUSCULAR; INTRAVENOUS; SUBCUTANEOUS at 16:42

## 2025-02-10 RX ADMIN — PROPOFOL 50 MCG/KG/MIN: 10 INJECTION, EMULSION INTRAVENOUS at 23:03

## 2025-02-10 RX ADMIN — ENOXAPARIN SODIUM 40 MG: 100 INJECTION SUBCUTANEOUS at 11:11

## 2025-02-10 RX ADMIN — QUETIAPINE FUMARATE 200 MG: 100 TABLET ORAL at 08:56

## 2025-02-10 RX ADMIN — DEXTRAN 70, GLYCERIN, HYPROMELLOSE 1 DROP: 1; 2; 3 SOLUTION/ DROPS OPHTHALMIC at 06:13

## 2025-02-10 RX ADMIN — LIDOCAINE HYDROCHLORIDE 10 ML: 40 SOLUTION ORAL at 14:52

## 2025-02-10 RX ADMIN — POLYETHYLENE GLYCOL 3350 17 G: 17 POWDER, FOR SOLUTION ORAL at 08:47

## 2025-02-10 RX ADMIN — PROPOFOL 35 MCG/KG/MIN: 10 INJECTION, EMULSION INTRAVENOUS at 03:18

## 2025-02-10 RX ADMIN — WATER 1000 MG: 1 INJECTION INTRAMUSCULAR; INTRAVENOUS; SUBCUTANEOUS at 09:00

## 2025-02-10 RX ADMIN — POTASSIUM BICARBONATE 40 MEQ: 782 TABLET, EFFERVESCENT ORAL at 08:48

## 2025-02-10 RX ADMIN — SENNOSIDES AND DOCUSATE SODIUM 2 TABLET: 50; 8.6 TABLET ORAL at 20:58

## 2025-02-10 RX ADMIN — MIDAZOLAM HYDROCHLORIDE 5 MG: 1 INJECTION, SOLUTION INTRAMUSCULAR; INTRAVENOUS at 04:37

## 2025-02-10 RX ADMIN — PROPOFOL 50 MCG/KG/MIN: 10 INJECTION, EMULSION INTRAVENOUS at 17:26

## 2025-02-10 RX ADMIN — DEXMEDETOMIDINE HYDROCHLORIDE 1.5 MCG/KG/HR: 400 INJECTION INTRAVENOUS at 18:59

## 2025-02-10 RX ADMIN — DEXTRAN 70, GLYCERIN, HYPROMELLOSE 1 DROP: 1; 2; 3 SOLUTION/ DROPS OPHTHALMIC at 19:00

## 2025-02-10 RX ADMIN — CHLORHEXIDINE GLUCONATE 15 ML: 1.2 RINSE ORAL at 20:57

## 2025-02-10 RX ADMIN — Medication 200 MCG/HR: at 23:58

## 2025-02-10 RX ADMIN — DEXMEDETOMIDINE HYDROCHLORIDE 1.5 MCG/KG/HR: 400 INJECTION INTRAVENOUS at 15:17

## 2025-02-10 RX ADMIN — QUETIAPINE FUMARATE 200 MG: 100 TABLET ORAL at 20:58

## 2025-02-10 RX ADMIN — FOLIC ACID 1 MG: 1 TABLET ORAL at 08:47

## 2025-02-10 RX ADMIN — SODIUM CHLORIDE: 9 INJECTION, SOLUTION INTRAVENOUS at 16:42

## 2025-02-10 RX ADMIN — DEXMEDETOMIDINE HYDROCHLORIDE 1.3 MCG/KG/HR: 400 INJECTION INTRAVENOUS at 02:24

## 2025-02-10 RX ADMIN — SODIUM CHLORIDE, PRESERVATIVE FREE 20 MG: 5 INJECTION INTRAVENOUS at 20:57

## 2025-02-10 RX ADMIN — ACETAMINOPHEN 650 MG: 325 TABLET ORAL at 16:21

## 2025-02-10 RX ADMIN — DEXMEDETOMIDINE HYDROCHLORIDE 1.5 MCG/KG/HR: 400 INJECTION INTRAVENOUS at 06:57

## 2025-02-10 RX ADMIN — Medication 100 MG: at 08:47

## 2025-02-10 RX ADMIN — PROPOFOL 50 MCG/KG/MIN: 10 INJECTION, EMULSION INTRAVENOUS at 12:23

## 2025-02-10 RX ADMIN — DEXTRAN 70, GLYCERIN, HYPROMELLOSE 1 DROP: 1; 2; 3 SOLUTION/ DROPS OPHTHALMIC at 11:15

## 2025-02-10 RX ADMIN — DEXMEDETOMIDINE HYDROCHLORIDE 1.5 MCG/KG/HR: 400 INJECTION INTRAVENOUS at 23:06

## 2025-02-10 RX ADMIN — THERA TABS 1 TABLET: TAB at 08:47

## 2025-02-10 RX ADMIN — Medication 200 MCG/HR: at 09:14

## 2025-02-10 RX ADMIN — Medication 200 MCG/HR: at 04:42

## 2025-02-10 RX ADMIN — POLYETHYLENE GLYCOL 3350 17 G: 17 POWDER, FOR SOLUTION ORAL at 20:58

## 2025-02-10 RX ADMIN — DEXTRAN 70, GLYCERIN, HYPROMELLOSE 1 DROP: 1; 2; 3 SOLUTION/ DROPS OPHTHALMIC at 02:28

## 2025-02-10 RX ADMIN — PROPOFOL 50 MG: 10 INJECTION, EMULSION INTRAVENOUS at 14:56

## 2025-02-10 RX ADMIN — PROPOFOL 50 MCG/KG/MIN: 10 INJECTION, EMULSION INTRAVENOUS at 08:43

## 2025-02-10 RX ADMIN — Medication 200 MCG/HR: at 14:02

## 2025-02-10 RX ADMIN — VANCOMYCIN HYDROCHLORIDE 1750 MG: 10 INJECTION, POWDER, LYOPHILIZED, FOR SOLUTION INTRAVENOUS at 17:04

## 2025-02-10 RX ADMIN — VANCOMYCIN HYDROCHLORIDE 1000 MG: 1 INJECTION, POWDER, LYOPHILIZED, FOR SOLUTION INTRAVENOUS at 23:02

## 2025-02-10 RX ADMIN — DEXTRAN 70, GLYCERIN, HYPROMELLOSE 1 DROP: 1; 2; 3 SOLUTION/ DROPS OPHTHALMIC at 16:07

## 2025-02-10 RX ADMIN — MIDAZOLAM HYDROCHLORIDE 10 MG/HR: 1 INJECTION, SOLUTION INTRAVENOUS at 06:13

## 2025-02-10 RX ADMIN — SODIUM CHLORIDE, PRESERVATIVE FREE 20 MG: 5 INJECTION INTRAVENOUS at 08:47

## 2025-02-10 RX ADMIN — MIDAZOLAM HYDROCHLORIDE 14 MG/HR: 1 INJECTION, SOLUTION INTRAVENOUS at 16:02

## 2025-02-10 RX ADMIN — Medication 200 MCG/HR: at 19:04

## 2025-02-10 RX ADMIN — CHLORHEXIDINE GLUCONATE 15 ML: 1.2 RINSE ORAL at 08:48

## 2025-02-10 RX ADMIN — DEXMEDETOMIDINE HYDROCHLORIDE 1.5 MCG/KG/HR: 400 INJECTION INTRAVENOUS at 11:11

## 2025-02-10 RX ADMIN — SENNOSIDES AND DOCUSATE SODIUM 2 TABLET: 50; 8.6 TABLET ORAL at 08:47

## 2025-02-10 ASSESSMENT — PULMONARY FUNCTION TESTS
PIF_VALUE: 19
PIF_VALUE: 17
PIF_VALUE: 22
PIF_VALUE: 19
PIF_VALUE: 21
PIF_VALUE: 26

## 2025-02-10 ASSESSMENT — PAIN SCALES - GENERAL
PAINLEVEL_OUTOF10: 0

## 2025-02-10 NOTE — CARE COORDINATION
2/10/25  3:43 PM    Care Management Progress Note    Reason for Admission:   Nausea and vomiting in adult [R11.2]  Alcohol-induced acute pancreatitis without infection or necrosis [K85.20]  Pancreatitis without necrosis or infection [K85.90]         Patient Admission Status: Inpatient  RUR: 15%  Hospitalization in the last 30 days (Readmission):  No        Transition of care plan:  Ongoing medical management- remains intubated- possible trach this week   Discharge plan: TBD- following for needs.   Outpatient follow-up.  Transport at discharge: TBD    CM participated in IDR- continuing to follow for needs.    GUIDO Judd  [facility]      Available via [employer]

## 2025-02-11 ENCOUNTER — APPOINTMENT (OUTPATIENT)
Facility: HOSPITAL | Age: 35
DRG: 005 | End: 2025-02-11
Payer: MEDICAID

## 2025-02-11 LAB
AMMONIA PLAS-SCNC: 28 UMOL/L
ANION GAP SERPL CALC-SCNC: 5 MMOL/L (ref 2–12)
ARTERIAL PATENCY WRIST A: POSITIVE
BACTERIA SPEC CULT: NORMAL
BACTERIA SPEC CULT: NORMAL
BASE EXCESS BLD CALC-SCNC: 5.6 MMOL/L
BASOPHILS # BLD: 0.05 K/UL (ref 0–0.1)
BASOPHILS NFR BLD: 0.6 % (ref 0–1)
BDY SITE: ABNORMAL
BUN SERPL-MCNC: 11 MG/DL (ref 6–20)
BUN/CREAT SERPL: 18 (ref 12–20)
CALCIUM SERPL-MCNC: 9.5 MG/DL (ref 8.5–10.1)
CHLORIDE SERPL-SCNC: 107 MMOL/L (ref 97–108)
CO2 SERPL-SCNC: 28 MMOL/L (ref 21–32)
COLLECT DURATION TIME UR: 24 HR
COPPER 24H UR-MRATE: 24 UG/24 HR (ref 3–35)
COPPER UR-MCNC: 17 UG/L
COPPER/CREAT UR: 12 UG/G CREAT (ref 0–49)
CREAT SERPL-MCNC: 0.61 MG/DL (ref 0.7–1.3)
CREAT UR-MCNC: 1.38 G/L (ref 0.3–3)
DIFFERENTIAL METHOD BLD: ABNORMAL
EOSINOPHIL # BLD: 0.2 K/UL (ref 0–0.4)
EOSINOPHIL NFR BLD: 2.3 % (ref 0–7)
ERYTHROCYTE [DISTWIDTH] IN BLOOD BY AUTOMATED COUNT: 12.5 % (ref 11.5–14.5)
GAS FLOW.O2 O2 DELIVERY SYS: ABNORMAL
GAS FLOW.O2 SETTING OXYMISER: 14 BPM
GLUCOSE SERPL-MCNC: 116 MG/DL (ref 65–100)
HCO3 BLD-SCNC: 31.2 MMOL/L (ref 21–28)
HCT VFR BLD AUTO: 35.4 % (ref 36.6–50.3)
HGB BLD-MCNC: 11.3 G/DL (ref 12.1–17)
IMM GRANULOCYTES # BLD AUTO: 0.04 K/UL (ref 0–0.04)
IMM GRANULOCYTES NFR BLD AUTO: 0.5 % (ref 0–0.5)
LYMPHOCYTES # BLD: 1.53 K/UL (ref 0.8–3.5)
LYMPHOCYTES NFR BLD: 17.9 % (ref 12–49)
MAGNESIUM SERPL-MCNC: 1.9 MG/DL (ref 1.6–2.4)
MCH RBC QN AUTO: 27.4 PG (ref 26–34)
MCHC RBC AUTO-ENTMCNC: 31.9 G/DL (ref 30–36.5)
MCV RBC AUTO: 85.9 FL (ref 80–99)
MONOCYTES # BLD: 0.72 K/UL (ref 0–1)
MONOCYTES NFR BLD: 8.4 % (ref 5–13)
NEUTS SEG # BLD: 6.03 K/UL (ref 1.8–8)
NEUTS SEG NFR BLD: 70.3 % (ref 32–75)
NRBC # BLD: 0 K/UL (ref 0–0.01)
NRBC BLD-RTO: 0 PER 100 WBC
O2/TOTAL GAS SETTING VFR VENT: 70 %
PCO2 BLD: 48.6 MMHG (ref 35–48)
PEEP RESPIRATORY: 10 CMH2O
PH BLD: 7.42 (ref 7.35–7.45)
PHOSPHATE SERPL-MCNC: 3.9 MG/DL (ref 2.6–4.7)
PLATELET # BLD AUTO: 304 K/UL (ref 150–400)
PMV BLD AUTO: 11.6 FL (ref 8.9–12.9)
PO2 BLD: 103 MMHG (ref 83–108)
POTASSIUM SERPL-SCNC: 4 MMOL/L (ref 3.5–5.1)
RBC # BLD AUTO: 4.12 M/UL (ref 4.1–5.7)
SAO2 % BLD: 97.9 % (ref 92–97)
SERVICE CMNT-IMP: NORMAL
SODIUM SERPL-SCNC: 140 MMOL/L (ref 136–145)
SPECIMEN TYPE: ABNORMAL
SPECIMEN VOL ?TM UR: 1430 ML
VANCOMYCIN SERPL-MCNC: 6.2 UG/ML
VENTILATION MODE VENT: ABNORMAL
VT SETTING VENT: 380 ML
WBC # BLD AUTO: 8.6 K/UL (ref 4.1–11.1)

## 2025-02-11 PROCEDURE — 2580000003 HC RX 258: Performed by: NURSE PRACTITIONER

## 2025-02-11 PROCEDURE — 82140 ASSAY OF AMMONIA: CPT

## 2025-02-11 PROCEDURE — 6370000000 HC RX 637 (ALT 250 FOR IP): Performed by: INTERNAL MEDICINE

## 2025-02-11 PROCEDURE — 94003 VENT MGMT INPAT SUBQ DAY: CPT

## 2025-02-11 PROCEDURE — 93005 ELECTROCARDIOGRAM TRACING: CPT | Performed by: INTERNAL MEDICINE

## 2025-02-11 PROCEDURE — 2580000003 HC RX 258: Performed by: HOSPITALIST

## 2025-02-11 PROCEDURE — 80048 BASIC METABOLIC PNL TOTAL CA: CPT

## 2025-02-11 PROCEDURE — 6360000002 HC RX W HCPCS: Performed by: HOSPITALIST

## 2025-02-11 PROCEDURE — 2500000003 HC RX 250 WO HCPCS: Performed by: NURSE PRACTITIONER

## 2025-02-11 PROCEDURE — 85025 COMPLETE CBC W/AUTO DIFF WBC: CPT

## 2025-02-11 PROCEDURE — 80202 ASSAY OF VANCOMYCIN: CPT

## 2025-02-11 PROCEDURE — 6370000000 HC RX 637 (ALT 250 FOR IP): Performed by: HOSPITALIST

## 2025-02-11 PROCEDURE — 6360000002 HC RX W HCPCS: Performed by: NURSE PRACTITIONER

## 2025-02-11 PROCEDURE — 82803 BLOOD GASES ANY COMBINATION: CPT

## 2025-02-11 PROCEDURE — 84100 ASSAY OF PHOSPHORUS: CPT

## 2025-02-11 PROCEDURE — 83735 ASSAY OF MAGNESIUM: CPT

## 2025-02-11 PROCEDURE — 2500000003 HC RX 250 WO HCPCS: Performed by: HOSPITALIST

## 2025-02-11 PROCEDURE — 94761 N-INVAS EAR/PLS OXIMETRY MLT: CPT

## 2025-02-11 PROCEDURE — 36415 COLL VENOUS BLD VENIPUNCTURE: CPT

## 2025-02-11 PROCEDURE — 74018 RADEX ABDOMEN 1 VIEW: CPT

## 2025-02-11 PROCEDURE — 2000000000 HC ICU R&B

## 2025-02-11 PROCEDURE — 6360000002 HC RX W HCPCS: Performed by: INTERNAL MEDICINE

## 2025-02-11 PROCEDURE — 36600 WITHDRAWAL OF ARTERIAL BLOOD: CPT

## 2025-02-11 RX ORDER — DIAZEPAM 5 MG/1
10 TABLET ORAL EVERY 8 HOURS
Status: DISCONTINUED | OUTPATIENT
Start: 2025-02-11 | End: 2025-02-12

## 2025-02-11 RX ORDER — METHADONE HYDROCHLORIDE 10 MG/ML
30 CONCENTRATE ORAL DAILY
Status: DISCONTINUED | OUTPATIENT
Start: 2025-02-11 | End: 2025-02-16

## 2025-02-11 RX ORDER — MIDAZOLAM HYDROCHLORIDE 1 MG/ML
2 INJECTION, SOLUTION INTRAMUSCULAR; INTRAVENOUS
Status: DISCONTINUED | OUTPATIENT
Start: 2025-02-11 | End: 2025-02-12

## 2025-02-11 RX ORDER — QUETIAPINE FUMARATE 100 MG/1
100 TABLET, FILM COATED ORAL 3 TIMES DAILY
Status: DISCONTINUED | OUTPATIENT
Start: 2025-02-11 | End: 2025-02-12

## 2025-02-11 RX ORDER — FAMOTIDINE 20 MG/1
20 TABLET, FILM COATED ORAL 2 TIMES DAILY
Status: DISCONTINUED | OUTPATIENT
Start: 2025-02-11 | End: 2025-02-22

## 2025-02-11 RX ORDER — LABETALOL HYDROCHLORIDE 5 MG/ML
10 INJECTION, SOLUTION INTRAVENOUS EVERY 4 HOURS PRN
Status: DISCONTINUED | OUTPATIENT
Start: 2025-02-11 | End: 2025-02-25 | Stop reason: HOSPADM

## 2025-02-11 RX ADMIN — MIDAZOLAM HYDROCHLORIDE 10 MG/HR: 1 INJECTION, SOLUTION INTRAVENOUS at 13:27

## 2025-02-11 RX ADMIN — DEXMEDETOMIDINE HYDROCHLORIDE 1.5 MCG/KG/HR: 400 INJECTION INTRAVENOUS at 02:51

## 2025-02-11 RX ADMIN — CEFEPIME 2000 MG: 2 INJECTION, POWDER, FOR SOLUTION INTRAVENOUS at 15:36

## 2025-02-11 RX ADMIN — PROPOFOL 50 MCG/KG/MIN: 10 INJECTION, EMULSION INTRAVENOUS at 08:05

## 2025-02-11 RX ADMIN — SODIUM CHLORIDE, PRESERVATIVE FREE 20 MG: 5 INJECTION INTRAVENOUS at 08:06

## 2025-02-11 RX ADMIN — Medication 100 MG: at 08:09

## 2025-02-11 RX ADMIN — MIDAZOLAM HYDROCHLORIDE 2 MG: 1 INJECTION, SOLUTION INTRAMUSCULAR; INTRAVENOUS at 22:24

## 2025-02-11 RX ADMIN — MIDAZOLAM HYDROCHLORIDE 2 MG: 1 INJECTION, SOLUTION INTRAMUSCULAR; INTRAVENOUS at 10:38

## 2025-02-11 RX ADMIN — DEXTRAN 70, GLYCERIN, HYPROMELLOSE 1 DROP: 1; 2; 3 SOLUTION/ DROPS OPHTHALMIC at 14:29

## 2025-02-11 RX ADMIN — DIAZEPAM 10 MG: 5 TABLET ORAL at 11:09

## 2025-02-11 RX ADMIN — CHLORHEXIDINE GLUCONATE 15 ML: 1.2 RINSE ORAL at 08:09

## 2025-02-11 RX ADMIN — POLYETHYLENE GLYCOL 3350 17 G: 17 POWDER, FOR SOLUTION ORAL at 20:09

## 2025-02-11 RX ADMIN — QUETIAPINE FUMARATE 100 MG: 100 TABLET ORAL at 17:19

## 2025-02-11 RX ADMIN — NALOXEGOL OXALATE 25 MG: 25 TABLET, FILM COATED ORAL at 17:41

## 2025-02-11 RX ADMIN — CHLORHEXIDINE GLUCONATE 15 ML: 1.2 RINSE ORAL at 20:08

## 2025-02-11 RX ADMIN — FAMOTIDINE 20 MG: 20 TABLET, FILM COATED ORAL at 20:09

## 2025-02-11 RX ADMIN — LABETALOL HYDROCHLORIDE 10 MG: 5 INJECTION, SOLUTION INTRAVENOUS at 23:09

## 2025-02-11 RX ADMIN — SENNOSIDES AND DOCUSATE SODIUM 2 TABLET: 50; 8.6 TABLET ORAL at 20:09

## 2025-02-11 RX ADMIN — DEXMEDETOMIDINE HYDROCHLORIDE 1.5 MCG/KG/HR: 400 INJECTION INTRAVENOUS at 10:59

## 2025-02-11 RX ADMIN — Medication 200 MCG/HR: at 09:46

## 2025-02-11 RX ADMIN — FOLIC ACID 1 MG: 1 TABLET ORAL at 08:09

## 2025-02-11 RX ADMIN — CEFEPIME 2000 MG: 2 INJECTION, POWDER, FOR SOLUTION INTRAVENOUS at 01:27

## 2025-02-11 RX ADMIN — VANCOMYCIN HYDROCHLORIDE 1000 MG: 1 INJECTION, POWDER, LYOPHILIZED, FOR SOLUTION INTRAVENOUS at 23:20

## 2025-02-11 RX ADMIN — POLYETHYLENE GLYCOL 3350 17 G: 17 POWDER, FOR SOLUTION ORAL at 08:09

## 2025-02-11 RX ADMIN — PROPOFOL 50 MCG/KG/MIN: 10 INJECTION, EMULSION INTRAVENOUS at 13:03

## 2025-02-11 RX ADMIN — MIDAZOLAM HYDROCHLORIDE 14 MG/HR: 1 INJECTION, SOLUTION INTRAVENOUS at 00:01

## 2025-02-11 RX ADMIN — MIDAZOLAM HYDROCHLORIDE 13 MG/HR: 1 INJECTION, SOLUTION INTRAVENOUS at 05:30

## 2025-02-11 RX ADMIN — DEXTRAN 70, GLYCERIN, HYPROMELLOSE 1 DROP: 1; 2; 3 SOLUTION/ DROPS OPHTHALMIC at 02:51

## 2025-02-11 RX ADMIN — PROPOFOL 50 MCG/KG/MIN: 10 INJECTION, EMULSION INTRAVENOUS at 18:05

## 2025-02-11 RX ADMIN — DEXTRAN 70, GLYCERIN, HYPROMELLOSE 1 DROP: 1; 2; 3 SOLUTION/ DROPS OPHTHALMIC at 10:06

## 2025-02-11 RX ADMIN — DEXMEDETOMIDINE HYDROCHLORIDE 1.2 MCG/KG/HR: 400 INJECTION INTRAVENOUS at 19:54

## 2025-02-11 RX ADMIN — THERA TABS 1 TABLET: TAB at 08:09

## 2025-02-11 RX ADMIN — VANCOMYCIN HYDROCHLORIDE 1000 MG: 1 INJECTION, POWDER, LYOPHILIZED, FOR SOLUTION INTRAVENOUS at 14:35

## 2025-02-11 RX ADMIN — PROPOFOL 50 MCG/KG/MIN: 10 INJECTION, EMULSION INTRAVENOUS at 04:08

## 2025-02-11 RX ADMIN — PROPOFOL 50 MCG/KG/MIN: 10 INJECTION, EMULSION INTRAVENOUS at 23:22

## 2025-02-11 RX ADMIN — DEXMEDETOMIDINE HYDROCHLORIDE 1.5 MCG/KG/HR: 400 INJECTION INTRAVENOUS at 14:59

## 2025-02-11 RX ADMIN — SENNOSIDES AND DOCUSATE SODIUM 2 TABLET: 50; 8.6 TABLET ORAL at 08:09

## 2025-02-11 RX ADMIN — QUETIAPINE FUMARATE 200 MG: 100 TABLET ORAL at 08:13

## 2025-02-11 RX ADMIN — DEXTRAN 70, GLYCERIN, HYPROMELLOSE 1 DROP: 1; 2; 3 SOLUTION/ DROPS OPHTHALMIC at 19:53

## 2025-02-11 RX ADMIN — ENOXAPARIN SODIUM 40 MG: 100 INJECTION SUBCUTANEOUS at 10:06

## 2025-02-11 RX ADMIN — MIDAZOLAM HYDROCHLORIDE 2 MG: 1 INJECTION, SOLUTION INTRAMUSCULAR; INTRAVENOUS at 18:42

## 2025-02-11 RX ADMIN — DIAZEPAM 10 MG: 5 TABLET ORAL at 20:08

## 2025-02-11 RX ADMIN — VANCOMYCIN HYDROCHLORIDE 1000 MG: 1 INJECTION, POWDER, LYOPHILIZED, FOR SOLUTION INTRAVENOUS at 06:19

## 2025-02-11 RX ADMIN — METHADONE HYDROCHLORIDE 30 MG: 10 CONCENTRATE ORAL at 10:43

## 2025-02-11 RX ADMIN — DEXTRAN 70, GLYCERIN, HYPROMELLOSE 1 DROP: 1; 2; 3 SOLUTION/ DROPS OPHTHALMIC at 00:04

## 2025-02-11 RX ADMIN — DEXMEDETOMIDINE HYDROCHLORIDE 1.5 MCG/KG/HR: 400 INJECTION INTRAVENOUS at 07:05

## 2025-02-11 RX ADMIN — Medication 175 MCG/HR: at 15:14

## 2025-02-11 RX ADMIN — Medication 175 MCG/HR: at 21:02

## 2025-02-11 RX ADMIN — CEFEPIME 2000 MG: 2 INJECTION, POWDER, FOR SOLUTION INTRAVENOUS at 08:08

## 2025-02-11 RX ADMIN — MIDAZOLAM HYDROCHLORIDE 5 MG: 1 INJECTION, SOLUTION INTRAMUSCULAR; INTRAVENOUS at 08:51

## 2025-02-11 RX ADMIN — Medication 200 MCG/HR: at 05:12

## 2025-02-11 RX ADMIN — DEXTRAN 70, GLYCERIN, HYPROMELLOSE 1 DROP: 1; 2; 3 SOLUTION/ DROPS OPHTHALMIC at 23:07

## 2025-02-11 RX ADMIN — DEXTRAN 70, GLYCERIN, HYPROMELLOSE 1 DROP: 1; 2; 3 SOLUTION/ DROPS OPHTHALMIC at 06:43

## 2025-02-11 ASSESSMENT — PULMONARY FUNCTION TESTS
PIF_VALUE: 15
PIF_VALUE: 15
PIF_VALUE: 23
PIF_VALUE: 17
PIF_VALUE: 16

## 2025-02-11 NOTE — CARE COORDINATION
Care Management Progress Note    Reason for Admission:   Nausea and vomiting in adult [R11.2]  Alcohol-induced acute pancreatitis without infection or necrosis [K85.20]  Pancreatitis without necrosis or infection [K85.90]         Patient Admission Status: Inpatient  RUR: 14%  Hospitalization in the last 30 days (Readmission):  no        Transition of care plan:  [Medical  Remains intubated and sedated  Multiple gtts  No SAT/SBT today  ENT consulted    Letty Morrell RN

## 2025-02-12 ENCOUNTER — APPOINTMENT (OUTPATIENT)
Dept: VASCULAR SURGERY | Facility: HOSPITAL | Age: 35
DRG: 005 | End: 2025-02-12
Attending: INTERNAL MEDICINE
Payer: MEDICAID

## 2025-02-12 ENCOUNTER — APPOINTMENT (OUTPATIENT)
Facility: HOSPITAL | Age: 35
DRG: 005 | End: 2025-02-12
Payer: MEDICAID

## 2025-02-12 LAB
ALBUMIN SERPL-MCNC: 2.6 G/DL (ref 3.5–5)
ALBUMIN/GLOB SERPL: 0.7 (ref 1.1–2.2)
ALP SERPL-CCNC: 118 U/L (ref 45–117)
ALT SERPL-CCNC: 130 U/L (ref 12–78)
ANION GAP SERPL CALC-SCNC: 5 MMOL/L (ref 2–12)
AST SERPL-CCNC: 46 U/L (ref 15–37)
BACTERIA SPEC CULT: NORMAL
BASOPHILS # BLD: 0.05 K/UL (ref 0–0.1)
BASOPHILS NFR BLD: 0.5 % (ref 0–1)
BILIRUB DIRECT SERPL-MCNC: 0.5 MG/DL (ref 0–0.2)
BILIRUB SERPL-MCNC: 0.8 MG/DL (ref 0.2–1)
BUN SERPL-MCNC: 10 MG/DL (ref 6–20)
BUN/CREAT SERPL: 16 (ref 12–20)
CALCIUM SERPL-MCNC: 9.5 MG/DL (ref 8.5–10.1)
CHLORIDE SERPL-SCNC: 102 MMOL/L (ref 97–108)
CO2 SERPL-SCNC: 30 MMOL/L (ref 21–32)
CREAT SERPL-MCNC: 0.63 MG/DL (ref 0.7–1.3)
DIFFERENTIAL METHOD BLD: ABNORMAL
EKG ATRIAL RATE: 67 BPM
EKG DIAGNOSIS: NORMAL
EKG P AXIS: 22 DEGREES
EKG P-R INTERVAL: 164 MS
EKG Q-T INTERVAL: 408 MS
EKG QRS DURATION: 88 MS
EKG QTC CALCULATION (BAZETT): 431 MS
EKG R AXIS: 54 DEGREES
EKG T AXIS: 28 DEGREES
EKG VENTRICULAR RATE: 67 BPM
EOSINOPHIL # BLD: 0.05 K/UL (ref 0–0.4)
EOSINOPHIL NFR BLD: 0.5 % (ref 0–7)
ERYTHROCYTE [DISTWIDTH] IN BLOOD BY AUTOMATED COUNT: 12.3 % (ref 11.5–14.5)
GLOBULIN SER CALC-MCNC: 4 G/DL (ref 2–4)
GLUCOSE SERPL-MCNC: 132 MG/DL (ref 65–100)
GRAM STN SPEC: NORMAL
HCT VFR BLD AUTO: 37.1 % (ref 36.6–50.3)
HGB BLD-MCNC: 12.1 G/DL (ref 12.1–17)
IMM GRANULOCYTES # BLD AUTO: 0.04 K/UL (ref 0–0.04)
IMM GRANULOCYTES NFR BLD AUTO: 0.4 % (ref 0–0.5)
LIPASE SERPL-CCNC: 9 U/L (ref 13–75)
LYMPHOCYTES # BLD: 1.3 K/UL (ref 0.8–3.5)
LYMPHOCYTES NFR BLD: 11.8 % (ref 12–49)
MAGNESIUM SERPL-MCNC: 1.9 MG/DL (ref 1.6–2.4)
MCH RBC QN AUTO: 27.5 PG (ref 26–34)
MCHC RBC AUTO-ENTMCNC: 32.6 G/DL (ref 30–36.5)
MCV RBC AUTO: 84.3 FL (ref 80–99)
MONOCYTES # BLD: 0.72 K/UL (ref 0–1)
MONOCYTES NFR BLD: 6.6 % (ref 5–13)
NEUTS SEG # BLD: 8.83 K/UL (ref 1.8–8)
NEUTS SEG NFR BLD: 80.2 % (ref 32–75)
NRBC # BLD: 0 K/UL (ref 0–0.01)
NRBC BLD-RTO: 0 PER 100 WBC
PHOSPHATE SERPL-MCNC: 3.1 MG/DL (ref 2.6–4.7)
PLATELET # BLD AUTO: 336 K/UL (ref 150–400)
PMV BLD AUTO: 11.3 FL (ref 8.9–12.9)
POTASSIUM SERPL-SCNC: 3.6 MMOL/L (ref 3.5–5.1)
PROCALCITONIN SERPL-MCNC: 0.24 NG/ML
PROT SERPL-MCNC: 6.6 G/DL (ref 6.4–8.2)
RBC # BLD AUTO: 4.4 M/UL (ref 4.1–5.7)
SERVICE CMNT-IMP: NORMAL
SODIUM SERPL-SCNC: 137 MMOL/L (ref 136–145)
TRIGL SERPL-MCNC: 123 MG/DL
WBC # BLD AUTO: 11 K/UL (ref 4.1–11.1)

## 2025-02-12 PROCEDURE — 6360000002 HC RX W HCPCS: Performed by: NURSE PRACTITIONER

## 2025-02-12 PROCEDURE — 6360000002 HC RX W HCPCS: Performed by: HOSPITALIST

## 2025-02-12 PROCEDURE — 2500000003 HC RX 250 WO HCPCS: Performed by: NURSE PRACTITIONER

## 2025-02-12 PROCEDURE — 6370000000 HC RX 637 (ALT 250 FOR IP): Performed by: INTERNAL MEDICINE

## 2025-02-12 PROCEDURE — 93010 ELECTROCARDIOGRAM REPORT: CPT | Performed by: INTERNAL MEDICINE

## 2025-02-12 PROCEDURE — 6370000000 HC RX 637 (ALT 250 FOR IP): Performed by: HOSPITALIST

## 2025-02-12 PROCEDURE — 76705 ECHO EXAM OF ABDOMEN: CPT

## 2025-02-12 PROCEDURE — 71045 X-RAY EXAM CHEST 1 VIEW: CPT

## 2025-02-12 PROCEDURE — 36415 COLL VENOUS BLD VENIPUNCTURE: CPT

## 2025-02-12 PROCEDURE — 2580000003 HC RX 258: Performed by: HOSPITALIST

## 2025-02-12 PROCEDURE — 80076 HEPATIC FUNCTION PANEL: CPT

## 2025-02-12 PROCEDURE — 93970 EXTREMITY STUDY: CPT

## 2025-02-12 PROCEDURE — 2580000003 HC RX 258: Performed by: INTERNAL MEDICINE

## 2025-02-12 PROCEDURE — 84145 PROCALCITONIN (PCT): CPT

## 2025-02-12 PROCEDURE — 84100 ASSAY OF PHOSPHORUS: CPT

## 2025-02-12 PROCEDURE — 6370000000 HC RX 637 (ALT 250 FOR IP): Performed by: NURSE PRACTITIONER

## 2025-02-12 PROCEDURE — 6360000002 HC RX W HCPCS: Performed by: STUDENT IN AN ORGANIZED HEALTH CARE EDUCATION/TRAINING PROGRAM

## 2025-02-12 PROCEDURE — 51701 INSERT BLADDER CATHETER: CPT

## 2025-02-12 PROCEDURE — 80048 BASIC METABOLIC PNL TOTAL CA: CPT

## 2025-02-12 PROCEDURE — 84478 ASSAY OF TRIGLYCERIDES: CPT

## 2025-02-12 PROCEDURE — 2500000003 HC RX 250 WO HCPCS: Performed by: HOSPITALIST

## 2025-02-12 PROCEDURE — 83735 ASSAY OF MAGNESIUM: CPT

## 2025-02-12 PROCEDURE — 2000000000 HC ICU R&B

## 2025-02-12 PROCEDURE — 6360000002 HC RX W HCPCS: Performed by: INTERNAL MEDICINE

## 2025-02-12 PROCEDURE — 85025 COMPLETE CBC W/AUTO DIFF WBC: CPT

## 2025-02-12 PROCEDURE — 94003 VENT MGMT INPAT SUBQ DAY: CPT

## 2025-02-12 PROCEDURE — 51798 US URINE CAPACITY MEASURE: CPT

## 2025-02-12 PROCEDURE — 83690 ASSAY OF LIPASE: CPT

## 2025-02-12 RX ORDER — DEXMEDETOMIDINE HYDROCHLORIDE 4 UG/ML
.1-1.5 INJECTION, SOLUTION INTRAVENOUS CONTINUOUS
Status: DISCONTINUED | OUTPATIENT
Start: 2025-02-12 | End: 2025-02-14

## 2025-02-12 RX ORDER — CLONIDINE HYDROCHLORIDE 0.1 MG/1
0.1 TABLET ORAL 2 TIMES DAILY
Status: DISCONTINUED | OUTPATIENT
Start: 2025-02-12 | End: 2025-02-12

## 2025-02-12 RX ORDER — HYDROMORPHONE HYDROCHLORIDE 1 MG/ML
0.5 INJECTION, SOLUTION INTRAMUSCULAR; INTRAVENOUS; SUBCUTANEOUS EVERY 30 MIN PRN
Status: DISCONTINUED | OUTPATIENT
Start: 2025-02-12 | End: 2025-02-16

## 2025-02-12 RX ORDER — DIAZEPAM 5 MG/1
10 TABLET ORAL EVERY 6 HOURS
Status: DISCONTINUED | OUTPATIENT
Start: 2025-02-12 | End: 2025-02-14

## 2025-02-12 RX ORDER — FENTANYL CITRATE-0.9 % NACL/PF 10 MCG/ML
25-200 PLASTIC BAG, INJECTION (ML) INTRAVENOUS CONTINUOUS
Status: DISCONTINUED | OUTPATIENT
Start: 2025-02-12 | End: 2025-02-12

## 2025-02-12 RX ORDER — LURASIDONE HYDROCHLORIDE 40 MG/1
20 TABLET, FILM COATED ORAL DAILY
Status: DISCONTINUED | OUTPATIENT
Start: 2025-02-12 | End: 2025-02-13

## 2025-02-12 RX ORDER — MIDAZOLAM HYDROCHLORIDE 1 MG/ML
1-20 INJECTION, SOLUTION INTRAVENOUS CONTINUOUS
Status: DISCONTINUED | OUTPATIENT
Start: 2025-02-12 | End: 2025-02-14

## 2025-02-12 RX ORDER — CLONIDINE HYDROCHLORIDE 0.1 MG/1
0.1 TABLET ORAL 2 TIMES DAILY
Status: DISCONTINUED | OUTPATIENT
Start: 2025-02-13 | End: 2025-02-14

## 2025-02-12 RX ORDER — CLONIDINE HYDROCHLORIDE 0.1 MG/1
0.1 TABLET ORAL EVERY 8 HOURS
Status: DISCONTINUED | OUTPATIENT
Start: 2025-02-12 | End: 2025-02-12

## 2025-02-12 RX ORDER — IBUPROFEN 100 MG/5ML
400 SUSPENSION ORAL EVERY 6 HOURS PRN
Status: DISCONTINUED | OUTPATIENT
Start: 2025-02-12 | End: 2025-02-22

## 2025-02-12 RX ORDER — CLONIDINE HYDROCHLORIDE 0.1 MG/1
0.1 TABLET ORAL 3 TIMES DAILY
Status: DISCONTINUED | OUTPATIENT
Start: 2025-02-12 | End: 2025-02-12

## 2025-02-12 RX ORDER — ZIPRASIDONE MESYLATE 20 MG/ML
10 INJECTION, POWDER, LYOPHILIZED, FOR SOLUTION INTRAMUSCULAR ONCE
Status: COMPLETED | OUTPATIENT
Start: 2025-02-12 | End: 2025-02-12

## 2025-02-12 RX ORDER — QUETIAPINE FUMARATE 100 MG/1
100 TABLET, FILM COATED ORAL 2 TIMES DAILY
Status: DISCONTINUED | OUTPATIENT
Start: 2025-02-12 | End: 2025-02-13

## 2025-02-12 RX ORDER — MIDAZOLAM HYDROCHLORIDE 5 MG/5ML
4 INJECTION, SOLUTION INTRAMUSCULAR; INTRAVENOUS ONCE
Status: COMPLETED | OUTPATIENT
Start: 2025-02-12 | End: 2025-02-12

## 2025-02-12 RX ORDER — WATER 10 ML/10ML
INJECTION INTRAMUSCULAR; INTRAVENOUS; SUBCUTANEOUS
Status: DISCONTINUED
Start: 2025-02-12 | End: 2025-02-12

## 2025-02-12 RX ORDER — GLYCOPYRROLATE 0.2 MG/ML
0.1 INJECTION INTRAMUSCULAR; INTRAVENOUS EVERY 4 HOURS PRN
Status: DISCONTINUED | OUTPATIENT
Start: 2025-02-12 | End: 2025-02-25 | Stop reason: HOSPADM

## 2025-02-12 RX ORDER — PROPOFOL 10 MG/ML
5-50 INJECTION, EMULSION INTRAVENOUS CONTINUOUS
Status: DISCONTINUED | OUTPATIENT
Start: 2025-02-12 | End: 2025-02-14

## 2025-02-12 RX ORDER — MIDAZOLAM HYDROCHLORIDE 1 MG/ML
2 INJECTION, SOLUTION INTRAMUSCULAR; INTRAVENOUS
Status: DISCONTINUED | OUTPATIENT
Start: 2025-02-12 | End: 2025-02-13

## 2025-02-12 RX ADMIN — DEXMEDETOMIDINE HYDROCHLORIDE 1.2 MCG/KG/HR: 400 INJECTION INTRAVENOUS at 00:59

## 2025-02-12 RX ADMIN — MIDAZOLAM HYDROCHLORIDE 2 MG: 1 INJECTION, SOLUTION INTRAMUSCULAR; INTRAVENOUS at 14:09

## 2025-02-12 RX ADMIN — ZIPRASIDONE MESYLATE 10 MG: 20 INJECTION, POWDER, LYOPHILIZED, FOR SOLUTION INTRAMUSCULAR at 01:22

## 2025-02-12 RX ADMIN — CEFEPIME 2000 MG: 2 INJECTION, POWDER, FOR SOLUTION INTRAVENOUS at 00:21

## 2025-02-12 RX ADMIN — PIPERACILLIN AND TAZOBACTAM 4500 MG: 4; .5 INJECTION, POWDER, FOR SOLUTION INTRAVENOUS at 15:17

## 2025-02-12 RX ADMIN — POLYETHYLENE GLYCOL 3350 17 G: 17 POWDER, FOR SOLUTION ORAL at 20:48

## 2025-02-12 RX ADMIN — CEFEPIME 2000 MG: 2 INJECTION, POWDER, FOR SOLUTION INTRAVENOUS at 08:49

## 2025-02-12 RX ADMIN — PROPOFOL 50 MCG/KG/MIN: 10 INJECTION, EMULSION INTRAVENOUS at 09:50

## 2025-02-12 RX ADMIN — QUETIAPINE FUMARATE 100 MG: 100 TABLET ORAL at 20:48

## 2025-02-12 RX ADMIN — MIDAZOLAM HYDROCHLORIDE 2 MG: 1 INJECTION, SOLUTION INTRAMUSCULAR; INTRAVENOUS at 07:04

## 2025-02-12 RX ADMIN — METHADONE HYDROCHLORIDE 30 MG: 10 CONCENTRATE ORAL at 08:39

## 2025-02-12 RX ADMIN — SENNOSIDES AND DOCUSATE SODIUM 2 TABLET: 50; 8.6 TABLET ORAL at 08:38

## 2025-02-12 RX ADMIN — DEXTRAN 70, GLYCERIN, HYPROMELLOSE 1 DROP: 1; 2; 3 SOLUTION/ DROPS OPHTHALMIC at 10:39

## 2025-02-12 RX ADMIN — HYDROMORPHONE HYDROCHLORIDE 0.5 MG: 1 INJECTION, SOLUTION INTRAMUSCULAR; INTRAVENOUS; SUBCUTANEOUS at 11:14

## 2025-02-12 RX ADMIN — Medication 2.5 MG/HR: at 20:35

## 2025-02-12 RX ADMIN — QUETIAPINE FUMARATE 100 MG: 100 TABLET ORAL at 08:38

## 2025-02-12 RX ADMIN — MIDAZOLAM HYDROCHLORIDE 2 MG: 1 INJECTION, SOLUTION INTRAMUSCULAR; INTRAVENOUS at 06:18

## 2025-02-12 RX ADMIN — MIDAZOLAM HYDROCHLORIDE 2 MG: 1 INJECTION, SOLUTION INTRAMUSCULAR; INTRAVENOUS at 00:54

## 2025-02-12 RX ADMIN — PIPERACILLIN AND TAZOBACTAM 4500 MG: 4; .5 INJECTION, POWDER, FOR SOLUTION INTRAVENOUS at 21:43

## 2025-02-12 RX ADMIN — MIDAZOLAM HYDROCHLORIDE 9 MG/HR: 1 INJECTION, SOLUTION INTRAVENOUS at 11:56

## 2025-02-12 RX ADMIN — MIDAZOLAM HYDROCHLORIDE 8 MG/HR: 1 INJECTION, SOLUTION INTRAVENOUS at 01:03

## 2025-02-12 RX ADMIN — MIDAZOLAM 4 MG: 1 INJECTION INTRAMUSCULAR; INTRAVENOUS at 01:37

## 2025-02-12 RX ADMIN — NALOXEGOL OXALATE 25 MG: 25 TABLET, FILM COATED ORAL at 06:55

## 2025-02-12 RX ADMIN — FAMOTIDINE 20 MG: 20 TABLET, FILM COATED ORAL at 20:48

## 2025-02-12 RX ADMIN — HYDROMORPHONE HYDROCHLORIDE 0.5 MG: 1 INJECTION, SOLUTION INTRAMUSCULAR; INTRAVENOUS; SUBCUTANEOUS at 08:19

## 2025-02-12 RX ADMIN — CHLORHEXIDINE GLUCONATE 15 ML: 1.2 RINSE ORAL at 09:06

## 2025-02-12 RX ADMIN — POLYETHYLENE GLYCOL 3350 17 G: 17 POWDER, FOR SOLUTION ORAL at 08:54

## 2025-02-12 RX ADMIN — CHLORHEXIDINE GLUCONATE 15 ML: 1.2 RINSE ORAL at 20:39

## 2025-02-12 RX ADMIN — LURASIDONE HYDROCHLORIDE 20 MG: 40 TABLET, FILM COATED ORAL at 18:06

## 2025-02-12 RX ADMIN — Medication 175 MCG/HR: at 03:45

## 2025-02-12 RX ADMIN — FOLIC ACID 1 MG: 1 TABLET ORAL at 08:38

## 2025-02-12 RX ADMIN — Medication 2 MG/HR: at 09:29

## 2025-02-12 RX ADMIN — DEXTRAN 70, GLYCERIN, HYPROMELLOSE 1 DROP: 1; 2; 3 SOLUTION/ DROPS OPHTHALMIC at 15:36

## 2025-02-12 RX ADMIN — MIDAZOLAM HYDROCHLORIDE 2 MG: 1 INJECTION, SOLUTION INTRAMUSCULAR; INTRAVENOUS at 08:13

## 2025-02-12 RX ADMIN — IBUPROFEN 400 MG: 100 SUSPENSION ORAL at 05:57

## 2025-02-12 RX ADMIN — DEXTRAN 70, GLYCERIN, HYPROMELLOSE 1 DROP: 1; 2; 3 SOLUTION/ DROPS OPHTHALMIC at 06:34

## 2025-02-12 RX ADMIN — DIAZEPAM 10 MG: 5 TABLET ORAL at 08:38

## 2025-02-12 RX ADMIN — CLONIDINE HYDROCHLORIDE 0.1 MG: 0.1 TABLET ORAL at 15:13

## 2025-02-12 RX ADMIN — GLYCOPYRROLATE 0.1 MG: 0.2 INJECTION INTRAMUSCULAR; INTRAVENOUS at 03:37

## 2025-02-12 RX ADMIN — MIDAZOLAM HYDROCHLORIDE 9 MG/HR: 1 INJECTION, SOLUTION INTRAVENOUS at 03:44

## 2025-02-12 RX ADMIN — DEXTRAN 70, GLYCERIN, HYPROMELLOSE 1 DROP: 1; 2; 3 SOLUTION/ DROPS OPHTHALMIC at 19:40

## 2025-02-12 RX ADMIN — Medication 175 MCG/HR: at 02:33

## 2025-02-12 RX ADMIN — Medication 100 MG: at 08:39

## 2025-02-12 RX ADMIN — DEXTRAN 70, GLYCERIN, HYPROMELLOSE 1 DROP: 1; 2; 3 SOLUTION/ DROPS OPHTHALMIC at 22:52

## 2025-02-12 RX ADMIN — Medication 2.5 MG/HR: at 11:12

## 2025-02-12 RX ADMIN — DEXMEDETOMIDINE HYDROCHLORIDE 0.6 MCG/KG/HR: 400 INJECTION INTRAVENOUS at 08:18

## 2025-02-12 RX ADMIN — MIDAZOLAM HYDROCHLORIDE 2 MG: 1 INJECTION, SOLUTION INTRAMUSCULAR; INTRAVENOUS at 05:14

## 2025-02-12 RX ADMIN — VANCOMYCIN HYDROCHLORIDE 1000 MG: 1 INJECTION, POWDER, LYOPHILIZED, FOR SOLUTION INTRAVENOUS at 06:35

## 2025-02-12 RX ADMIN — DEXMEDETOMIDINE HYDROCHLORIDE 0.2 MCG/KG/HR: 400 INJECTION INTRAVENOUS at 19:15

## 2025-02-12 RX ADMIN — PROPOFOL 50 MCG/KG/MIN: 10 INJECTION, EMULSION INTRAVENOUS at 20:55

## 2025-02-12 RX ADMIN — PROPOFOL 50 MCG/KG/MIN: 10 INJECTION, EMULSION INTRAVENOUS at 15:46

## 2025-02-12 RX ADMIN — DIAZEPAM 10 MG: 5 TABLET ORAL at 01:54

## 2025-02-12 RX ADMIN — CLONIDINE HYDROCHLORIDE 0.1 MG: 0.1 TABLET ORAL at 10:37

## 2025-02-12 RX ADMIN — MIDAZOLAM HYDROCHLORIDE 2 MG: 1 INJECTION, SOLUTION INTRAMUSCULAR; INTRAVENOUS at 00:08

## 2025-02-12 RX ADMIN — Medication 175 MCG/HR: at 07:42

## 2025-02-12 RX ADMIN — THERA TABS 1 TABLET: TAB at 08:38

## 2025-02-12 RX ADMIN — ENOXAPARIN SODIUM 40 MG: 100 INJECTION SUBCUTANEOUS at 10:37

## 2025-02-12 RX ADMIN — GLYCOPYRROLATE 0.1 MG: 0.2 INJECTION INTRAMUSCULAR; INTRAVENOUS at 21:40

## 2025-02-12 RX ADMIN — FAMOTIDINE 20 MG: 20 TABLET, FILM COATED ORAL at 08:38

## 2025-02-12 RX ADMIN — DEXTRAN 70, GLYCERIN, HYPROMELLOSE 1 DROP: 1; 2; 3 SOLUTION/ DROPS OPHTHALMIC at 01:40

## 2025-02-12 RX ADMIN — PROPOFOL 50 MCG/KG/MIN: 10 INJECTION, EMULSION INTRAVENOUS at 03:41

## 2025-02-12 RX ADMIN — MIDAZOLAM HYDROCHLORIDE 2 MG: 1 INJECTION, SOLUTION INTRAMUSCULAR; INTRAVENOUS at 21:00

## 2025-02-12 RX ADMIN — DIAZEPAM 10 MG: 5 TABLET ORAL at 20:48

## 2025-02-12 RX ADMIN — PROPOFOL 50 MCG/KG/MIN: 10 INJECTION, EMULSION INTRAVENOUS at 05:24

## 2025-02-12 RX ADMIN — DIAZEPAM 10 MG: 5 TABLET ORAL at 15:13

## 2025-02-12 ASSESSMENT — PULMONARY FUNCTION TESTS
PIF_VALUE: 17
PIF_VALUE: 21
PIF_VALUE: 18
PIF_VALUE: 19
PIF_VALUE: 23

## 2025-02-12 ASSESSMENT — PAIN SCALES - GENERAL
PAINLEVEL_OUTOF10: 0

## 2025-02-12 ASSESSMENT — PAIN SCALES - WONG BAKER: WONGBAKER_NUMERICALRESPONSE: NO HURT

## 2025-02-12 NOTE — CARE COORDINATION
Care Management Progress Note    Reason for Admission:   Nausea and vomiting in adult [R11.2]  Alcohol-induced acute pancreatitis without infection or necrosis [K85.20]  Pancreatitis without necrosis or infection [K85.90]         Patient Admission Status: Inpatient  RUR: 13%  Hospitalization in the last 30 days (Readmission):  no        Transition of care plan:  [Medical]  Pt remains intubated and sedated(1/30/25)  Sedation medications are being adjusted by intensivist.  Overnight,pt received geodon and valium  When sedation has been decreased,pt has become quickly agitated and aggressive.  Pt is on IV versed   Precedex gtt  Propofol gtt  Fentanyl gtt  Trach/peg discussions  Per intensivist's discussions with pt.'s sister,Artie Trejo yesterday,sister has agreed to proceed with tracheostomy.  ENT consulted-ENT is currently on vacation this week  [DC plan]  If SNF or IPR:  date FOC discussed/offered 2/12/25  Discharge plan communicated with patient and/or discharge caregiver: yes    Date 1st [facility] letter given: [place] [employer] [place]   Outpatient follow-up.will need a new PCP   Transport at discharge:  medicaid    Future discharge needs:  I contacted pt.'s sister,Artie to begin discussions regarding potential need for LTACH /SNF/inpatient rehab.    Sister said she did not realize her brother would need rehab and did not realize that her brother would need to leave the hospital once he received his trach.    I reviewed the typical progression with sister in relationship of hospital transitioning to next disposition     I reviewed LTACHs with sister and when a LTACH is indicated.I reviewed inpatient rehabs with sister and when IPR is indicated and when a SNF would be necessary.    I will periodically discuss disposition with sister.    Sister also wants to know a projection ,if possible,as to how long pt may need the  trach and wants a projection of when pt would be stable to go to next disposition once trach is

## 2025-02-13 ENCOUNTER — APPOINTMENT (OUTPATIENT)
Facility: HOSPITAL | Age: 35
DRG: 005 | End: 2025-02-13
Payer: MEDICAID

## 2025-02-13 LAB
ANION GAP SERPL CALC-SCNC: 2 MMOL/L (ref 2–12)
ANION GAP SERPL CALC-SCNC: 6 MMOL/L (ref 2–12)
APPEARANCE UR: CLEAR
BACTERIA URNS QL MICRO: NEGATIVE /HPF
BASOPHILS # BLD: 0.06 K/UL (ref 0–0.1)
BASOPHILS NFR BLD: 1 % (ref 0–1)
BILIRUB UR QL: NEGATIVE
BUN SERPL-MCNC: 14 MG/DL (ref 6–20)
BUN SERPL-MCNC: 16 MG/DL (ref 6–20)
BUN/CREAT SERPL: 20 (ref 12–20)
BUN/CREAT SERPL: 25 (ref 12–20)
CALCIUM SERPL-MCNC: 9.4 MG/DL (ref 8.5–10.1)
CALCIUM SERPL-MCNC: 9.9 MG/DL (ref 8.5–10.1)
CHLORIDE SERPL-SCNC: 106 MMOL/L (ref 97–108)
CHLORIDE SERPL-SCNC: 107 MMOL/L (ref 97–108)
CO2 SERPL-SCNC: 28 MMOL/L (ref 21–32)
CO2 SERPL-SCNC: 31 MMOL/L (ref 21–32)
COLOR UR: NORMAL
CREAT SERPL-MCNC: 0.64 MG/DL (ref 0.7–1.3)
CREAT SERPL-MCNC: 0.71 MG/DL (ref 0.7–1.3)
DIFFERENTIAL METHOD BLD: ABNORMAL
ECHO BSA: 1.96 M2
EOSINOPHIL # BLD: 0.29 K/UL (ref 0–0.4)
EOSINOPHIL NFR BLD: 5 % (ref 0–7)
EPITH CASTS URNS QL MICRO: NORMAL /LPF
ERYTHROCYTE [DISTWIDTH] IN BLOOD BY AUTOMATED COUNT: 12.4 % (ref 11.5–14.5)
GLUCOSE SERPL-MCNC: 105 MG/DL (ref 65–100)
GLUCOSE SERPL-MCNC: 110 MG/DL (ref 65–100)
GLUCOSE UR STRIP.AUTO-MCNC: NEGATIVE MG/DL
HCT VFR BLD AUTO: 33.1 % (ref 36.6–50.3)
HGB BLD-MCNC: 10.7 G/DL (ref 12.1–17)
HGB UR QL STRIP: NEGATIVE
HYALINE CASTS URNS QL MICRO: NORMAL /LPF (ref 0–2)
IMM GRANULOCYTES # BLD AUTO: 0 K/UL
IMM GRANULOCYTES NFR BLD AUTO: 0 %
KETONES UR QL STRIP.AUTO: NEGATIVE MG/DL
LEUKOCYTE ESTERASE UR QL STRIP.AUTO: NEGATIVE
LYMPHOCYTES # BLD: 1.74 K/UL (ref 0.8–3.5)
LYMPHOCYTES NFR BLD: 30 % (ref 12–49)
MAGNESIUM SERPL-MCNC: 2 MG/DL (ref 1.6–2.4)
MCH RBC QN AUTO: 27.2 PG (ref 26–34)
MCHC RBC AUTO-ENTMCNC: 32.3 G/DL (ref 30–36.5)
MCV RBC AUTO: 84.2 FL (ref 80–99)
MONOCYTES # BLD: 0.29 K/UL (ref 0–1)
MONOCYTES NFR BLD: 5 % (ref 5–13)
MYELOCYTES NFR BLD MANUAL: 1 %
NEUTS SEG # BLD: 3.36 K/UL (ref 1.8–8)
NEUTS SEG NFR BLD: 58 % (ref 32–75)
NITRITE UR QL STRIP.AUTO: NEGATIVE
NRBC # BLD: 0 K/UL (ref 0–0.01)
NRBC BLD-RTO: 0 PER 100 WBC
PH UR STRIP: 6 (ref 5–8)
PHOSPHATE SERPL-MCNC: 4.3 MG/DL (ref 2.6–4.7)
PLATELET # BLD AUTO: 320 K/UL (ref 150–400)
PMV BLD AUTO: 11.1 FL (ref 8.9–12.9)
POTASSIUM SERPL-SCNC: 3.1 MMOL/L (ref 3.5–5.1)
POTASSIUM SERPL-SCNC: 3.9 MMOL/L (ref 3.5–5.1)
PROT UR STRIP-MCNC: NEGATIVE MG/DL
RBC # BLD AUTO: 3.93 M/UL (ref 4.1–5.7)
RBC #/AREA URNS HPF: NORMAL /HPF (ref 0–5)
RBC MORPH BLD: ABNORMAL
SODIUM SERPL-SCNC: 140 MMOL/L (ref 136–145)
SODIUM SERPL-SCNC: 140 MMOL/L (ref 136–145)
SP GR UR REFRACTOMETRY: 1.02 (ref 1–1.03)
UROBILINOGEN UR QL STRIP.AUTO: 1 EU/DL (ref 0.2–1)
WBC # BLD AUTO: 5.8 K/UL (ref 4.1–11.1)
WBC URNS QL MICRO: NORMAL /HPF (ref 0–4)

## 2025-02-13 PROCEDURE — 74018 RADEX ABDOMEN 1 VIEW: CPT

## 2025-02-13 PROCEDURE — 51702 INSERT TEMP BLADDER CATH: CPT

## 2025-02-13 PROCEDURE — 81001 URINALYSIS AUTO W/SCOPE: CPT

## 2025-02-13 PROCEDURE — 85025 COMPLETE CBC W/AUTO DIFF WBC: CPT

## 2025-02-13 PROCEDURE — 31646 BRNCHSC W/THER ASPIR SBSQ: CPT

## 2025-02-13 PROCEDURE — 6370000000 HC RX 637 (ALT 250 FOR IP): Performed by: INTERNAL MEDICINE

## 2025-02-13 PROCEDURE — 6360000002 HC RX W HCPCS: Performed by: INTERNAL MEDICINE

## 2025-02-13 PROCEDURE — 51798 US URINE CAPACITY MEASURE: CPT

## 2025-02-13 PROCEDURE — 84100 ASSAY OF PHOSPHORUS: CPT

## 2025-02-13 PROCEDURE — 0B113F4 BYPASS TRACHEA TO CUTANEOUS WITH TRACHEOSTOMY DEVICE, PERCUTANEOUS APPROACH: ICD-10-PCS | Performed by: HOSPITALIST

## 2025-02-13 PROCEDURE — 87070 CULTURE OTHR SPECIMN AEROBIC: CPT

## 2025-02-13 PROCEDURE — 6370000000 HC RX 637 (ALT 250 FOR IP): Performed by: NURSE PRACTITIONER

## 2025-02-13 PROCEDURE — 2580000003 HC RX 258: Performed by: INTERNAL MEDICINE

## 2025-02-13 PROCEDURE — 51701 INSERT BLADDER CATHETER: CPT

## 2025-02-13 PROCEDURE — 2500000003 HC RX 250 WO HCPCS: Performed by: INTERNAL MEDICINE

## 2025-02-13 PROCEDURE — 2500000003 HC RX 250 WO HCPCS: Performed by: NURSE PRACTITIONER

## 2025-02-13 PROCEDURE — 31600 PLANNED TRACHEOSTOMY: CPT

## 2025-02-13 PROCEDURE — 2700000000 HC OXYGEN THERAPY PER DAY

## 2025-02-13 PROCEDURE — 94003 VENT MGMT INPAT SUBQ DAY: CPT

## 2025-02-13 PROCEDURE — 6360000002 HC RX W HCPCS: Performed by: HOSPITALIST

## 2025-02-13 PROCEDURE — 0BCB8ZZ EXTIRPATION OF MATTER FROM LEFT LOWER LOBE BRONCHUS, VIA NATURAL OR ARTIFICIAL OPENING ENDOSCOPIC: ICD-10-PCS | Performed by: INTERNAL MEDICINE

## 2025-02-13 PROCEDURE — 6370000000 HC RX 637 (ALT 250 FOR IP): Performed by: HOSPITALIST

## 2025-02-13 PROCEDURE — 36415 COLL VENOUS BLD VENIPUNCTURE: CPT

## 2025-02-13 PROCEDURE — 87205 SMEAR GRAM STAIN: CPT

## 2025-02-13 PROCEDURE — 6360000002 HC RX W HCPCS: Performed by: NURSE PRACTITIONER

## 2025-02-13 PROCEDURE — 0BC68ZZ EXTIRPATION OF MATTER FROM RIGHT LOWER LOBE BRONCHUS, VIA NATURAL OR ARTIFICIAL OPENING ENDOSCOPIC: ICD-10-PCS | Performed by: INTERNAL MEDICINE

## 2025-02-13 PROCEDURE — 2000000000 HC ICU R&B

## 2025-02-13 PROCEDURE — 80048 BASIC METABOLIC PNL TOTAL CA: CPT

## 2025-02-13 PROCEDURE — 83735 ASSAY OF MAGNESIUM: CPT

## 2025-02-13 RX ORDER — CISATRACURIUM BESYLATE 2 MG/ML
15 INJECTION, SOLUTION INTRAVENOUS ONCE
Status: COMPLETED | OUTPATIENT
Start: 2025-02-13 | End: 2025-02-13

## 2025-02-13 RX ORDER — MIDAZOLAM HYDROCHLORIDE 1 MG/ML
5 INJECTION, SOLUTION INTRAMUSCULAR; INTRAVENOUS ONCE
Status: DISCONTINUED | OUTPATIENT
Start: 2025-02-13 | End: 2025-02-14

## 2025-02-13 RX ORDER — MIDAZOLAM HYDROCHLORIDE 1 MG/ML
2 INJECTION, SOLUTION INTRAMUSCULAR; INTRAVENOUS
Status: DISCONTINUED | OUTPATIENT
Start: 2025-02-13 | End: 2025-02-22

## 2025-02-13 RX ORDER — LURASIDONE HYDROCHLORIDE 40 MG/1
20 TABLET, FILM COATED ORAL DAILY
Status: DISCONTINUED | OUTPATIENT
Start: 2025-02-13 | End: 2025-02-14

## 2025-02-13 RX ORDER — CISATRACURIUM BESYLATE 10 MG/ML
15 INJECTION, SOLUTION INTRAVENOUS ONCE
Status: DISCONTINUED | OUTPATIENT
Start: 2025-02-13 | End: 2025-02-13 | Stop reason: SDUPTHER

## 2025-02-13 RX ORDER — FENTANYL CITRATE 50 UG/ML
200 INJECTION, SOLUTION INTRAMUSCULAR; INTRAVENOUS ONCE
Status: COMPLETED | OUTPATIENT
Start: 2025-02-13 | End: 2025-02-13

## 2025-02-13 RX ORDER — QUETIAPINE FUMARATE 100 MG/1
100 TABLET, FILM COATED ORAL NIGHTLY
Status: DISCONTINUED | OUTPATIENT
Start: 2025-02-13 | End: 2025-02-14

## 2025-02-13 RX ADMIN — CISATRACURIUM BESYLATE 15 MG: 20 INJECTION, SOLUTION INTRAVENOUS at 15:18

## 2025-02-13 RX ADMIN — FAMOTIDINE 20 MG: 20 TABLET, FILM COATED ORAL at 08:23

## 2025-02-13 RX ADMIN — FOLIC ACID 1 MG: 1 TABLET ORAL at 08:23

## 2025-02-13 RX ADMIN — PIPERACILLIN AND TAZOBACTAM 4500 MG: 4; .5 INJECTION, POWDER, FOR SOLUTION INTRAVENOUS at 21:50

## 2025-02-13 RX ADMIN — DEXMEDETOMIDINE HYDROCHLORIDE 0.5 MCG/KG/HR: 400 INJECTION INTRAVENOUS at 11:25

## 2025-02-13 RX ADMIN — DIAZEPAM 10 MG: 5 TABLET ORAL at 03:14

## 2025-02-13 RX ADMIN — LURASIDONE HYDROCHLORIDE 20 MG: 40 TABLET, FILM COATED ORAL at 09:00

## 2025-02-13 RX ADMIN — DEXTRAN 70, GLYCERIN, HYPROMELLOSE 1 DROP: 1; 2; 3 SOLUTION/ DROPS OPHTHALMIC at 11:00

## 2025-02-13 RX ADMIN — PIPERACILLIN AND TAZOBACTAM 4500 MG: 4; .5 INJECTION, POWDER, FOR SOLUTION INTRAVENOUS at 05:51

## 2025-02-13 RX ADMIN — Medication 100 MG: at 08:23

## 2025-02-13 RX ADMIN — DEXTRAN 70, GLYCERIN, HYPROMELLOSE 1 DROP: 1; 2; 3 SOLUTION/ DROPS OPHTHALMIC at 05:50

## 2025-02-13 RX ADMIN — MIDAZOLAM 2 MG: 1 INJECTION INTRAMUSCULAR; INTRAVENOUS at 06:49

## 2025-02-13 RX ADMIN — PROPOFOL 50 MCG/KG/MIN: 10 INJECTION, EMULSION INTRAVENOUS at 12:02

## 2025-02-13 RX ADMIN — CHLORHEXIDINE GLUCONATE 15 ML: 1.2 RINSE ORAL at 20:01

## 2025-02-13 RX ADMIN — CLONIDINE HYDROCHLORIDE 0.1 MG: 0.1 TABLET ORAL at 09:04

## 2025-02-13 RX ADMIN — METHADONE HYDROCHLORIDE 30 MG: 10 CONCENTRATE ORAL at 08:20

## 2025-02-13 RX ADMIN — PROPOFOL 50 MCG/KG/MIN: 10 INJECTION, EMULSION INTRAVENOUS at 17:37

## 2025-02-13 RX ADMIN — Medication 2.5 MG/HR: at 20:00

## 2025-02-13 RX ADMIN — FENTANYL CITRATE 200 MCG: 50 INJECTION INTRAMUSCULAR; INTRAVENOUS at 15:08

## 2025-02-13 RX ADMIN — DIAZEPAM 10 MG: 5 TABLET ORAL at 09:01

## 2025-02-13 RX ADMIN — DEXMEDETOMIDINE HYDROCHLORIDE 1.1 MCG/KG/HR: 400 INJECTION INTRAVENOUS at 23:14

## 2025-02-13 RX ADMIN — DEXTRAN 70, GLYCERIN, HYPROMELLOSE 1 DROP: 1; 2; 3 SOLUTION/ DROPS OPHTHALMIC at 03:14

## 2025-02-13 RX ADMIN — DEXTRAN 70, GLYCERIN, HYPROMELLOSE 1 DROP: 1; 2; 3 SOLUTION/ DROPS OPHTHALMIC at 23:13

## 2025-02-13 RX ADMIN — THERA TABS 1 TABLET: TAB at 08:23

## 2025-02-13 RX ADMIN — POTASSIUM BICARBONATE 40 MEQ: 782 TABLET, EFFERVESCENT ORAL at 08:23

## 2025-02-13 RX ADMIN — DEXTRAN 70, GLYCERIN, HYPROMELLOSE 1 DROP: 1; 2; 3 SOLUTION/ DROPS OPHTHALMIC at 19:12

## 2025-02-13 RX ADMIN — CHLORHEXIDINE GLUCONATE 15 ML: 1.2 RINSE ORAL at 09:00

## 2025-02-13 RX ADMIN — ENOXAPARIN SODIUM 40 MG: 100 INJECTION SUBCUTANEOUS at 12:33

## 2025-02-13 RX ADMIN — NALOXEGOL OXALATE 25 MG: 25 TABLET, FILM COATED ORAL at 08:23

## 2025-02-13 RX ADMIN — PROPOFOL 50 MCG/KG/MIN: 10 INJECTION, EMULSION INTRAVENOUS at 01:52

## 2025-02-13 RX ADMIN — Medication 2.5 MG/HR: at 08:45

## 2025-02-13 RX ADMIN — MIDAZOLAM HYDROCHLORIDE 10 MG/HR: 1 INJECTION, SOLUTION INTRAVENOUS at 17:42

## 2025-02-13 RX ADMIN — POLYETHYLENE GLYCOL 3350 17 G: 17 POWDER, FOR SOLUTION ORAL at 08:23

## 2025-02-13 RX ADMIN — MIDAZOLAM HYDROCHLORIDE 2 MG: 1 INJECTION, SOLUTION INTRAMUSCULAR; INTRAVENOUS at 04:36

## 2025-02-13 RX ADMIN — QUETIAPINE FUMARATE 100 MG: 100 TABLET ORAL at 09:01

## 2025-02-13 RX ADMIN — MIDAZOLAM HYDROCHLORIDE 5 MG/HR: 1 INJECTION, SOLUTION INTRAVENOUS at 01:53

## 2025-02-13 RX ADMIN — PROPOFOL 50 MCG/KG/MIN: 10 INJECTION, EMULSION INTRAVENOUS at 22:08

## 2025-02-13 RX ADMIN — MIDAZOLAM HYDROCHLORIDE 2 MG: 1 INJECTION, SOLUTION INTRAMUSCULAR; INTRAVENOUS at 02:53

## 2025-02-13 RX ADMIN — POTASSIUM BICARBONATE 40 MEQ: 782 TABLET, EFFERVESCENT ORAL at 04:04

## 2025-02-13 RX ADMIN — PIPERACILLIN AND TAZOBACTAM 4500 MG: 4; .5 INJECTION, POWDER, FOR SOLUTION INTRAVENOUS at 16:54

## 2025-02-13 RX ADMIN — POTASSIUM BICARBONATE 40 MEQ: 782 TABLET, EFFERVESCENT ORAL at 09:01

## 2025-02-13 RX ADMIN — PROPOFOL 50 MCG/KG/MIN: 10 INJECTION, EMULSION INTRAVENOUS at 06:52

## 2025-02-13 RX ADMIN — MIDAZOLAM 2 MG: 1 INJECTION INTRAMUSCULAR; INTRAVENOUS at 15:13

## 2025-02-13 RX ADMIN — DEXMEDETOMIDINE HYDROCHLORIDE 1.1 MCG/KG/HR: 400 INJECTION INTRAVENOUS at 17:38

## 2025-02-13 ASSESSMENT — PULMONARY FUNCTION TESTS
PIF_VALUE: 19
PIF_VALUE: 20
PIF_VALUE: 23
PIF_VALUE: 22
PIF_VALUE: 21

## 2025-02-13 ASSESSMENT — PAIN SCALES - GENERAL
PAINLEVEL_OUTOF10: 0

## 2025-02-13 NOTE — FLOWSHEET NOTE
02/13/25 1218 02/13/25 1219 02/13/25 1220   Vitals   Pulse (!) 154 (!) 140 (!) 127   Respirations 28 17 19   ETCO2 (mmHg) 33 mmHg 42 mmHg 35 mmHg      02/13/25 1221 02/13/25 1222   Vitals   Pulse (!) 135 (!) 117   Respirations 25 15   ETCO2 (mmHg) 36 mmHg 39 mmHg     Was weaning sedation to perform an SAT. Patient aggitated, tachycardic, tachypneic, HR Max got to 160 on bedside monitor. Patient failed SAT

## 2025-02-13 NOTE — CARE COORDINATION
2/13/25  9:08 AM    Care Management Progress Note    Reason for Admission:   Nausea and vomiting in adult [R11.2]  Alcohol-induced acute pancreatitis without infection or necrosis [K85.20]  Pancreatitis without necrosis or infection [K85.90]         Patient Admission Status: Inpatient  RUR: 15%  Hospitalization in the last 30 days (Readmission):  No        Transition of care plan:  Ongoing medical management:  Remains intubated and sedated- unable to wean sedation overnight.   Trach/PEG discussions with patients sister who is in agreement with the plan to proceed with a tracheostomy.  ENT consulted for trach  Discharge plan:   [facility]  [facility] pending clinical stability and insurance authorization- auth has not yet been started.   Discharge plan communicated with patient and/or discharge caregiver: Yes    Outpatient follow-up.  Transport at discharge: Stretcher via Medicaid    GUIDO Judd  [facility]      Available via [employer]

## 2025-02-14 LAB
ALBUMIN SERPL-MCNC: 2.6 G/DL (ref 3.5–5)
ALBUMIN/GLOB SERPL: 0.5 (ref 1.1–2.2)
ALP SERPL-CCNC: 96 U/L (ref 45–117)
ALT SERPL-CCNC: 101 U/L (ref 12–78)
ANION GAP SERPL CALC-SCNC: 6 MMOL/L (ref 2–12)
AST SERPL-CCNC: 37 U/L (ref 15–37)
BILIRUB DIRECT SERPL-MCNC: 0.5 MG/DL (ref 0–0.2)
BILIRUB SERPL-MCNC: 0.7 MG/DL (ref 0.2–1)
BUN SERPL-MCNC: 14 MG/DL (ref 6–20)
BUN/CREAT SERPL: 21 (ref 12–20)
CALCIUM SERPL-MCNC: 9.7 MG/DL (ref 8.5–10.1)
CHLORIDE SERPL-SCNC: 106 MMOL/L (ref 97–108)
CO2 SERPL-SCNC: 28 MMOL/L (ref 21–32)
COMMENT:: NORMAL
CREAT SERPL-MCNC: 0.66 MG/DL (ref 0.7–1.3)
GLOBULIN SER CALC-MCNC: 4.8 G/DL (ref 2–4)
GLUCOSE BLD STRIP.AUTO-MCNC: 98 MG/DL (ref 65–117)
GLUCOSE SERPL-MCNC: 89 MG/DL (ref 65–100)
INR PPP: 1.1 (ref 0.9–1.1)
POTASSIUM SERPL-SCNC: 4.2 MMOL/L (ref 3.5–5.1)
PROCALCITONIN SERPL-MCNC: 0.14 NG/ML
PROT SERPL-MCNC: 7.4 G/DL (ref 6.4–8.2)
PROTHROMBIN TIME: 11.1 SEC (ref 9.2–11.2)
SERVICE CMNT-IMP: NORMAL
SODIUM SERPL-SCNC: 140 MMOL/L (ref 136–145)
SPECIMEN HOLD: NORMAL
TRIGL SERPL-MCNC: 213 MG/DL

## 2025-02-14 PROCEDURE — 6360000002 HC RX W HCPCS: Performed by: INTERNAL MEDICINE

## 2025-02-14 PROCEDURE — 2000000000 HC ICU R&B

## 2025-02-14 PROCEDURE — 80076 HEPATIC FUNCTION PANEL: CPT

## 2025-02-14 PROCEDURE — 94003 VENT MGMT INPAT SUBQ DAY: CPT

## 2025-02-14 PROCEDURE — 2500000003 HC RX 250 WO HCPCS: Performed by: NURSE PRACTITIONER

## 2025-02-14 PROCEDURE — 6360000002 HC RX W HCPCS: Performed by: NURSE PRACTITIONER

## 2025-02-14 PROCEDURE — 82962 GLUCOSE BLOOD TEST: CPT

## 2025-02-14 PROCEDURE — 84478 ASSAY OF TRIGLYCERIDES: CPT

## 2025-02-14 PROCEDURE — 6370000000 HC RX 637 (ALT 250 FOR IP): Performed by: INTERNAL MEDICINE

## 2025-02-14 PROCEDURE — 36415 COLL VENOUS BLD VENIPUNCTURE: CPT

## 2025-02-14 PROCEDURE — 6370000000 HC RX 637 (ALT 250 FOR IP): Performed by: HOSPITALIST

## 2025-02-14 PROCEDURE — 80048 BASIC METABOLIC PNL TOTAL CA: CPT

## 2025-02-14 PROCEDURE — 94761 N-INVAS EAR/PLS OXIMETRY MLT: CPT

## 2025-02-14 PROCEDURE — 2580000003 HC RX 258: Performed by: INTERNAL MEDICINE

## 2025-02-14 PROCEDURE — 84145 PROCALCITONIN (PCT): CPT

## 2025-02-14 PROCEDURE — 85610 PROTHROMBIN TIME: CPT

## 2025-02-14 PROCEDURE — 6360000002 HC RX W HCPCS: Performed by: HOSPITALIST

## 2025-02-14 RX ORDER — DIAZEPAM 5 MG/1
20 TABLET ORAL EVERY 6 HOURS
Status: DISCONTINUED | OUTPATIENT
Start: 2025-02-14 | End: 2025-02-15

## 2025-02-14 RX ORDER — LURASIDONE HYDROCHLORIDE 40 MG/1
40 TABLET, FILM COATED ORAL DAILY
Status: DISCONTINUED | OUTPATIENT
Start: 2025-02-15 | End: 2025-02-22

## 2025-02-14 RX ORDER — CLONIDINE 0.2 MG/24H
1 PATCH, EXTENDED RELEASE TRANSDERMAL WEEKLY
Status: DISCONTINUED | OUTPATIENT
Start: 2025-02-14 | End: 2025-02-23

## 2025-02-14 RX ORDER — PROPOFOL 10 MG/ML
5-50 INJECTION, EMULSION INTRAVENOUS CONTINUOUS
Status: DISCONTINUED | OUTPATIENT
Start: 2025-02-14 | End: 2025-02-16

## 2025-02-14 RX ORDER — DEXMEDETOMIDINE HYDROCHLORIDE 4 UG/ML
.1-1.5 INJECTION, SOLUTION INTRAVENOUS CONTINUOUS
Status: DISCONTINUED | OUTPATIENT
Start: 2025-02-14 | End: 2025-02-20

## 2025-02-14 RX ORDER — MIDAZOLAM HYDROCHLORIDE 1 MG/ML
1-20 INJECTION, SOLUTION INTRAVENOUS CONTINUOUS
Status: DISCONTINUED | OUTPATIENT
Start: 2025-02-14 | End: 2025-02-16

## 2025-02-14 RX ADMIN — CHLORHEXIDINE GLUCONATE 15 ML: 1.2 RINSE ORAL at 20:50

## 2025-02-14 RX ADMIN — Medication 2.5 MG/HR: at 07:22

## 2025-02-14 RX ADMIN — PROPOFOL 45 MCG/KG/MIN: 10 INJECTION, EMULSION INTRAVENOUS at 13:03

## 2025-02-14 RX ADMIN — DEXMEDETOMIDINE HYDROCHLORIDE 1 MCG/KG/HR: 400 INJECTION INTRAVENOUS at 11:17

## 2025-02-14 RX ADMIN — DEXMEDETOMIDINE HYDROCHLORIDE 1.5 MCG/KG/HR: 400 INJECTION INTRAVENOUS at 16:18

## 2025-02-14 RX ADMIN — Medication 2.5 MG/HR: at 19:01

## 2025-02-14 RX ADMIN — MIDAZOLAM HYDROCHLORIDE 12 MG/HR: 1 INJECTION, SOLUTION INTRAVENOUS at 21:44

## 2025-02-14 RX ADMIN — CHLORHEXIDINE GLUCONATE 15 ML: 1.2 RINSE ORAL at 09:50

## 2025-02-14 RX ADMIN — METHADONE HYDROCHLORIDE 30 MG: 10 CONCENTRATE ORAL at 09:49

## 2025-02-14 RX ADMIN — FOLIC ACID 1 MG: 1 TABLET ORAL at 09:50

## 2025-02-14 RX ADMIN — QUETIAPINE FUMARATE 100 MG: 100 TABLET ORAL at 00:19

## 2025-02-14 RX ADMIN — MIDAZOLAM HYDROCHLORIDE 11 MG/HR: 1 INJECTION, SOLUTION INTRAVENOUS at 13:23

## 2025-02-14 RX ADMIN — THERA TABS 1 TABLET: TAB at 09:50

## 2025-02-14 RX ADMIN — DEXTRAN 70, GLYCERIN, HYPROMELLOSE 1 DROP: 1; 2; 3 SOLUTION/ DROPS OPHTHALMIC at 14:46

## 2025-02-14 RX ADMIN — DEXTRAN 70, GLYCERIN, HYPROMELLOSE 1 DROP: 1; 2; 3 SOLUTION/ DROPS OPHTHALMIC at 11:15

## 2025-02-14 RX ADMIN — POLYETHYLENE GLYCOL 3350 17 G: 17 POWDER, FOR SOLUTION ORAL at 09:50

## 2025-02-14 RX ADMIN — LABETALOL HYDROCHLORIDE 10 MG: 5 INJECTION, SOLUTION INTRAVENOUS at 16:19

## 2025-02-14 RX ADMIN — DEXTRAN 70, GLYCERIN, HYPROMELLOSE 1 DROP: 1; 2; 3 SOLUTION/ DROPS OPHTHALMIC at 17:42

## 2025-02-14 RX ADMIN — POLYETHYLENE GLYCOL 3350 17 G: 17 POWDER, FOR SOLUTION ORAL at 00:31

## 2025-02-14 RX ADMIN — DEXMEDETOMIDINE HYDROCHLORIDE 1 MCG/KG/HR: 400 INJECTION INTRAVENOUS at 05:18

## 2025-02-14 RX ADMIN — Medication 100 MG: at 09:50

## 2025-02-14 RX ADMIN — PIPERACILLIN AND TAZOBACTAM 4500 MG: 4; .5 INJECTION, POWDER, FOR SOLUTION INTRAVENOUS at 05:33

## 2025-02-14 RX ADMIN — CLONIDINE HYDROCHLORIDE 0.1 MG: 0.1 TABLET ORAL at 09:58

## 2025-02-14 RX ADMIN — FAMOTIDINE 20 MG: 20 TABLET, FILM COATED ORAL at 20:50

## 2025-02-14 RX ADMIN — PROPOFOL 50 MCG/KG/MIN: 10 INJECTION, EMULSION INTRAVENOUS at 23:48

## 2025-02-14 RX ADMIN — DEXTRAN 70, GLYCERIN, HYPROMELLOSE 1 DROP: 1; 2; 3 SOLUTION/ DROPS OPHTHALMIC at 06:20

## 2025-02-14 RX ADMIN — DIAZEPAM 10 MG: 5 TABLET ORAL at 05:23

## 2025-02-14 RX ADMIN — CLONIDINE HYDROCHLORIDE 0.1 MG: 0.1 TABLET ORAL at 00:20

## 2025-02-14 RX ADMIN — FAMOTIDINE 20 MG: 20 TABLET, FILM COATED ORAL at 00:20

## 2025-02-14 RX ADMIN — POLYETHYLENE GLYCOL 3350 17 G: 17 POWDER, FOR SOLUTION ORAL at 20:50

## 2025-02-14 RX ADMIN — PROPOFOL 50 MCG/KG/MIN: 10 INJECTION, EMULSION INTRAVENOUS at 19:31

## 2025-02-14 RX ADMIN — NALOXEGOL OXALATE 25 MG: 25 TABLET, FILM COATED ORAL at 09:50

## 2025-02-14 RX ADMIN — PROPOFOL 45 MCG/KG/MIN: 10 INJECTION, EMULSION INTRAVENOUS at 06:57

## 2025-02-14 RX ADMIN — ENOXAPARIN SODIUM 40 MG: 100 INJECTION SUBCUTANEOUS at 11:14

## 2025-02-14 RX ADMIN — LURASIDONE HYDROCHLORIDE 20 MG: 40 TABLET, FILM COATED ORAL at 09:58

## 2025-02-14 RX ADMIN — DIAZEPAM 10 MG: 5 TABLET ORAL at 00:22

## 2025-02-14 RX ADMIN — DIAZEPAM 20 MG: 5 TABLET ORAL at 17:42

## 2025-02-14 RX ADMIN — FAMOTIDINE 20 MG: 20 TABLET, FILM COATED ORAL at 09:50

## 2025-02-14 RX ADMIN — PROPOFOL 50 MCG/KG/MIN: 10 INJECTION, EMULSION INTRAVENOUS at 01:54

## 2025-02-14 RX ADMIN — DIAZEPAM 20 MG: 5 TABLET ORAL at 12:12

## 2025-02-14 RX ADMIN — MIDAZOLAM HYDROCHLORIDE 10 MG/HR: 1 INJECTION, SOLUTION INTRAVENOUS at 03:08

## 2025-02-14 RX ADMIN — DEXMEDETOMIDINE HYDROCHLORIDE 1.3 MCG/KG/HR: 400 INJECTION INTRAVENOUS at 20:22

## 2025-02-14 RX ADMIN — DEXTRAN 70, GLYCERIN, HYPROMELLOSE 1 DROP: 1; 2; 3 SOLUTION/ DROPS OPHTHALMIC at 03:05

## 2025-02-14 ASSESSMENT — PULMONARY FUNCTION TESTS
PIF_VALUE: 16
PIF_VALUE: 23
PIF_VALUE: 16
PIF_VALUE: 15
PIF_VALUE: 16
PIF_VALUE: 23

## 2025-02-14 ASSESSMENT — PAIN SCALES - GENERAL
PAINLEVEL_OUTOF10: 0
PAINLEVEL_OUTOF10: 0

## 2025-02-14 NOTE — CARE COORDINATION
Care Management Progress Note    Reason for Admission:   Nausea and vomiting in adult [R11.2]  Alcohol-induced acute pancreatitis without infection or necrosis [K85.20]  Pancreatitis without necrosis or infection [K85.90]         Patient Admission Status: Inpatient  RUR: 14%  Hospitalization in the last 30 days (Readmission):  no        Transition of care plan:  [Medical]  [DC plan]  If SNF or IPR:  Date FOC offered: 2/12/25  Accepting facility: [facility] pending insurance authorization     IMPORTANT:For attending and RT :  [place] [employer] [place]  Because of this,LTACH instructed me that post tracheotomy:  Either intensivist , RT or both  must document in progress notes consistently details about the weaning trials.  Documentation must include time off of full vent support.  Documentation must demonstrate pt is weanable from the vent and that pt is making progress in being weaned off of the ventilator.This documentation is usually for a 5 to 7 day period.  Insurance requires daily documentation.  Then ,[employer] requires that pt be off of all sedation.  Then,[employer] medicaid requires a 21 day waiting period to insure pt continues to improve,etc.etc   [employer] wants pt to be weaned off all sedation before going to LTACH  Back-up plan when appropriate (off all gtts) is for pt to SNF or inpatient rehab with trachs but cannot send until off of versed,precedex,propofol and dilaudid.    Discharge plan communicated with patient and/or discharge caregiver: ongoing discussions with sisterJenny    Date 1st IMM letter given: has medicaid  Outpatient follow-up.LTACH   Transport at discharge:  medicaid    Letty Morrell RN

## 2025-02-14 NOTE — PROCEDURES
PROCEDURE NOTE  Date: 2/1/2025   Name: Nathan Trejo  YOB: 1990    Intubation    Date/Time: 2/1/2025 4:50 AM    Performed by: Rosaura Daily APRN - CNP  Authorized by: Rosaura Daily APRN - CNP  Consent: The procedure was performed in an emergent situation.  Indications: airway protection  Intubation method: video-assisted  Patient status: paralyzed (RSI)  Preoxygenation: none  Sedatives: etomidate  Paralytic: rocuronium  Laryngoscope size: Mac 4  Tube size: 8.0 mm  Tube type: cuffed  Number of attempts: 1  Cords visualized: yes  Post-procedure assessment: chest rise and CO2 detector  Breath sounds: equal  Cuff inflated: yes  ETT to lip: 26 cm  Tube secured with: ETT navarro  Chest x-ray interpreted by me.  Chest x-ray findings: endotracheal tube in appropriate position  Patient tolerance: patient tolerated the procedure well with no immediate complications                
PROCEDURE NOTE  Date: 2/10/2025   Name: Nathan Trejo  YOB: 1990    Procedures    Flexible bronchoscopy    Consent was obtained from patient's sister    Lidocaine was used for topical anesthesia    Patient was already sedated and intubated prior to procedure.  50 mg propofol was administered prior to procedure given significant coughing.    Procedure: Noted to have moderate amount of thick white mucus in right lower lobe.  BAL was obtained from right lower lobe medial segment.  Using saline moderate amount of mucus was suctioned out from bilateral lower lobes.  No significant AV lesions, hemorrhage, endobronchial lesions were observed.  Patient tolerated the procedure well.  No immediate complications.        
PROCEDURE NOTE  Date: 2/13/2025   Name: Nathan Terjo  YOB: 1990    Procedures         [facility] CRITICAL CARE  Bronchoscopy Procedure Note    Procedure:  Therapeutic bronchoscopy.    Indication:  Preformed for Percutaneous tracheostomy and mucus plugging    Consent/Treatment:  Informed consent was obtained from the  family after risks, benefits and alternatives were explained. Timeout verified the correct patient and correct procedure.     Anesthesia:   Patient on ventilator and receiving  see MAR    The following parameters were monitored: oxygen saturation, heart rate, blood pressure, respiratory rate, EKG, adequacy of pulmonary ventilation and response to care. Total physician intraservice time was 45    Procedure Details:   -- The bronchoscope was introduced through an endotracheal tube.   -- The trachea and rosa maria were completely inspected and were found to be normal.  -- The right-sided endobronchial anatomy was completely inspected and was found to be normal.  -- The left-sided endobronchial anatomy was completely inspected and was found to be normal.   There were copious white thick secretions in bilateral bases left worse than right.  Suctioned clean   Specimens:   Bronchial washings were sent for  microbiology    Complications: none    Estimated Blood Loss: Minimal    Mery Lujan MD [facility] Critical Care  [phone]          
PROCEDURE NOTE  Date: 2/3/2025   Name: Nathan Trejo  YOB: 1990    Intubation    Date/Time: 2/3/2025 4:47 PM    Performed by: Yoshi Duron MD  Authorized by: Yoshi Duron MD  Consent: The procedure was performed in an emergent situation.  Indications: airway protection  Intubation method: video-assisted  Patient status: paralyzed (RSI)  Pretreatment medications: midazolam  Sedatives: etomidate  Paralytic: rocuronium  Laryngoscope size: Mac 4  Tube size: 8.0 mm  Tube type: cuffed  Number of attempts: 1  Ventilation between attempts: BVM  Cricoid pressure: yes  Cords visualized: yes  Post-procedure assessment: chest rise and CO2 detector  Breath sounds: equal  Cuff inflated: yes  ETT to teeth: 26 cm  Tube secured with: ETT navarro  Chest x-ray interpreted by radiologist.  Patient tolerance: patient tolerated the procedure well with no immediate complications                
direction through the needle into the airway. The needle was removed and the trachea was again palpated to ensure proper positioning of the guidewire between the first and second tracheal rings. The small blue dilator was then passed over the guidewire into the trachea and then subsequently removed. The bolstering catheter and large blue dilator were then passed over the wire into the trachea enlarging the ostomy. The dilator was visualized by the bronchscopist. Following dilation of the ostomy a # 8 Shiley percutaneous tracheostomy tube was inserted over the guidewire into the trachea. Video bronchoscopy was performed through the tracheostomy tube ensuring that tube was well positioned within the trachea. The bronchoscope was used to measure the distance from the rosa maria to the tip of the tracheostomy tube. The patient was attached to the ventilator circuit and mechanical ventilation was reinstituted. The chest was seen to rise symmetrically and breath sounds were equal bilaterally. The endotracheal tube was then withdrawn. The tracheostomy tube flange was secured with the tracheostomy collar. The patient tolerated the procedure well without difficulty or complication.    COMPLICATIONS: No immediate complications        Mere Trotter MD   Critical Care Medicine  [facility]

## 2025-02-15 LAB
ANION GAP SERPL CALC-SCNC: 4 MMOL/L (ref 2–12)
BACTERIA SPEC CULT: NORMAL
BUN SERPL-MCNC: 12 MG/DL (ref 6–20)
BUN/CREAT SERPL: 18 (ref 12–20)
CALCIUM SERPL-MCNC: 9.5 MG/DL (ref 8.5–10.1)
CHLORIDE SERPL-SCNC: 105 MMOL/L (ref 97–108)
CO2 SERPL-SCNC: 30 MMOL/L (ref 21–32)
CREAT SERPL-MCNC: 0.68 MG/DL (ref 0.7–1.3)
ERYTHROCYTE [DISTWIDTH] IN BLOOD BY AUTOMATED COUNT: 12.2 % (ref 11.5–14.5)
GLUCOSE SERPL-MCNC: 113 MG/DL (ref 65–100)
GRAM STN SPEC: NORMAL
HCT VFR BLD AUTO: 36.2 % (ref 36.6–50.3)
HGB BLD-MCNC: 11.7 G/DL (ref 12.1–17)
MCH RBC QN AUTO: 27.2 PG (ref 26–34)
MCHC RBC AUTO-ENTMCNC: 32.3 G/DL (ref 30–36.5)
MCV RBC AUTO: 84.2 FL (ref 80–99)
NRBC # BLD: 0 K/UL (ref 0–0.01)
NRBC BLD-RTO: 0 PER 100 WBC
PLATELET # BLD AUTO: 340 K/UL (ref 150–400)
PMV BLD AUTO: 10.9 FL (ref 8.9–12.9)
POTASSIUM SERPL-SCNC: 3.8 MMOL/L (ref 3.5–5.1)
RBC # BLD AUTO: 4.3 M/UL (ref 4.1–5.7)
SERVICE CMNT-IMP: NORMAL
SODIUM SERPL-SCNC: 139 MMOL/L (ref 136–145)
WBC # BLD AUTO: 7.2 K/UL (ref 4.1–11.1)

## 2025-02-15 PROCEDURE — 2700000000 HC OXYGEN THERAPY PER DAY

## 2025-02-15 PROCEDURE — 94761 N-INVAS EAR/PLS OXIMETRY MLT: CPT

## 2025-02-15 PROCEDURE — 6360000002 HC RX W HCPCS: Performed by: INTERNAL MEDICINE

## 2025-02-15 PROCEDURE — 6370000000 HC RX 637 (ALT 250 FOR IP): Performed by: NURSE PRACTITIONER

## 2025-02-15 PROCEDURE — 6360000002 HC RX W HCPCS: Performed by: NURSE PRACTITIONER

## 2025-02-15 PROCEDURE — 36415 COLL VENOUS BLD VENIPUNCTURE: CPT

## 2025-02-15 PROCEDURE — 2000000000 HC ICU R&B

## 2025-02-15 PROCEDURE — 6370000000 HC RX 637 (ALT 250 FOR IP): Performed by: INTERNAL MEDICINE

## 2025-02-15 PROCEDURE — 80048 BASIC METABOLIC PNL TOTAL CA: CPT

## 2025-02-15 PROCEDURE — 2500000003 HC RX 250 WO HCPCS: Performed by: NURSE PRACTITIONER

## 2025-02-15 PROCEDURE — 6360000002 HC RX W HCPCS: Performed by: HOSPITALIST

## 2025-02-15 PROCEDURE — 85027 COMPLETE CBC AUTOMATED: CPT

## 2025-02-15 PROCEDURE — 6370000000 HC RX 637 (ALT 250 FOR IP): Performed by: HOSPITALIST

## 2025-02-15 PROCEDURE — 94003 VENT MGMT INPAT SUBQ DAY: CPT

## 2025-02-15 RX ORDER — DIAZEPAM 5 MG/1
40 TABLET ORAL EVERY 8 HOURS
Status: DISCONTINUED | OUTPATIENT
Start: 2025-02-15 | End: 2025-02-20

## 2025-02-15 RX ORDER — ACETAMINOPHEN 325 MG/1
650 TABLET ORAL EVERY 4 HOURS PRN
Status: DISCONTINUED | OUTPATIENT
Start: 2025-02-15 | End: 2025-02-22

## 2025-02-15 RX ADMIN — LABETALOL HYDROCHLORIDE 10 MG: 5 INJECTION, SOLUTION INTRAVENOUS at 12:17

## 2025-02-15 RX ADMIN — PROPOFOL 45 MCG/KG/MIN: 10 INJECTION, EMULSION INTRAVENOUS at 04:43

## 2025-02-15 RX ADMIN — Medication 100 MG: at 07:44

## 2025-02-15 RX ADMIN — DEXTRAN 70, GLYCERIN, HYPROMELLOSE 1 DROP: 1; 2; 3 SOLUTION/ DROPS OPHTHALMIC at 18:22

## 2025-02-15 RX ADMIN — MIDAZOLAM HYDROCHLORIDE 14 MG/HR: 1 INJECTION, SOLUTION INTRAVENOUS at 22:20

## 2025-02-15 RX ADMIN — DIAZEPAM 40 MG: 5 TABLET ORAL at 20:08

## 2025-02-15 RX ADMIN — LABETALOL HYDROCHLORIDE 10 MG: 5 INJECTION, SOLUTION INTRAVENOUS at 16:09

## 2025-02-15 RX ADMIN — DIAZEPAM 40 MG: 5 TABLET ORAL at 11:46

## 2025-02-15 RX ADMIN — POLYETHYLENE GLYCOL 3350 17 G: 17 POWDER, FOR SOLUTION ORAL at 20:08

## 2025-02-15 RX ADMIN — FAMOTIDINE 20 MG: 20 TABLET, FILM COATED ORAL at 07:44

## 2025-02-15 RX ADMIN — NALOXEGOL OXALATE 25 MG: 25 TABLET, FILM COATED ORAL at 07:44

## 2025-02-15 RX ADMIN — ACETAMINOPHEN 650 MG: 325 TABLET ORAL at 11:46

## 2025-02-15 RX ADMIN — Medication 2.25 MG/HR: at 07:14

## 2025-02-15 RX ADMIN — DEXMEDETOMIDINE HYDROCHLORIDE 1.5 MCG/KG/HR: 400 INJECTION INTRAVENOUS at 13:36

## 2025-02-15 RX ADMIN — DEXTRAN 70, GLYCERIN, HYPROMELLOSE 1 DROP: 1; 2; 3 SOLUTION/ DROPS OPHTHALMIC at 21:52

## 2025-02-15 RX ADMIN — CHLORHEXIDINE GLUCONATE 15 ML: 1.2 RINSE ORAL at 20:09

## 2025-02-15 RX ADMIN — MIDAZOLAM HYDROCHLORIDE 11 MG/HR: 1 INJECTION, SOLUTION INTRAVENOUS at 15:10

## 2025-02-15 RX ADMIN — ENOXAPARIN SODIUM 40 MG: 100 INJECTION SUBCUTANEOUS at 11:06

## 2025-02-15 RX ADMIN — DEXTRAN 70, GLYCERIN, HYPROMELLOSE 1 DROP: 1; 2; 3 SOLUTION/ DROPS OPHTHALMIC at 00:32

## 2025-02-15 RX ADMIN — DEXMEDETOMIDINE HYDROCHLORIDE 1.3 MCG/KG/HR: 400 INJECTION INTRAVENOUS at 04:41

## 2025-02-15 RX ADMIN — FAMOTIDINE 20 MG: 20 TABLET, FILM COATED ORAL at 20:08

## 2025-02-15 RX ADMIN — DEXTRAN 70, GLYCERIN, HYPROMELLOSE 1 DROP: 1; 2; 3 SOLUTION/ DROPS OPHTHALMIC at 07:32

## 2025-02-15 RX ADMIN — DEXMEDETOMIDINE HYDROCHLORIDE 1.5 MCG/KG/HR: 400 INJECTION INTRAVENOUS at 00:33

## 2025-02-15 RX ADMIN — HYDROMORPHONE HYDROCHLORIDE 0.5 MG: 1 INJECTION, SOLUTION INTRAMUSCULAR; INTRAVENOUS; SUBCUTANEOUS at 17:55

## 2025-02-15 RX ADMIN — THERA TABS 1 TABLET: TAB at 07:44

## 2025-02-15 RX ADMIN — FOLIC ACID 1 MG: 1 TABLET ORAL at 07:44

## 2025-02-15 RX ADMIN — METHADONE HYDROCHLORIDE 30 MG: 10 CONCENTRATE ORAL at 08:27

## 2025-02-15 RX ADMIN — PROPOFOL 35 MCG/KG/MIN: 10 INJECTION, EMULSION INTRAVENOUS at 11:06

## 2025-02-15 RX ADMIN — DEXTRAN 70, GLYCERIN, HYPROMELLOSE 1 DROP: 1; 2; 3 SOLUTION/ DROPS OPHTHALMIC at 03:53

## 2025-02-15 RX ADMIN — DEXTRAN 70, GLYCERIN, HYPROMELLOSE 1 DROP: 1; 2; 3 SOLUTION/ DROPS OPHTHALMIC at 15:10

## 2025-02-15 RX ADMIN — DEXMEDETOMIDINE HYDROCHLORIDE 1.5 MCG/KG/HR: 400 INJECTION INTRAVENOUS at 21:51

## 2025-02-15 RX ADMIN — LURASIDONE HYDROCHLORIDE 40 MG: 40 TABLET, FILM COATED ORAL at 08:27

## 2025-02-15 RX ADMIN — SODIUM CHLORIDE, PRESERVATIVE FREE 10 ML: 5 INJECTION INTRAVENOUS at 07:56

## 2025-02-15 RX ADMIN — DEXTRAN 70, GLYCERIN, HYPROMELLOSE 1 DROP: 1; 2; 3 SOLUTION/ DROPS OPHTHALMIC at 11:07

## 2025-02-15 RX ADMIN — DEXMEDETOMIDINE HYDROCHLORIDE 1.5 MCG/KG/HR: 400 INJECTION INTRAVENOUS at 17:50

## 2025-02-15 RX ADMIN — CHLORHEXIDINE GLUCONATE 15 ML: 1.2 RINSE ORAL at 07:45

## 2025-02-15 RX ADMIN — DEXMEDETOMIDINE HYDROCHLORIDE 1.5 MCG/KG/HR: 400 INJECTION INTRAVENOUS at 09:21

## 2025-02-15 RX ADMIN — DIAZEPAM 20 MG: 5 TABLET ORAL at 06:40

## 2025-02-15 RX ADMIN — DIAZEPAM 20 MG: 5 TABLET ORAL at 00:32

## 2025-02-15 RX ADMIN — MIDAZOLAM HYDROCHLORIDE 12 MG/HR: 1 INJECTION, SOLUTION INTRAVENOUS at 06:22

## 2025-02-15 RX ADMIN — POLYETHYLENE GLYCOL 3350 17 G: 17 POWDER, FOR SOLUTION ORAL at 07:44

## 2025-02-15 RX ADMIN — Medication 2.25 MG/HR: at 20:23

## 2025-02-15 RX ADMIN — ACETAMINOPHEN 650 MG: 325 TABLET ORAL at 16:09

## 2025-02-15 ASSESSMENT — PULMONARY FUNCTION TESTS
PIF_VALUE: 24
PIF_VALUE: 23
PIF_VALUE: 14
PIF_VALUE: 21
PIF_VALUE: 16
PIF_VALUE: 15
PIF_VALUE: 21

## 2025-02-16 LAB
ANION GAP SERPL CALC-SCNC: 6 MMOL/L (ref 2–12)
BUN SERPL-MCNC: 13 MG/DL (ref 6–20)
BUN/CREAT SERPL: 19 (ref 12–20)
CALCIUM SERPL-MCNC: 9.7 MG/DL (ref 8.5–10.1)
CHLORIDE SERPL-SCNC: 101 MMOL/L (ref 97–108)
CO2 SERPL-SCNC: 27 MMOL/L (ref 21–32)
CREAT SERPL-MCNC: 0.7 MG/DL (ref 0.7–1.3)
GLUCOSE SERPL-MCNC: 118 MG/DL (ref 65–100)
POTASSIUM SERPL-SCNC: 3.6 MMOL/L (ref 3.5–5.1)
SODIUM SERPL-SCNC: 134 MMOL/L (ref 136–145)
TRIGL SERPL-MCNC: 103 MG/DL

## 2025-02-16 PROCEDURE — 36415 COLL VENOUS BLD VENIPUNCTURE: CPT

## 2025-02-16 PROCEDURE — 6360000002 HC RX W HCPCS: Performed by: INTERNAL MEDICINE

## 2025-02-16 PROCEDURE — 94003 VENT MGMT INPAT SUBQ DAY: CPT

## 2025-02-16 PROCEDURE — 2500000003 HC RX 250 WO HCPCS: Performed by: NURSE PRACTITIONER

## 2025-02-16 PROCEDURE — 2700000000 HC OXYGEN THERAPY PER DAY

## 2025-02-16 PROCEDURE — 6370000000 HC RX 637 (ALT 250 FOR IP): Performed by: INTERNAL MEDICINE

## 2025-02-16 PROCEDURE — 6370000000 HC RX 637 (ALT 250 FOR IP): Performed by: HOSPITALIST

## 2025-02-16 PROCEDURE — 80048 BASIC METABOLIC PNL TOTAL CA: CPT

## 2025-02-16 PROCEDURE — 2000000000 HC ICU R&B

## 2025-02-16 PROCEDURE — 94761 N-INVAS EAR/PLS OXIMETRY MLT: CPT

## 2025-02-16 PROCEDURE — 6360000002 HC RX W HCPCS: Performed by: NURSE PRACTITIONER

## 2025-02-16 PROCEDURE — 84478 ASSAY OF TRIGLYCERIDES: CPT

## 2025-02-16 PROCEDURE — 6360000002 HC RX W HCPCS: Performed by: HOSPITALIST

## 2025-02-16 PROCEDURE — 2060000000 HC ICU INTERMEDIATE R&B

## 2025-02-16 RX ORDER — METHADONE HYDROCHLORIDE 10 MG/ML
15 CONCENTRATE ORAL 2 TIMES DAILY
Status: DISCONTINUED | OUTPATIENT
Start: 2025-02-16 | End: 2025-02-22

## 2025-02-16 RX ORDER — HYDROMORPHONE HYDROCHLORIDE 2 MG/ML
2 INJECTION, SOLUTION INTRAMUSCULAR; INTRAVENOUS; SUBCUTANEOUS
Status: DISCONTINUED | OUTPATIENT
Start: 2025-02-16 | End: 2025-02-22

## 2025-02-16 RX ADMIN — HYDROMORPHONE HYDROCHLORIDE 2 MG: 2 INJECTION, SOLUTION INTRAMUSCULAR; INTRAVENOUS; SUBCUTANEOUS at 19:26

## 2025-02-16 RX ADMIN — MIDAZOLAM 2 MG: 1 INJECTION INTRAMUSCULAR; INTRAVENOUS at 01:46

## 2025-02-16 RX ADMIN — FAMOTIDINE 20 MG: 20 TABLET, FILM COATED ORAL at 21:20

## 2025-02-16 RX ADMIN — Medication 2.5 MG/HR: at 09:19

## 2025-02-16 RX ADMIN — DEXMEDETOMIDINE HYDROCHLORIDE 1.3 MCG/KG/HR: 400 INJECTION INTRAVENOUS at 23:24

## 2025-02-16 RX ADMIN — DEXTRAN 70, GLYCERIN, HYPROMELLOSE 1 DROP: 1; 2; 3 SOLUTION/ DROPS OPHTHALMIC at 06:22

## 2025-02-16 RX ADMIN — ACETAMINOPHEN 650 MG: 325 TABLET ORAL at 16:35

## 2025-02-16 RX ADMIN — ACETAMINOPHEN 650 MG: 325 TABLET ORAL at 03:11

## 2025-02-16 RX ADMIN — MIDAZOLAM HYDROCHLORIDE 16 MG/HR: 1 INJECTION, SOLUTION INTRAVENOUS at 02:51

## 2025-02-16 RX ADMIN — DEXMEDETOMIDINE HYDROCHLORIDE 1.5 MCG/KG/HR: 400 INJECTION INTRAVENOUS at 02:28

## 2025-02-16 RX ADMIN — MIDAZOLAM HYDROCHLORIDE 15 MG/HR: 1 INJECTION, SOLUTION INTRAVENOUS at 00:42

## 2025-02-16 RX ADMIN — LURASIDONE HYDROCHLORIDE 40 MG: 40 TABLET, FILM COATED ORAL at 09:00

## 2025-02-16 RX ADMIN — DEXTRAN 70, GLYCERIN, HYPROMELLOSE 1 DROP: 1; 2; 3 SOLUTION/ DROPS OPHTHALMIC at 22:44

## 2025-02-16 RX ADMIN — FAMOTIDINE 20 MG: 20 TABLET, FILM COATED ORAL at 07:44

## 2025-02-16 RX ADMIN — DEXMEDETOMIDINE HYDROCHLORIDE 1.5 MCG/KG/HR: 400 INJECTION INTRAVENOUS at 11:24

## 2025-02-16 RX ADMIN — FOLIC ACID 1 MG: 1 TABLET ORAL at 07:45

## 2025-02-16 RX ADMIN — ONDANSETRON 4 MG: 2 INJECTION, SOLUTION INTRAMUSCULAR; INTRAVENOUS at 03:11

## 2025-02-16 RX ADMIN — MIDAZOLAM HYDROCHLORIDE 16 MG/HR: 1 INJECTION, SOLUTION INTRAVENOUS at 05:25

## 2025-02-16 RX ADMIN — ENOXAPARIN SODIUM 40 MG: 100 INJECTION SUBCUTANEOUS at 11:13

## 2025-02-16 RX ADMIN — DEXTRAN 70, GLYCERIN, HYPROMELLOSE 1 DROP: 1; 2; 3 SOLUTION/ DROPS OPHTHALMIC at 03:34

## 2025-02-16 RX ADMIN — METHADONE HYDROCHLORIDE 15 MG: 10 CONCENTRATE ORAL at 21:20

## 2025-02-16 RX ADMIN — DIAZEPAM 40 MG: 5 TABLET ORAL at 21:20

## 2025-02-16 RX ADMIN — POLYETHYLENE GLYCOL 3350 17 G: 17 POWDER, FOR SOLUTION ORAL at 21:29

## 2025-02-16 RX ADMIN — CHLORHEXIDINE GLUCONATE 15 ML: 1.2 RINSE ORAL at 07:48

## 2025-02-16 RX ADMIN — MIDAZOLAM 2 MG: 1 INJECTION INTRAMUSCULAR; INTRAVENOUS at 22:42

## 2025-02-16 RX ADMIN — NALOXEGOL OXALATE 25 MG: 25 TABLET, FILM COATED ORAL at 07:44

## 2025-02-16 RX ADMIN — POLYETHYLENE GLYCOL 3350 17 G: 17 POWDER, FOR SOLUTION ORAL at 07:45

## 2025-02-16 RX ADMIN — Medication 100 MG: at 07:44

## 2025-02-16 RX ADMIN — GLYCOPYRROLATE 0.1 MG: 0.2 INJECTION INTRAMUSCULAR; INTRAVENOUS at 19:25

## 2025-02-16 RX ADMIN — THERA TABS 1 TABLET: TAB at 07:45

## 2025-02-16 RX ADMIN — DEXTRAN 70, GLYCERIN, HYPROMELLOSE 1 DROP: 1; 2; 3 SOLUTION/ DROPS OPHTHALMIC at 17:54

## 2025-02-16 RX ADMIN — DIAZEPAM 40 MG: 5 TABLET ORAL at 11:13

## 2025-02-16 RX ADMIN — DEXMEDETOMIDINE HYDROCHLORIDE 1.5 MCG/KG/HR: 400 INJECTION INTRAVENOUS at 15:24

## 2025-02-16 RX ADMIN — DEXTRAN 70, GLYCERIN, HYPROMELLOSE 1 DROP: 1; 2; 3 SOLUTION/ DROPS OPHTHALMIC at 11:13

## 2025-02-16 RX ADMIN — DEXTRAN 70, GLYCERIN, HYPROMELLOSE 1 DROP: 1; 2; 3 SOLUTION/ DROPS OPHTHALMIC at 14:49

## 2025-02-16 RX ADMIN — DEXMEDETOMIDINE HYDROCHLORIDE 1.5 MCG/KG/HR: 400 INJECTION INTRAVENOUS at 06:23

## 2025-02-16 RX ADMIN — METHADONE HYDROCHLORIDE 30 MG: 10 CONCENTRATE ORAL at 08:57

## 2025-02-16 RX ADMIN — DEXMEDETOMIDINE HYDROCHLORIDE 1.3 MCG/KG/HR: 400 INJECTION INTRAVENOUS at 19:15

## 2025-02-16 RX ADMIN — DIAZEPAM 40 MG: 5 TABLET ORAL at 03:26

## 2025-02-16 RX ADMIN — CHLORHEXIDINE GLUCONATE 15 ML: 1.2 RINSE ORAL at 19:14

## 2025-02-16 ASSESSMENT — PULMONARY FUNCTION TESTS
PIF_VALUE: 11
PIF_VALUE: 11
PIF_VALUE: 21
PIF_VALUE: 21
PIF_VALUE: 11
PIF_VALUE: 16

## 2025-02-16 ASSESSMENT — PAIN DESCRIPTION - LOCATION: LOCATION: ABDOMEN

## 2025-02-16 ASSESSMENT — PAIN SCALES - GENERAL: PAINLEVEL_OUTOF10: 7

## 2025-02-17 ENCOUNTER — APPOINTMENT (OUTPATIENT)
Facility: HOSPITAL | Age: 35
DRG: 005 | End: 2025-02-17
Payer: MEDICAID

## 2025-02-17 LAB
ANION GAP SERPL CALC-SCNC: 8 MMOL/L (ref 2–12)
BUN SERPL-MCNC: 22 MG/DL (ref 6–20)
BUN/CREAT SERPL: 28 (ref 12–20)
CALCIUM SERPL-MCNC: 10.3 MG/DL (ref 8.5–10.1)
CHLORIDE SERPL-SCNC: 102 MMOL/L (ref 97–108)
CO2 SERPL-SCNC: 25 MMOL/L (ref 21–32)
CREAT SERPL-MCNC: 0.8 MG/DL (ref 0.7–1.3)
GLUCOSE SERPL-MCNC: 106 MG/DL (ref 65–100)
POTASSIUM SERPL-SCNC: 3.7 MMOL/L (ref 3.5–5.1)
SODIUM SERPL-SCNC: 135 MMOL/L (ref 136–145)

## 2025-02-17 PROCEDURE — 87070 CULTURE OTHR SPECIMN AEROBIC: CPT

## 2025-02-17 PROCEDURE — 94003 VENT MGMT INPAT SUBQ DAY: CPT

## 2025-02-17 PROCEDURE — 71045 X-RAY EXAM CHEST 1 VIEW: CPT

## 2025-02-17 PROCEDURE — 2500000003 HC RX 250 WO HCPCS: Performed by: HOSPITALIST

## 2025-02-17 PROCEDURE — 87040 BLOOD CULTURE FOR BACTERIA: CPT

## 2025-02-17 PROCEDURE — 6360000002 HC RX W HCPCS: Performed by: NURSE PRACTITIONER

## 2025-02-17 PROCEDURE — 97165 OT EVAL LOW COMPLEX 30 MIN: CPT

## 2025-02-17 PROCEDURE — 97530 THERAPEUTIC ACTIVITIES: CPT

## 2025-02-17 PROCEDURE — 97161 PT EVAL LOW COMPLEX 20 MIN: CPT

## 2025-02-17 PROCEDURE — 6360000002 HC RX W HCPCS: Performed by: HOSPITALIST

## 2025-02-17 PROCEDURE — 80048 BASIC METABOLIC PNL TOTAL CA: CPT

## 2025-02-17 PROCEDURE — 6370000000 HC RX 637 (ALT 250 FOR IP): Performed by: HOSPITALIST

## 2025-02-17 PROCEDURE — 6370000000 HC RX 637 (ALT 250 FOR IP): Performed by: INTERNAL MEDICINE

## 2025-02-17 PROCEDURE — 36415 COLL VENOUS BLD VENIPUNCTURE: CPT

## 2025-02-17 PROCEDURE — 2000000000 HC ICU R&B

## 2025-02-17 PROCEDURE — 2500000003 HC RX 250 WO HCPCS: Performed by: NURSE PRACTITIONER

## 2025-02-17 PROCEDURE — 87205 SMEAR GRAM STAIN: CPT

## 2025-02-17 RX ORDER — GUAIFENESIN 200 MG/10ML
200 LIQUID ORAL EVERY 6 HOURS SCHEDULED
Status: DISCONTINUED | OUTPATIENT
Start: 2025-02-17 | End: 2025-02-21

## 2025-02-17 RX ADMIN — DEXMEDETOMIDINE HYDROCHLORIDE 1.1 MCG/KG/HR: 400 INJECTION INTRAVENOUS at 19:41

## 2025-02-17 RX ADMIN — DIAZEPAM 40 MG: 5 TABLET ORAL at 12:06

## 2025-02-17 RX ADMIN — FOLIC ACID 1 MG: 1 TABLET ORAL at 10:37

## 2025-02-17 RX ADMIN — GLYCOPYRROLATE 0.1 MG: 0.2 INJECTION INTRAMUSCULAR; INTRAVENOUS at 07:35

## 2025-02-17 RX ADMIN — LURASIDONE HYDROCHLORIDE 40 MG: 40 TABLET, FILM COATED ORAL at 10:37

## 2025-02-17 RX ADMIN — DEXMEDETOMIDINE HYDROCHLORIDE 1.1 MCG/KG/HR: 400 INJECTION INTRAVENOUS at 14:42

## 2025-02-17 RX ADMIN — DEXTRAN 70, GLYCERIN, HYPROMELLOSE 1 DROP: 1; 2; 3 SOLUTION/ DROPS OPHTHALMIC at 23:25

## 2025-02-17 RX ADMIN — METHADONE HYDROCHLORIDE 15 MG: 10 CONCENTRATE ORAL at 20:36

## 2025-02-17 RX ADMIN — DEXMEDETOMIDINE HYDROCHLORIDE 1.3 MCG/KG/HR: 400 INJECTION INTRAVENOUS at 03:54

## 2025-02-17 RX ADMIN — DEXTRAN 70, GLYCERIN, HYPROMELLOSE 1 DROP: 1; 2; 3 SOLUTION/ DROPS OPHTHALMIC at 03:55

## 2025-02-17 RX ADMIN — DEXTRAN 70, GLYCERIN, HYPROMELLOSE 1 DROP: 1; 2; 3 SOLUTION/ DROPS OPHTHALMIC at 18:21

## 2025-02-17 RX ADMIN — ENOXAPARIN SODIUM 40 MG: 100 INJECTION SUBCUTANEOUS at 12:06

## 2025-02-17 RX ADMIN — SODIUM CHLORIDE, PRESERVATIVE FREE 10 ML: 5 INJECTION INTRAVENOUS at 20:14

## 2025-02-17 RX ADMIN — THERA TABS 1 TABLET: TAB at 10:37

## 2025-02-17 RX ADMIN — POLYETHYLENE GLYCOL 3350 17 G: 17 POWDER, FOR SOLUTION ORAL at 20:36

## 2025-02-17 RX ADMIN — POLYETHYLENE GLYCOL 3350 17 G: 17 POWDER, FOR SOLUTION ORAL at 10:37

## 2025-02-17 RX ADMIN — DIAZEPAM 40 MG: 5 TABLET ORAL at 20:36

## 2025-02-17 RX ADMIN — FAMOTIDINE 20 MG: 20 TABLET, FILM COATED ORAL at 20:37

## 2025-02-17 RX ADMIN — METHADONE HYDROCHLORIDE 15 MG: 10 CONCENTRATE ORAL at 10:37

## 2025-02-17 RX ADMIN — GUAIFENESIN 200 MG: 200 SOLUTION ORAL at 18:21

## 2025-02-17 RX ADMIN — GUAIFENESIN 200 MG: 200 SOLUTION ORAL at 23:25

## 2025-02-17 RX ADMIN — CHLORHEXIDINE GLUCONATE 15 ML: 1.2 RINSE ORAL at 20:14

## 2025-02-17 RX ADMIN — HYDROMORPHONE HYDROCHLORIDE 2 MG: 2 INJECTION, SOLUTION INTRAMUSCULAR; INTRAVENOUS; SUBCUTANEOUS at 18:56

## 2025-02-17 RX ADMIN — GLYCOPYRROLATE 0.1 MG: 0.2 INJECTION INTRAMUSCULAR; INTRAVENOUS at 03:42

## 2025-02-17 RX ADMIN — HYDROMORPHONE HYDROCHLORIDE 2 MG: 2 INJECTION, SOLUTION INTRAMUSCULAR; INTRAVENOUS; SUBCUTANEOUS at 02:45

## 2025-02-17 RX ADMIN — DEXTRAN 70, GLYCERIN, HYPROMELLOSE 1 DROP: 1; 2; 3 SOLUTION/ DROPS OPHTHALMIC at 17:00

## 2025-02-17 RX ADMIN — DEXTRAN 70, GLYCERIN, HYPROMELLOSE 1 DROP: 1; 2; 3 SOLUTION/ DROPS OPHTHALMIC at 10:40

## 2025-02-17 RX ADMIN — DEXTRAN 70, GLYCERIN, HYPROMELLOSE 1 DROP: 1; 2; 3 SOLUTION/ DROPS OPHTHALMIC at 07:00

## 2025-02-17 RX ADMIN — DIAZEPAM 40 MG: 5 TABLET ORAL at 03:43

## 2025-02-17 RX ADMIN — ACETAMINOPHEN 650 MG: 325 TABLET ORAL at 00:35

## 2025-02-17 RX ADMIN — HYDROMORPHONE HYDROCHLORIDE 2 MG: 2 INJECTION, SOLUTION INTRAMUSCULAR; INTRAVENOUS; SUBCUTANEOUS at 23:25

## 2025-02-17 RX ADMIN — Medication 3 MG: at 20:37

## 2025-02-17 RX ADMIN — GLYCOPYRROLATE 0.1 MG: 0.2 INJECTION INTRAMUSCULAR; INTRAVENOUS at 20:37

## 2025-02-17 RX ADMIN — CHLORHEXIDINE GLUCONATE 15 ML: 1.2 RINSE ORAL at 10:40

## 2025-02-17 RX ADMIN — Medication 100 MG: at 10:37

## 2025-02-17 RX ADMIN — HYDROMORPHONE HYDROCHLORIDE 2 MG: 2 INJECTION, SOLUTION INTRAMUSCULAR; INTRAVENOUS; SUBCUTANEOUS at 07:35

## 2025-02-17 RX ADMIN — FAMOTIDINE 20 MG: 20 TABLET, FILM COATED ORAL at 10:37

## 2025-02-17 RX ADMIN — GUAIFENESIN 200 MG: 200 SOLUTION ORAL at 12:06

## 2025-02-17 ASSESSMENT — PULMONARY FUNCTION TESTS
PIF_VALUE: 12
PIF_VALUE: 24
PIF_VALUE: 21
PIF_VALUE: 21
PIF_VALUE: 22
PIF_VALUE: 24

## 2025-02-17 ASSESSMENT — PAIN SCALES - GENERAL: PAINLEVEL_OUTOF10: 7

## 2025-02-17 NOTE — CARE COORDINATION
Care Management Progress Note    Reason for Admission:   Nausea and vomiting in adult [R11.2]  Alcohol-induced acute pancreatitis without infection or necrosis [K85.20]  Pancreatitis without necrosis or infection [K85.90]         Patient Admission Status: Inpatient  RUR: 15%  Hospitalization in the last 30 days (Readmission):  no        Transition of care plan:  [Medical]  [DC plan]  Referral sent to LTACH  IMPORTANT:For Intensivist  and RT :  [place] has [employer] [place] insurance  Because of this,[facility] instructed me that post tracheotomy:  Either intensivist , RT or both  must document in progress notes consistently details about the weaning trials.  Documentation must include time off of full vent support.  Documentation must demonstrate pt is weanable from the vent and that pt is making progress in being weaned off of the ventilator.This documentation is usually for a 5 to 7 day period.  Insurance requires daily documentation.  Then ,[employer] med[place] requires that pt be off of all sedation.  Then,[employer] medicaid requires a 21 day waiting period to insure pt continues to improve,etc.etc   [employer] wants pt to be weaned off all sedation before going to LTACH  Alternative plans for discharge once off precedex gtt:inpatient rehab or SNF with trach/vent   RT notified regarding documentation via [employer] and will also discuss in IDR.  Discharge plan communicated with patient and/or discharge caregiver: yes with sister,Artie Trejo    Date 1st IMM letter given: has [employer] medicaid   Outpatient follow-up.rehab /LTACH   Transport at discharge:  contingent upon pt.'s progress.    Letty MorrellRN

## 2025-02-18 LAB
ANION GAP SERPL CALC-SCNC: 7 MMOL/L (ref 2–12)
BUN SERPL-MCNC: 21 MG/DL (ref 6–20)
BUN/CREAT SERPL: 31 (ref 12–20)
CALCIUM SERPL-MCNC: 9.6 MG/DL (ref 8.5–10.1)
CHLORIDE SERPL-SCNC: 104 MMOL/L (ref 97–108)
CO2 SERPL-SCNC: 25 MMOL/L (ref 21–32)
COLLECT DURATION TIME UR: 24 HR
COPPER 24H UR-MRATE: 32 UG/24 HR (ref 3–35)
COPPER UR-MCNC: 8 UG/L
COPPER/CREAT UR: 14 UG/G CREAT (ref 0–49)
CREAT SERPL-MCNC: 0.67 MG/DL (ref 0.7–1.3)
CREAT UR-MCNC: 0.59 G/L (ref 0.3–3)
ERYTHROCYTE [DISTWIDTH] IN BLOOD BY AUTOMATED COUNT: 12.2 % (ref 11.5–14.5)
GLUCOSE SERPL-MCNC: 106 MG/DL (ref 65–100)
HCT VFR BLD AUTO: 33.3 % (ref 36.6–50.3)
HGB BLD-MCNC: 10.9 G/DL (ref 12.1–17)
MAGNESIUM SERPL-MCNC: 1.9 MG/DL (ref 1.6–2.4)
MCH RBC QN AUTO: 26.7 PG (ref 26–34)
MCHC RBC AUTO-ENTMCNC: 32.7 G/DL (ref 30–36.5)
MCV RBC AUTO: 81.6 FL (ref 80–99)
NRBC # BLD: 0 K/UL (ref 0–0.01)
NRBC BLD-RTO: 0 PER 100 WBC
PHOSPHATE SERPL-MCNC: 3.6 MG/DL (ref 2.6–4.7)
PLATELET # BLD AUTO: 300 K/UL (ref 150–400)
PMV BLD AUTO: 10.6 FL (ref 8.9–12.9)
POTASSIUM SERPL-SCNC: 3.1 MMOL/L (ref 3.5–5.1)
RBC # BLD AUTO: 4.08 M/UL (ref 4.1–5.7)
SODIUM SERPL-SCNC: 136 MMOL/L (ref 136–145)
SPECIMEN VOL ?TM UR: 3950 ML
TRIGL SERPL-MCNC: 119 MG/DL
WBC # BLD AUTO: 12.7 K/UL (ref 4.1–11.1)

## 2025-02-18 PROCEDURE — 2000000000 HC ICU R&B

## 2025-02-18 PROCEDURE — 94003 VENT MGMT INPAT SUBQ DAY: CPT

## 2025-02-18 PROCEDURE — 83735 ASSAY OF MAGNESIUM: CPT

## 2025-02-18 PROCEDURE — 6370000000 HC RX 637 (ALT 250 FOR IP): Performed by: HOSPITALIST

## 2025-02-18 PROCEDURE — 2500000003 HC RX 250 WO HCPCS: Performed by: NURSE PRACTITIONER

## 2025-02-18 PROCEDURE — 6360000002 HC RX W HCPCS: Performed by: HOSPITALIST

## 2025-02-18 PROCEDURE — 6360000002 HC RX W HCPCS: Performed by: NURSE PRACTITIONER

## 2025-02-18 PROCEDURE — 92610 EVALUATE SWALLOWING FUNCTION: CPT

## 2025-02-18 PROCEDURE — 85027 COMPLETE CBC AUTOMATED: CPT

## 2025-02-18 PROCEDURE — 2500000003 HC RX 250 WO HCPCS: Performed by: HOSPITALIST

## 2025-02-18 PROCEDURE — 94761 N-INVAS EAR/PLS OXIMETRY MLT: CPT

## 2025-02-18 PROCEDURE — 97530 THERAPEUTIC ACTIVITIES: CPT

## 2025-02-18 PROCEDURE — 6370000000 HC RX 637 (ALT 250 FOR IP): Performed by: INTERNAL MEDICINE

## 2025-02-18 PROCEDURE — 51798 US URINE CAPACITY MEASURE: CPT

## 2025-02-18 PROCEDURE — 97116 GAIT TRAINING THERAPY: CPT

## 2025-02-18 PROCEDURE — 36415 COLL VENOUS BLD VENIPUNCTURE: CPT

## 2025-02-18 PROCEDURE — 84478 ASSAY OF TRIGLYCERIDES: CPT

## 2025-02-18 PROCEDURE — 84100 ASSAY OF PHOSPHORUS: CPT

## 2025-02-18 PROCEDURE — 97535 SELF CARE MNGMENT TRAINING: CPT

## 2025-02-18 PROCEDURE — 80048 BASIC METABOLIC PNL TOTAL CA: CPT

## 2025-02-18 PROCEDURE — 92597 ORAL SPEECH DEVICE EVAL: CPT

## 2025-02-18 RX ADMIN — DEXMEDETOMIDINE HYDROCHLORIDE 0.9 MCG/KG/HR: 400 INJECTION INTRAVENOUS at 01:47

## 2025-02-18 RX ADMIN — DIAZEPAM 40 MG: 5 TABLET ORAL at 19:50

## 2025-02-18 RX ADMIN — DEXTRAN 70, GLYCERIN, HYPROMELLOSE 1 DROP: 1; 2; 3 SOLUTION/ DROPS OPHTHALMIC at 03:06

## 2025-02-18 RX ADMIN — HYDROMORPHONE HYDROCHLORIDE 2 MG: 2 INJECTION, SOLUTION INTRAMUSCULAR; INTRAVENOUS; SUBCUTANEOUS at 05:24

## 2025-02-18 RX ADMIN — GUAIFENESIN 200 MG: 200 SOLUTION ORAL at 05:24

## 2025-02-18 RX ADMIN — GUAIFENESIN 200 MG: 200 SOLUTION ORAL at 11:53

## 2025-02-18 RX ADMIN — DEXTRAN 70, GLYCERIN, HYPROMELLOSE 1 DROP: 1; 2; 3 SOLUTION/ DROPS OPHTHALMIC at 11:54

## 2025-02-18 RX ADMIN — FAMOTIDINE 20 MG: 20 TABLET, FILM COATED ORAL at 10:19

## 2025-02-18 RX ADMIN — LURASIDONE HYDROCHLORIDE 40 MG: 40 TABLET, FILM COATED ORAL at 10:25

## 2025-02-18 RX ADMIN — DIAZEPAM 40 MG: 5 TABLET ORAL at 11:54

## 2025-02-18 RX ADMIN — DEXTRAN 70, GLYCERIN, HYPROMELLOSE 1 DROP: 1; 2; 3 SOLUTION/ DROPS OPHTHALMIC at 18:02

## 2025-02-18 RX ADMIN — HYDROMORPHONE HYDROCHLORIDE 2 MG: 2 INJECTION, SOLUTION INTRAMUSCULAR; INTRAVENOUS; SUBCUTANEOUS at 22:04

## 2025-02-18 RX ADMIN — HYDROMORPHONE HYDROCHLORIDE 2 MG: 2 INJECTION, SOLUTION INTRAMUSCULAR; INTRAVENOUS; SUBCUTANEOUS at 19:51

## 2025-02-18 RX ADMIN — ENOXAPARIN SODIUM 40 MG: 100 INJECTION SUBCUTANEOUS at 11:53

## 2025-02-18 RX ADMIN — POLYETHYLENE GLYCOL 3350 17 G: 17 POWDER, FOR SOLUTION ORAL at 19:50

## 2025-02-18 RX ADMIN — Medication 100 MG: at 10:20

## 2025-02-18 RX ADMIN — DEXTRAN 70, GLYCERIN, HYPROMELLOSE 1 DROP: 1; 2; 3 SOLUTION/ DROPS OPHTHALMIC at 15:00

## 2025-02-18 RX ADMIN — FAMOTIDINE 20 MG: 20 TABLET, FILM COATED ORAL at 19:50

## 2025-02-18 RX ADMIN — DEXTRAN 70, GLYCERIN, HYPROMELLOSE 1 DROP: 1; 2; 3 SOLUTION/ DROPS OPHTHALMIC at 07:00

## 2025-02-18 RX ADMIN — GLYCOPYRROLATE 0.1 MG: 0.2 INJECTION INTRAMUSCULAR; INTRAVENOUS at 14:48

## 2025-02-18 RX ADMIN — THERA TABS 1 TABLET: TAB at 10:20

## 2025-02-18 RX ADMIN — GUAIFENESIN 200 MG: 200 SOLUTION ORAL at 22:04

## 2025-02-18 RX ADMIN — CHLORHEXIDINE GLUCONATE 15 ML: 1.2 RINSE ORAL at 10:21

## 2025-02-18 RX ADMIN — Medication 3 MG: at 22:04

## 2025-02-18 RX ADMIN — GUAIFENESIN 200 MG: 200 SOLUTION ORAL at 18:03

## 2025-02-18 RX ADMIN — HYDROMORPHONE HYDROCHLORIDE 2 MG: 2 INJECTION, SOLUTION INTRAMUSCULAR; INTRAVENOUS; SUBCUTANEOUS at 08:31

## 2025-02-18 RX ADMIN — POTASSIUM BICARBONATE 40 MEQ: 782 TABLET, EFFERVESCENT ORAL at 11:54

## 2025-02-18 RX ADMIN — HYDROMORPHONE HYDROCHLORIDE 2 MG: 2 INJECTION, SOLUTION INTRAMUSCULAR; INTRAVENOUS; SUBCUTANEOUS at 14:48

## 2025-02-18 RX ADMIN — GLYCOPYRROLATE 0.1 MG: 0.2 INJECTION INTRAMUSCULAR; INTRAVENOUS at 19:51

## 2025-02-18 RX ADMIN — DIAZEPAM 40 MG: 5 TABLET ORAL at 03:01

## 2025-02-18 RX ADMIN — FOLIC ACID 1 MG: 1 TABLET ORAL at 10:19

## 2025-02-18 RX ADMIN — CHLORHEXIDINE GLUCONATE 15 ML: 1.2 RINSE ORAL at 19:50

## 2025-02-18 RX ADMIN — METHADONE HYDROCHLORIDE 15 MG: 10 CONCENTRATE ORAL at 19:51

## 2025-02-18 RX ADMIN — METHADONE HYDROCHLORIDE 15 MG: 10 CONCENTRATE ORAL at 10:20

## 2025-02-18 RX ADMIN — POLYETHYLENE GLYCOL 3350 17 G: 17 POWDER, FOR SOLUTION ORAL at 10:20

## 2025-02-18 ASSESSMENT — PULMONARY FUNCTION TESTS
PIF_VALUE: 21
PIF_VALUE: 21
PIF_VALUE: 11

## 2025-02-18 ASSESSMENT — PAIN SCALES - GENERAL
PAINLEVEL_OUTOF10: 6
PAINLEVEL_OUTOF10: 6

## 2025-02-18 ASSESSMENT — PAIN DESCRIPTION - LOCATION: LOCATION: OTHER (COMMENT)

## 2025-02-18 NOTE — CARE COORDINATION
In order for pt to be accepted to LTACH,need respiratory therapy progress notes :    Consistent charting about vent weaning-need specifics   How long pt was on trach collar and how long trach trial was tolerated,etc etc    Letty Morrell RN      Pt on [place]sey-Pickering valve today and TC .    EA

## 2025-02-18 NOTE — CARE COORDINATION
Care Management Progress Note    Reason for Admission:   Nausea and vomiting in adult [R11.2]  Alcohol-induced acute pancreatitis without infection or necrosis [K85.20]  Pancreatitis without necrosis or infection [K85.90]         Patient Admission Status: Inpatient  RUR: 15%  Hospitalization in the last 30 days (Readmission):  no        Transition of care plan:  [Medical]  Pt is currently on pecedex gtt  Dilaudid and versed gtts are off  Overnight,pt received prn dilaudid and robinul  Per night shift charge report,pt slept most of the night  Pt required to be straight cathed twice during the night.  Trach /vent  PT/OT evaluated pt yesterday and if pt is weaned off the vent,he demonstrates potential for inpatient rehab.  At pt.'s request,I attempted to contact pt.'s girlfriend ,Malka @ the number listed in chart([phone]) to request she call pt but the number has been changed and is no longer in service.  Sister,Artie Trejo is primary decision-maker @ [phone] so all communications regarding pt will be discussed with sister.  [DC plan]  If SNF or IPR:  Date FOC offered: 2/12/25  Accepting facility: [facility] is following pt  IMPORTANT:For attending and RT :  [place] has [employer] [place] insurance  Because of this,LTACH instructed me that post tracheotomy:  Either intensivist , RT or both  must document in progress notes consistently details about the weaning trials.  Documentation must include time off of full vent support.  Documentation must demonstrate pt is weanable from the vent and that pt is making progress in being weaned off of the ventilator.This documentation is usually for a 5 to 7 day period.  Insurance requires daily documentation.  Then ,[employer] [place] requires that pt be off of all sedation.  Then,[employer] requires a 21 day waiting period to insure pt continues to improve,etc.etc   [employer] wants pt to be weaned off all sedation before going to LTACH  Back-up plan

## 2025-02-19 LAB
ANION GAP SERPL CALC-SCNC: 4 MMOL/L (ref 2–12)
BACTERIA SPEC CULT: NORMAL
BUN SERPL-MCNC: 16 MG/DL (ref 6–20)
BUN/CREAT SERPL: 24 (ref 12–20)
CALCIUM SERPL-MCNC: 9.6 MG/DL (ref 8.5–10.1)
CHLORIDE SERPL-SCNC: 102 MMOL/L (ref 97–108)
CO2 SERPL-SCNC: 29 MMOL/L (ref 21–32)
CREAT SERPL-MCNC: 0.66 MG/DL (ref 0.7–1.3)
ERYTHROCYTE [DISTWIDTH] IN BLOOD BY AUTOMATED COUNT: 12.5 % (ref 11.5–14.5)
GLUCOSE BLD STRIP.AUTO-MCNC: 104 MG/DL (ref 65–117)
GLUCOSE SERPL-MCNC: 115 MG/DL (ref 65–100)
GRAM STN SPEC: NORMAL
HCT VFR BLD AUTO: 34.4 % (ref 36.6–50.3)
HGB BLD-MCNC: 11.2 G/DL (ref 12.1–17)
MAGNESIUM SERPL-MCNC: 1.9 MG/DL (ref 1.6–2.4)
MCH RBC QN AUTO: 27.7 PG (ref 26–34)
MCHC RBC AUTO-ENTMCNC: 32.6 G/DL (ref 30–36.5)
MCV RBC AUTO: 84.9 FL (ref 80–99)
NRBC # BLD: 0 K/UL (ref 0–0.01)
NRBC BLD-RTO: 0 PER 100 WBC
PHOSPHATE SERPL-MCNC: 3.7 MG/DL (ref 2.6–4.7)
PLATELET # BLD AUTO: 300 K/UL (ref 150–400)
PMV BLD AUTO: 10.7 FL (ref 8.9–12.9)
POTASSIUM SERPL-SCNC: 3.7 MMOL/L (ref 3.5–5.1)
RBC # BLD AUTO: 4.05 M/UL (ref 4.1–5.7)
SERVICE CMNT-IMP: NORMAL
SERVICE CMNT-IMP: NORMAL
SODIUM SERPL-SCNC: 135 MMOL/L (ref 136–145)
WBC # BLD AUTO: 10.4 K/UL (ref 4.1–11.1)

## 2025-02-19 PROCEDURE — 2500000003 HC RX 250 WO HCPCS: Performed by: HOSPITALIST

## 2025-02-19 PROCEDURE — 6370000000 HC RX 637 (ALT 250 FOR IP): Performed by: INTERNAL MEDICINE

## 2025-02-19 PROCEDURE — 6360000002 HC RX W HCPCS: Performed by: INTERNAL MEDICINE

## 2025-02-19 PROCEDURE — 2000000000 HC ICU R&B

## 2025-02-19 PROCEDURE — 80048 BASIC METABOLIC PNL TOTAL CA: CPT

## 2025-02-19 PROCEDURE — 97116 GAIT TRAINING THERAPY: CPT

## 2025-02-19 PROCEDURE — 92612 ENDOSCOPY SWALLOW (FEES) VID: CPT

## 2025-02-19 PROCEDURE — 94761 N-INVAS EAR/PLS OXIMETRY MLT: CPT

## 2025-02-19 PROCEDURE — 85027 COMPLETE CBC AUTOMATED: CPT

## 2025-02-19 PROCEDURE — 97530 THERAPEUTIC ACTIVITIES: CPT

## 2025-02-19 PROCEDURE — 2500000003 HC RX 250 WO HCPCS: Performed by: NURSE PRACTITIONER

## 2025-02-19 PROCEDURE — 92507 TX SP LANG VOICE COMM INDIV: CPT

## 2025-02-19 PROCEDURE — 83735 ASSAY OF MAGNESIUM: CPT

## 2025-02-19 PROCEDURE — 84100 ASSAY OF PHOSPHORUS: CPT

## 2025-02-19 PROCEDURE — 82962 GLUCOSE BLOOD TEST: CPT

## 2025-02-19 PROCEDURE — 6370000000 HC RX 637 (ALT 250 FOR IP): Performed by: HOSPITALIST

## 2025-02-19 PROCEDURE — 36415 COLL VENOUS BLD VENIPUNCTURE: CPT

## 2025-02-19 PROCEDURE — 6360000002 HC RX W HCPCS: Performed by: NURSE PRACTITIONER

## 2025-02-19 PROCEDURE — 6360000002 HC RX W HCPCS: Performed by: HOSPITALIST

## 2025-02-19 RX ADMIN — HYDROMORPHONE HYDROCHLORIDE 2 MG: 2 INJECTION, SOLUTION INTRAMUSCULAR; INTRAVENOUS; SUBCUTANEOUS at 20:52

## 2025-02-19 RX ADMIN — HYDROMORPHONE HYDROCHLORIDE 2 MG: 2 INJECTION, SOLUTION INTRAMUSCULAR; INTRAVENOUS; SUBCUTANEOUS at 02:39

## 2025-02-19 RX ADMIN — GUAIFENESIN 200 MG: 200 SOLUTION ORAL at 23:30

## 2025-02-19 RX ADMIN — DEXTRAN 70, GLYCERIN, HYPROMELLOSE 1 DROP: 1; 2; 3 SOLUTION/ DROPS OPHTHALMIC at 11:06

## 2025-02-19 RX ADMIN — OXYCODONE 5 MG: 5 TABLET ORAL at 11:05

## 2025-02-19 RX ADMIN — METHADONE HYDROCHLORIDE 15 MG: 10 CONCENTRATE ORAL at 20:59

## 2025-02-19 RX ADMIN — GLYCOPYRROLATE 0.1 MG: 0.2 INJECTION INTRAMUSCULAR; INTRAVENOUS at 02:20

## 2025-02-19 RX ADMIN — Medication 3 MG: at 20:52

## 2025-02-19 RX ADMIN — THERA TABS 1 TABLET: TAB at 08:16

## 2025-02-19 RX ADMIN — DIAZEPAM 40 MG: 5 TABLET ORAL at 13:48

## 2025-02-19 RX ADMIN — GLYCOPYRROLATE 0.1 MG: 0.2 INJECTION INTRAMUSCULAR; INTRAVENOUS at 19:43

## 2025-02-19 RX ADMIN — Medication 100 MG: at 08:15

## 2025-02-19 RX ADMIN — ONDANSETRON 4 MG: 2 INJECTION, SOLUTION INTRAMUSCULAR; INTRAVENOUS at 15:54

## 2025-02-19 RX ADMIN — SODIUM CHLORIDE, PRESERVATIVE FREE 10 ML: 5 INJECTION INTRAVENOUS at 19:44

## 2025-02-19 RX ADMIN — CHLORHEXIDINE GLUCONATE 15 ML: 1.2 RINSE ORAL at 19:46

## 2025-02-19 RX ADMIN — ENOXAPARIN SODIUM 40 MG: 100 INJECTION SUBCUTANEOUS at 11:05

## 2025-02-19 RX ADMIN — HYDROMORPHONE HYDROCHLORIDE 2 MG: 2 INJECTION, SOLUTION INTRAMUSCULAR; INTRAVENOUS; SUBCUTANEOUS at 23:30

## 2025-02-19 RX ADMIN — DEXTRAN 70, GLYCERIN, HYPROMELLOSE 1 DROP: 1; 2; 3 SOLUTION/ DROPS OPHTHALMIC at 15:54

## 2025-02-19 RX ADMIN — FAMOTIDINE 20 MG: 20 TABLET, FILM COATED ORAL at 08:15

## 2025-02-19 RX ADMIN — DEXTRAN 70, GLYCERIN, HYPROMELLOSE 1 DROP: 1; 2; 3 SOLUTION/ DROPS OPHTHALMIC at 06:09

## 2025-02-19 RX ADMIN — HYDROMORPHONE HYDROCHLORIDE 2 MG: 2 INJECTION, SOLUTION INTRAMUSCULAR; INTRAVENOUS; SUBCUTANEOUS at 00:35

## 2025-02-19 RX ADMIN — HYDROMORPHONE HYDROCHLORIDE 2 MG: 2 INJECTION, SOLUTION INTRAMUSCULAR; INTRAVENOUS; SUBCUTANEOUS at 06:13

## 2025-02-19 RX ADMIN — FAMOTIDINE 20 MG: 20 TABLET, FILM COATED ORAL at 20:53

## 2025-02-19 RX ADMIN — LURASIDONE HYDROCHLORIDE 40 MG: 40 TABLET, FILM COATED ORAL at 08:15

## 2025-02-19 RX ADMIN — GUAIFENESIN 200 MG: 200 SOLUTION ORAL at 04:10

## 2025-02-19 RX ADMIN — HYDROMORPHONE HYDROCHLORIDE 2 MG: 2 INJECTION, SOLUTION INTRAMUSCULAR; INTRAVENOUS; SUBCUTANEOUS at 16:15

## 2025-02-19 RX ADMIN — GUAIFENESIN 200 MG: 200 SOLUTION ORAL at 11:06

## 2025-02-19 RX ADMIN — HYDROMORPHONE HYDROCHLORIDE 2 MG: 2 INJECTION, SOLUTION INTRAMUSCULAR; INTRAVENOUS; SUBCUTANEOUS at 08:16

## 2025-02-19 RX ADMIN — METHADONE HYDROCHLORIDE 15 MG: 10 CONCENTRATE ORAL at 08:18

## 2025-02-19 RX ADMIN — CHLORHEXIDINE GLUCONATE 15 ML: 1.2 RINSE ORAL at 08:16

## 2025-02-19 RX ADMIN — DIAZEPAM 40 MG: 5 TABLET ORAL at 04:10

## 2025-02-19 RX ADMIN — FOLIC ACID 1 MG: 1 TABLET ORAL at 08:15

## 2025-02-19 RX ADMIN — POLYETHYLENE GLYCOL 3350 17 G: 17 POWDER, FOR SOLUTION ORAL at 08:16

## 2025-02-19 RX ADMIN — DIAZEPAM 40 MG: 5 TABLET ORAL at 20:52

## 2025-02-19 ASSESSMENT — PAIN DESCRIPTION - LOCATION: LOCATION: CHEST

## 2025-02-19 ASSESSMENT — PAIN SCALES - GENERAL: PAINLEVEL_OUTOF10: 7

## 2025-02-19 ASSESSMENT — PAIN DESCRIPTION - DESCRIPTORS: DESCRIPTORS: ACHING

## 2025-02-19 NOTE — CARE COORDINATION
Care Management Progress Note    Reason for Admission:   Nausea and vomiting in adult [R11.2]  Alcohol-induced acute pancreatitis without infection or necrosis [K85.20]  Pancreatitis without necrosis or infection [K85.90]         Patient Admission Status: Inpatient  RUR: 15 %  Hospitalization in the last 30 days (Readmission):  No        Transition of care plan:  Medical  DC plan-updated resp notes sent to [facility] today.   For attending and RT :  Pt has [employer] med[place] insurance because of this,[facility] instructed that post tracheotomy:  Either intensivist , RT or both  must document in progress notes consistently details about the weaning trials. See CM noted from 2/18 for complete/specific documentation requirements.      Sister,Artie Trejo is primary decision-maker @ [phone] so all communications regarding pt will be discussed with .   Date 1st IMM letter given: na-medicaid  Outpatient follow-up.  Transport at discharge: medicaid transport    Elisa Herrera RN, [facility]  [facility] Management    Available via [employer]

## 2025-02-20 LAB
ANION GAP SERPL CALC-SCNC: 6 MMOL/L (ref 2–12)
BUN SERPL-MCNC: 16 MG/DL (ref 6–20)
BUN/CREAT SERPL: 23 (ref 12–20)
CALCIUM SERPL-MCNC: 10.4 MG/DL (ref 8.5–10.1)
CHLORIDE SERPL-SCNC: 101 MMOL/L (ref 97–108)
CO2 SERPL-SCNC: 29 MMOL/L (ref 21–32)
CREAT SERPL-MCNC: 0.71 MG/DL (ref 0.7–1.3)
ERYTHROCYTE [DISTWIDTH] IN BLOOD BY AUTOMATED COUNT: 12.5 % (ref 11.5–14.5)
GLUCOSE SERPL-MCNC: 112 MG/DL (ref 65–100)
HCT VFR BLD AUTO: 34.4 % (ref 36.6–50.3)
HGB BLD-MCNC: 11 G/DL (ref 12.1–17)
MAGNESIUM SERPL-MCNC: 1.9 MG/DL (ref 1.6–2.4)
MCH RBC QN AUTO: 27.3 PG (ref 26–34)
MCHC RBC AUTO-ENTMCNC: 32 G/DL (ref 30–36.5)
MCV RBC AUTO: 85.4 FL (ref 80–99)
NRBC # BLD: 0 K/UL (ref 0–0.01)
NRBC BLD-RTO: 0 PER 100 WBC
PHOSPHATE SERPL-MCNC: 3.7 MG/DL (ref 2.6–4.7)
PLATELET # BLD AUTO: 313 K/UL (ref 150–400)
PMV BLD AUTO: 11.7 FL (ref 8.9–12.9)
POTASSIUM SERPL-SCNC: 3.7 MMOL/L (ref 3.5–5.1)
RBC # BLD AUTO: 4.03 M/UL (ref 4.1–5.7)
SODIUM SERPL-SCNC: 136 MMOL/L (ref 136–145)
TRIGL SERPL-MCNC: 89 MG/DL
WBC # BLD AUTO: 9 K/UL (ref 4.1–11.1)

## 2025-02-20 PROCEDURE — 6370000000 HC RX 637 (ALT 250 FOR IP): Performed by: INTERNAL MEDICINE

## 2025-02-20 PROCEDURE — 2500000003 HC RX 250 WO HCPCS: Performed by: HOSPITALIST

## 2025-02-20 PROCEDURE — 83735 ASSAY OF MAGNESIUM: CPT

## 2025-02-20 PROCEDURE — 84100 ASSAY OF PHOSPHORUS: CPT

## 2025-02-20 PROCEDURE — 31502 CHANGE OF WINDPIPE AIRWAY: CPT

## 2025-02-20 PROCEDURE — 85027 COMPLETE CBC AUTOMATED: CPT

## 2025-02-20 PROCEDURE — 6370000000 HC RX 637 (ALT 250 FOR IP): Performed by: HOSPITALIST

## 2025-02-20 PROCEDURE — 6370000000 HC RX 637 (ALT 250 FOR IP): Performed by: NURSE PRACTITIONER

## 2025-02-20 PROCEDURE — 80048 BASIC METABOLIC PNL TOTAL CA: CPT

## 2025-02-20 PROCEDURE — 1100000000 HC RM PRIVATE

## 2025-02-20 PROCEDURE — 6360000002 HC RX W HCPCS: Performed by: HOSPITALIST

## 2025-02-20 PROCEDURE — 92526 ORAL FUNCTION THERAPY: CPT

## 2025-02-20 PROCEDURE — 36415 COLL VENOUS BLD VENIPUNCTURE: CPT

## 2025-02-20 PROCEDURE — 2700000000 HC OXYGEN THERAPY PER DAY

## 2025-02-20 PROCEDURE — 97535 SELF CARE MNGMENT TRAINING: CPT

## 2025-02-20 PROCEDURE — 97530 THERAPEUTIC ACTIVITIES: CPT

## 2025-02-20 PROCEDURE — 2500000003 HC RX 250 WO HCPCS: Performed by: NURSE PRACTITIONER

## 2025-02-20 PROCEDURE — 97116 GAIT TRAINING THERAPY: CPT

## 2025-02-20 PROCEDURE — 94640 AIRWAY INHALATION TREATMENT: CPT

## 2025-02-20 PROCEDURE — 84478 ASSAY OF TRIGLYCERIDES: CPT

## 2025-02-20 RX ORDER — DIAZEPAM 5 MG/1
10 TABLET ORAL EVERY 8 HOURS
Status: DISCONTINUED | OUTPATIENT
Start: 2025-02-20 | End: 2025-02-21

## 2025-02-20 RX ORDER — IPRATROPIUM BROMIDE AND ALBUTEROL SULFATE 2.5; .5 MG/3ML; MG/3ML
1 SOLUTION RESPIRATORY (INHALATION) EVERY 6 HOURS
Status: DISCONTINUED | OUTPATIENT
Start: 2025-02-20 | End: 2025-02-21

## 2025-02-20 RX ADMIN — GUAIFENESIN 200 MG: 200 SOLUTION ORAL at 05:04

## 2025-02-20 RX ADMIN — GUAIFENESIN 200 MG: 200 SOLUTION ORAL at 18:04

## 2025-02-20 RX ADMIN — GUAIFENESIN 200 MG: 200 SOLUTION ORAL at 13:01

## 2025-02-20 RX ADMIN — GUAIFENESIN 200 MG: 200 SOLUTION ORAL at 23:53

## 2025-02-20 RX ADMIN — Medication 100 MG: at 07:43

## 2025-02-20 RX ADMIN — IPRATROPIUM BROMIDE AND ALBUTEROL SULFATE 1 DOSE: .5; 3 SOLUTION RESPIRATORY (INHALATION) at 13:29

## 2025-02-20 RX ADMIN — ENOXAPARIN SODIUM 40 MG: 100 INJECTION SUBCUTANEOUS at 13:01

## 2025-02-20 RX ADMIN — SODIUM CHLORIDE, PRESERVATIVE FREE 10 ML: 5 INJECTION INTRAVENOUS at 21:52

## 2025-02-20 RX ADMIN — IPRATROPIUM BROMIDE AND ALBUTEROL SULFATE 1 DOSE: .5; 3 SOLUTION RESPIRATORY (INHALATION) at 20:45

## 2025-02-20 RX ADMIN — HYDROMORPHONE HYDROCHLORIDE 2 MG: 2 INJECTION, SOLUTION INTRAMUSCULAR; INTRAVENOUS; SUBCUTANEOUS at 02:41

## 2025-02-20 RX ADMIN — FAMOTIDINE 20 MG: 20 TABLET, FILM COATED ORAL at 07:43

## 2025-02-20 RX ADMIN — THERA TABS 1 TABLET: TAB at 07:43

## 2025-02-20 RX ADMIN — POLYETHYLENE GLYCOL 3350 17 G: 17 POWDER, FOR SOLUTION ORAL at 21:51

## 2025-02-20 RX ADMIN — CHLORHEXIDINE GLUCONATE 15 ML: 1.2 RINSE ORAL at 23:53

## 2025-02-20 RX ADMIN — LURASIDONE HYDROCHLORIDE 40 MG: 40 TABLET, FILM COATED ORAL at 07:49

## 2025-02-20 RX ADMIN — HYDROMORPHONE HYDROCHLORIDE 2 MG: 2 INJECTION, SOLUTION INTRAMUSCULAR; INTRAVENOUS; SUBCUTANEOUS at 07:43

## 2025-02-20 RX ADMIN — CHLORHEXIDINE GLUCONATE 15 ML: 1.2 RINSE ORAL at 07:44

## 2025-02-20 RX ADMIN — METHADONE HYDROCHLORIDE 15 MG: 10 CONCENTRATE ORAL at 21:51

## 2025-02-20 RX ADMIN — FOLIC ACID 1 MG: 1 TABLET ORAL at 07:43

## 2025-02-20 RX ADMIN — DIAZEPAM 40 MG: 5 TABLET ORAL at 05:04

## 2025-02-20 RX ADMIN — POLYETHYLENE GLYCOL 3350 17 G: 17 POWDER, FOR SOLUTION ORAL at 07:43

## 2025-02-20 RX ADMIN — METHADONE HYDROCHLORIDE 15 MG: 10 CONCENTRATE ORAL at 07:42

## 2025-02-20 RX ADMIN — Medication 3 MG: at 23:57

## 2025-02-20 RX ADMIN — DIAZEPAM 10 MG: 5 TABLET ORAL at 21:52

## 2025-02-20 RX ADMIN — FAMOTIDINE 20 MG: 20 TABLET, FILM COATED ORAL at 21:52

## 2025-02-20 RX ADMIN — DIAZEPAM 10 MG: 5 TABLET ORAL at 13:01

## 2025-02-20 ASSESSMENT — PAIN SCALES - GENERAL: PAINLEVEL_OUTOF10: 9

## 2025-02-20 ASSESSMENT — PAIN DESCRIPTION - LOCATION: LOCATION: THROAT

## 2025-02-20 NOTE — CARE COORDINATION
Care Management Progress Note    Reason for Admission:   Nausea and vomiting in adult [R11.2]  Alcohol-induced acute pancreatitis without infection or necrosis [K85.20]  Pancreatitis without necrosis or infection [K85.90]       Patient Admission Status: Inpatient  RUR: 15%  Hospitalization in the last 30 days (Readmission):  No      Comments:  CM s/w [facility] liaison[facility]oo.  Patient's insurance will not authorize placement at LTAC since he is off the vent and doing well.  CM s/w patient's sister, Artie, by phone at patient's bedside.  Discussed IPR.  Will send referrals to [facility], [facility] and [facility] for review.      Transition of care plan:  Medical management continues  Discharge Plan- IPR  SisterArtie is primary decision-maker ([phone]).  All communications regarding pt will be discussed with sister.   Date 1st IMM letter given: n/a- patient has medicaid  Outpatient follow-up.  Transport at discharge: medicaid transport

## 2025-02-20 NOTE — H&P
Hospitalist Admission Note    NAME:  Nathan Trejo   :  1990   MRN:  [id number]     Date/Time:  2025 12:53 PM    Patient PCP: No primary care provider on file.  ________________________________________________________________________    Given the patient's current clinical presentation, I have a high level of concern for decompensation if discharged from the emergency department.  Complex decision making was performed, which includes reviewing the patient's available past medical records, laboratory results, and x-ray films.       My assessment of this patient's clinical condition and my plan of care is as follows.    Assessment / Plan:    34-year-old male with history notable for alcohol abuse (reportedly drinks 12-24 beers per day, last drink on ) who was admitted with abdominal pain.  Workup was consistent with pancreatitis and incidental note of hepatic steatosis.  Patient was managed on medical floor for acute pancreatitis, and a prolonged ICU course as below, patient is transferred back to the floor for further management.    # Acute encephalopathy, currently resolved  - Secondary to delirium and alcohol withdrawal with underlying substance abuse disorder  - Intubated on   for agitation, extubated and then reintubated on 2/3 due to significant agitation  - Trach -  for high sedation needs and slow wean   Plan  - Continue methadone to 15 mg twice daily.  - Continue latuda scheduled - 40 mg daily  - wean PO valium -  10 q8h, with plan to continue wean down  - Continue clonidine patch   - monitor qtc     # Acute respiratory failure with hypoxemia, trach dependant now 25   # Hemophilus pneumonia, treatment completed on 25  # Recurrent fever, drug induced??  - Intubated on   for agitation, extubated and then reintubated on 2/3 due to significant agitation  - Trach - .   - Mrsa nares negative  - NO fever since off of precedex.   Plan  - Follow Repeat Resp cultures & blood

## 2025-02-21 ENCOUNTER — APPOINTMENT (OUTPATIENT)
Facility: HOSPITAL | Age: 35
DRG: 005 | End: 2025-02-21
Attending: INTERNAL MEDICINE
Payer: MEDICAID

## 2025-02-21 LAB
ANION GAP SERPL CALC-SCNC: 4 MMOL/L (ref 2–12)
BUN SERPL-MCNC: 13 MG/DL (ref 6–20)
BUN/CREAT SERPL: 18 (ref 12–20)
CALCIUM SERPL-MCNC: 10.6 MG/DL (ref 8.5–10.1)
CHLORIDE SERPL-SCNC: 102 MMOL/L (ref 97–108)
CO2 SERPL-SCNC: 30 MMOL/L (ref 21–32)
CREAT SERPL-MCNC: 0.72 MG/DL (ref 0.7–1.3)
ECHO AO ASC DIAM: 2.6 CM
ECHO AO ASCENDING AORTA INDEX: 1.44 CM/M2
ECHO AO ROOT DIAM: 3.1 CM
ECHO AO ROOT INDEX: 1.72 CM/M2
ECHO AV AREA PEAK VELOCITY: 3.3 CM2
ECHO AV AREA VTI: 3.3 CM2
ECHO AV AREA/BSA PEAK VELOCITY: 1.8 CM2/M2
ECHO AV AREA/BSA VTI: 1.8 CM2/M2
ECHO AV MEAN GRADIENT: 2 MMHG
ECHO AV MEAN VELOCITY: 0.6 M/S
ECHO AV PEAK GRADIENT: 3 MMHG
ECHO AV PEAK VELOCITY: 0.9 M/S
ECHO AV VELOCITY RATIO: 0.89
ECHO AV VTI: 13.8 CM
ECHO BSA: 1.76 M2
ECHO LA DIAMETER INDEX: 1.39 CM/M2
ECHO LA DIAMETER: 2.5 CM
ECHO LA TO AORTIC ROOT RATIO: 0.81
ECHO LA VOL A-L A2C: 61 ML (ref 18–58)
ECHO LA VOL A-L A4C: 23 ML (ref 18–58)
ECHO LA VOL BP: 33 ML (ref 18–58)
ECHO LA VOL MOD A2C: 61 ML (ref 18–58)
ECHO LA VOL MOD A4C: 17 ML (ref 18–58)
ECHO LA VOL/BSA BIPLANE: 18 ML/M2 (ref 16–34)
ECHO LA VOLUME AREA LENGTH: 39 ML
ECHO LA VOLUME INDEX A-L A2C: 34 ML/M2 (ref 16–34)
ECHO LA VOLUME INDEX A-L A4C: 13 ML/M2 (ref 16–34)
ECHO LA VOLUME INDEX AREA LENGTH: 22 ML/M2 (ref 16–34)
ECHO LA VOLUME INDEX MOD A2C: 34 ML/M2 (ref 16–34)
ECHO LA VOLUME INDEX MOD A4C: 9 ML/M2 (ref 16–34)
ECHO LV E' LATERAL VELOCITY: 12.05 CM/S
ECHO LV E' SEPTAL VELOCITY: 8.51 CM/S
ECHO LV EDV A2C: 95 ML
ECHO LV EDV A4C: 114 ML
ECHO LV EDV BP: 105 ML (ref 67–155)
ECHO LV EDV INDEX A4C: 63 ML/M2
ECHO LV EDV INDEX BP: 58 ML/M2
ECHO LV EDV NDEX A2C: 53 ML/M2
ECHO LV EF PHYSICIAN: 45 %
ECHO LV EJECTION FRACTION A2C: 55 %
ECHO LV EJECTION FRACTION A4C: 43 %
ECHO LV EJECTION FRACTION BIPLANE: 45 % (ref 55–100)
ECHO LV ESV A2C: 43 ML
ECHO LV ESV A4C: 65 ML
ECHO LV ESV BP: 58 ML (ref 22–58)
ECHO LV ESV INDEX A2C: 24 ML/M2
ECHO LV ESV INDEX A4C: 36 ML/M2
ECHO LV ESV INDEX BP: 32 ML/M2
ECHO LV FRACTIONAL SHORTENING: 36 % (ref 28–44)
ECHO LV INTERNAL DIMENSION DIASTOLE INDEX: 2.5 CM/M2
ECHO LV INTERNAL DIMENSION DIASTOLIC: 4.5 CM (ref 4.2–5.9)
ECHO LV INTERNAL DIMENSION SYSTOLIC INDEX: 1.61 CM/M2
ECHO LV INTERNAL DIMENSION SYSTOLIC: 2.9 CM
ECHO LV IVSD: 1 CM (ref 0.6–1)
ECHO LV MASS 2D: 153.3 G (ref 88–224)
ECHO LV MASS INDEX 2D: 85.2 G/M2 (ref 49–115)
ECHO LV POSTERIOR WALL DIASTOLIC: 1 CM (ref 0.6–1)
ECHO LV RELATIVE WALL THICKNESS RATIO: 0.44
ECHO LVOT AREA: 3.5 CM2
ECHO LVOT AV VTI INDEX: 0.92
ECHO LVOT DIAM: 2.1 CM
ECHO LVOT MEAN GRADIENT: 2 MMHG
ECHO LVOT PEAK GRADIENT: 3 MMHG
ECHO LVOT PEAK VELOCITY: 0.8 M/S
ECHO LVOT STROKE VOLUME INDEX: 24.4 ML/M2
ECHO LVOT SV: 44 ML
ECHO LVOT VTI: 12.7 CM
ECHO MV A VELOCITY: 0.43 M/S
ECHO MV AREA VTI: 3.1 CM2
ECHO MV E DECELERATION TIME (DT): 187.7 MS
ECHO MV E VELOCITY: 0.5 M/S
ECHO MV E/A RATIO: 1.16
ECHO MV E/E' LATERAL: 4.15
ECHO MV E/E' RATIO (AVERAGED): 5.01
ECHO MV E/E' SEPTAL: 5.88
ECHO MV LVOT VTI INDEX: 1.12
ECHO MV MAX VELOCITY: 0.6 M/S
ECHO MV MEAN GRADIENT: 1 MMHG
ECHO MV MEAN VELOCITY: 0.4 M/S
ECHO MV PEAK GRADIENT: 1 MMHG
ECHO MV VTI: 14.2 CM
ECHO PV MAX VELOCITY: 0.7 M/S
ECHO PV PEAK GRADIENT: 2 MMHG
ECHO RV FREE WALL PEAK S': 11.7 CM/S
ECHO RV INTERNAL DIMENSION: 5 CM
ECHO RV TAPSE: 3.6 CM (ref 1.7–?)
ERYTHROCYTE [DISTWIDTH] IN BLOOD BY AUTOMATED COUNT: 12.3 % (ref 11.5–14.5)
GLUCOSE SERPL-MCNC: 112 MG/DL (ref 65–100)
HCT VFR BLD AUTO: 38.8 % (ref 36.6–50.3)
HGB BLD-MCNC: 12.4 G/DL (ref 12.1–17)
MAGNESIUM SERPL-MCNC: 2.1 MG/DL (ref 1.6–2.4)
MCH RBC QN AUTO: 27.4 PG (ref 26–34)
MCHC RBC AUTO-ENTMCNC: 32 G/DL (ref 30–36.5)
MCV RBC AUTO: 85.7 FL (ref 80–99)
NRBC # BLD: 0 K/UL (ref 0–0.01)
NRBC BLD-RTO: 0 PER 100 WBC
PHOSPHATE SERPL-MCNC: 4.7 MG/DL (ref 2.6–4.7)
PLATELET # BLD AUTO: 314 K/UL (ref 150–400)
PMV BLD AUTO: 10.9 FL (ref 8.9–12.9)
POTASSIUM SERPL-SCNC: 4.1 MMOL/L (ref 3.5–5.1)
RBC # BLD AUTO: 4.53 M/UL (ref 4.1–5.7)
SODIUM SERPL-SCNC: 136 MMOL/L (ref 136–145)
TROPONIN I SERPL HS-MCNC: <4 NG/L (ref 0–76)
WBC # BLD AUTO: 8.8 K/UL (ref 4.1–11.1)

## 2025-02-21 PROCEDURE — 6360000002 HC RX W HCPCS: Performed by: INTERNAL MEDICINE

## 2025-02-21 PROCEDURE — 1100000000 HC RM PRIVATE

## 2025-02-21 PROCEDURE — 92526 ORAL FUNCTION THERAPY: CPT

## 2025-02-21 PROCEDURE — 93306 TTE W/DOPPLER COMPLETE: CPT

## 2025-02-21 PROCEDURE — 94761 N-INVAS EAR/PLS OXIMETRY MLT: CPT

## 2025-02-21 PROCEDURE — 6370000000 HC RX 637 (ALT 250 FOR IP): Performed by: INTERNAL MEDICINE

## 2025-02-21 PROCEDURE — 6360000002 HC RX W HCPCS: Performed by: HOSPITALIST

## 2025-02-21 PROCEDURE — 36415 COLL VENOUS BLD VENIPUNCTURE: CPT

## 2025-02-21 PROCEDURE — 85027 COMPLETE CBC AUTOMATED: CPT

## 2025-02-21 PROCEDURE — 6370000000 HC RX 637 (ALT 250 FOR IP): Performed by: HOSPITALIST

## 2025-02-21 PROCEDURE — 84100 ASSAY OF PHOSPHORUS: CPT

## 2025-02-21 PROCEDURE — 80048 BASIC METABOLIC PNL TOTAL CA: CPT

## 2025-02-21 PROCEDURE — 93306 TTE W/DOPPLER COMPLETE: CPT | Performed by: SPECIALIST

## 2025-02-21 PROCEDURE — 94640 AIRWAY INHALATION TREATMENT: CPT

## 2025-02-21 PROCEDURE — 2500000003 HC RX 250 WO HCPCS: Performed by: HOSPITALIST

## 2025-02-21 PROCEDURE — 97116 GAIT TRAINING THERAPY: CPT

## 2025-02-21 PROCEDURE — 83735 ASSAY OF MAGNESIUM: CPT

## 2025-02-21 PROCEDURE — 2500000003 HC RX 250 WO HCPCS: Performed by: INTERNAL MEDICINE

## 2025-02-21 PROCEDURE — 94668 MNPJ CHEST WALL SBSQ: CPT

## 2025-02-21 PROCEDURE — 84484 ASSAY OF TROPONIN QUANT: CPT

## 2025-02-21 PROCEDURE — 93005 ELECTROCARDIOGRAM TRACING: CPT | Performed by: INTERNAL MEDICINE

## 2025-02-21 RX ORDER — DIAZEPAM 5 MG/1
7.5 TABLET ORAL EVERY 8 HOURS
Status: DISCONTINUED | OUTPATIENT
Start: 2025-02-21 | End: 2025-02-22

## 2025-02-21 RX ORDER — ALBUTEROL SULFATE 0.83 MG/ML
2.5 SOLUTION RESPIRATORY (INHALATION)
Status: DISCONTINUED | OUTPATIENT
Start: 2025-02-21 | End: 2025-02-22

## 2025-02-21 RX ORDER — BUSPIRONE HYDROCHLORIDE 5 MG/1
5 TABLET ORAL 2 TIMES DAILY
Status: DISCONTINUED | OUTPATIENT
Start: 2025-02-21 | End: 2025-02-22

## 2025-02-21 RX ORDER — IPRATROPIUM BROMIDE AND ALBUTEROL SULFATE 2.5; .5 MG/3ML; MG/3ML
1 SOLUTION RESPIRATORY (INHALATION) EVERY 4 HOURS PRN
Status: DISCONTINUED | OUTPATIENT
Start: 2025-02-21 | End: 2025-02-25 | Stop reason: HOSPADM

## 2025-02-21 RX ORDER — GUAIFENESIN 200 MG/10ML
400 LIQUID ORAL EVERY 6 HOURS
Status: DISCONTINUED | OUTPATIENT
Start: 2025-02-21 | End: 2025-02-22

## 2025-02-21 RX ORDER — DIAZEPAM 2 MG/1
2 TABLET ORAL EVERY 12 HOURS PRN
Status: DISCONTINUED | OUTPATIENT
Start: 2025-02-21 | End: 2025-02-22

## 2025-02-21 RX ADMIN — DIAZEPAM 10 MG: 5 TABLET ORAL at 12:38

## 2025-02-21 RX ADMIN — FAMOTIDINE 20 MG: 20 TABLET, FILM COATED ORAL at 10:34

## 2025-02-21 RX ADMIN — METHADONE HYDROCHLORIDE 15 MG: 10 CONCENTRATE ORAL at 20:50

## 2025-02-21 RX ADMIN — BUSPIRONE HYDROCHLORIDE 5 MG: 5 TABLET ORAL at 20:50

## 2025-02-21 RX ADMIN — OXYCODONE 5 MG: 5 TABLET ORAL at 03:45

## 2025-02-21 RX ADMIN — POLYETHYLENE GLYCOL 3350 17 G: 17 POWDER, FOR SOLUTION ORAL at 20:51

## 2025-02-21 RX ADMIN — IPRATROPIUM BROMIDE AND ALBUTEROL SULFATE 1 DOSE: .5; 3 SOLUTION RESPIRATORY (INHALATION) at 02:31

## 2025-02-21 RX ADMIN — GUAIFENESIN 200 MG: 200 SOLUTION ORAL at 07:41

## 2025-02-21 RX ADMIN — Medication 100 MG: at 10:34

## 2025-02-21 RX ADMIN — FOLIC ACID 1 MG: 1 TABLET ORAL at 10:34

## 2025-02-21 RX ADMIN — POLYETHYLENE GLYCOL 3350 17 G: 17 POWDER, FOR SOLUTION ORAL at 10:31

## 2025-02-21 RX ADMIN — ALBUTEROL SULFATE 2.5 MG: 2.5 SOLUTION RESPIRATORY (INHALATION) at 21:00

## 2025-02-21 RX ADMIN — DIAZEPAM 10 MG: 5 TABLET ORAL at 03:45

## 2025-02-21 RX ADMIN — ENOXAPARIN SODIUM 40 MG: 100 INJECTION SUBCUTANEOUS at 10:30

## 2025-02-21 RX ADMIN — GUAIFENESIN 400 MG: 200 SOLUTION ORAL at 12:38

## 2025-02-21 RX ADMIN — DIAZEPAM 7.5 MG: 5 TABLET ORAL at 20:50

## 2025-02-21 RX ADMIN — LURASIDONE HYDROCHLORIDE 40 MG: 40 TABLET, FILM COATED ORAL at 12:38

## 2025-02-21 RX ADMIN — GUAIFENESIN 400 MG: 200 SOLUTION ORAL at 17:32

## 2025-02-21 RX ADMIN — BUSPIRONE HYDROCHLORIDE 5 MG: 5 TABLET ORAL at 17:31

## 2025-02-21 RX ADMIN — METHADONE HYDROCHLORIDE 15 MG: 10 CONCENTRATE ORAL at 10:31

## 2025-02-21 RX ADMIN — THERA TABS 1 TABLET: TAB at 10:34

## 2025-02-21 RX ADMIN — FAMOTIDINE 20 MG: 20 TABLET, FILM COATED ORAL at 20:49

## 2025-02-21 RX ADMIN — ALBUTEROL SULFATE 2.5 MG: 2.5 SOLUTION RESPIRATORY (INHALATION) at 10:30

## 2025-02-21 ASSESSMENT — PAIN SCALES - GENERAL
PAINLEVEL_OUTOF10: 10
PAINLEVEL_OUTOF10: 7
PAINLEVEL_OUTOF10: 6

## 2025-02-21 ASSESSMENT — PAIN DESCRIPTION - DESCRIPTORS: DESCRIPTORS: ACHING

## 2025-02-21 ASSESSMENT — PAIN - FUNCTIONAL ASSESSMENT: PAIN_FUNCTIONAL_ASSESSMENT: ACTIVITIES ARE NOT PREVENTED

## 2025-02-21 ASSESSMENT — PAIN DESCRIPTION - LOCATION: LOCATION: ABDOMEN

## 2025-02-21 NOTE — CARE COORDINATION
Care Management Progress Note    Reason for Admission:   Nausea and vomiting in adult [R11.2]  Alcohol-induced acute pancreatitis without infection or necrosis [K85.20]  Pancreatitis without necrosis or infection [K85.90]         Patient Admission Status: Inpatient  RUR: 14%  Hospitalization in the last 30 days (Readmission):  No        Transition of care plan:  Clinical Updates:  Ongoing medical management.  Transfer from ICU.  Trach.  Trial capped today.  Tolerating PMV.  On Regular thin liquids diet per ST note.  Sitter at bedside.      Discharge Plan:    [facility] per Therapy recs.  [facility], [facility], and [facility] are reviewing & will continue to follow.  They are questioning patient being on Methadone, which was started on 2/16.                   B.  Per [facility] & Misti,  if patient is accepted, they can prescribe Methadone, but patient will need an OP center secured prior to admission.                    C.  Patient's sister stated that she is working on getting patient admitted to a [facility] Re[facility] Facility after Physical Rehab.      Discharge plan communicated with patient and/or discharge caregiver: Yes.    Sitter at bedside.  Patient is A&Ox2.  CM called and discuss DCP with patient's sister, Artie Trejo [phone].    Outpatient follow-up:  TBD    Transport at discharge: TBD    ______________________________________  JHON Christiansen, RN-CM  [facility]- Care Management  Available via [employer]  2/21/2025  4:10 PM

## 2025-02-22 LAB
ALBUMIN SERPL-MCNC: 3.6 G/DL (ref 3.5–5)
ALBUMIN/GLOB SERPL: 0.7 (ref 1.1–2.2)
ALP SERPL-CCNC: 128 U/L (ref 45–117)
ALT SERPL-CCNC: 65 U/L (ref 12–78)
ANION GAP SERPL CALC-SCNC: 7 MMOL/L (ref 2–12)
AST SERPL-CCNC: 32 U/L (ref 15–37)
BILIRUB SERPL-MCNC: 0.9 MG/DL (ref 0.2–1)
BUN SERPL-MCNC: 12 MG/DL (ref 6–20)
BUN/CREAT SERPL: 16 (ref 12–20)
CALCIUM SERPL-MCNC: 10.7 MG/DL (ref 8.5–10.1)
CHLORIDE SERPL-SCNC: 100 MMOL/L (ref 97–108)
CO2 SERPL-SCNC: 26 MMOL/L (ref 21–32)
CREAT SERPL-MCNC: 0.74 MG/DL (ref 0.7–1.3)
GLOBULIN SER CALC-MCNC: 5.4 G/DL (ref 2–4)
GLUCOSE SERPL-MCNC: 102 MG/DL (ref 65–100)
MAGNESIUM SERPL-MCNC: 2 MG/DL (ref 1.6–2.4)
PHOSPHATE SERPL-MCNC: 3.9 MG/DL (ref 2.6–4.7)
POTASSIUM SERPL-SCNC: 4.2 MMOL/L (ref 3.5–5.1)
PROT SERPL-MCNC: 9 G/DL (ref 6.4–8.2)
SODIUM SERPL-SCNC: 133 MMOL/L (ref 136–145)
TRIGL SERPL-MCNC: 78 MG/DL

## 2025-02-22 PROCEDURE — 1100000000 HC RM PRIVATE

## 2025-02-22 PROCEDURE — 6370000000 HC RX 637 (ALT 250 FOR IP): Performed by: HOSPITALIST

## 2025-02-22 PROCEDURE — 2500000003 HC RX 250 WO HCPCS: Performed by: INTERNAL MEDICINE

## 2025-02-22 PROCEDURE — 94640 AIRWAY INHALATION TREATMENT: CPT

## 2025-02-22 PROCEDURE — 84478 ASSAY OF TRIGLYCERIDES: CPT

## 2025-02-22 PROCEDURE — 6360000002 HC RX W HCPCS: Performed by: HOSPITALIST

## 2025-02-22 PROCEDURE — 94668 MNPJ CHEST WALL SBSQ: CPT

## 2025-02-22 PROCEDURE — 6360000002 HC RX W HCPCS: Performed by: INTERNAL MEDICINE

## 2025-02-22 PROCEDURE — 84100 ASSAY OF PHOSPHORUS: CPT

## 2025-02-22 PROCEDURE — 6370000000 HC RX 637 (ALT 250 FOR IP): Performed by: INTERNAL MEDICINE

## 2025-02-22 PROCEDURE — 6360000002 HC RX W HCPCS: Performed by: NURSE PRACTITIONER

## 2025-02-22 PROCEDURE — 80053 COMPREHEN METABOLIC PANEL: CPT

## 2025-02-22 PROCEDURE — 94761 N-INVAS EAR/PLS OXIMETRY MLT: CPT

## 2025-02-22 PROCEDURE — 83735 ASSAY OF MAGNESIUM: CPT

## 2025-02-22 RX ORDER — POLYETHYLENE GLYCOL 3350 17 G/17G
17 POWDER, FOR SOLUTION ORAL DAILY PRN
Status: DISCONTINUED | OUTPATIENT
Start: 2025-02-22 | End: 2025-02-25 | Stop reason: HOSPADM

## 2025-02-22 RX ORDER — MULTIVITAMIN WITH IRON
1 TABLET ORAL DAILY
Status: DISCONTINUED | OUTPATIENT
Start: 2025-02-23 | End: 2025-02-25 | Stop reason: HOSPADM

## 2025-02-22 RX ORDER — MIDAZOLAM HYDROCHLORIDE 1 MG/ML
2 INJECTION, SOLUTION INTRAMUSCULAR; INTRAVENOUS EVERY 4 HOURS PRN
Status: DISCONTINUED | OUTPATIENT
Start: 2025-02-22 | End: 2025-02-22

## 2025-02-22 RX ORDER — HYDROMORPHONE HYDROCHLORIDE 2 MG/ML
2 INJECTION, SOLUTION INTRAMUSCULAR; INTRAVENOUS; SUBCUTANEOUS EVERY 6 HOURS PRN
Status: DISCONTINUED | OUTPATIENT
Start: 2025-02-22 | End: 2025-02-23

## 2025-02-22 RX ORDER — ALBUTEROL SULFATE 0.83 MG/ML
2.5 SOLUTION RESPIRATORY (INHALATION)
Status: DISCONTINUED | OUTPATIENT
Start: 2025-02-22 | End: 2025-02-25 | Stop reason: HOSPADM

## 2025-02-22 RX ORDER — METHADONE HYDROCHLORIDE 10 MG/1
10 TABLET ORAL 2 TIMES DAILY
Status: DISCONTINUED | OUTPATIENT
Start: 2025-02-22 | End: 2025-02-23

## 2025-02-22 RX ORDER — OXYCODONE HYDROCHLORIDE 5 MG/1
5 TABLET ORAL EVERY 4 HOURS PRN
Status: DISCONTINUED | OUTPATIENT
Start: 2025-02-22 | End: 2025-02-25 | Stop reason: HOSPADM

## 2025-02-22 RX ORDER — DIAZEPAM 5 MG/1
5 TABLET ORAL EVERY 8 HOURS
Status: DISCONTINUED | OUTPATIENT
Start: 2025-02-23 | End: 2025-02-23

## 2025-02-22 RX ORDER — BUSPIRONE HYDROCHLORIDE 5 MG/1
5 TABLET ORAL 3 TIMES DAILY
Status: DISCONTINUED | OUTPATIENT
Start: 2025-02-22 | End: 2025-02-23

## 2025-02-22 RX ORDER — FOLIC ACID 1 MG/1
1 TABLET ORAL DAILY
Status: DISCONTINUED | OUTPATIENT
Start: 2025-02-23 | End: 2025-02-25 | Stop reason: HOSPADM

## 2025-02-22 RX ORDER — GUAIFENESIN 200 MG/10ML
400 LIQUID ORAL EVERY 6 HOURS
Status: DISCONTINUED | OUTPATIENT
Start: 2025-02-23 | End: 2025-02-25 | Stop reason: HOSPADM

## 2025-02-22 RX ORDER — DIAZEPAM 5 MG/1
5 TABLET ORAL EVERY 8 HOURS
Status: DISCONTINUED | OUTPATIENT
Start: 2025-02-22 | End: 2025-02-22

## 2025-02-22 RX ORDER — GAUZE BANDAGE 2" X 2"
100 BANDAGE TOPICAL DAILY
Status: DISCONTINUED | OUTPATIENT
Start: 2025-02-23 | End: 2025-02-25 | Stop reason: HOSPADM

## 2025-02-22 RX ORDER — POTASSIUM CHLORIDE 7.45 MG/ML
10 INJECTION INTRAVENOUS PRN
Status: DISCONTINUED | OUTPATIENT
Start: 2025-02-22 | End: 2025-02-25 | Stop reason: HOSPADM

## 2025-02-22 RX ORDER — POLYETHYLENE GLYCOL 3350 17 G/17G
17 POWDER, FOR SOLUTION ORAL 2 TIMES DAILY
Status: DISCONTINUED | OUTPATIENT
Start: 2025-02-23 | End: 2025-02-25 | Stop reason: HOSPADM

## 2025-02-22 RX ORDER — IBUPROFEN 100 MG/5ML
400 SUSPENSION ORAL EVERY 6 HOURS PRN
Status: DISCONTINUED | OUTPATIENT
Start: 2025-02-22 | End: 2025-02-25 | Stop reason: HOSPADM

## 2025-02-22 RX ORDER — POTASSIUM CHLORIDE 750 MG/1
40 TABLET, EXTENDED RELEASE ORAL PRN
Status: DISCONTINUED | OUTPATIENT
Start: 2025-02-22 | End: 2025-02-25 | Stop reason: HOSPADM

## 2025-02-22 RX ORDER — ONDANSETRON 4 MG/1
4 TABLET, ORALLY DISINTEGRATING ORAL EVERY 8 HOURS PRN
Status: DISCONTINUED | OUTPATIENT
Start: 2025-02-22 | End: 2025-02-25 | Stop reason: HOSPADM

## 2025-02-22 RX ORDER — FAMOTIDINE 20 MG/1
20 TABLET, FILM COATED ORAL 2 TIMES DAILY
Status: DISCONTINUED | OUTPATIENT
Start: 2025-02-23 | End: 2025-02-25 | Stop reason: HOSPADM

## 2025-02-22 RX ORDER — ACETAMINOPHEN 500 MG
500 TABLET ORAL EVERY 4 HOURS PRN
Status: DISCONTINUED | OUTPATIENT
Start: 2025-02-22 | End: 2025-02-25 | Stop reason: HOSPADM

## 2025-02-22 RX ORDER — DIAZEPAM 2 MG/1
2 TABLET ORAL EVERY 12 HOURS PRN
Status: DISCONTINUED | OUTPATIENT
Start: 2025-02-22 | End: 2025-02-25 | Stop reason: HOSPADM

## 2025-02-22 RX ORDER — ONDANSETRON 2 MG/ML
4 INJECTION INTRAMUSCULAR; INTRAVENOUS EVERY 6 HOURS PRN
Status: DISCONTINUED | OUTPATIENT
Start: 2025-02-22 | End: 2025-02-25 | Stop reason: HOSPADM

## 2025-02-22 RX ORDER — LURASIDONE HYDROCHLORIDE 40 MG/1
40 TABLET, FILM COATED ORAL DAILY
Status: DISCONTINUED | OUTPATIENT
Start: 2025-02-23 | End: 2025-02-25 | Stop reason: HOSPADM

## 2025-02-22 RX ADMIN — ALBUTEROL SULFATE 2.5 MG: 2.5 SOLUTION RESPIRATORY (INHALATION) at 07:39

## 2025-02-22 RX ADMIN — BUSPIRONE HYDROCHLORIDE 5 MG: 5 TABLET ORAL at 13:16

## 2025-02-22 RX ADMIN — MIDAZOLAM 2 MG: 1 INJECTION INTRAMUSCULAR; INTRAVENOUS at 02:10

## 2025-02-22 RX ADMIN — GUAIFENESIN 400 MG: 200 SOLUTION ORAL at 19:40

## 2025-02-22 RX ADMIN — HYDROMORPHONE HYDROCHLORIDE 2 MG: 2 INJECTION, SOLUTION INTRAMUSCULAR; INTRAVENOUS; SUBCUTANEOUS at 05:57

## 2025-02-22 RX ADMIN — BUSPIRONE HYDROCHLORIDE 5 MG: 5 TABLET ORAL at 10:59

## 2025-02-22 RX ADMIN — FAMOTIDINE 20 MG: 20 TABLET, FILM COATED ORAL at 10:59

## 2025-02-22 RX ADMIN — ALBUTEROL SULFATE 2.5 MG: 2.5 SOLUTION RESPIRATORY (INHALATION) at 19:10

## 2025-02-22 RX ADMIN — MIDAZOLAM 2 MG: 1 INJECTION INTRAMUSCULAR; INTRAVENOUS at 00:13

## 2025-02-22 RX ADMIN — DIAZEPAM 5 MG: 5 TABLET ORAL at 20:18

## 2025-02-22 RX ADMIN — DIAZEPAM 5 MG: 5 TABLET ORAL at 13:16

## 2025-02-22 RX ADMIN — ENOXAPARIN SODIUM 40 MG: 100 INJECTION SUBCUTANEOUS at 10:59

## 2025-02-22 RX ADMIN — DIAZEPAM 2 MG: 2 TABLET ORAL at 10:59

## 2025-02-22 RX ADMIN — METHADONE HYDROCHLORIDE 10 MG: 10 TABLET ORAL at 20:18

## 2025-02-22 RX ADMIN — METHADONE HYDROCHLORIDE 15 MG: 10 CONCENTRATE ORAL at 10:59

## 2025-02-22 RX ADMIN — THERA TABS 1 TABLET: TAB at 10:59

## 2025-02-22 RX ADMIN — GUAIFENESIN 400 MG: 200 SOLUTION ORAL at 00:13

## 2025-02-22 RX ADMIN — DIAZEPAM 7.5 MG: 5 TABLET ORAL at 05:52

## 2025-02-22 RX ADMIN — GUAIFENESIN 400 MG: 200 SOLUTION ORAL at 05:53

## 2025-02-22 RX ADMIN — POLYETHYLENE GLYCOL 3350 17 G: 17 POWDER, FOR SOLUTION ORAL at 10:59

## 2025-02-22 RX ADMIN — FOLIC ACID 1 MG: 1 TABLET ORAL at 10:59

## 2025-02-22 RX ADMIN — GUAIFENESIN 400 MG: 200 SOLUTION ORAL at 11:10

## 2025-02-22 RX ADMIN — FAMOTIDINE 20 MG: 20 TABLET, FILM COATED ORAL at 20:18

## 2025-02-22 RX ADMIN — LURASIDONE HYDROCHLORIDE 40 MG: 40 TABLET, FILM COATED ORAL at 11:08

## 2025-02-22 RX ADMIN — BUSPIRONE HYDROCHLORIDE 5 MG: 5 TABLET ORAL at 20:18

## 2025-02-22 RX ADMIN — HYDROMORPHONE HYDROCHLORIDE 2 MG: 2 INJECTION, SOLUTION INTRAMUSCULAR; INTRAVENOUS; SUBCUTANEOUS at 00:13

## 2025-02-22 RX ADMIN — Medication 100 MG: at 10:59

## 2025-02-22 ASSESSMENT — PAIN SCALES - WONG BAKER
WONGBAKER_NUMERICALRESPONSE: NO HURT
WONGBAKER_NUMERICALRESPONSE: NO HURT

## 2025-02-22 ASSESSMENT — PAIN DESCRIPTION - LOCATION: LOCATION: ABDOMEN

## 2025-02-22 ASSESSMENT — PAIN SCALES - GENERAL
PAINLEVEL_OUTOF10: 0
PAINLEVEL_OUTOF10: 8
PAINLEVEL_OUTOF10: 0
PAINLEVEL_OUTOF10: 5

## 2025-02-23 ENCOUNTER — APPOINTMENT (OUTPATIENT)
Facility: HOSPITAL | Age: 35
DRG: 005 | End: 2025-02-23
Payer: MEDICAID

## 2025-02-23 LAB
ALBUMIN SERPL-MCNC: 3.2 G/DL (ref 3.5–5)
ALBUMIN/GLOB SERPL: 0.7 (ref 1.1–2.2)
ALP SERPL-CCNC: 114 U/L (ref 45–117)
ALT SERPL-CCNC: 54 U/L (ref 12–78)
AMMONIA PLAS-SCNC: 46 UMOL/L
ANION GAP SERPL CALC-SCNC: 7 MMOL/L (ref 2–12)
AST SERPL-CCNC: 29 U/L (ref 15–37)
BACTERIA SPEC CULT: NORMAL
BACTERIA SPEC CULT: NORMAL
BILIRUB SERPL-MCNC: 0.9 MG/DL (ref 0.2–1)
BUN SERPL-MCNC: 15 MG/DL (ref 6–20)
BUN/CREAT SERPL: 16 (ref 12–20)
CALCIUM SERPL-MCNC: 10 MG/DL (ref 8.5–10.1)
CHLORIDE SERPL-SCNC: 102 MMOL/L (ref 97–108)
CO2 SERPL-SCNC: 26 MMOL/L (ref 21–32)
CREAT SERPL-MCNC: 0.92 MG/DL (ref 0.7–1.3)
EKG ATRIAL RATE: 86 BPM
EKG DIAGNOSIS: NORMAL
EKG P AXIS: 35 DEGREES
EKG P-R INTERVAL: 152 MS
EKG Q-T INTERVAL: 362 MS
EKG QRS DURATION: 84 MS
EKG QTC CALCULATION (BAZETT): 433 MS
EKG R AXIS: 45 DEGREES
EKG T AXIS: 18 DEGREES
EKG VENTRICULAR RATE: 86 BPM
GLOBULIN SER CALC-MCNC: 4.7 G/DL (ref 2–4)
GLUCOSE SERPL-MCNC: 130 MG/DL (ref 65–100)
MAGNESIUM SERPL-MCNC: 2 MG/DL (ref 1.6–2.4)
PHOSPHATE SERPL-MCNC: 4.2 MG/DL (ref 2.6–4.7)
POTASSIUM SERPL-SCNC: 3.9 MMOL/L (ref 3.5–5.1)
PROT SERPL-MCNC: 7.9 G/DL (ref 6.4–8.2)
SERVICE CMNT-IMP: NORMAL
SERVICE CMNT-IMP: NORMAL
SODIUM SERPL-SCNC: 135 MMOL/L (ref 136–145)
TSH SERPL DL<=0.05 MIU/L-ACNC: 1.23 UIU/ML (ref 0.36–3.74)

## 2025-02-23 PROCEDURE — 6370000000 HC RX 637 (ALT 250 FOR IP)

## 2025-02-23 PROCEDURE — 6360000002 HC RX W HCPCS: Performed by: INTERNAL MEDICINE

## 2025-02-23 PROCEDURE — 94640 AIRWAY INHALATION TREATMENT: CPT

## 2025-02-23 PROCEDURE — 82140 ASSAY OF AMMONIA: CPT

## 2025-02-23 PROCEDURE — 83735 ASSAY OF MAGNESIUM: CPT

## 2025-02-23 PROCEDURE — 36415 COLL VENOUS BLD VENIPUNCTURE: CPT

## 2025-02-23 PROCEDURE — 84443 ASSAY THYROID STIM HORMONE: CPT

## 2025-02-23 PROCEDURE — 94761 N-INVAS EAR/PLS OXIMETRY MLT: CPT

## 2025-02-23 PROCEDURE — 2500000003 HC RX 250 WO HCPCS

## 2025-02-23 PROCEDURE — 1100000000 HC RM PRIVATE

## 2025-02-23 PROCEDURE — 80053 COMPREHEN METABOLIC PANEL: CPT

## 2025-02-23 PROCEDURE — 93010 ELECTROCARDIOGRAM REPORT: CPT | Performed by: SPECIALIST

## 2025-02-23 PROCEDURE — 6360000002 HC RX W HCPCS: Performed by: HOSPITALIST

## 2025-02-23 PROCEDURE — 70450 CT HEAD/BRAIN W/O DYE: CPT

## 2025-02-23 PROCEDURE — 84100 ASSAY OF PHOSPHORUS: CPT

## 2025-02-23 PROCEDURE — 6370000000 HC RX 637 (ALT 250 FOR IP): Performed by: INTERNAL MEDICINE

## 2025-02-23 RX ORDER — DIAZEPAM 5 MG/1
2.5 TABLET ORAL EVERY 8 HOURS
Status: DISCONTINUED | OUTPATIENT
Start: 2025-02-23 | End: 2025-02-24

## 2025-02-23 RX ORDER — METHADONE HYDROCHLORIDE 10 MG/1
5 TABLET ORAL 2 TIMES DAILY
Status: DISCONTINUED | OUTPATIENT
Start: 2025-02-23 | End: 2025-02-24

## 2025-02-23 RX ORDER — HYDROXYZINE HYDROCHLORIDE 10 MG/1
10 TABLET, FILM COATED ORAL 3 TIMES DAILY PRN
Status: DISCONTINUED | OUTPATIENT
Start: 2025-02-23 | End: 2025-02-25 | Stop reason: HOSPADM

## 2025-02-23 RX ORDER — HYDROMORPHONE HYDROCHLORIDE 1 MG/ML
1 INJECTION, SOLUTION INTRAMUSCULAR; INTRAVENOUS; SUBCUTANEOUS EVERY 8 HOURS PRN
Status: DISCONTINUED | OUTPATIENT
Start: 2025-02-23 | End: 2025-02-25 | Stop reason: HOSPADM

## 2025-02-23 RX ORDER — HYDROXYZINE HYDROCHLORIDE 10 MG/1
10 TABLET, FILM COATED ORAL 3 TIMES DAILY PRN
Status: DISCONTINUED | OUTPATIENT
Start: 2025-02-23 | End: 2025-02-23 | Stop reason: SDUPTHER

## 2025-02-23 RX ADMIN — METHADONE HYDROCHLORIDE 5 MG: 10 TABLET ORAL at 21:15

## 2025-02-23 RX ADMIN — GUAIFENESIN 400 MG: 200 SOLUTION ORAL at 13:07

## 2025-02-23 RX ADMIN — THERA TABS 1 TABLET: TAB at 09:12

## 2025-02-23 RX ADMIN — GUAIFENESIN 400 MG: 200 SOLUTION ORAL at 18:32

## 2025-02-23 RX ADMIN — ALBUTEROL SULFATE 2.5 MG: 2.5 SOLUTION RESPIRATORY (INHALATION) at 19:49

## 2025-02-23 RX ADMIN — FAMOTIDINE 20 MG: 20 TABLET, FILM COATED ORAL at 21:15

## 2025-02-23 RX ADMIN — HYDROMORPHONE HYDROCHLORIDE 2 MG: 2 INJECTION, SOLUTION INTRAMUSCULAR; INTRAVENOUS; SUBCUTANEOUS at 02:39

## 2025-02-23 RX ADMIN — GUAIFENESIN 400 MG: 200 SOLUTION ORAL at 05:06

## 2025-02-23 RX ADMIN — POLYETHYLENE GLYCOL 3350 17 G: 17 POWDER, FOR SOLUTION ORAL at 10:38

## 2025-02-23 RX ADMIN — DIAZEPAM 2.5 MG: 5 TABLET ORAL at 13:06

## 2025-02-23 RX ADMIN — THERA TABS 1 TABLET: TAB at 09:11

## 2025-02-23 RX ADMIN — ALBUTEROL SULFATE 2.5 MG: 2.5 SOLUTION RESPIRATORY (INHALATION) at 08:10

## 2025-02-23 RX ADMIN — Medication 3 MG: at 22:18

## 2025-02-23 RX ADMIN — DIAZEPAM 5 MG: 5 TABLET ORAL at 05:06

## 2025-02-23 RX ADMIN — FOLIC ACID 1 MG: 1 TABLET ORAL at 09:12

## 2025-02-23 RX ADMIN — Medication 100 MG: at 09:12

## 2025-02-23 RX ADMIN — Medication 3 MG: at 05:09

## 2025-02-23 RX ADMIN — ENOXAPARIN SODIUM 40 MG: 100 INJECTION SUBCUTANEOUS at 09:10

## 2025-02-23 RX ADMIN — FAMOTIDINE 20 MG: 20 TABLET, FILM COATED ORAL at 09:14

## 2025-02-23 RX ADMIN — LURASIDONE HYDROCHLORIDE 40 MG: 40 TABLET, FILM COATED ORAL at 09:11

## 2025-02-23 RX ADMIN — DIAZEPAM 2.5 MG: 5 TABLET ORAL at 22:18

## 2025-02-23 ASSESSMENT — PAIN DESCRIPTION - DESCRIPTORS
DESCRIPTORS: ACHING;CRAMPING;SHOOTING
DESCRIPTORS: SHARP

## 2025-02-23 ASSESSMENT — PAIN DESCRIPTION - ORIENTATION
ORIENTATION: MID;UPPER
ORIENTATION: UPPER

## 2025-02-23 ASSESSMENT — PAIN DESCRIPTION - LOCATION
LOCATION: ABDOMEN
LOCATION: ABDOMEN

## 2025-02-23 ASSESSMENT — PAIN SCALES - GENERAL
PAINLEVEL_OUTOF10: 0
PAINLEVEL_OUTOF10: 6
PAINLEVEL_OUTOF10: 2
PAINLEVEL_OUTOF10: 8

## 2025-02-23 ASSESSMENT — PAIN SCALES - WONG BAKER: WONGBAKER_NUMERICALRESPONSE: NO HURT

## 2025-02-23 ASSESSMENT — PAIN - FUNCTIONAL ASSESSMENT: PAIN_FUNCTIONAL_ASSESSMENT: PREVENTS OR INTERFERES SOME ACTIVE ACTIVITIES AND ADLS

## 2025-02-23 NOTE — CARE COORDINATION
Updates at 1220:    Per Attending, there is no plan on continuing Methadone medication, once patient discharges to IPR.  CM informed Misti Henriquez IPR liaison of updates.        Care Management Progress Note    Reason for Admission:   Nausea and vomiting in adult [R11.2]  Alcohol-induced acute pancreatitis without infection or necrosis [K85.20]  Pancreatitis without necrosis or infection [K85.90]         Patient Admission Status: Inpatient  RUR: 11%  Hospitalization in the last 30 days (Readmission):  No        Transition of care plan:  Clinical Updates:  Ongoing medical management.  Trach--capped.  Plan to decanulate early this week.  Tolerating regular diet.  Tolerating PMV.  Patient was started on Methadone on this admission 2/16.      Discharge Plan:    IP[facility] per therapy recs:        ---[facility]--accepted.  AUTH started 2/22.       ---[facility] declined per Bashir Doe       --[facility]--declined.      B.  Per Misti Coombs, if Methadone is prescribed at discharge, he needs to have a methadone clinic set up prior to discharge (if planning to continue to use), at least through IPR stay, since it's highly regulated and requires a special license.       C.  Patient's sister stated that she is working on getting patient admitted to a [facility] Rehab Facility after Physical Rehab.       Discharge plan communicated with patient and/or discharge caregiver: Yes.  CM spoke to patient's sister Artie Trejo [phone].     Outpatient follow-up:  TBD    Transport at discharge: TBD.  Sister vs wheelchair van.        ______________________________________  JHON Christiansen, RN-CM  [facility]- Care Management  Available via [employer]  2/23/2025  12:08 PM

## 2025-02-24 ENCOUNTER — APPOINTMENT (OUTPATIENT)
Facility: HOSPITAL | Age: 35
DRG: 005 | End: 2025-02-24
Payer: MEDICAID

## 2025-02-24 LAB
ALBUMIN SERPL-MCNC: 3.2 G/DL (ref 3.5–5)
ALBUMIN/GLOB SERPL: 0.8 (ref 1.1–2.2)
ALP SERPL-CCNC: 119 U/L (ref 45–117)
ALT SERPL-CCNC: 52 U/L (ref 12–78)
ANION GAP SERPL CALC-SCNC: 5 MMOL/L (ref 2–12)
AST SERPL-CCNC: 27 U/L (ref 15–37)
BILIRUB SERPL-MCNC: 0.8 MG/DL (ref 0.2–1)
BUN SERPL-MCNC: 13 MG/DL (ref 6–20)
BUN/CREAT SERPL: 15 (ref 12–20)
CALCIUM SERPL-MCNC: 9.9 MG/DL (ref 8.5–10.1)
CHLORIDE SERPL-SCNC: 102 MMOL/L (ref 97–108)
CO2 SERPL-SCNC: 29 MMOL/L (ref 21–32)
CREAT SERPL-MCNC: 0.86 MG/DL (ref 0.7–1.3)
GLOBULIN SER CALC-MCNC: 4.2 G/DL (ref 2–4)
GLUCOSE SERPL-MCNC: 108 MG/DL (ref 65–100)
MAGNESIUM SERPL-MCNC: 2 MG/DL (ref 1.6–2.4)
PHOSPHATE SERPL-MCNC: 4.3 MG/DL (ref 2.6–4.7)
POTASSIUM SERPL-SCNC: 4.2 MMOL/L (ref 3.5–5.1)
PROT SERPL-MCNC: 7.4 G/DL (ref 6.4–8.2)
SODIUM SERPL-SCNC: 136 MMOL/L (ref 136–145)
TRIGL SERPL-MCNC: 60 MG/DL

## 2025-02-24 PROCEDURE — 6370000000 HC RX 637 (ALT 250 FOR IP): Performed by: INTERNAL MEDICINE

## 2025-02-24 PROCEDURE — 6360000002 HC RX W HCPCS: Performed by: INTERNAL MEDICINE

## 2025-02-24 PROCEDURE — 6360000002 HC RX W HCPCS: Performed by: HOSPITALIST

## 2025-02-24 PROCEDURE — 83735 ASSAY OF MAGNESIUM: CPT

## 2025-02-24 PROCEDURE — 84100 ASSAY OF PHOSPHORUS: CPT

## 2025-02-24 PROCEDURE — 71045 X-RAY EXAM CHEST 1 VIEW: CPT

## 2025-02-24 PROCEDURE — 6370000000 HC RX 637 (ALT 250 FOR IP)

## 2025-02-24 PROCEDURE — 0202U NFCT DS 22 TRGT SARS-COV-2: CPT

## 2025-02-24 PROCEDURE — 87040 BLOOD CULTURE FOR BACTERIA: CPT

## 2025-02-24 PROCEDURE — 36415 COLL VENOUS BLD VENIPUNCTURE: CPT

## 2025-02-24 PROCEDURE — 94640 AIRWAY INHALATION TREATMENT: CPT

## 2025-02-24 PROCEDURE — 2500000003 HC RX 250 WO HCPCS

## 2025-02-24 PROCEDURE — 80053 COMPREHEN METABOLIC PANEL: CPT

## 2025-02-24 PROCEDURE — 94668 MNPJ CHEST WALL SBSQ: CPT

## 2025-02-24 PROCEDURE — 70551 MRI BRAIN STEM W/O DYE: CPT

## 2025-02-24 PROCEDURE — 84478 ASSAY OF TRIGLYCERIDES: CPT

## 2025-02-24 PROCEDURE — 97116 GAIT TRAINING THERAPY: CPT

## 2025-02-24 PROCEDURE — 94761 N-INVAS EAR/PLS OXIMETRY MLT: CPT

## 2025-02-24 PROCEDURE — 1100000000 HC RM PRIVATE

## 2025-02-24 PROCEDURE — 97530 THERAPEUTIC ACTIVITIES: CPT

## 2025-02-24 RX ORDER — DIAZEPAM 5 MG/1
5 TABLET ORAL ONCE
Status: COMPLETED | OUTPATIENT
Start: 2025-02-24 | End: 2025-02-24

## 2025-02-24 RX ORDER — DIAZEPAM 5 MG/1
7.5 TABLET ORAL 3 TIMES DAILY
Status: DISCONTINUED | OUTPATIENT
Start: 2025-02-25 | End: 2025-02-25 | Stop reason: HOSPADM

## 2025-02-24 RX ORDER — METHADONE HYDROCHLORIDE 5 MG/1
2.5 TABLET ORAL 2 TIMES DAILY
Status: DISCONTINUED | OUTPATIENT
Start: 2025-02-24 | End: 2025-02-25 | Stop reason: HOSPADM

## 2025-02-24 RX ORDER — DIAZEPAM 5 MG/1
2.5 TABLET ORAL EVERY 12 HOURS
Status: DISCONTINUED | OUTPATIENT
Start: 2025-02-24 | End: 2025-02-24

## 2025-02-24 RX ORDER — DIAZEPAM 5 MG/1
7.5 TABLET ORAL 3 TIMES DAILY
Status: DISCONTINUED | OUTPATIENT
Start: 2025-02-25 | End: 2025-02-24

## 2025-02-24 RX ORDER — LACTULOSE 10 G/15ML
20 SOLUTION ORAL 3 TIMES DAILY
Status: DISCONTINUED | OUTPATIENT
Start: 2025-02-24 | End: 2025-02-25 | Stop reason: HOSPADM

## 2025-02-24 RX ADMIN — ALBUTEROL SULFATE 2.5 MG: 2.5 SOLUTION RESPIRATORY (INHALATION) at 20:12

## 2025-02-24 RX ADMIN — GUAIFENESIN 400 MG: 200 SOLUTION ORAL at 06:27

## 2025-02-24 RX ADMIN — METHADONE HYDROCHLORIDE 5 MG: 10 TABLET ORAL at 09:42

## 2025-02-24 RX ADMIN — LACTULOSE 20 G: 10 SOLUTION ORAL at 11:24

## 2025-02-24 RX ADMIN — LACTULOSE 20 G: 10 SOLUTION ORAL at 14:52

## 2025-02-24 RX ADMIN — THERA TABS 1 TABLET: TAB at 09:42

## 2025-02-24 RX ADMIN — METHADONE HYDROCHLORIDE 2.5 MG: 5 TABLET ORAL at 22:01

## 2025-02-24 RX ADMIN — DIAZEPAM 2.5 MG: 5 TABLET ORAL at 06:27

## 2025-02-24 RX ADMIN — FAMOTIDINE 20 MG: 20 TABLET, FILM COATED ORAL at 22:01

## 2025-02-24 RX ADMIN — LURASIDONE HYDROCHLORIDE 40 MG: 40 TABLET, FILM COATED ORAL at 09:42

## 2025-02-24 RX ADMIN — ENOXAPARIN SODIUM 40 MG: 100 INJECTION SUBCUTANEOUS at 09:42

## 2025-02-24 RX ADMIN — DIAZEPAM 5 MG: 5 TABLET ORAL at 19:41

## 2025-02-24 RX ADMIN — Medication 100 MG: at 09:42

## 2025-02-24 RX ADMIN — FAMOTIDINE 20 MG: 20 TABLET, FILM COATED ORAL at 09:42

## 2025-02-24 RX ADMIN — GUAIFENESIN 400 MG: 200 SOLUTION ORAL at 11:24

## 2025-02-24 RX ADMIN — FOLIC ACID 1 MG: 1 TABLET ORAL at 09:42

## 2025-02-24 RX ADMIN — HYDROXYZINE HYDROCHLORIDE 10 MG: 10 TABLET ORAL at 02:44

## 2025-02-24 RX ADMIN — LACTULOSE 20 G: 10 SOLUTION ORAL at 22:01

## 2025-02-24 RX ADMIN — GUAIFENESIN 400 MG: 200 SOLUTION ORAL at 17:40

## 2025-02-24 RX ADMIN — DIAZEPAM 2.5 MG: 5 TABLET ORAL at 17:41

## 2025-02-24 ASSESSMENT — PAIN DESCRIPTION - LOCATION
LOCATION: CHEST;ABDOMEN
LOCATION: CHEST;ABDOMEN

## 2025-02-24 NOTE — CARE COORDINATION
Care Management Progress Note    Reason for Admission:   Nausea and vomiting in adult [R11.2]  Alcohol-induced acute pancreatitis without infection or necrosis [K85.20]  Pancreatitis without necrosis or infection [K85.90]         Patient Admission Status: Inpatient  RUR: 12% Low  Hospitalization in the last 30 days (Readmission):  No        Transition of care plan:  Medical management ongoing. Trach capped and plans for decannulation today-weaning methodone. GI and neurology consulted.  [facility] auth received today 2/24/25.   Discharge plan communicated with patient and/or discharge caregiver: Yes    Date 1st IMM letter given: NA  Outpatient follow-up: PCP, Specialist  Transport at discharge: Family    Erica Aiken, MS  [phone]    no gum bleeding/no nose bleeding

## 2025-02-25 VITALS
HEART RATE: 96 BPM | RESPIRATION RATE: 18 BRPM | WEIGHT: 135 LBS | BODY MASS INDEX: 18.28 KG/M2 | HEIGHT: 72 IN | DIASTOLIC BLOOD PRESSURE: 78 MMHG | TEMPERATURE: 98.4 F | OXYGEN SATURATION: 98 % | SYSTOLIC BLOOD PRESSURE: 117 MMHG

## 2025-02-25 LAB

## 2025-02-25 PROCEDURE — 2500000003 HC RX 250 WO HCPCS

## 2025-02-25 PROCEDURE — 6370000000 HC RX 637 (ALT 250 FOR IP): Performed by: INTERNAL MEDICINE

## 2025-02-25 PROCEDURE — 6360000002 HC RX W HCPCS: Performed by: INTERNAL MEDICINE

## 2025-02-25 PROCEDURE — 94640 AIRWAY INHALATION TREATMENT: CPT

## 2025-02-25 PROCEDURE — 92526 ORAL FUNCTION THERAPY: CPT

## 2025-02-25 PROCEDURE — 94761 N-INVAS EAR/PLS OXIMETRY MLT: CPT

## 2025-02-25 PROCEDURE — 6370000000 HC RX 637 (ALT 250 FOR IP)

## 2025-02-25 RX ADMIN — GUAIFENESIN 400 MG: 200 SOLUTION ORAL at 00:41

## 2025-02-25 RX ADMIN — POLYETHYLENE GLYCOL 3350 17 G: 17 POWDER, FOR SOLUTION ORAL at 10:42

## 2025-02-25 RX ADMIN — DIAZEPAM 7.5 MG: 5 TABLET ORAL at 04:05

## 2025-02-25 RX ADMIN — ALBUTEROL SULFATE 2.5 MG: 2.5 SOLUTION RESPIRATORY (INHALATION) at 07:29

## 2025-02-25 RX ADMIN — DIAZEPAM 2 MG: 2 TABLET ORAL at 00:41

## 2025-02-25 NOTE — PROGRESS NOTES
BRIAN JOHNSON [facility]  [address] [place] [address]  [phone]      Hospitalist  Progress Note      NAME:       Nathan Trejo   :        1990  MRM:        [id number]    Date of service: 2025      Subjective: Patient seen and examined by me. Patient admitted with alcoholic pancreatitis then had a rapidly worsening liver functions. Intubated. Sedated. Discussed with his nurse and family at bedside.       Objective:    Vital Signs:    /71   Pulse 79   Temp 98.5 °F (36.9 °C) (Esophageal) Comment: cooling blanket removed  Resp 12   Ht 1.854 m (6' 1\")   Wt 67 kg (147 lb 11.3 oz)   SpO2 95%   BMI 19.49 kg/m²        Intake/Output Summary (Last 24 hours) at 2025 1330  Last data filed at 2025 1126  Gross per 24 hour   Intake 9554.73 ml   Output 1700 ml   Net 7854.73 ml        Current inpatient medications reviewed:  Current Facility-Administered Medications   Medication Dose Route Frequency    fentaNYL (SUBLIMAZE) infusion 1000 mcg/100mL   mcg/hr IntraVENous Continuous    PHENobarbital (LUMINAL) injection 65 mg  65 mg IntraVENous 4 times per day    [START ON 2/3/2025] PHENobarbital (LUMINAL) injection 65 mg  65 mg IntraVENous BID    [START ON 2025] PHENobarbital (LUMINAL) injection 32.5 mg  32.5 mg IntraVENous BID    [START ON 2025] PHENobarbital (LUMINAL) injection 32.5 mg  32.5 mg IntraVENous Daily    melatonin tablet 3 mg  3 mg Orogastric Nightly PRN    [START ON 2/3/2025] multivitamin 1 tablet  1 tablet Orogastric Daily    ondansetron (ZOFRAN-ODT) disintegrating tablet 4 mg  4 mg Orogastric Q8H PRN    Or    ondansetron (ZOFRAN) injection 4 mg  4 mg IntraVENous Q6H PRN    potassium chloride (KLOR-CON) extended release tablet 40 mEq  40 mEq Oral PRN    Or    potassium bicarb-citric acid (EFFER-K) effervescent tablet 40 mEq  40 mEq Orogastric PRN    Or    potassium chloride 10 
                                                             BRIAN JOHNSON [facility]  [address] [place] [address]  [phone]      Hospitalist  Progress Note      NAME:       Nathan Trejo   :        1990  MRM:        [id number]    Date of service: 2025      Subjective: Patient seen and examined by me. Patient admitted with acute pancreatitis. Still with some aching epigastric discomfort but no nausea or vomiting. No fever or chills. He feels weak.      Objective:    Vital Signs:    /68   Pulse 70   Temp 99.9 °F (37.7 °C) (Oral)   Resp 14   Ht 1.854 m (6' 1\")   Wt 74.8 kg (165 lb)   SpO2 95%   BMI 21.77 kg/m²      No intake or output data in the 24 hours ending 25 1228     Current inpatient medications reviewed:  Current Facility-Administered Medications   Medication Dose Route Frequency    acetylcysteine (ACETADOTE) 11,220 mg in dextrose 5 % 256.1 mL infusion  150 mg/kg IntraVENous Once    Followed by    acetylcysteine (ACETADOTE) 3,740 mg in dextrose 5 % 518.7 mL infusion  50 mg/kg IntraVENous Once    Followed by    acetylcysteine (ACETADOTE) 7,480 mg in dextrose 5 % 1,037.4 mL infusion  100 mg/kg IntraVENous Once    phytonadione (ADULT) (VITAMIN K) 10 mg in sodium chloride 0.9 % 100 mL IVPB  10 mg IntraVENous Daily    HYDROmorphone HCl PF (DILAUDID) injection 0.25 mg  0.25 mg IntraVENous Q3H PRN    Or    HYDROmorphone HCl PF (DILAUDID) injection 0.5 mg  0.5 mg IntraVENous Q3H PRN    oxyCODONE (ROXICODONE) immediate release tablet 5 mg  5 mg Oral Q4H PRN    bisacodyl (DULCOLAX) EC tablet 10 mg  10 mg Oral Daily    sodium chloride flush 0.9 % injection 5-40 mL  5-40 mL IntraVENous 2 times per day    sodium chloride flush 0.9 % injection 5-40 mL  5-40 mL IntraVENous PRN    0.9 % sodium chloride infusion   IntraVENous PRN    LORazepam (ATIVAN) tablet 1 mg  1 mg Oral Q1H PRN    Or    LORazepam (ATIVAN) injection 1 mg  1 mg IntraVENous Q1H PRN    Or 
                                                             BRIAN JOHNSON [facility]  [address] [place] [address]  [phone]      Hospitalist  Progress Note      NAME:       Nathan Trejo   :        1990  MRM:        [id number]    Date of service: 2025      Subjective: Patient seen and examined by me. Patient admitted with alcoholic pancreatitis then had a rapidly worsening liver functions. Became restless and agitated requiring mechanical ventilation last night. Now not able to give any hx. Discussed with the ICU team for further Hx.      Objective:    Vital Signs:    /78   Pulse 79   Temp 99.3 °F (37.4 °C) (Axillary)   Resp 13   Ht 1.854 m (6' 1\")   Wt 67 kg (147 lb 11.3 oz)   SpO2 100%   BMI 19.49 kg/m²        Intake/Output Summary (Last 24 hours) at 2025 1230  Last data filed at 2025 1200  Gross per 24 hour   Intake 287.42 ml   Output 3450 ml   Net -3162.58 ml        Current inpatient medications reviewed:  Current Facility-Administered Medications   Medication Dose Route Frequency    propofol infusion  5-50 mcg/kg/min IntraVENous Continuous    lactulose (CHRONULAC) 10 GM/15ML solution 20 g  20 g Per G Tube TID    sodium chloride flush 0.9 % injection 5-40 mL  5-40 mL IntraVENous PRN    0.9 % sodium chloride infusion   IntraVENous PRN    thiamine (B-1) injection 500 mg  500 mg IntraVENous Q8H    Followed by    [START ON 2/3/2025] thiamine (B-1) injection 250 mg  250 mg IntraVENous Q24H    Followed by    [START ON 2025] thiamine mononitrate tablet 100 mg  100 mg Oral Daily    chlorhexidine (PERIDEX) 0.12 % solution 15 mL  15 mL Mouth/Throat BID    famotidine (PEPCID) 20 mg in sodium chloride (PF) 0.9 % 10 mL injection  20 mg IntraVENous BID    GenTeal Tears 0.1-0.2-0.3 % SOLN 1 drop  1 drop Both Eyes Q4H    Or    lubrifresh P.M. (artificial tears) ophthalmic ointment   Both Eyes Q4H    PHENobarbital tablet 64.8 mg  64.8 mg Orogastric 4x 
                  Jovan Diaz MD  Office [phone]     Gastroenterology Progress Note    February 1, 2025  Admit Date: 1/28/2025         Narrative Assessment and Plan   ETOH pancreatitis and elevated LFTs/Tbili      Very high AST/ALT along with acute worsening of INR to 1.9 concerning for evolving liver failure. This is not ETOH hepatitis solely - concerning for ischemic/infectious (viral)/DILI/autoimmune hepatitis. He was taking some tylenol prior to admission but reports not a significant amount and he denies any supplements. His drug screen was + for  THC as well. Of note, CK negative.      1/31/25  Fortunately his INR has improved and with his Tbili fairly mild and lagging LFT elevation, this is most consistent with a shock liver picture.      His ammonia is normal suggesting against encephalopathy - suspect most of his AMS is secondary to ETOH withdrawal     Plan:  - supportive care  - CIWA/ETOH withdrawal care  - aggressive IV fluids   - pain control - he reports ongoing pain  - follow up serologies  - vitamin K 10mg daily x 3 days  - CBC, CMP, INR daily  - transfer request has been submitted - [facility] could not accept patient. Appears [facility] also considering. Fortunately I think it is likely his liver numbers/MELDNA will continue to improve though not wrong to transfer.     2/1 - INR and Cr stable/improved, bili up.  Transaminases suggestive of shock given normal viral panel, APAP, patent hepatic vasculature. Ammonia not significantly elevated, though by no means is this any index of liver failure etc.  AMS and encephalopathy multifactorial but atleast a dominant component of ETOH withdrawal (compared to liver injury/failure).  Otherwise supportive care and monitor hepatic labs, rest per primary.  Agree with NAC trial.  Would not recommend steroids at this moment.    Subjective:   Intubated due to agitate    No signs GIB    ROS:  The previous review of systems on initial consultation / H&P is noted and 
     [facility]   PROGRESS NOTE.    Name: Nathan Trejo   : 1990   MRN: [id number]   Date: 2025      Chief Complaint   Patient presents with    Loss of Consciousness    Abdominal Pain     Reason for ICU  - Encephalopathy   - Respiratory failure s/p Tracheostomy     HPI:     34-year-old male with history notable for alcohol abuse (reportedly drinks 12-24 beers per day, last drink on ) who was admitted with abdominal pain.  Workup was consistent with pancreatitis and incidental note of hepatic steatosis.  Patient was managed on medical floor for acute pancreatitis.    He was noted to have significant worsening of LFTs on .  He was treated with N-acetylcysteine.  INR was elevated to 1.7.  He received 10 mg of vitamin K.  Liver ultrasound with Dopplers was ordered which did not show any significant thrombosis of hepatic veins.    : Patient was transferred to stepdown unit due to increasing agitation and disorientation.  This morning, dacia Robbins was called for significant agitation.  At the time of my evaluation, patient was standing beside his bed and insisting on leaving the hospital.  Security was present at bedside.  He was unable to give me an accurate date or location.  He was very unsteady and at risk for falling.  He did not have significant tremors, however, exam was notable for asterixis.  Given progressively worsening agitation, he received 4 mg Ativan x 2 along with Zyprexa 10 mg x 1.  Despite this, he was still very agitated.  He needed to be restrained.  He was started on Precedex drip.  Received 5 mg Versed in addition to above along with 50 mics of fentanyl which resulted in him becoming somnolent.  Oxygen saturation was stable.  He was arousable and breathing spontaneously.  End-tidal CO2 was 30.    I updated patient's sister, Ms. Artie Trejo about his current situation.  I explained that I am concerned about progressively worsening encephalopathy along with 
     [facility]   PROGRESS NOTE.    Name: Nathan Trejo   : 1990   MRN: [id number]   Date: 2025      Chief Complaint   Patient presents with    Loss of Consciousness    Abdominal Pain     Reason for ICU  - Encephalopathy   - Respiratory failure s/p Tracheostomy     HPI:     34-year-old male with history notable for alcohol abuse (reportedly drinks 12-24 beers per day, last drink on ) who was admitted with abdominal pain.  Workup was consistent with pancreatitis and incidental note of hepatic steatosis.  Patient was managed on medical floor for acute pancreatitis.    He was noted to have significant worsening of LFTs on .  He was treated with N-acetylcysteine.  INR was elevated to 1.7.  He received 10 mg of vitamin K.  Liver ultrasound with Dopplers was ordered which did not show any significant thrombosis of hepatic veins.    : Patient was transferred to stepdown unit due to increasing agitation and disorientation.  This morning, dacia Robbins was called for significant agitation.  At the time of my evaluation, patient was standing beside his bed and insisting on leaving the hospital.  Security was present at bedside.  He was unable to give me an accurate date or location.  He was very unsteady and at risk for falling.  He did not have significant tremors, however, exam was notable for asterixis.  Given progressively worsening agitation, he received 4 mg Ativan x 2 along with Zyprexa 10 mg x 1.  Despite this, he was still very agitated.  He needed to be restrained.  He was started on Precedex drip.  Received 5 mg Versed in addition to above along with 50 mics of fentanyl which resulted in him becoming somnolent.  Oxygen saturation was stable.  He was arousable and breathing spontaneously.  End-tidal CO2 was 30.    I updated patient's sister, Ms. Artie Trejo about his current situation.  I explained that I am concerned about progressively worsening encephalopathy along with 
     [facility]   PROGRESS NOTE.    Name: Nathan Trejo   : 1990   MRN: [id number]   Date: 2025      Chief Complaint   Patient presents with    Loss of Consciousness    Abdominal Pain     Reason for ICU  - Encephalopathy   - Respiratory failure s/p Tracheostomy     HPI:     34-year-old male with history notable for alcohol abuse (reportedly drinks 12-24 beers per day, last drink on ) who was admitted with abdominal pain.  Workup was consistent with pancreatitis and incidental note of hepatic steatosis.  Patient was managed on medical floor for acute pancreatitis.    He was noted to have significant worsening of LFTs on .  He was treated with N-acetylcysteine.  INR was elevated to 1.7.  He received 10 mg of vitamin K.  Liver ultrasound with Dopplers was ordered which did not show any significant thrombosis of hepatic veins.    : Patient was transferred to stepdown unit due to increasing agitation and disorientation.  This morning, dacia Robbins was called for significant agitation.  At the time of my evaluation, patient was standing beside his bed and insisting on leaving the hospital.  Security was present at bedside.  He was unable to give me an accurate date or location.  He was very unsteady and at risk for falling.  He did not have significant tremors, however, exam was notable for asterixis.  Given progressively worsening agitation, he received 4 mg Ativan x 2 along with Zyprexa 10 mg x 1.  Despite this, he was still very agitated.  He needed to be restrained.  He was started on Precedex drip.  Received 5 mg Versed in addition to above along with 50 mics of fentanyl which resulted in him becoming somnolent.  Oxygen saturation was stable.  He was arousable and breathing spontaneously.  End-tidal CO2 was 30.    I updated patient's sister, Ms. Artie Trejo about his current situation.  I explained that I am concerned about progressively worsening encephalopathy along with 
     [facility]   PROGRESS NOTE.    Name: Nathan Trejo   : 1990   MRN: [id number]   Date: 2025      Chief Complaint   Patient presents with    Loss of Consciousness    Abdominal Pain     Reason for ICU  - Encephalopathy   - Respiratory failure s/p Tracheostomy     HPI:     34-year-old male with history notable for alcohol abuse (reportedly drinks 12-24 beers per day, last drink on ) who was admitted with abdominal pain.  Workup was consistent with pancreatitis and incidental note of hepatic steatosis.  Patient was managed on medical floor for acute pancreatitis.    He was noted to have significant worsening of LFTs on .  He was treated with N-acetylcysteine.  INR was elevated to 1.7.  He received 10 mg of vitamin K.  Liver ultrasound with Dopplers was ordered which did not show any significant thrombosis of hepatic veins.    : Patient was transferred to stepdown unit due to increasing agitation and disorientation.  This morning, dacia Robbins was called for significant agitation.  At the time of my evaluation, patient was standing beside his bed and insisting on leaving the hospital.  Security was present at bedside.  He was unable to give me an accurate date or location.  He was very unsteady and at risk for falling.  He did not have significant tremors, however, exam was notable for asterixis.  Given progressively worsening agitation, he received 4 mg Ativan x 2 along with Zyprexa 10 mg x 1.  Despite this, he was still very agitated.  He needed to be restrained.  He was started on Precedex drip.  Received 5 mg Versed in addition to above along with 50 mics of fentanyl which resulted in him becoming somnolent.  Oxygen saturation was stable.  He was arousable and breathing spontaneously.  End-tidal CO2 was 30.    I updated patient's sister, Ms. Artie Trejo about his current situation.  I explained that I am concerned about progressively worsening encephalopathy along with 
     [facility] INITIAL ASSESSMENT.      Name: Nathan Trejo   : 1990   MRN: [id number]   Date: 2/10/2025        Chief Complaint   Patient presents with    Loss of Consciousness    Abdominal Pain         HPI:     34-year-old male with history notable for alcohol abuse (reportedly drinks 12-24 beers per day, last drink on ) who was admitted with abdominal pain.  Workup was consistent with pancreatitis and incidental note of hepatic steatosis.  Patient was managed on medical floor for acute pancreatitis.    He was noted to have significant worsening of LFTs on .  He was treated with N-acetylcysteine.  INR was elevated to 1.7.  He received 10 mg of vitamin K.  Liver ultrasound with Dopplers was ordered which did not show any significant thrombosis of hepatic veins.    : Patient was transferred to stepdown unit due to increasing agitation and disorientation.  This morning, dacia Robbins was called for significant agitation.  At the time of my evaluation, patient was standing beside his bed and insisting on leaving the hospital.  Security was present at bedside.  He was unable to give me an accurate date or location.  He was very unsteady and at risk for falling.  He did not have significant tremors, however, exam was notable for asterixis.  Given progressively worsening agitation, he received 4 mg Ativan x 2 along with Zyprexa 10 mg x 1.  Despite this, he was still very agitated.  He needed to be restrained.  He was started on Precedex drip.  Received 5 mg Versed in addition to above along with 50 mics of fentanyl which resulted in him becoming somnolent.  Oxygen saturation was stable.  He was arousable and breathing spontaneously.  End-tidal CO2 was 30.    I updated patient's sister, Ms. Artie Trejo about his current situation.  I explained that I am concerned about progressively worsening encephalopathy along with delirium from significant liver failure.  I informed her about I had a 
     [facility] INITIAL ASSESSMENT.      Name: Nathan Trejo   : 1990   MRN: [id number]   Date: 2025        Chief Complaint   Patient presents with    Loss of Consciousness    Abdominal Pain         HPI:     34-year-old male with history notable for alcohol abuse (reportedly drinks 12-24 beers per day, last drink on ) who was admitted with abdominal pain.  Workup was consistent with pancreatitis and incidental note of hepatic steatosis.  Patient was managed on medical floor for acute pancreatitis.    He was noted to have significant worsening of LFTs on .  He was treated with N-acetylcysteine.  INR was elevated to 1.7.  He received 10 mg of vitamin K.  Liver ultrasound with Dopplers was ordered which did not show any significant thrombosis of hepatic veins.    : Patient was transferred to stepdown unit due to increasing agitation and disorientation.  This morning, dacia Robbins was called for significant agitation.  At the time of my evaluation, patient was standing beside his bed and insisting on leaving the hospital.  Security was present at bedside.  He was unable to give me an accurate date or location.  He was very unsteady and at risk for falling.  He did not have significant tremors, however, exam was notable for asterixis.  Given progressively worsening agitation, he received 4 mg Ativan x 2 along with Zyprexa 10 mg x 1.  Despite this, he was still very agitated.  He needed to be restrained.  He was started on Precedex drip.  Received 5 mg Versed in addition to above along with 50 mics of fentanyl which resulted in him becoming somnolent.  Oxygen saturation was stable.  He was arousable and breathing spontaneously.  End-tidal CO2 was 30.    I updated patient's sister, Ms. Artie Trejo about his current situation.  I explained that I am concerned about progressively worsening encephalopathy along with delirium from significant liver failure.  I informed her about I had a 
     [facility] INITIAL ASSESSMENT.      Name: Nathan Trejo   : 1990   MRN: [id number]   Date: 2025        Chief Complaint   Patient presents with    Loss of Consciousness    Abdominal Pain         HPI:     34-year-old male with history notable for alcohol abuse (reportedly drinks 12-24 beers per day, last drink on ) who was admitted with abdominal pain.  Workup was consistent with pancreatitis and incidental note of hepatic steatosis.  Patient was managed on medical floor for acute pancreatitis.    He was noted to have significant worsening of LFTs on .  He was treated with N-acetylcysteine.  INR was elevated to 1.7.  He received 10 mg of vitamin K.  Liver ultrasound with Dopplers was ordered which did not show any significant thrombosis of hepatic veins.    : Patient was transferred to stepdown unit due to increasing agitation and disorientation.  This morning, dacia Robbins was called for significant agitation.  At the time of my evaluation, patient was standing beside his bed and insisting on leaving the hospital.  Security was present at bedside.  He was unable to give me an accurate date or location.  He was very unsteady and at risk for falling.  He did not have significant tremors, however, exam was notable for asterixis.  Given progressively worsening agitation, he received 4 mg Ativan x 2 along with Zyprexa 10 mg x 1.  Despite this, he was still very agitated.  He needed to be restrained.  He was started on Precedex drip.  Received 5 mg Versed in addition to above along with 50 mics of fentanyl which resulted in him becoming somnolent.  Oxygen saturation was stable.  He was arousable and breathing spontaneously.  End-tidal CO2 was 30.    I updated patient's sister, Ms. Artie Trejo about his current situation.  I explained that I am concerned about progressively worsening encephalopathy along with delirium from significant liver failure.  I informed her about I had a 
     [facility] INITIAL ASSESSMENT.      Name: Nathan Trejo   : 1990   MRN: [id number]   Date: 2025        Chief Complaint   Patient presents with    Loss of Consciousness    Abdominal Pain         HPI:     34-year-old male with history notable for alcohol abuse (reportedly drinks 12-24 beers per day, last drink on ) who was admitted with abdominal pain.  Workup was consistent with pancreatitis and incidental note of hepatic steatosis.  Patient was managed on medical floor for acute pancreatitis.    He was noted to have significant worsening of LFTs on .  He was treated with N-acetylcysteine.  INR was elevated to 1.7.  He received 10 mg of vitamin K.  Liver ultrasound with Dopplers was ordered which did not show any significant thrombosis of hepatic veins.    : Patient was transferred to stepdown unit due to increasing agitation and disorientation.  This morning, dacia Robbins was called for significant agitation.  At the time of my evaluation, patient was standing beside his bed and insisting on leaving the hospital.  Security was present at bedside.  He was unable to give me an accurate date or location.  He was very unsteady and at risk for falling.  He did not have significant tremors, however, exam was notable for asterixis.  Given progressively worsening agitation, he received 4 mg Ativan x 2 along with Zyprexa 10 mg x 1.  Despite this, he was still very agitated.  He needed to be restrained.  He was started on Precedex drip.  Received 5 mg Versed in addition to above along with 50 mics of fentanyl which resulted in him becoming somnolent.  Oxygen saturation was stable.  He was arousable and breathing spontaneously.  End-tidal CO2 was 30.    I updated patient's sister, Ms. Artie Trejo about his current situation.  I explained that I am concerned about progressively worsening encephalopathy along with delirium from significant liver failure.  I informed her about I had a 
     [facility] INITIAL ASSESSMENT.      Name: Nathan Trejo   : 1990   MRN: [id number]   Date: 2/15/2025        Chief Complaint   Patient presents with    Loss of Consciousness    Abdominal Pain         HPI:     34-year-old male with history notable for alcohol abuse (reportedly drinks 12-24 beers per day, last drink on ) who was admitted with abdominal pain.  Workup was consistent with pancreatitis and incidental note of hepatic steatosis.  Patient was managed on medical floor for acute pancreatitis.    He was noted to have significant worsening of LFTs on .  He was treated with N-acetylcysteine.  INR was elevated to 1.7.  He received 10 mg of vitamin K.  Liver ultrasound with Dopplers was ordered which did not show any significant thrombosis of hepatic veins.    : Patient was transferred to stepdown unit due to increasing agitation and disorientation.  This morning, dacia Robbins was called for significant agitation.  At the time of my evaluation, patient was standing beside his bed and insisting on leaving the hospital.  Security was present at bedside.  He was unable to give me an accurate date or location.  He was very unsteady and at risk for falling.  He did not have significant tremors, however, exam was notable for asterixis.  Given progressively worsening agitation, he received 4 mg Ativan x 2 along with Zyprexa 10 mg x 1.  Despite this, he was still very agitated.  He needed to be restrained.  He was started on Precedex drip.  Received 5 mg Versed in addition to above along with 50 mics of fentanyl which resulted in him becoming somnolent.  Oxygen saturation was stable.  He was arousable and breathing spontaneously.  End-tidal CO2 was 30.    I updated patient's sister, Ms. Artie Trejo about his current situation.  I explained that I am concerned about progressively worsening encephalopathy along with delirium from significant liver failure.  I informed her about I had a 
     SOUND CRITICAL CARE INITIAL ASSESSMENT.      Name: Nathan Trejo   : 1990   MRN: [id number]   Date: 2025        Chief Complaint   Patient presents with    Loss of Consciousness    Abdominal Pain         HPI:     34-year-old male with history notable for alcohol abuse (reportedly drinks 12-24 beers per day, last drink on ) who was admitted with abdominal pain.  Workup was consistent with pancreatitis and incidental note of hepatic steatosis.  Patient was managed on medical floor for acute pancreatitis.    He was noted to have significant worsening of LFTs on .  He was treated with N-acetylcysteine.  INR was elevated to 1.7.  He received 10 mg of vitamin K.  Liver ultrasound with Dopplers was ordered which did not show any significant thrombosis of hepatic veins.    : Patient was transferred to stepdown unit due to increasing agitation and disorientation.  This morning, dacia Robbins was called for significant agitation.  At the time of my evaluation, patient was standing beside his bed and insisting on leaving the hospital.  Security was present at bedside.  He was unable to give me an accurate date or location.  He was very unsteady and at risk for falling.  He did not have significant tremors, however, exam was notable for asterixis.  Given progressively worsening agitation, he received 4 mg Ativan x 2 along with Zyprexa 10 mg x 1.  Despite this, he was still very agitated.  He needed to be restrained.  He was started on Precedex drip.  Received 5 mg Versed in addition to above along with 50 mics of fentanyl which resulted in him becoming somnolent.  Oxygen saturation was stable.  He was arousable and breathing spontaneously.  End-tidal CO2 was 30.    I updated patient's sister, Ms. Artie Trejo about his current situation.  I explained that I am concerned about progressively worsening encephalopathy along with delirium from significant liver failure.  I informed her about I had a 
     SOUND CRITICAL CARE INITIAL ASSESSMENT.      Name: Nathan Trejo   : 1990   MRN: [id number]   Date: 2025        Chief Complaint   Patient presents with    Loss of Consciousness    Abdominal Pain         HPI:     34-year-old male with history notable for alcohol abuse (reportedly drinks 12-24 beers per day, last drink on ) who was admitted with abdominal pain.  Workup was consistent with pancreatitis and incidental note of hepatic steatosis.  Patient was managed on medical floor for acute pancreatitis.    He was noted to have significant worsening of LFTs on .  He was treated with N-acetylcysteine.  INR was elevated to 1.7.  He received 10 mg of vitamin K.  Liver ultrasound with Dopplers was ordered which did not show any significant thrombosis of hepatic veins.    : Patient was transferred to stepdown unit due to increasing agitation and disorientation.  This morning, dacia Robbins was called for significant agitation.  At the time of my evaluation, patient was standing beside his bed and insisting on leaving the hospital.  Security was present at bedside.  He was unable to give me an accurate date or location.  He was very unsteady and at risk for falling.  He did not have significant tremors, however, exam was notable for asterixis.  Given progressively worsening agitation, he received 4 mg Ativan x 2 along with Zyprexa 10 mg x 1.  Despite this, he was still very agitated.  He needed to be restrained.  He was started on Precedex drip.  Received 5 mg Versed in addition to above along with 50 mics of fentanyl which resulted in him becoming somnolent.  Oxygen saturation was stable.  He was arousable and breathing spontaneously.  End-tidal CO2 was 30.    I updated patient's sister, Ms. Artie Trejo about his current situation.  I explained that I am concerned about progressively worsening encephalopathy along with delirium from significant liver failure.  I informed her about I had a 
     SOUND CRITICAL CARE INITIAL ASSESSMENT.      Name: Nathan Trejo   : 1990   MRN: [id number]   Date: 2025        Chief Complaint   Patient presents with    Loss of Consciousness    Abdominal Pain         HPI:     34-year-old male with history notable for alcohol abuse (reportedly drinks 12-24 beers per day, last drink on ) who was admitted with abdominal pain.  Workup was consistent with pancreatitis and incidental note of hepatic steatosis.  Patient was managed on medical floor for acute pancreatitis.    He was noted to have significant worsening of LFTs on .  He was treated with N-acetylcysteine.  INR was elevated to 1.7.  He received 10 mg of vitamin K.  Liver ultrasound with Dopplers was ordered which did not show any significant thrombosis of hepatic veins.    : Patient was transferred to stepdown unit due to increasing agitation and disorientation.  This morning, dacia Robbins was called for significant agitation.  At the time of my evaluation, patient was standing beside his bed and insisting on leaving the hospital.  Security was present at bedside.  He was unable to give me an accurate date or location.  He was very unsteady and at risk for falling.  He did not have significant tremors, however, exam was notable for asterixis.  Given progressively worsening agitation, he received 4 mg Ativan x 2 along with Zyprexa 10 mg x 1.  Despite this, he was still very agitated.  He needed to be restrained.  He was started on Precedex drip.  Received 5 mg Versed in addition to above along with 50 mics of fentanyl which resulted in him becoming somnolent.  Oxygen saturation was stable.  He was arousable and breathing spontaneously.  End-tidal CO2 was 30.    I updated patient's sister, Ms. Artie Trejo about his current situation.  I explained that I am concerned about progressively worsening encephalopathy along with delirium from significant liver failure.  I informed her about I had a 
     SOUND CRITICAL CARE INITIAL ASSESSMENT.      Name: Nathan Trejo   : 1990   MRN: [id number]   Date: 2025        Chief Complaint   Patient presents with    Loss of Consciousness    Abdominal Pain         HPI:     34-year-old male with history notable for alcohol abuse (reportedly drinks 12-24 beers per day, last drink on ) who was admitted with abdominal pain.  Workup was consistent with pancreatitis and incidental note of hepatic steatosis.  Patient was managed on medical floor for acute pancreatitis.    He was noted to have significant worsening of LFTs on .  He was treated with N-acetylcysteine.  INR was elevated to 1.7.  He received 10 mg of vitamin K.  Liver ultrasound with Dopplers was ordered which did not show any significant thrombosis of hepatic veins.    : Patient was transferred to stepdown unit due to increasing agitation and disorientation.  This morning, dacia Robbins was called for significant agitation.  At the time of my evaluation, patient was standing beside his bed and insisting on leaving the hospital.  Security was present at bedside.  He was unable to give me an accurate date or location.  He was very unsteady and at risk for falling.  He did not have significant tremors, however, exam was notable for asterixis.  Given progressively worsening agitation, he received 4 mg Ativan x 2 along with Zyprexa 10 mg x 1.  Despite this, he was still very agitated.  He needed to be restrained.  He was started on Precedex drip.  Received 5 mg Versed in addition to above along with 50 mics of fentanyl which resulted in him becoming somnolent.  Oxygen saturation was stable.  He was arousable and breathing spontaneously.  End-tidal CO2 was 30.    I updated patient's sister, Ms. Artie Trejo about his current situation.  I explained that I am concerned about progressively worsening encephalopathy along with delirium from significant liver failure.  I informed her about I had a 
     SOUND CRITICAL CARE INITIAL ASSESSMENT.      Name: Nathan rTejo   : 1990   MRN: [id number]   Date: 2/3/2025        Chief Complaint   Patient presents with    Loss of Consciousness    Abdominal Pain         HPI:     34-year-old male with history notable for alcohol abuse (reportedly drinks 12-24 beers per day, last drink on ) who was admitted with abdominal pain.  Workup was consistent with pancreatitis and incidental note of hepatic steatosis.  Patient was managed on medical floor for acute pancreatitis.    He was noted to have significant worsening of LFTs on .  He was treated with N-acetylcysteine.  INR was elevated to 1.7.  He received 10 mg of vitamin K.  Liver ultrasound with Dopplers was ordered which did not show any significant thrombosis of hepatic veins.    : Patient was transferred to stepdown unit due to increasing agitation and disorientation.  This morning, dacia Robbins was called for significant agitation.  At the time of my evaluation, patient was standing beside his bed and insisting on leaving the hospital.  Security was present at bedside.  He was unable to give me an accurate date or location.  He was very unsteady and at risk for falling.  He did not have significant tremors, however, exam was notable for asterixis.  Given progressively worsening agitation, he received 4 mg Ativan x 2 along with Zyprexa 10 mg x 1.  Despite this, he was still very agitated.  He needed to be restrained.  He was started on Precedex drip.  Received 5 mg Versed in addition to above along with 50 mics of fentanyl which resulted in him becoming somnolent.  Oxygen saturation was stable.  He was arousable and breathing spontaneously.  End-tidal CO2 was 30.    I updated patient's sister, Ms. Artie Trejo about his current situation.  I explained that I am concerned about progressively worsening encephalopathy along with delirium from significant liver failure.  I informed her about I had a 
    Hospitalist Progress Note      NAME:  Nathan Trejo   :  1990  MRM:  [id number]    Date/Time: 2025  8:02 AM           Assessment / Plan:     Nathan Trejo is a very pleasant 34 y.o. male with a past medical history of alcohol abuse and alcoholic pancreatitis presents to the emergency room for epigastric pain.     Pancreatitis, POA  - Elevated lipase. CT c/w pancreatitis. No evidence of biliary tract dilation or gallstones on CT  - Likely due to alcohol abuse   - Aggressive fluid hydration, will increase to 200  - Severe pain today which he states is worse than any prior epsiode, LFTS climbing, very tired, will consult GI   - NPO, still unable to take PO, will escalate pending improvement      Elevated LFTS  Hepatic steatosis   - Likely due to acute alcoholic pancreatitis/hepatitis. CT with heaptic steatosis but no other liver or biliary abnormalities.   - Negative hepatitis panel last year, will repeat   - Worse today, monitor closely with pancreatitis treatment above     AMS   - CT head negative. Likely due to pain, medications, and pancreatitis   - Continue to monitor     Acute on chronic Hyponatremia  - Sodium of 131 on admission, likely due to alcohol abuse   - Improving, CTM      Alcohol Abuse/withdrawal, POA  - Vitamin supplementation   - Phenobarbital taper   - CIWA protocol   - Case management consulted for resources.      THC/opiate use  - UDS positive for opiates and THC  - On CIWA as above      #BMI (Calculated): 21.77    I have personally reviewed the radiographs, laboratory data in Epic and decisions and statements above are based partially on this personal interpretation.                 Care Plan discussed with: Patient, Family, Care Manager, Nursing Staff, and Consultant/Specialist    Discussed:  Care Plan and D/C Planning    Prophylaxis:  Lovenox    Disposition:  Home w/Family    Total time spent with patient care: 45 Minutes **I personally saw and examined the patient during 
    Name: Nathan Trejo Hospital: [facility]   : 1990 Admit Date: 2025   Phone: [phone]  Room: 70/   PCP: No primary care provider on file.  MRN: [id number]   Date: 2025  Code: Full Code          Chart and notes reviewed. Data reviewed. I review the patient's current medications in the medical record at each encounter.  I have evaluated and examined the patient.     HPI:    8:54 AM       History was obtained from patient.      I was asked by Nhi Soriano DO to see Nathan Trejo in consultation for a chief complaint of tracheostomy management.      History of Present Illness:  Mr. Trejo is a 35 yo gentleman with a history EtOH abuse admitted with acute pancreatitis and hospital course complicated by respiratory failure requiring prolonged intubation with trach placement. He was intubated  for airway protection due to agitation. There was difficulty weaning sedation due to agitation and he ultimately required a percutaneous trach on . He has been on TC since  and tolerating PMV. He describes some chest congestion and anxiety, but denies shortness of breath.     Labs: Cr 0.72, WBC 8.8, BAL  with normal respiratory albert, sputum culture  positive for haemophilus influenzae    Images reviewed:  CXR  with patchy airspace disease    Interval history:  Tolerated capping throughout the day and overnight. Has been anxious and agitated. Breathing comfortably.      Past Medical History:   Diagnosis Date    Alcohol abuse        No past surgical history on file.    No family history on file.    Social History     Tobacco Use    Smoking status: Every Day     Current packs/day: 1.00     Average packs/day: 1 pack/day for 6.1 years (6.1 ttl pk-yrs)     Types: Cigarettes     Start date:     Smokeless tobacco: Never   Substance Use Topics    Alcohol use: Not on file       No Known Allergies    Current Facility-Administered Medications   Medication Dose Route 
    Name: Nathan Trejo Hospital: [facility]   : 1990 Admit Date: 2025   Phone: [phone]  Room: A370/   PCP: No primary care provider on file.  MRN: [id number]   Date: 2025  Code: Full Code          Chart and notes reviewed. Data reviewed. I review the patient's current medications in the medical record at each encounter.  I have evaluated and examined the patient.       History of Present Illness:  Mr. Trejo is a 33 yo gentleman with a history EtOH abuse admitted with acute pancreatitis and hospital course complicated by respiratory failure requiring prolonged intubation with trach placement. He was intubated  for airway protection due to agitation. There was difficulty weaning sedation due to agitation and he ultimately required a percutaneous trach on . He has been on TC since  and tolerating PMV. He describes some chest congestion and anxiety, but denies shortness of breath.     Labs: Cr 0.72, WBC 8.8, BAL  with normal respiratory albert, sputum culture  positive for haemophilus influenzae    Images reviewed:  CXR  with patchy airspace disease    Interval history:  Afebrile currently, had mild low grade temp overnight  BP stable  Sats 98% on RA   blood cx no growth x 6 days   blood cx no growth x 11 hrs    Head CT with no acute process    ROS: No complaints today.  Anxious to go home.  Denies SOB or CP.  Denies much of a cough.  No secretions from trach.  Has been capped for the last few days and tolerating well.      Past Medical History:   Diagnosis Date    Alcohol abuse        No past surgical history on file.    No family history on file.    Social History     Tobacco Use    Smoking status: Every Day     Current packs/day: 1.00     Average packs/day: 1 pack/day for 6.2 years (6.2 ttl pk-yrs)     Types: Cigarettes     Start date:     Smokeless tobacco: Never   Substance Use Topics    Alcohol use: Not on file       No Known 
    Name: Nathan Trejo Hospital: [facility]   : 1990 Admit Date: 2025   Phone: [phone]  Room: 70/   PCP: No primary care provider on file.  MRN: [id number]   Date: 2025  Code: Full Code          Chart and notes reviewed. Data reviewed. I review the patient's current medications in the medical record at each encounter.  I have evaluated and examined the patient.       History of Present Illness:  Mr. Trejo is a 35 yo gentleman with a history EtOH abuse admitted with acute pancreatitis and hospital course complicated by respiratory failure requiring prolonged intubation with trach placement. He was intubated  for airway protection due to agitation. There was difficulty weaning sedation due to agitation and he ultimately required a percutaneous trach on . He has been on TC since  and tolerating PMV. He describes some chest congestion and anxiety, but denies shortness of breath.     Labs: Cr 0.72, WBC 8.8, BAL  with normal respiratory albert, sputum culture  positive for haemophilus influenzae    Images reviewed:  CXR  with patchy airspace disease    Interval history:  Afebrile  BP stable  Sats 94% on RA    Head CT with no acute process    ROS: Continue to do well with trach capping.  Reports cough with dark brown sputum which he is suctioning.  Denies any other pulmonary complaints today.       Past Medical History:   Diagnosis Date    Alcohol abuse        No past surgical history on file.    No family history on file.    Social History     Tobacco Use    Smoking status: Every Day     Current packs/day: 1.00     Average packs/day: 1 pack/day for 6.1 years (6.1 ttl pk-yrs)     Types: Cigarettes     Start date:     Smokeless tobacco: Never   Substance Use Topics    Alcohol use: Not on file       No Known Allergies    Current Facility-Administered Medications   Medication Dose Route Frequency    diazePAM (VALIUM) tablet 2.5 mg  2.5 mg Oral Q12H    
  Physician Progress Note      PATIENT:               ALEX LIZ  CSN #:                  [id number]  :                       1990  ADMIT DATE:       2025 6:05 PM  DISCH DATE:  RESPONDING  PROVIDER #:        Yoshi Duron MD          QUERY TEXT:    Patient admitted with alcohol induced pancreatitis and withdrawal. Noted   documentation of acute respiratory failure in Progress Note .  In order to   support the diagnosis of acute respiratory failure, please include additional   clinical indicators in your documentation.  Or please document if the   diagnosis of acute respiratory failure has been ruled out after further study.    The medical record reflects the following:  Risk Factors: 34 year old male, acute pancreatitis, encephalopathy, alcohol   abuse with withdrawal, electrolyte imbalance,elevated LFT's    Clinical Indicators:  O2  2115 88% RA  RR up to 41    PN -  NEURO- awake but lethargic  HEENT-Head: Normocephalic, no lesions, without obvious abnormality.  LUNGS-clear to auscultation bilaterally      SIGNIFICANT EVENT  4:36 AM-  Patient remains agitated despite high doses of sedatives. He is kicking and   spitting at staff, disoriented, and attempting to get out of bed and leave.   Decision was made to intubate him for safety so that his withdrawal symptoms   could be more aggressively treated.    PROCEDURE NOTE -  Intubation 4:50 AM    Indications:  airway protection  Intubation method: video-assisted      CONSULT -  #Significant encephalopathy, likely related to a combination of liver failure,   delirium and alcohol withdrawal  Intubated overnight for agitation  Started phenobarb taper for alcohol withdrawal  Cont high dose thiamine  Cont prop for sedation  Plan is to keep him sedated for now    # Intubated for worsening agitation  CXR prn  Target o2 sat>92%      PN -  Acute respiratory failure with hypoxia POA: intubated for airway protection   given severe agitation 
 attended rounds in ICU as part of interdisciplinary team where patient's ongoing care was discussed. Please contact [facility] for further referrals.    Rev Kirstie Best MDiv, [facility]  To mukul sam: [phone])  
 attended rounds in ICU as part of interdisciplinary team where patient's ongoing care was discussed. Please contact [facility] for further referrals.    Rev Kirstie Best MDiv, [facility]  To mukul sam: [phone])  
 attended rounds in ICU as part of interdisciplinary team where patient's ongoing care was discussed. Please contact [facility] for further referrals.    Rev Kirstie Best MDiv, [facility]  To mukul sam: [phone] ([phone])  
 attended rounds in ICU as part of interdisciplinary team where patient's ongoing care was discussed. Please contact [facility] for further referrals.    Rev Kirstie Best MDiv, [facility]  To mukul sam: [phone] ([phone])  
 attended rounds in ICU as part of interdisciplinary team where patient's ongoing care was discussed. Please contact [facility] for further referrals.    Rev Kirstie Best MDiv, [facility]  To mukul sam: [phone] ([phone])  
 attended rounds in the ICU as part of the Interdisciplinary team where the patient's ongoing care was discussed. Please contact [facility] for further referrals.    Chaplain Nelson MS, MDiv, [facility]  Staff   Paging service: [phone] (KAYLA)    
 attended rounds in the ICU as part of the Interdisciplinary team where the patient's ongoing care was discussed. Please contact [facility] for further referrals.    Chaplain Nelson MS, MDiv, [facility]  Staff   Paging service: [phone] (KAYLA)    
 attended rounds in the ICU as part of the Interdisciplinary team where the patient's ongoing care was discussed. Please contact [facility] for further referrals.    Chaplain Nelson MS, MDiv, [facility]  Staff   Paging service: [phone] (KAYLA)    
 attended rounds in the ICU as part of the Interdisciplinary team where the patient's ongoing care was discussed. Please contact [facility] for further referrals.    Chaplain Nelson MS, MDiv, [facility]  Staff   Paging service: [phone] (KAYLA)    
 attended rounds in the ICU as part of the Interdisciplinary team where the patient's ongoing care was discussed. Please contact [facility] for further referrals.    Chaplain Nelson MS, MDiv, [facility]  Staff   Paging service: [phone] (KAYLA)    
 attended rounds in the ICU as part of the Interdisciplinary team where the patient's ongoing care was discussed. Please contact [facility] for further referrals.    Chaplain Nelson MS, MDiv, [facility]  Staff   Paging service: [phone] (KAYLA)    
 attended rounds in the ICU as part of the Interdisciplinary team where the patient's ongoing care was discussed. Please contact [facility] for further referrals.    Chaplain Nelson MS, MDiv, [facility]  Staff   Paging service: [phone] (KAYLA)    
 attended rounds in the ICU as part of the Interdisciplinary team where the patient's ongoing care was discussed. Please contact [facility] for further referrals.    Chaplain Nelson MS, MDiv, [facility]  Staff   Paging service: [phone] (KAYLA)    
**ETT navarro changed protocol, dated with new date of 02/14. Patient did not tolerate change well agitation/ pt dyscrinic noted. RN assisted at bedside.      02/09/25 1105   Adult IBW   Height 1.854 m (6' 0.99\")   IBW/kg (Calculated) 79.88   Patient Observation   Pulse 73   Respirations 15   SpO2 99 %   Breath Sounds   Respiratory Pattern Regular   Upper Airway Sounds Other (comment)  (WNL)   Breath Sounds Bilateral Clear   Right Upper Lobe Clear   Right Middle Lobe Clear   Right Lower Lobe Clear   Left Upper Lobe Clear   Left Lower Lobe Clear   Vent Information   Ventilator Day(s) 9   Ventilator ID [id number]   Ventilator Safety Check Performed Pre-Use Yes   Vent Mode AC/VC   Ventilator Settings   FiO2  60 %   Vt (Set, mL) 380 mL   Resp Rate (Set) 12 bpm   PEEP/CPAP (cmH2O) 8   Vent Patient Data (Readings)   Vt Mandatory Exp (mL) 399 mL   Vt (Measured) 399 mL   Peak Inspiratory Pressure (cmH2O) 19 cmH2O   Rate Measured 15 br/min   Minute Volume (L/min) 6.83 Liters   Peak Inspiratory Flow (lpm) 46 L/sec   Mean Airway Pressure (cmH2O) 12 cmH20   Plateau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 mcg fentanyl, 2 mg/hr versed, propofol @ 45 mcg. Propofol providing 477 kcal/day. On LR @ 100 mL/hr.     Vital AF 1.2 kcal @ 40 mL/hr   Advance by 10 mL q 6 hours until goal reached  Provide 1 Prosource/day, flush 15 mL H2O before and after   mL q 3 hours     Tube feed at new goal 40 mL/hr x 22 hours provides 880 mL; 1056 kcal (+ Propofol + Prosource, 1613 kcal, 100% needs), 96 g carbs, 47 g fat, 66 g protein (+ 1 Prosource, 86 g, 100% needs). TF + FWF provides 1633 mL/day.    Last BM: 02/03/25  Edema: Generalized   Edema Generalized: Trace                Nutr. Labs:    Lab Results   Component Value Date    CREATININE 0.67 (L) 02/04/2025    BUN 4 (L) 02/04/2025     02/04/2025    K 3.4 (L) 02/04/2025     02/04/2025    CO2 26 02/04/2025       Lab Results   Component Value Date/Time    POCGLU 100 02/04/2025 07:26 AM        Hemoglobin A1C   Date Value Ref Range Status   01/28/2025 5.0 4.0 - 5.6 % Final     Comment:     (NOTE)  HbA1C Interpretive Ranges  <5.7              Normal  5.7 - 6.4         Consider Prediabetes  >6.5              Consider Diabetes         Lab Results   Component Value Date/Time    MG 1.9 02/04/2025 05:34 AM       Lab Results   Component Value Date    CALCIUM 8.3 (L) 02/04/2025    PHOS 2.4 (L) 02/04/2025       Lab Results   Component Value Date    TRIG 170 (H) 02/04/2025    TRIG 39 01/29/2025    TRIG 67 08/12/2024       Nutr. Meds:  Scheduled Meds:   QUEtiapine  200 mg Orogastric BID    enoxaparin  40 mg SubCUTAneous Q24H    thiamine  100 mg IntraVENous Daily    potassium & 
Brief ICU Nutrition Assessment    Type and Reason for Visit: Reassess    Nutrition Recommendations/Plan:   Brief follow up. SLP advanced diet to regular/finger foods today, NGT removed. Discontinued TF order. Doing well on trach collar, 8L 35% - possible decannulation prior to discharge. BM overnight.     Last BM: 02/19/25  Edema: None   Edema Generalized: None  RUE Edema: None  LUE Edema: None          Nutr. Labs:    Lab Results   Component Value Date    CREATININE 0.71 02/20/2025    BUN 16 02/20/2025     02/20/2025    K 3.7 02/20/2025     02/20/2025    CO2 29 02/20/2025       Lab Results   Component Value Date/Time    POCGLU 104 02/19/2025 03:21 PM    POCGLU 98 02/14/2025 02:15 AM    POCGLU 93 02/06/2025 10:42 PM    POCGLU 97 02/05/2025 08:24 PM    POCGLU 100 02/04/2025 07:26 AM        Hemoglobin A1C   Date Value Ref Range Status   01/28/2025 5.0 4.0 - 5.6 % Final     Comment:     (NOTE)  HbA1C Interpretive Ranges  <5.7              Normal  5.7 - 6.4         Consider Prediabetes  >6.5              Consider Diabetes         Lab Results   Component Value Date/Time    MG 1.9 02/20/2025 05:13 AM       Lab Results   Component Value Date    CALCIUM 10.4 (H) 02/20/2025    PHOS 3.7 02/20/2025       Lab Results   Component Value Date    TRIG 89 02/20/2025    TRIG 119 02/18/2025    TRIG 103 02/16/2025       Nutr. Meds:  Scheduled Meds:   diazePAM  10 mg Orogastric Q8H    ipratropium 0.5 mg-albuterol 2.5 mg  1 Dose Inhalation Q6H    guaiFENesin  200 mg Per G Tube 4 times per day    methadone  15 mg Oral BID    lurasidone  40 mg Orogastric Daily    cloNIDine  1 patch TransDERmal Weekly    famotidine  20 mg Orogastric BID    folic acid  1 mg Orogastric Daily    thiamine mononitrate  100 mg Orogastric Daily    polyethylene glycol  17 g Orogastric BID    enoxaparin  40 mg SubCUTAneous Q24H    multivitamin  1 tablet Orogastric Daily    chlorhexidine  15 mL Mouth/Throat BID     Continuous Infusions:   sodium chloride 
Chart reviewed. LFTs/Tbili and INR all are resolving nicely. Nothing further to add from a GI standpoint.    He will ultimately need to follow up as outpatient for monitoring of his SERJIO.    Avoiding ETOH is key for him.    Can see again as needed.   
Comprehensive Nutrition Assessment    Type and Reason for Visit:  Initial    Nutrition Recommendations/Plan:   Continue trophic feeds if remaining intubated:  Trickle Feeds Vital AF 1.2 kcal @ 20 mL/hour  FWF 90 mL q 3 hours  Provide 3 Prosource/day, flush with 15 mL H2O before and after    If extubated, YSS and diet as tolerated     Malnutrition Assessment:  Malnutrition Status:  At risk for malnutrition (ETOH abuse) (02/03/25 1151)    Context:  Acute Illness     Findings of the 6 clinical characteristics of malnutrition:  Energy Intake:  Unable to assess  Weight Loss:  Unable to assess     Body Fat Loss:  Mild body fat loss Fat Overlying Ribs, Orbital   Muscle Mass Loss:  Mild muscle mass loss Clavicles (pectoralis & deltoids)  Fluid Accumulation:  No fluid accumulation     Strength:  Not Performed    Nutrition Assessment:     Patient is a 34 year old male admitted with Nausea and vomiting in adult [R11.2]  Alcohol-induced acute pancreatitis without infection or necrosis [K85.20]  Pancreatitis without necrosis or infection [K85.90]. He  has a past medical history of Alcohol abuse.  Intubated, OGT in place. Provided recs for TF when consulted over weekend - corrected order this AM. Per IDR discussion, may be extubating today. Has gotten <24 hrs consecutive nutrition, would continue trophic fees today if not extubated. He is currently on Fent 125 mcgs, propofol @ 40 mcgs providing 475 kcal/day. Episode of emesis 2/1. No family bedside during RD encounter. Measured bedscale weight obtained, appreciated - needs recalculated. +BM yesterday. 18# loss (11.1%) x 1 alberto, clinically significant for timeframe but question accuracy. Modified thiamine from wernicke's dose.    Trickle feeds at goal 20 mL/hour provide 528 kcal (+ Prosource, + Propofol, 1063 kcal, 79% needs), 53 g carbs, 36 g protein (+ 3 Prosource, 96 g, 100% needs). TF + FWF provides 1076 mL H2O/day.        Wt Readings from Last 10 Encounters:   02/01/25 67 
Comprehensive Nutrition Assessment    Type and Reason for Visit:  Reassess    Nutrition Recommendations/Plan:   Continue DHT feeds at goal:  Vital HP @ 40 mL/hr  Provide 1 Prosource.day, flush with 15 mL before and after  FWF 95 mL q 3 hours   Obtain new measured weight     Malnutrition Assessment:  Malnutrition Status:  At risk for malnutrition (02/17/25 1140)    Context:  Acute Illness     Findings of the 6 clinical characteristics of malnutrition:  Energy Intake:  No decrease in energy intake (TF meeting needs)  Weight Loss:  No weight loss     Body Fat Loss:  Mild body fat loss Fat Overlying Ribs, Orbital   Muscle Mass Loss:  Mild muscle mass loss Clavicles (pectoralis & deltoids)  Fluid Accumulation:  No fluid accumulation     Strength:  Not Performed    Nutrition Assessment:     2/17: Follow up. Continues to tolerate tube feeds at goal rate. Off of propofol now. Plan for SLP consult per IDR discussion. Currently on precedex @ 1.1 mcg; hoping to wean. Mild hyponatremia but lytes WDL otherwise. Last BM yesterday. Per nursing, intermittent fevers - Tmax 101.9 last night, down to 100.6 at last check this AM.     Tube feeds at 40 mL/hr to provide 880 mL; 880 kcal (+ Prosource, 960 kcal/day, 71% needs); 97 g carbs, 76 g protein (+1 Prosource, 96 g protein, 100% needs). TF + FWF provides 1495 mL H2O/day.     Nutrition Related Findings:      Wound Type: None     Last BM: 02/16/25  Edema: Scleral   Edema Generalized: Trace  RUE Edema: Trace  LUE Edema: Trace          Nutr. Labs:    Lab Results   Component Value Date    CREATININE 0.80 02/17/2025    BUN 22 (H) 02/17/2025     (L) 02/17/2025    K 3.7 02/17/2025     02/17/2025    CO2 25 02/17/2025       Lab Results   Component Value Date/Time    POCGLU 98 02/14/2025 02:15 AM    POCGLU 93 02/06/2025 10:42 PM    POCGLU 97 02/05/2025 08:24 PM    POCGLU 100 02/04/2025 07:26 AM        Hemoglobin A1C   Date Value Ref Range Status   01/28/2025 5.0 4.0 - 5.6 % 
Comprehensive Nutrition Assessment    Type and Reason for Visit:  Reassess    Nutrition Recommendations/Plan:   Continue TF at goal while intubated:  Vital AF 1.2 kcal @ 40 mL/hr   Provide 1 Prosource/day, flush 15 mL H2O before and after   mL q 3 hours   Has PRN bowel regimen not provided; consider scheduling   If extubated, consider SLP eval for PO   Continue MVI/Thiamine/Folic      Malnutrition Assessment:  Malnutrition Status:  At risk for malnutrition (ETOH abuse) (02/03/25 1151)    Context:  Acute Illness     Findings of the 6 clinical characteristics of malnutrition:  Energy Intake:  Unable to assess  Weight Loss:  Unable to assess     Body Fat Loss:  Mild body fat loss Fat Overlying Ribs, Orbital   Muscle Mass Loss:  Mild muscle mass loss Clavicles (pectoralis & deltoids)  Fluid Accumulation:  No fluid accumulation     Strength:  Not Performed    Nutrition Assessment:     2/6: Follow up. Remains intubated, tube feeds at goal 40 mL/hr. Currently on precedex @ 1 mcg, fent @ 20 mcg0, versed @ 3, propofol @ 40 mgs. Propofol providing 443 kcal/day. No BM yet. BG remains stable last reading 105. Lytes WDL. TG slightly elevated on propofol.    Tube feed at new goal 40 mL/hr x 22 hours provides 880 mL; 1056 kcal (+ Propofol + Prosource, 1579 kcal, 100% needs), 96 g carbs, 47 g fat, 66 g protein (+ 1 Prosource, 86 g, 100% needs). TF + FWF provides 1633 mL/day.     Nutrition Related Findings:      Wound Type: None     Last BM: 02/03/25  Edema: Generalized   Edema Generalized: Trace                Nutr. Labs:    Lab Results   Component Value Date    CREATININE 0.58 (L) 02/06/2025    BUN 6 02/06/2025     02/06/2025    K 3.6 02/06/2025     02/06/2025    CO2 29 02/06/2025       Lab Results   Component Value Date/Time    POCGLU 97 02/05/2025 08:24 PM    POCGLU 100 02/04/2025 07:26 AM        Hemoglobin A1C   Date Value Ref Range Status   01/28/2025 5.0 4.0 - 5.6 % Final     Comment:     (NOTE)  HbA1C 
Comprehensive Nutrition Assessment    Type and Reason for Visit:  Reassess    Nutrition Recommendations/Plan:   Continue TF at goal:  Vital AF 1.2 kcal @ 40 mL/hr   Provide 1 Prosource/day, flush 15 mL H2O before and after   mL q 3 hours   Consider suppository; receiving daily BM regimen but no BM x 8 days     Malnutrition Assessment:  Malnutrition Status:  At risk for malnutrition (ETOH abuse) (02/03/25 1151)    Context:  Acute Illness     Findings of the 6 clinical characteristics of malnutrition:  Energy Intake:  Unable to assess  Weight Loss:  Unable to assess     Body Fat Loss:  Mild body fat loss Fat Overlying Ribs, Orbital   Muscle Mass Loss:  Mild muscle mass loss Clavicles (pectoralis & deltoids)  Fluid Accumulation:  No fluid accumulation     Strength:  Not Performed    Nutrition Assessment:    2/10: Follow up. Remains intubated, OGT w/ tube feed at goal. No noted emesis. Patient has been receiving scheduled senna BID but no documented bowel movement since 2/2. Currently on precedex 1.5 mcg, fent 200 mcg, versed 10 mcg, propofol @ 50 mcgs. Propofol providing 530 kcal/day. Plan to wean versed today as able. ENT to be consulted for possible trach. Abd feels firm today. Edema has developed in BUE.    Tube feed at goal 40 mL/hr x 22 hours provides 880 mL; 1056 kcal (+ Propofol + Prosource, 1579 kcal, 100% needs), 96 g carbs, 47 g fat, 66 g protein (+ 1 Prosource, 86 g, 100% needs). TF + FWF provides 1633 mL/day.     Nutrition Related Findings:      Wound Type: None     Last BM: 02/02/25  Edema: Generalized, Right upper extremity   Edema Generalized: Trace  RUE Edema: +1  LUE Edema: +1          Nutr. Labs:    Lab Results   Component Value Date    CREATININE 0.46 (L) 02/10/2025    BUN 9 02/10/2025     02/10/2025    K 3.2 (L) 02/10/2025     (H) 02/10/2025    CO2 26 02/10/2025       Lab Results   Component Value Date/Time    POCGLU 93 02/06/2025 10:42 PM    POCGLU 97 02/05/2025 08:24 PM    
Comprehensive Nutrition Assessment    Type and Reason for Visit:  Reassess    Nutrition Recommendations/Plan:   Modify TF:  Vital HP @ 25 mL/hr  FWF 30 mL q 4 hours   Continue full liquid diet, or texture per SLP     Malnutrition Assessment:  Malnutrition Status:  At risk for malnutrition (02/17/25 1140)    Context:  Acute Illness     Findings of the 6 clinical characteristics of malnutrition:  Energy Intake:  No decrease in energy intake (TF meeting needs)  Weight Loss:  No weight loss     Body Fat Loss:  Mild body fat loss Fat Overlying Ribs, Orbital   Muscle Mass Loss:  Mild muscle mass loss Clavicles (pectoralis & deltoids)  Fluid Accumulation:  No fluid accumulation     Strength:  Not Performed    Nutrition Assessment:     2/19: Follow up. Remains intubated, but tolerated trach collar x >6 hours yesterday per chart review. TF continue at goal 40 mL/hr. No BM x 3 days at this time, but getting daily glycolax. Mild hyponatremia; have been providing >1 mL H2O/kcal. SLP cleared for full liquids today, plan to decrease TF rate and FWF amount. Tolerated ~20 minutes of PMV with SLP yesterday. Patient able to stand up for standing scale weight today - is down ~10# from admit bedscale weight.    Tube feeds at 25 mL/hr to provide 550 mL; 550 kcal (30% needs); 60 g carbs, 47 g protein (75% needs). TF + FWF provides 578 mL H2O/day.      Nutrition Related Findings:      Wound Type: None     Last BM: 02/16/25  Edema: None   Edema Generalized: None  RUE Edema: None  LUE Edema: None          Nutr. Labs:    Lab Results   Component Value Date    CREATININE 0.66 (L) 02/19/2025    BUN 16 02/19/2025     (L) 02/19/2025    K 3.7 02/19/2025     02/19/2025    CO2 29 02/19/2025       Lab Results   Component Value Date/Time    POCGLU 98 02/14/2025 02:15 AM    POCGLU 93 02/06/2025 10:42 PM    POCGLU 97 02/05/2025 08:24 PM    POCGLU 100 02/04/2025 07:26 AM        Hemoglobin A1C   Date Value Ref Range Status   01/28/2025 5.0 
Comprehensive Nutrition Assessment    Type and Reason for Visit:  Reassess    Nutrition Recommendations/Plan:   Modify tube feeds:  Vital HP @ 40 mL/hr  Provide 1 Prosource.day, flush with 15 mL before and after  FWF 95 mL q 3 hours     Obtain new measured weight     Malnutrition Assessment:  Malnutrition Status:  At risk for malnutrition (ETOH abuse) (02/03/25 1151)    Context:  Acute Illness     Findings of the 6 clinical characteristics of malnutrition:  Energy Intake:  Unable to assess  Weight Loss:  Unable to assess     Body Fat Loss:  Mild body fat loss Fat Overlying Ribs, Orbital   Muscle Mass Loss:  Mild muscle mass loss Clavicles (pectoralis & deltoids)  Fluid Accumulation:  No fluid accumulation     Strength:  Not Performed    Nutrition Assessment:     2/12: Follow up. Remains intubated, finally had BM yesterday after 9 days without bowel movement. No new weight since admission; ordering new weight to be obtained. Lytes WDL. He remains on precedex 0.6 mcg, fent 175 mcg, versed 9 mg, propofol @ 50 mcgs. Propofol is providing 530 kcal/day. May need trach.    Vented x 1 week; modifying tube feeds. New tube feed formula at goal 40 mL/hr to provide 880 mL; 880 kcal (+ Prosource, + Propofol, 1490 kcal/day, 100% needs); 97 g carbs, 76 g protein (+1 Prosource, 96 g protein, 100% needs). TF + FWF provides 1495 mL H2O/day.    Last Weight Metrics:      2/11/2025    11:50 AM 2/9/2025     2:42 PM 2/9/2025    11:05 AM 2/9/2025     8:02 AM 2/8/2025     2:48 PM 2/8/2025    11:30 AM 2/8/2025     8:33 AM   Weight Loss Metrics   Height 6' 0.992\" 6' 0.992\" 6' 0.992\" 6' 0.992\" 6' 0.992\" 6' 0.992\" 6' 0.992\"       Nutrition Related Findings:      Wound Type: None     Last BM: 02/11/25  Edema: Scleral   Edema Generalized: Trace  RUE Edema: +1  LUE Edema: +1          Nutr. Labs:    Lab Results   Component Value Date    CREATININE 0.63 (L) 02/12/2025    BUN 10 02/12/2025     02/12/2025    K 3.6 02/12/2025     
EEG attempted but patient refused.  Patient states he doesn't want anything new and that he is only waiting the trach to come out so that he can go home.  NP Meghan auqino notified.  
Notified by Nursing patient with elevated CIWA scotes, restless, getting OOB etc. Notably he is hospitalized for abdominal pain and questionable acute liver failure. On arrival to bedside, pt asleep w even unlabored respirations in no distress. Discussed w nursing plan of care including continuing w PRN Ativan for agitation. Later on pt was code ADDIS and Zyprexa IM was ordered. Added ammonia to am labs. RRT RN req tx to SD for closer monitoring which was done. On arrival to new unit pt code ADDIS again. Zyprexa had not yet been administered. Dr. Rendon to see pt, notified of previous events.   
Patient having a bedside Bronchoscopy and tracheostomy performed today. Patient RASS +3 at 1227. Patient sitting up in bed, tachycardic HR 160s, RR high 20s and aggitated. Patient intermittently following commands, but now following commands or re-directable at this time. Spoke with Dr Lujan, patient failed SAT due to aggitation and tachycardia. Increasing sedation to get patient to goal RASS -4 in order to perform these procedures. Monitoring BP closely with quick titration of gtts. Turned precedex and versed gtts up by 50 percent to see if it would be enough to get to goal RASS -4. Will have to readjust again prior to procedure and titrate until patient is at goal.   
Patient off vent all night. Will attempt FEES today.   
Patient was placed on Trach collar at 10 lpm & 80%, titrated down to 60% later in day - as of this note he has been on TC for 6.5 hrs - if patent continues to do well he will stay on until night shift switches him back to vent to rest over night.    Patient has a strong cough and is using speaking valve when wants to communicate.  
Pharmacy Dosing Services: 2/11/2025    The pharmacist has determined that this patient meets P & T approved criteria for conversion from IV to oral therapy for the following medication: Famotidine      The pharmacist has written the following order for the patient: Famotidine 20 mg PO BID    The pharmacist will continue to monitor the patient's status and advise the physician if conversion back to IV therapy is recommended.    Thanks,   Nicola Reyes, [employer]      
Pharmacy Dosing Services: 2/7/2025    The pharmacist has determined that this patient meets P & T approved criteria for conversion from IV to oral therapy for the following medication: Folate, Thiamine      The pharmacist has written the following order for the patient: Folic acid, thiamine per OGT  The pharmacist will continue to monitor the patient's status and advise the physician if conversion back to IV therapy is recommended.    Thanks,   Nicola Reyes, [employer]    
Pt calling out to family member not currently in the room and then states he sees snow falling inside his room. When questioned by this RN about hallucinations, pt stated he has intermittently been experiencing hallucinations  
Pt has not voided since correia removal at around 1200 today. Bladder scanned at this time showed 182 ml. Pt stated he does not feel the need to void at this time.  
Pt is continuously getting out of bed to get to the restroom without using the call bell as instructed.    Pt Removed gripper socks that was provided    Fall risk education given, pt non compliant.        1820- Pt restless and Anxious, requesting to leave    Explanation and education given to pt    Julieta PUENTE contacted and spoke to pt, pt decided to stay    CIWA done - score of 15    1 mg ativan given to pt per MD Rendon's order       1930- Bedside shift change report given to Letty RICH, Vitals done.  
Pt refused 0300 vitals.  
Pt with fever 100.4. MD notified. UA, CXR, Blood Cx, and Resp Panel ordered.   
Pt. Currently on ATC 11L/60%. Tried to place Pt. Back on Ventilator for the night. Did not tolerate. Placed back on ATC 11L/60%. O2 Saturations stable. Vitals stable. RN & NP aware. Will continue to monitor.   
Rosaura Jensen, NP notified of patient in violent soft 4 point restraints for 3 times throughout the shift.  
Rt capped trach per pulmonary notes  
SBT attempted on 5 PSV and +5 PEEP. Pt failed SBT with RSBI 142. RT increased PSV to 10 for appropriate volumes  
Speech LAnguage Pathology TREATMENT    Patient: Nathan Trejo (34 y.o. male)  Date: 2/25/2025  Primary Diagnosis: Nausea and vomiting in adult [R11.2]  Alcohol-induced acute pancreatitis without infection or necrosis [K85.20]  Pancreatitis without necrosis or infection [K85.90]       Precautions:  Fall Risk, Bed Alarm                  ASSESSMENT :  Patient seen at breakfast.    Occasional wet cough. Not using suction anymore.   Pulm plans to pull trach today.       Patient will benefit from skilled intervention to address the above impairments.     PLAN :  Recommendations and Planned Interventions:  Diet: Regular and thin liquids           Acute SLP Services: Yes, SLP will continue to follow per plan of care.  Discharge Recommendations: No, additional SLP treatment not indicated at discharge     SUBJECTIVE:   Patient stated, “I don't usually eat breakfast, but if it helps me get out of here, I'll do it.”    OBJECTIVE:     Past Medical History:   Diagnosis Date    Alcohol abuse    No past surgical history on file.  Prior Level of Function/Home Situation:   Social/Functional History  Lives With: Significant other  Type of Home: House  Home Equipment: None  Prior Level of Assist for ADLs: Independent  Prior Level of Assist for Homemaking: Independent  Prior Level of Assist for Transfers: Independent  Active : Yes  Mode of Transportation: Car    Cognitive and Communication Status:  Neurologic State: Alert  Orientation Level: Oriented to person and Oriented to place  Cognition: Memory loss    Dysphagia:  Oral Assessment:     P.O. Trials:  PO Trials  Assessment Method(s): Observation;Palpation  Patient Position: upright in bed  Vocal Quality: No Impairment  Consistency Presented: Regular;Thin  How Presented: Self-fed/presented;Straw;Successive Swallows;Cup/gulp  Bolus Acceptance: No impairment  Bolus Formation/Control: No impairment  Propulsion: No impairment  Oral Residue: None  Initiation of Swallow: No 
Speech Language Pathology  Flexible Endoscopic Evaluation of Swallowing-FEES  Patient: Nathan Trejo (34 y.o. male)  Date: 2/19/2025  Primary Diagnosis: Nausea and vomiting in adult [R11.2]  Alcohol-induced acute pancreatitis without infection or necrosis [K85.20]  Pancreatitis without necrosis or infection [K85.90]       Precautions:               aspiration       ASSESSMENT :  Patient with mild pharyngeal dysphagia, characterized by mild to moderate pharyngeal residue s/p swallow for all consistencies.  Residue was significantly reduced when patient wore PMV.    He is ready for PO diet.   Still on vent at times, so will leave dubhoff in until off vent consistently and taking sufficient PO.     Patient will benefit from skilled intervention to address the above impairments.     PLAN :  Recommendations and Planned Interventions:  Diet: Full liquid  Upright for all PO  WEAR PMV FOR ALL PO INTAKE AND DURING ALL WAKING HOURS.       Acute SLP Services: Yes, patient will be followed by speech-language pathology 4x/week to address goals. Patient's rehabilitation potential is considered to be Good.  Discharge Recommendations: Yes, recommend SLP treatment at next level of care     SUBJECTIVE:   Patient stated “How long does this program take?”.    OBJECTIVE:       Past Medical History:   Diagnosis Date    Alcohol abuse    No past surgical history on file.  Prior Level of Function/Home Situation:   Social/Functional History  Lives With: Significant other  Type of Home: House  Home Equipment: None  Prior Level of Assist for ADLs: Independent  Prior Level of Assist for Homemaking: Independent  Prior Level of Assist for Transfers: Independent  Active : Yes  Mode of Transportation: Car            History/indication for procedure: intubation  Lidocaine used: No  Nostril used: Right  Scope Used: Ambu disposable scope  Feeding Tube Present in Nare: Yes, dobhoff tube  Adverse Reaction: No  Respiratory status:   On trach 
Spiritual Care Partner Volunteer visited patient at [facility] in [facility] on 1/30/2025   Documented by:  Chaplain Zhao Siddiqui M.Div., [facility].  For  support, please call Spiritual Health Team @ [phone]    
Spiritual Health History and Assessment/Progress Note  [facility]    Attempted Encounter, Family Care, Adjustment to illness,      Name: Nathan Trejo MRN: [id number]    Age: 34 y.o.     Sex: male   Language: English   Holiness: Adventism   Pancreatitis without necrosis or infection     Date: 2/19/2025            Total Time Calculated: 10 min              Spiritual Assessment began in [facility]ENS[facility]ARE UN[facility]        Referral/Consult From: Rounding   Encounter Overview/Reason: Attempted Encounter  Service Provided For: Patient    Carole, Belief, Meaning:   Patient unable to assess at this time  Family/Friends No family/friends present      Importance and Influence:  Patient unable to assess at this time  Family/Friends No family/friends present    Community:  Patient Other: unable to assess  Family/Friends No family/friends present    Assessment and Plan of Care:     Patient Interventions include: Other: provided a ministry of presence  Family/Friends Interventions include: No family/friends present    Patient Plan of Care: Spiritual Care available upon further referral  Family/Friends Plan of Care: Spiritual Care available upon further referral     attempted visit. Pt was being attended to by medical staff. Chart indicate pt is Adventism. Offered silent prayer on pt's behalf.    Electronically signed by CHARLIE Duenas on 2/19/2025 at 11:58 AM    
Spiritual Health History and Assessment/Progress Note  [facility]    Family Care, Family Care, Adjustment to illness,      Name: Nathan Trejo MRN: [id number]    Age: 34 y.o.     Sex: male   Language: English   Mormonism: Muslim   Pancreatitis without necrosis or infection     Date: 2/3/2025            Total Time Calculated: 6 min              Spiritual Assessment began in [facility]        Referral/Consult From: Rounding   Encounter Overview/Reason: Family Care  Service Provided For: Family    Carole, Belief, Meaning:   Patient unable to assess at this time  Family/Friends identify as spiritual      Importance and Influence:  Patient unable to assess at this time  Family/Friends have spiritual/personal beliefs that influence decisions regarding the patient's health    Community:  Patient Other: Unable to assess  Family/Friends feel well-supported. Support system includes: Extended family    Assessment and Plan of Care:     Patient Interventions include: Other: Visited patient to offer support following Code [place]. Multiple staff members were attending to patient.  Family/Friends Interventions include: Facilitated expression of thoughts and feelings and Other: Patient's girl friend was standing outside of his room and appeared concerned. Introduced self and offered spiritual presence and active listening as she shared her concerns about patient; acknowledged her feelings and offered words of support. Assured her of prayers on their behalf and of ongoing  availability for support.    Patient Plan of Care: Spiritual Care available upon further referral  Family/Friends Plan of Care: Spiritual Care available upon further referral      Rev Kirstie Best MDiv, [facility]  To mukul : [phone]  
Spiritual Health History and Assessment/Progress Note  Formerly named [facility]    Attempted Encounter,  ,  ,      Name: Nathan Trejo MRN: [id number]    Age: 34 y.o.     Sex: male   Language: English   Quaker: Confucianism   Pancreatitis without necrosis or infection     Date: 1/31/2025            Total Time Calculated: 15 min              Spiritual Assessment began in [facility]        Referral/Consult From: Rounding   Encounter Overview/Reason: Attempted Encounter  Service Provided For: Patient    Carole, Belief, Meaning:   Patient unable to assess at this time  Family/Friends No family/friends present      Importance and Influence:  Patient unable to assess at this time  Family/Friends No family/friends present    Community:  Patient Other: Unable to assess  Family/Friends No family/friends present    Assessment and Plan of Care:     Patient Interventions include: Other: Provided spiritual presence as Mr Trejo   was quietly lying in bed with his eyes closed; nurse stated that patient was not responding at that time. No family was present. Unable to do spiritual assessment. Left note assuring patient and family of  availability for support.  Family/Friends Interventions include: No family/friends present    Patient Plan of Care: Spiritual Care available upon further referral  Family/Friends Plan of Care: Spiritual Care available upon further referral      Rev Kirstie Best MDiv, [facility]  To mukul : [phone]  
Spiritual Health History and Assessment/Progress Note  [facility]    Attempted Encounter, Family Care, Adjustment to illness,      Name: Nathan Trejo MRN: [id number]    Age: 34 y.o.     Sex: male   Language: English   Jainism: Restorationism   Pancreatitis without necrosis or infection     Date: 2/12/2025            Total Time Calculated: 10 min              Spiritual Assessment continued in [facility]        Referral/Consult From: Rounding   Encounter Overview/Reason: Attempted Encounter  Service Provided For: Patient    Carole, Belief, Meaning:   Patient unable to assess at this time  Family/Friends No family/friends present      Importance and Influence:  Patient unable to assess at this time  Family/Friends No family/friends present    Community:  Patient Other: Unable to assess. Pt has a sister.  Family/Friends No family/friends present    Assessment and Plan of Care:     Patient Interventions include: Other: Offered silent words of comfort and peace.  Family/Friends Interventions include: No family/friends present    Patient Plan of Care: Spiritual Care available upon further referral  Family/Friends Plan of Care: Spiritual Care available upon further referral    Electronically signed by CHARLIE Duenas on 2/12/2025 at 11:16 AM    
Spiritual Health History and Assessment/Progress Note  [facility]    Attempted Encounter, Family Care, Adjustment to illness,      Name: Nathan Trejo MRN: [id number]    Age: 34 y.o.     Sex: male   Language: English   Tenriism: Quaker   Pancreatitis without necrosis or infection     Date: 2/7/2025            Total Time Calculated: 15 min              Spiritual Assessment continued in [facility]        Referral/Consult From: Rounding   Encounter Overview/Reason: Attempted Encounter  Service Provided For: Patient    Carole, Belief, Meaning:   Patient unable to assess at this time  Family/Friends No family/friends present      Importance and Influence:  Patient unable to assess at this time  Family/Friends No family/friends present    Community:  Patient Other: unable to assess  Family/Friends No family/friends present    Assessment and Plan of Care:     Patient Interventions include: Other: ministry of presence  Family/Friends Interventions include: No family/friends present    Patient Plan of Care: Spiritual Care available upon further referral  Family/Friends Plan of Care: Spiritual Care available upon further referral     visit following Rounds. Pt appears to be resting quietly on the vent. Offered words of comfort.    Electronically signed by CHARLIE Duenas on 2/7/2025 at 11:39 AM    
[facility] Dosing Services: Antimicrobial Stewardship Daily Doc 2/11/2025  Consult for antibiotic dosing of Vancomycin by Dr. Duron  Indication: VAP  Day of Therapy: 2 of 7    Ht Readings from Last 1 Encounters:   02/09/25 1.854 m (6' 0.99\")        Wt Readings from Last 1 Encounters:   02/01/25 67 kg (147 lb 11.3 oz)      Vancomycin therapy:  Loading dose: Vancomycin 1750 mg x1 dose now/given  Maintenance dose: Vancomycin 1000 mg IV every 8 hours   Dose calculated to approximate a           a. Target AUC/ZULEIKA of 400-600  Last level: N/A    A/Plan:  Renal fxn stable, MRSA screen and sputum Cx pending, WBC WNL, febrile this AM - Tmax 101.1. Procal 0.32.   Previously on Rocephin monotherapy for H.Flu PNA  Dose appropriate, level this afternoon    Dose administration notes: new start      Other Antimicrobial   (not dosed by pharmacist) Cefepime 2g q8h (CrCl >60 ml/min)   Cultures 2/10 MRSA: Pending  2/10 Resp Cx (endotracheal): NGTD, Prelim    2/5 Resp Cx (endotracheal): Haemophilus influenzae beta lactamase negative - F   2/1 Blood Cx: NGTD - F  2/1 Blood Cx: NGTD - F   Serum Creatinine Lab Results   Component Value Date/Time    CREATININE 0.61 02/11/2025 02:03 AM          Creatinine Clearance Estimated Creatinine Clearance: 163 mL/min (A) (based on SCr of 0.61 mg/dL (L)).     Temp Temp: 99.8 °F (37.7 °C)       WBC Lab Results   Component Value Date/Time    WBC 8.6 02/11/2025 02:03 AM          Procalcitonin Lab Results   Component Value Date/Time    PROCAL 0.32 02/10/2025 05:14 PM      For Antifungals, Metronidazole and Nafcillin: Lab Results   Component Value Date/Time     02/08/2025 04:37 AM    AST 39 02/08/2025 04:37 AM        Thanks,   Nicola Reyes, PharmD        
The route for medications order are still per NGT/OGT/Gtube patient does not have NGT/OGT/Gtube. He has tracheostomy tube but clamped/covered. Speech Therapist also clear him for regular diet and is doing fine eating. Regular diet in place on 02/20/2025. Patient verbalized he can take medication orally. Informed NP Hortensia Rios, medications given orally and orders to be change by NP.   
[facility] Dosing Services: Antimicrobial Stewardship Daily Doc  Consult for antibiotic dosing of Vancomycin by Dr. Duron  Indication: Pneumonia (Nosocomial)  Day of Therapy: 1 of 7    Ht Readings from Last 1 Encounters:   02/09/25 1.854 m (6' 0.99\")        Wt Readings from Last 1 Encounters:   02/01/25 67 kg (147 lb 11.3 oz)      Vancomycin therapy:  Loading dose: Vancomycin 1750 mg x1 dose now/given  Maintenance dose: Vancomycin 1250 mg IV every 8 hours   Dose calculated to approximate a           a. Target AUC/ZULEIKA of 400-600          b. Trough of 15-20 mcg/mL   Last level:  mcg/mL    A/Plan:  BMP daily, MRSA nares ordered  Previously on Rocephin monotherapy, spiked fever. Vanc and cefepime ordered  Will order scheduled vancomycin 1000 mg IV every 8 hours (per insight rx predicts , Tr 11.1, T1/2 = 4.83 hours)  Plan for follow up vanc level within 48 hours (not yet ordered)      Dose administration notes: new start      Other Antimicrobial   (not dosed by pharmacist) Cefepime 2g q8h   Cultures 2/10 Resp Cx (endotracheal): in pocess  2/5 Resp Cx (endotracheal): Haemophilus influenzae beta lactamase negative - F   2/1 Blood Cx: NGTD - F  2/1 Blood Cx: NGTD - F       Serum Creatinine Lab Results   Component Value Date/Time    CREATININE 0.46 02/10/2025 04:30 AM          Creatinine Clearance Estimated Creatinine Clearance: 216 mL/min (A) (based on SCr of 0.46 mg/dL (L)).     Temp Temp: 99.4 °F (37.4 °C)       WBC Lab Results   Component Value Date/Time    WBC 7.2 02/10/2025 04:30 AM          Procalcitonin Lab Results   Component Value Date/Time    PROCAL 0.67 02/03/2025 08:33 AM      For Antifungals, Metronidazole and Nafcillin: Lab Results   Component Value Date/Time     02/08/2025 04:37 AM    AST 39 02/08/2025 04:37 AM        Pharmacist: Mykel Randolph, [facility]  [phone]    
chance of requiring intubation to keep him safe    2/1: Intubated overnight for worsening agitation.   2/2: Vomited x2 overnight. Tube feeds held. LFTs trending down. Agitated overnight, did go up on propofol.   2/3: prop and fent overnight. Agitated with SAT yesterday. LFTs improving.  2/4: agitated this morning despite prop and fent. Received 5 mg versed and started on versed gtt. Mild ETT secretions. Tmax - 100.6F  2/5: febrile - 102.F. Resolved spontaneously. On versed, propofol and fentanyl.  2/6: Stable overnight.  Tmax 100.7 Fahrenheit.   2/7: Still agitated intermittently despite versed, fentanyl and precedex gtt. Started on ketamine last night.    2/8: Stable overnight. FiO2 weaned down slightly. Afebrile. Agitated upon waking up overnight requiring increasing sedation. On versed, prop, fentanyl and precedex.    2/9 : stable. Mild agitation yesterday. O2 needs improving.    Problem list:     Acute liver failure   Progressive encephalopathy/delirium  Alcohol abuse, withdrawal   Acute pancreatitis  Abnormal INR    Assessment/Plan:     Neuro:   #Significant encephalopathy, likely related to a combination of liver failure, delirium and alcohol withdrawal - currently liver failure is improving and alcohol withdrawal has been more than 7 days.  Suspect current symptoms could be related to potential unknown ingestion of substance (kratom/bath salths etc.) vs delirium.    - Intubated on 2/1  for agitation, extubated and then reintubated on 2/3 due to significant agitation  - Cont thiamine/folic  - Conitnue versed, propofol, fentanyl and precedex.  - Follow triglyceride levels  - Once able to wean down sedation to some degree, we can assess mental status again to assess for extubation. So far no major progress.  - Continue seroquel 200 bid - holding off on escalating since patient has not had any major response with this so far.  - Family reports a history of previous alcohol withdrawal and a rehabilitation stay 
release tablet 5 mg  5 mg Oral Q4H PRN    bisacodyl (DULCOLAX) EC tablet 10 mg  10 mg Oral Daily    sodium chloride flush 0.9 % injection 5-40 mL  5-40 mL IntraVENous 2 times per day    sodium chloride flush 0.9 % injection 5-40 mL  5-40 mL IntraVENous PRN    0.9 % sodium chloride infusion   IntraVENous PRN    LORazepam (ATIVAN) tablet 1 mg  1 mg Oral Q1H PRN    Or    LORazepam (ATIVAN) injection 1 mg  1 mg IntraVENous Q1H PRN    Or    LORazepam (ATIVAN) tablet 2 mg  2 mg Oral Q1H PRN    Or    LORazepam (ATIVAN) injection 2 mg  2 mg IntraVENous Q1H PRN    Or    LORazepam (ATIVAN) tablet 3 mg  3 mg Oral Q1H PRN    Or    LORazepam (ATIVAN) injection 3 mg  3 mg IntraVENous Q1H PRN    Or    LORazepam (ATIVAN) tablet 4 mg  4 mg Oral Q1H PRN    Or    LORazepam (ATIVAN) injection 4 mg  4 mg IntraVENous Q1H PRN    naloxone (NARCAN) injection 0.4 mg  0.4 mg IntraVENous PRN    lactated ringers infusion   IntraVENous Continuous    sodium chloride flush 0.9 % injection 5-40 mL  5-40 mL IntraVENous 2 times per day    sodium chloride flush 0.9 % injection 5-40 mL  5-40 mL IntraVENous PRN    0.9 % sodium chloride infusion   IntraVENous PRN    potassium chloride (KLOR-CON) extended release tablet 40 mEq  40 mEq Oral PRN    Or    potassium bicarb-citric acid (EFFER-K) effervescent tablet 40 mEq  40 mEq Oral PRN    Or    potassium chloride 10 mEq/100 mL IVPB (Peripheral Line)  10 mEq IntraVENous PRN    magnesium sulfate 2000 mg in 50 mL IVPB premix  2,000 mg IntraVENous PRN    ondansetron (ZOFRAN-ODT) disintegrating tablet 4 mg  4 mg Oral Q8H PRN    Or    ondansetron (ZOFRAN) injection 4 mg  4 mg IntraVENous Q6H PRN    melatonin tablet 3 mg  3 mg Oral Nightly PRN    polyethylene glycol (GLYCOLAX) packet 17 g  17 g Oral Daily PRN    sodium chloride flush 0.9 % injection 5-40 mL  5-40 mL IntraVENous 2 times per day    sodium chloride flush 0.9 % injection 5-40 mL  5-40 mL IntraVENous PRN    0.9 % sodium chloride infusion   IntraVENous 
infusion   IntraVENous PRN    0.9 % sodium chloride infusion   IntraVENous PRN    naloxone (NARCAN) injection 0.4 mg  0.4 mg IntraVENous PRN    0.9 % sodium chloride infusion   IntraVENous PRN    magnesium sulfate 2000 mg in 50 mL IVPB premix  2,000 mg IntraVENous PRN    sodium chloride flush 0.9 % injection 5-40 mL  5-40 mL IntraVENous PRN    0.9 % sodium chloride infusion   IntraVENous PRN         REVIEW OF SYSTEMS   12 point ROS negative except as stated in the HPI.      Physical Exam:   Vitals:    02/23/25 0750   BP: (!) 88/77   Pulse: 85   Resp: 17   Temp: 98.2 °F (36.8 °C)   SpO2: 99%       General:  Alert, cooperative, no distress, appears stated age. Phonating well with PM   Head:  Normocephalic, without obvious abnormality, atraumatic.   Eyes:  Conjunctivae/corneas clear.    Nose: Nares normal. Septum midline.    Throat: Lips, mucosa, and tongue normal.    Neck: Tracheostomy in place   Lungs:   Lungs clear   Chest wall:  No tenderness or deformity.   Heart:  Regular rate and rhythm, S1, S2 normal, no murmur, click, rub or gallop.   Abdomen:   Soft, non-tender. Bowel sounds hector   Extremities: Extremities normal, atraumatic, no cyanosis or edema.   Pulses: 2+ and symmetric all extremities.   Skin: Skin color, texture, turgor normal. No rashes or lesions       Neurologic: Grossly nonfocal       Lab Results   Component Value Date/Time     02/23/2025 03:14 AM    K 3.9 02/23/2025 03:14 AM     02/23/2025 03:14 AM    CO2 26 02/23/2025 03:14 AM    BUN 15 02/23/2025 03:14 AM    MG 2.0 02/23/2025 03:14 AM    PHOS 4.2 02/23/2025 03:14 AM       Lab Results   Component Value Date/Time    WBC 8.8 02/21/2025 04:54 AM    HGB 12.4 02/21/2025 04:54 AM     02/21/2025 04:54 AM    MCV 85.7 02/21/2025 04:54 AM       Lab Results   Component Value Date/Time    INR 1.1 02/14/2025 03:41 AM    APTT 28 08/13/2024 04:31 PM    GLOB 4.7 02/23/2025 03:14 AM       Lab Results   Component Value Date/Time    CRP <0.29 
monitor qtc     Cardiovascular:   - No acute issues     Respiratory:   Respiratory failure  Intubated for airway protection  Now with likely PNA  Initially with H flu s/p rocephin then worsening fever/hypoxia  Broad spectrum abx started 1/10 after bronch with thick secretions  Mrsa nares negative  Culture without growth now  Hypoxia improving   No significant parapneumonic effusion on lung ultrasound    - repeat cxr  - Follow-up procalcitonin  - DC vanc, complete 7 days cefepime  - wean FiO2 as tolerated  - follow up cultures  - day 10 vent - plan for trach    GI/Hepatology:   Constipation   Improving  KUB unremarkable   Acute Hepatic failure - Improved  Completed N-acetylcysteine on 2/1   Liver Doppler negative for portal vein thrombosis.  Suspect ischemic hepatitis given negative work up  Serology for viral hepatitis -ve, KARLOS and IgG -ve   Lactulose normal     - repeat LFTs  - Okay to use Tylenol for fever.  - continue movantik and bowel reg.   - advance TF as needed     ID:   Intermittent fever  Without leukocytosis or procal  +infiltrate on cxr    - DC Vanc  - change cefepime to zosyn  - consider drug fever   - dc correia and change PIV as able     Renal:     -DC correia catheter   -Bladder scan   -Aggressive electrolyte repletion     GI: Tube feeds - advance to goal. Continue bowel regimen.    Lovenox ppx    Patient's sister updated.    GOALS OF CARE/ADVANCED DIRECTIVES  Full     I personally spent 45 minutes of critical care time.  This is time spent at this critically ill patient's bedside actively involved in patient care as well as the coordination of care and discussions with the patient's family.  This does not include any procedural time which has been billed separately.        Review of systems:     Review of Systems     Unable to provide adequate review of systems due to disorientation    Objective:     Vital Signs:  BP (!) 154/98   Pulse 100   Temp (!) 103 °F (39.4 °C) (Oral)   Resp 14   Ht 1.854 m 
prescribed an antiseizure medication, however, he has not been taking it for a while.  They do not have any further details available at the moment.    Cardiovascular:   - No acute issues     Respiratory:   # Intubated for worsening agitation   - Developed H. Influenzae pneumonia.  - continue ceftriaxone  - wean FiO2 as tolerated  - ABG in am.    GI/Hepatology:   # Acute Hepatic failure - Resolving  Completed N-acetylcysteine on    Liver Doppler negative for portal vein thrombosis.  Suspect ischemic hepatitis given negative work up  Serology for viral hepatitis -ve, KARLOS and IgG -ve   Patient's girlfriend reports potential intake of possible amoxicillin for nausea and vomiting prior to admission.  LFT elevation could be related to drug-induced liver injury.    ID:   Pt continues to have fevers intermittently. Sputum Cx + for Hemophilus Influenzae - continue ceftriaxone.    Renal:   Urinary retention -continue Wilson catheter  Aggressive electrolyte repletion     GI: Tube feeds - advance to goal. Continue bowel regimen.    Lovenox ppx    Patient's sister updated.    GOALS OF CARE/ADVANCED DIRECTIVES  Full     I personally spent 45 minutes of critical care time.  This is time spent at this critically ill patient's bedside actively involved in patient care as well as the coordination of care and discussions with the patient's family.  This does not include any procedural time which has been billed separately.        Review of systems:     Review of Systems     Unable to provide adequate review of systems due to disorientation    Objective:     Vital Signs:  /65   Pulse 68   Temp 98.1 °F (36.7 °C)   Resp 13   Ht 1.854 m (6' 0.99\")   Wt 67 kg (147 lb 11.3 oz)   SpO2 98%   BMI 19.49 kg/m²      Temp (24hrs), Av.7 °F (37.6 °C), Min:97.7 °F (36.5 °C), Max:100.8 °F (38.2 °C)           Intake/Output:     Intake/Output Summary (Last 24 hours) at 2025 3257  Last data filed at 2025 0700  Gross per 24 
Medications:     Prior to Admission medications    Medication Sig Start Date End Date Taking? Authorizing Provider   ondansetron (ZOFRAN-ODT) 4 MG disintegrating tablet Take 1 tablet by mouth every 8 hours as needed for Nausea or Vomiting 8/16/24   Aidan, MD Bridger   Multiple Vitamin (MULTIVITAMIN) TABS tablet Take 1 tablet by mouth daily 8/17/24   Aidan, MD Bridger   thiamine 100 MG tablet Take 1 tablet by mouth daily 8/17/24   Aidan, MD Bridger   folic acid (FOLVITE) 1 MG tablet Take 1 tablet by mouth daily 8/16/24   Aidan, MD Bridger   pantoprazole (PROTONIX) 40 MG tablet Take 1 tablet by mouth every morning (before breakfast) 7/12/24   Gael Garcia MD         Allergies/Social/Family History:     No Known Allergies   Social History     Tobacco Use    Smoking status: Every Day     Current packs/day: 1.00     Average packs/day: 1 pack/day for 6.1 years (6.1 ttl pk-yrs)     Types: Cigarettes     Start date: 2019    Smokeless tobacco: Never   Substance Use Topics    Alcohol use: Not on file      No family history on file.      LABS AND  DATA:   Reviewed          CRITICAL CARE CONSULTANT NOTE  I had a face to face encounter with the patient, reviewed and interpreted patient data including clinical events, labs, images, vital signs, I/O's, and examined patient.  I have discussed the case and the plan and management of the patient's care with the consulting services, the bedside nurses and the respiratory therapist.      NOTE OF PERSONAL INVOLVEMENT IN CARE   This patient has a high probability of imminent, clinically significant deterioration, which requires the highest level of preparedness to intervene urgently. I participated in the decision-making and personally managed or directed the management of the following life and organ supporting interventions that required my frequent assessment to treat or prevent imminent deterioration.      Yoshi Duron MD   [facility]  [phone]  2/6/2025        
Never had any episode of hypotension. Acute hepatitis neg. Acetaminophen levels <2. Tox screen had shown opiates and THC. KARLOS neg. Ceruloplasmin is low at 15.9. EBV, CMV IgM low. INR 1.2.  Tbili trending down. He is sp Acetylcysteine and vitamin K. ALT and AST all have continued to trend down. Liver ultrasound showed no portal vein thrombosis but hepatic steatosis. Transfer to a tertiary center recommended. Discussed with [facility] team and they declined given restriction due to capacity 1/31. I had discussed with Dr Kely Oneill at [facility] 1/31. She recommended to continue NAC infusion which is now completed. I did just as our  did discuss with family - see separate note. Completed NAC, vitamin K. Continue to follow clinical progress and LFTs and follow serological work up. Discussed with Dr Oneill again 2/1 and after reviewing his labs noted some recovery. Nonetheless, they cannot assess him for transplant given he is currently intubated with a hx of substance abuse and as such, he will not be transferred to [facility]. He could follow up as an outpatient. Continue monitoring his clinical progress and follow labs. A 24 hr urine copper levels are pending. Overall improving.         Acute metabolic encephalopathy POA: became very restless and was likely from alcohol withdrawal. Continue CIWA protocol. Precedex gtt and Phenobarbital taper. Continue lactulose     Acute respiratory failure with hypoxia POA: intubated for airway protection given severe agitation. Followed by intensivist. Extubated 2/3. Continue precedex for agitation      Fever - occurred last night. He did have an episode of vomiting. CXRs neg for any acute changes. Recent blood cultures neg. Now afebrile. Continue to monitor clinical progress     Alcohol induced acute pancreatitis POA: severe on admission. Now on tube feeds.         Thrombocytopenia POA: likely from alcoholic use vs other. Stable. No bleeding      Hepatic steatosis POA: likely from alcohol

## 2025-02-25 NOTE — CONSULTS
[facility] INITIAL ASSESSMENT.      Name: Nathan Trejo   : 1990   MRN: [id number]   Date: 2025        Chief Complaint   Patient presents with    Loss of Consciousness    Abdominal Pain         HPI:     34-year-old male with history notable for alcohol abuse (reportedly drinks 12-24 beers per day, last drink on ) who was admitted with abdominal pain.  Workup was consistent with pancreatitis and incidental note of hepatic steatosis.  Patient was managed on medical floor for acute pancreatitis.    He was noted to have significant worsening of LFTs on .  He was treated with N-acetylcysteine.  INR was elevated to 1.7.  He received 10 mg of vitamin K.  Liver ultrasound with Dopplers was ordered which did not show any significant thrombosis of hepatic veins.    : Patient was transferred to stepdown unit due to increasing agitation and disorientation.  This morning, dacia Robbins was called for significant agitation.  At the time of my evaluation, patient was standing beside his bed and insisting on leaving the hospital.  Security was present at bedside.  He was unable to give me an accurate date or location.  He was very unsteady and at risk for falling.  He did not have significant tremors, however, exam was notable for asterixis.  Given progressively worsening agitation, he received 4 mg Ativan x 2 along with Zyprexa 10 mg x 1.  Despite this, he was still very agitated.  He needed to be restrained.  He was started on Precedex drip.  Received 5 mg Versed in addition to above along with 50 mics of fentanyl which resulted in him becoming somnolent.  Oxygen saturation was stable.  He was arousable and breathing spontaneously.  End-tidal CO2 was 30.    I updated patient's sister, Ms. Artie Trejo about his current situation.  I explained that I am concerned about progressively worsening encephalopathy along with delirium from significant liver failure.  I informed her about I had a 
     [facility] INITIAL ASSESSMENT.      Name: Nathan Trejo   : 1990   MRN: [id number]   Date: 2025        Chief Complaint   Patient presents with    Loss of Consciousness    Abdominal Pain         HPI:     34-year-old male with history notable for alcohol abuse (reportedly drinks 12-24 beers per day, last drink on ) who was admitted with abdominal pain.  Workup was consistent with pancreatitis and incidental note of hepatic steatosis.  Patient was managed on medical floor for acute pancreatitis.    He was noted to have significant worsening of LFTs on .  He was treated with N-acetylcysteine.  INR was elevated to 1.7.  He received 10 mg of vitamin K.  Liver ultrasound with Dopplers was ordered which did not show any significant thrombosis of hepatic veins.    : Patient was transferred to stepdown unit due to increasing agitation and disorientation.  This morning, dacia Robbins was called for significant agitation.  At the time of my evaluation, patient was standing beside his bed and insisting on leaving the hospital.  Security was present at bedside.  He was unable to give me an accurate date or location.  He was very unsteady and at risk for falling.  He did not have significant tremors, however, exam was notable for asterixis.  Given progressively worsening agitation, he received 4 mg Ativan x 2 along with Zyprexa 10 mg x 1.  Despite this, he was still very agitated.  He needed to be restrained.  He was started on Precedex drip.  Received 5 mg Versed in addition to above along with 50 mics of fentanyl which resulted in him becoming somnolent.  Oxygen saturation was stable.  He was arousable and breathing spontaneously.  End-tidal CO2 was 30.    I updated patient's sister, Ms. Artie Trejo about his current situation.  I explained that I am concerned about progressively worsening encephalopathy along with delirium from significant liver failure.  I informed her about I had a 
    Name: Nathan Trejo Hospital: [facility]   : 1990 Admit Date: 2025   Phone: [phone]  Room: A3/   PCP: No primary care provider on file.  MRN: [id number]   Date: 2025  Code: Full Code          Chart and notes reviewed. Data reviewed. I review the patient's current medications in the medical record at each encounter.  I have evaluated and examined the patient.     HPI:    9:03 AM       History was obtained from patient.      I was asked by Nhi Soriano DO to see Nathan Trejo in consultation for a chief complaint of tracheostomy management.      History of Present Illness:  Mr. Trejo is a 33 yo gentleman with a history EtOH abuse admitted with acute pancreatitis and hospital course complicated by respiratory failure requiring prolonged intubation with trach placement. He was intubated  for airway protection due to agitation. There was difficulty weaning sedation due to agitation and he ultimately required a percutaneous trach on . He has been on TC since  and tolerating PMV. He describes some chest congestion and anxiety, but denies shortness of breath.     Labs: Cr 0.72, WBC 8.8, BAL  with normal respiratory albert, sputum culture  positive for haemophilus influenzae    Images reviewed:  CXR  with patchy airspace disease      Past Medical History:   Diagnosis Date    Alcohol abuse        No past surgical history on file.    No family history on file.    Social History     Tobacco Use    Smoking status: Every Day     Current packs/day: 1.00     Average packs/day: 1 pack/day for 6.1 years (6.1 ttl pk-yrs)     Types: Cigarettes     Start date:     Smokeless tobacco: Never   Substance Use Topics    Alcohol use: Not on file       No Known Allergies    Current Facility-Administered Medications   Medication Dose Route Frequency    ipratropium 0.5 mg-albuterol 2.5 mg (DUONEB) nebulizer solution 1 Dose  1 Dose Inhalation Q4H PRN    diazePAM 
Alerted to marked increase in LFTs.  This is not consistent with EtOH hepatitis and is more consistent with ischemic/infectious/DILI/autoimmune.  We do not have tylenol level however in this instance would urgently start NAC.    With marked elevation in liver chemistries, elevated INR, and AMS, high concern for acute liver failure that is evolving.    Differential for this includes above.  Need to consider tylenol toxicity and even possible HSV hepatitis (classically, anicteric hepatitis).  Very low threshold for ICU transfer.  I have significant concern for rapid decompensation that may even require transfer to [facility] for consideration of liver transplantation.  We will personally reach out to them regarding any further recommendation.    For now - will update serologic evaluation given change in clinical status.    1) Start NAC immediately.      Will follow closely.    Mak Hall MD  Gastroenterology  
[facility]: [facility]  Meghan Cleemns, DELMY, CNRN, ACNP-BC  [facility]  [address] [facility]  [phone]      Name:   Nathan Trejo   Medical record #: [id number]  Admission Date: 1/28/2025       Consult requested by: Krzysztof Kruger MD     Reason for Consult:  Hallucinations      Patient discharged and left the hospital without being seen  
Brief ICU Nutrition Assessment    Type and Reason for Visit: Weekend assessment    Nutrition Recommendations/Plan:   Consulted for tube feed orders and management. Intubated this AM 2/2 agitation. Weight appears stated - ordered measured weight. Question 20# wt loss in one month last year, if accurate. NKFA. No known chewing/swallowing issues.    Currently on 1.5 mcg/kg/hr precedex, 25 mcg/kg/min propofol. Propofol providing 295 kcal/day. Needs based on admit stated weight - if measured weight differs, will modify recs.    Replete Kphos  When enteral access gained, begin the following -     Trickle Feeds Vital AF 1.2 kcal @ 20 mL/hour  FWF 90 mL q 3 hours  Provide 3 Prosource/day, flush with 15 mL H2O before and after    Trickle feeds at goal 20 mL/hour provide 528 kcal (+ Prosource, + Propofol, 1063 kcal, 71% needs), 53 g carbs, 36 g protein (+ 3 Prosource, 96 g, 100% needs). TF + FWF provides 1076 mL H2O/day.       Wt Readings from Last 10 Encounters:   02/01/25 67 kg (147 lb 11.3 oz)   08/12/24 65.4 kg (144 lb 3.2 oz)   07/11/24 73.5 kg (162 lb 0.6 oz)   05/27/24 72.6 kg (160 lb)      Last BM:    Edema: None                    Nutr. Labs:    Lab Results   Component Value Date    CREATININE 0.69 (L) 02/01/2025    BUN 6 02/01/2025     02/01/2025    K 3.2 (L) 02/01/2025     02/01/2025    CO2 25 02/01/2025       No results found for: \"POCGLU\"     Hemoglobin A1C   Date Value Ref Range Status   01/28/2025 5.0 4.0 - 5.6 % Final     Comment:     (NOTE)  HbA1C Interpretive Ranges  <5.7              Normal  5.7 - 6.4         Consider Prediabetes  >6.5              Consider Diabetes         Lab Results   Component Value Date/Time    MG 1.9 02/01/2025 06:01 AM       Lab Results   Component Value Date    CALCIUM 8.5 02/01/2025    PHOS 1.1 (L) 01/31/2025       Lab Results   Component Value Date    TRIG 39 01/29/2025    TRIG 67 08/12/2024    TRIG 97 07/08/2024       Nutr. Meds:  Scheduled Meds:   lactulose  20 g Per 
[facility]  MARIBEL Gracia  [phone]                    GASTROENTEROLOGY CONSULTATION NOTE              NAME:  Nathan Trejo   :   1990   MRN:   [id number]       Referring Physician:   Augustine      Consult Date:   2025 4:07 PM    Chief Complaint:    Abd pain     History of Present Illness:    Patient is a 34 y.o. male with history of ETOH abuse who is admitted with ETOH pancreatitis.     Developed severe abdominal pan which prompted him to come to hospital about a day ago. Pain is centralized.     Here he is found to have acute pancreatitis on CT along with hepatic steatosis.    His lipase is elevated along with AST and ALT. Tbili is 2.8. INR 1.      PMH:  Past Medical History:   Diagnosis Date    Alcohol abuse        PSH:  No past surgical history on file.    Allergies:  No Known Allergies    Home Medications:  Prior to Admission Medications   Prescriptions Last Dose Informant Patient Reported? Taking?   Multiple Vitamin (MULTIVITAMIN) TABS tablet   No No   Sig: Take 1 tablet by mouth daily   folic acid (FOLVITE) 1 MG tablet   No No   Sig: Take 1 tablet by mouth daily   ondansetron (ZOFRAN-ODT) 4 MG disintegrating tablet   No No   Sig: Take 1 tablet by mouth every 8 hours as needed for Nausea or Vomiting   pantoprazole (PROTONIX) 40 MG tablet  Self No No   Sig: Take 1 tablet by mouth every morning (before breakfast)   thiamine 100 MG tablet   No No   Sig: Take 1 tablet by mouth daily      Facility-Administered Medications: None       Hospital Medications:  Current Facility-Administered Medications   Medication Dose Route Frequency    oxyCODONE (ROXICODONE) immediate release tablet 5 mg  5 mg Oral Q4H PRN    HYDROmorphone (DILAUDID) injection 0.25 mg  0.25 mg IntraVENous Q3H PRN    Or    HYDROmorphone (DILAUDID) injection 0.5 mg  0.5 mg IntraVENous Q3H PRN    sodium chloride flush 0.9 % injection 5-40 mL  5-40 mL IntraVENous 2 times per day    sodium chloride flush 0.9 % 
Forensic exam complete and photographs obtained. Patient tolerated exam well. Findings discussed with ICU RN. There are no immediate safety concerns and law enforcement is currently involved. Care relinquished to [facility] ICU.  
Safety plan- Patient currently sedated, intubated and in soft wrist restraints.   
    Review of Systems     Unable to provide adequate review of systems due to disorientation    Objective:     Vital Signs:  BP (!) 137/90   Pulse 66   Temp 98.2 °F (36.8 °C) (Oral)   Resp 16   Ht 1.854 m (6' 1\")   Wt 74.8 kg (165 lb)   SpO2 98%   BMI 21.77 kg/m²      Temp (24hrs), Av.7 °F (37.1 °C), Min:97.8 °F (36.6 °C), Max:99.9 °F (37.7 °C)           Intake/Output:     Intake/Output Summary (Last 24 hours) at 2025 0742  Last data filed at 2025 2312  Gross per 24 hour   Intake --   Output 1000 ml   Net -1000 ml         Physical Exam    Young male, disoriented, agitated  Once sedated, he was intermittently arousable and moving his extremities spontaneously  No significant crackles or wheezes on respiratory examination  Soft, nontender abdomen without any significant distention or stigmata of chronic liver disease      Past Medical History:        has a past medical history of Alcohol abuse.    Past Surgical History:      has no past surgical history on file.      Home Medications:     Prior to Admission medications    Medication Sig Start Date End Date Taking? Authorizing Provider   ondansetron (ZOFRAN-ODT) 4 MG disintegrating tablet Take 1 tablet by mouth every 8 hours as needed for Nausea or Vomiting 24   Aidan, MD Bridger   Multiple Vitamin (MULTIVITAMIN) TABS tablet Take 1 tablet by mouth daily 24   Aidan, MD Bridger   thiamine 100 MG tablet Take 1 tablet by mouth daily 24   Aidan, MD Bridger   folic acid (FOLVITE) 1 MG tablet Take 1 tablet by mouth daily 24   Aidan, MD Bridger   pantoprazole (PROTONIX) 40 MG tablet Take 1 tablet by mouth every morning (before breakfast) 24   Gael Garcia MD         Allergies/Social/Family History:     No Known Allergies   Social History     Tobacco Use    Smoking status: Every Day     Current packs/day: 1.00     Average packs/day: 1 pack/day for 6.1 years (6.1 ttl pk-yrs)     Types: Cigarettes     Start date:     Smokeless

## 2025-02-25 NOTE — PLAN OF CARE
Problem: Occupational Therapy - Adult  Goal: By Discharge: Performs self-care activities at highest level of function for planned discharge setting.  See evaluation for individualized goals.  Description: FUNCTIONAL STATUS PRIOR TO ADMISSION:  Patient is normally independent with ADLs and mobility and drives    HOME SUPPORT: Patient lived with his girlfriend.    Occupational Therapy Goals:  Initiated 2/17/2025  1.  Patient will perform simple grooming (face washing), seated EOB or in chair, with Supervision within 7 day(s).  2.  Patient will perform upper body dressing with Minimal Assist within 7 day(s).  3.  Patient will perform upper body bathing with Minimal Assist within 7 day(s).  4.  Patient will perform toilet transfers to [facility] with Contact Guard Assist  within 7 day(s).  5.  Patient will perform all aspects of toileting with Minimal Assist within 7 day(s).  6.  Patient will participate in upper extremity therapeutic exercise/activities with [place] for 10 minutes within 7 day(s).   Outcome: Progressing     OCCUPATIONAL THERAPY TREATMENT  Patient: Nathan Trejo (34 y.o. male)  Date: 2/18/2025  Primary Diagnosis: Nausea and vomiting in adult [R11.2]  Alcohol-induced acute pancreatitis without infection or necrosis [K85.20]  Pancreatitis without necrosis or infection [K85.90]       Precautions: fall  Chart, occupational therapy assessment, plan of care, and goals were reviewed.    ASSESSMENT  Patient is progressing in OT goals.  He was seen on trach collar + PMV with VSS throughout session with activity.  He remains limited by poor safety awareness/ insight into deficits + impulsivity, Ox2-3, impaired attention, overall weakness/ deconditioning, bilateral hand weakness (3+/5 grasp) with resulting poor coordination (was given resistance sponges for hand therex), and impaired balance (posterior lean in standing).  He progressed today to stand/ ambulate in room with min-modAx1-2 and was agreeable to sit 
  Problem: Occupational Therapy - Adult  Goal: By Discharge: Performs self-care activities at highest level of function for planned discharge setting.  See evaluation for individualized goals.  Description: FUNCTIONAL STATUS PRIOR TO ADMISSION:  Patient is normally independent with ADLs and mobility and drives    HOME SUPPORT: Patient lived with his girlfriend.    Occupational Therapy Goals:  Initiated 2/17/2025  1.  Patient will perform simple grooming (face washing), seated EOB or in chair, with Supervision within 7 day(s).  2.  Patient will perform upper body dressing with Minimal Assist within 7 day(s).  3.  Patient will perform upper body bathing with Minimal Assist within 7 day(s).  4.  Patient will perform toilet transfers to [facility] with Contact Guard Assist  within 7 day(s).  5.  Patient will perform all aspects of toileting with Minimal Assist within 7 day(s).  6.  Patient will participate in upper extremity therapeutic exercise/activities with [place] for 10 minutes within 7 day(s).   Outcome: Progressing   .  OCCUPATIONAL THERAPY TREATMENT  Patient: Nathan Trejo (34 y.o. male)  Date: 2/20/2025  Primary Diagnosis: Nausea and vomiting in adult [R11.2]  Alcohol-induced acute pancreatitis without infection or necrosis [K85.20]  Pancreatitis without necrosis or infection [K85.90]       Precautions: fall  Chart, occupational therapy assessment, plan of care, and goals were reviewed.    ASSESSMENT  Patient is progressing in OT goals.  He was seen on trach collar + PMV with VSS throughout session with activity.  He remains limited by impaired safety awareness/ insight into deficits + impulsivity (improving), impaired attention/ memory/ problem solving, overall weakness/ deconditioning, bilateral hand weakness (3+/5 grasp) with resulting impaired coordination (improving), and impaired balance (posterior losses of balance in sitting with dynamic challenges + posterior lean in standing).  Note his NG tube is 
  Problem: Occupational Therapy - Adult  Goal: By Discharge: Performs self-care activities at highest level of function for planned discharge setting.  See evaluation for individualized goals.  Description: FUNCTIONAL STATUS PRIOR TO ADMISSION:  Patient is normally independent with ADLs and mobility and drives    HOME SUPPORT: Patient lived with his girlfriend.    Occupational Therapy Goals:  Initiated 2/17/2025  1.  Patient will perform simple grooming (face washing), seated EOB or in chair, with Supervision within 7 day(s).  2.  Patient will perform upper body dressing with Minimal Assist within 7 day(s).  3.  Patient will perform upper body bathing with Minimal Assist within 7 day(s).  4.  Patient will perform toilet transfers to [facility] with Contact Guard Assist  within 7 day(s).  5.  Patient will perform all aspects of toileting with Minimal Assist within 7 day(s).  6.  Patient will participate in upper extremity therapeutic exercise/activities with [place] for 10 minutes within 7 day(s).   Outcome: Progressing    OCCUPATIONAL THERAPY TREATMENT  Patient: Nathan Trejo (34 y.o. male)  Date: 2/19/2025  Primary Diagnosis: Nausea and vomiting in adult [R11.2]  Alcohol-induced acute pancreatitis without infection or necrosis [K85.20]  Pancreatitis without necrosis or infection [K85.90]       Precautions:                  Chart, occupational therapy assessment, plan of care, and goals were reviewed.    ASSESSMENT  Patient continues to benefit from skilled OT services and is progressing towards goals. He continues to be limited by decreased activity tolerance, strength, cognition (insight, memory, safety, processing, judgement), and balance, with ADLs and functional mobility. Patient received in supine and agreeable to participate in therapy. Patient with less impulsivity this session, however still requiring min-mod verbal cues for safety and increased time for processing. He required Min A with bed mobility to 
  Problem: Occupational Therapy - Adult  Goal: By Discharge: Performs self-care activities at highest level of function for planned discharge setting.  See evaluation for individualized goals.  Description: FUNCTIONAL STATUS PRIOR TO ADMISSION:  Patient is normally independent with ADLs and mobility and drives    HOME SUPPORT: Patient lived with his girlfriend.    Occupational Therapy Goals:  Initiated 2/17/2025  1.  Patient will perform simple grooming (face washing), seated EOB or in chair, with Supervision within 7 day(s).  2.  Patient will perform upper body dressing with Minimal Assist within 7 day(s).  3.  Patient will perform upper body bathing with Minimal Assist within 7 day(s).  4.  Patient will perform toilet transfers to [facility] with Contact Guard Assist  within 7 day(s).  5.  Patient will perform all aspects of toileting with Minimal Assist within 7 day(s).  6.  Patient will participate in upper extremity therapeutic exercise/activities with [place] for 10 minutes within 7 day(s).   Outcome: Completed     OCCUPATIONAL THERAPY TREATMENT/DISCHARGE  Patient: Nathan Trejo (34 y.o. male)  Date: 2/24/2025  Primary Diagnosis: Nausea and vomiting in adult [R11.2]  Alcohol-induced acute pancreatitis without infection or necrosis [K85.20]  Pancreatitis without necrosis or infection [K85.90]       Precautions: Fall Risk, Bed Alarm                  Chart, occupational therapy assessment, plan of care, and goals were reviewed.    ASSESSMENT  Patient continues with skilled OT services and has progressed towards goals.  Patient received in room and agreeable to functional reaching activities in the hallway. He completed multiple reps and demonstrates good balance throughout, able to  items off the floor and up high. He demonstrates improved dual tasking ability and safety awareness and insight. Patient reports having independently walked in the unit hallway today. At this time, patient has met all goals 
  Problem: Occupational Therapy - Adult  Goal: By Discharge: Performs self-care activities at highest level of function for planned discharge setting.  See evaluation for individualized goals.  Description: FUNCTIONAL STATUS PRIOR TO ADMISSION:  Patient is normally independent with ADLs and mobility and drives    HOME SUPPORT: Patient lived with his girlfriend.    Occupational Therapy Goals:  Initiated 2/17/2025  1.  Patient will perform simple grooming (face washing), seated EOB or in chair, with Supervision within 7 day(s).  2.  Patient will perform upper body dressing with Minimal Assist within 7 day(s).  3.  Patient will perform upper body bathing with Minimal Assist within 7 day(s).  4.  Patient will perform toilet transfers to [facility] with Contact Guard Assist  within 7 day(s).  5.  Patient will perform all aspects of toileting with Minimal Assist within 7 day(s).  6.  Patient will participate in upper extremity therapeutic exercise/activities with [place] for 10 minutes within 7 day(s).   Outcome: Progressing     OCCUPATIONAL THERAPY EVALUATION    Patient: Nathan Trejo (34 y.o. male)  Date: 2/17/2025  Primary Diagnosis: Nausea and vomiting in adult [R11.2]  Alcohol-induced acute pancreatitis without infection or necrosis [K85.20]  Pancreatitis without necrosis or infection [K85.90]         Precautions:                    ASSESSMENT :  The patient is limited by decreased functional mobility, independence in ADLs, high-level IADLs, ROM, strength, activity tolerance, safety awareness, cognition, attention/concentration, coordination, balance on day 20 of hospital admission, admitted 1/28/2025 for nausea and vomiting, diagnosed with acute pancreatitis and liver failure with elevated LFTs and worsening AMS. Pt required transfer to ICU on 1/31 and intubated 2/1 due to increasing agitation. He is now s/p tracheostomy on 2/13 and weaned off sedation to safely participate with therapy while on vent 
  Problem: Physical Therapy - Adult  Goal: By Discharge: Performs mobility at highest level of function for planned discharge setting.  See evaluation for individualized goals.  Description: FUNCTIONAL STATUS PRIOR TO ADMISSION: Patient was independent and active without use of DME.    HOME SUPPORT PRIOR TO ADMISSION: The patient lived with family but did not require assistance.    Physical Therapy Goals  Initiated 2/17/2025  1.  Patient will move from supine to sit and sit to supine, scoot up and down, and roll side to side in bed with independence within 7 day(s).    2.  Patient will perform sit to stand with modified independence within 7 day(s).  3.  Patient will transfer from bed to chair and chair to bed with modified independence using the least restrictive device within 7 day(s).  4.  Patient will ambulate with supervision/set-up for 50 feet with the least restrictive device within 7 day(s).     2/18/2025 1534 by Oscar Gutierrez, PT  Outcome: Progressing   PHYSICAL THERAPY TREATMENT    Patient: Nathan Trejo (34 y.o. male)  Date: 2/18/2025  Diagnosis: Nausea and vomiting in adult [R11.2]  Alcohol-induced acute pancreatitis without infection or necrosis [K85.20]  Pancreatitis without necrosis or infection [K85.90] Pancreatitis without necrosis or infection      Precautions:                        ASSESSMENT:  Patient continues to benefit from skilled PT services and is progressing towards goals. Communicated with nurse cleared for therapy. Patient supine on bed when received girlfriend at bedside and agreed with all goals set for the patient. Mobilized patient today with hand held assist. Sit on the edge of bed, sit to stand multiple times. Ambulate with hand held assist in the room and around the bed patient, we will try ambulating patient tomorrow with rolling walker if continue to be more awake and follows command. Noted patient still impulsive and need to redirect patient most of the time for 
  Problem: Physical Therapy - Adult  Goal: By Discharge: Performs mobility at highest level of function for planned discharge setting.  See evaluation for individualized goals.  Description: FUNCTIONAL STATUS PRIOR TO ADMISSION: Patient was independent and active without use of DME.    HOME SUPPORT PRIOR TO ADMISSION: The patient lived with family but did not require assistance.    Physical Therapy Goals  Initiated 2/17/2025  1.  Patient will move from supine to sit and sit to supine, scoot up and down, and roll side to side in bed with independence within 7 day(s).    2.  Patient will perform sit to stand with modified independence within 7 day(s).  3.  Patient will transfer from bed to chair and chair to bed with modified independence using the least restrictive device within 7 day(s).  4.  Patient will ambulate with supervision/set-up for 50 feet with the least restrictive device within 7 day(s).     Outcome: Completed     PHYSICAL THERAPY TREATMENT/DISCHARGE    Patient: Nathan Trejo (34 y.o. male)  Date: 2/24/2025  Diagnosis: Nausea and vomiting in adult [R11.2]  Alcohol-induced acute pancreatitis without infection or necrosis [K85.20]  Pancreatitis without necrosis or infection [K85.90] Pancreatitis without necrosis or infection      Precautions: Fall Risk, Bed Alarm                      ASSESSMENT:  Patient has been followed by skilled PT services and has progressed towards goals. Patient demonstrates functional mobility at independent level. Patient reports having just walked downstairs and back upstairs by himself. Patient engaged in dynamic gait activity with dual task component without instability or loss of balance. Patient denies concerns regarding his mobility aside from acute onset of worsening fingertip paraesthesias. Patient is close to baseline and safe to return home with family support once medically cleared.    Patient with no further acute PT needs. Please consult if there is a change in 
  Problem: Physical Therapy - Adult  Goal: By Discharge: Performs mobility at highest level of function for planned discharge setting.  See evaluation for individualized goals.  Description: FUNCTIONAL STATUS PRIOR TO ADMISSION: Patient was independent and active without use of DME.    HOME SUPPORT PRIOR TO ADMISSION: The patient lived with family but did not require assistance.    Physical Therapy Goals  Initiated 2/17/2025  1.  Patient will move from supine to sit and sit to supine, scoot up and down, and roll side to side in bed with independence within 7 day(s).    2.  Patient will perform sit to stand with modified independence within 7 day(s).  3.  Patient will transfer from bed to chair and chair to bed with modified independence using the least restrictive device within 7 day(s).  4.  Patient will ambulate with supervision/set-up for 50 feet with the least restrictive device within 7 day(s).     Outcome: Progressing    PHYSICAL THERAPY TREATMENT    Patient: Nathan Trejo (34 y.o. male)  Date: 2/21/2025  Diagnosis: Nausea and vomiting in adult [R11.2]  Alcohol-induced acute pancreatitis without infection or necrosis [K85.20]  Pancreatitis without necrosis or infection [K85.90] Pancreatitis without necrosis or infection      Precautions: Fall Risk, Bed Alarm                      ASSESSMENT:  Patient continues to benefit from skilled PT services and is progressing towards goals. Adm 24 days ago with acute encephalopathy secondary to delirium and alcohol withdrawal with underlying substance abuse disorder and Intubated on 2/1  for agitation, extubated and then reintubated on 2/3 due to significant agitation; Trach on 2/13 for high sedation needs and slow wean. PMV until today trach capped per RT/pulmonary. SPO2 % with amb in hallways. Patient highly agitated and anxious upon PT arrival and clear psych/behavioral component with initial activity. Amb in hallways with chair followed behind. Patient 
  Problem: Physical Therapy - Adult  Goal: By Discharge: Performs mobility at highest level of function for planned discharge setting.  See evaluation for individualized goals.  Description: FUNCTIONAL STATUS PRIOR TO ADMISSION: Patient was independent and active without use of DME.    HOME SUPPORT PRIOR TO ADMISSION: The patient lived with family but did not require assistance.    Physical Therapy Goals  Initiated 2/17/2025  1.  Patient will move from supine to sit and sit to supine, scoot up and down, and roll side to side in bed with independence within 7 day(s).    2.  Patient will perform sit to stand with modified independence within 7 day(s).  3.  Patient will transfer from bed to chair and chair to bed with modified independence using the least restrictive device within 7 day(s).  4.  Patient will ambulate with supervision/set-up for 50 feet with the least restrictive device within 7 day(s).     Outcome: Progressing   PHYSICAL THERAPY EVALUATION    Patient: Nathan Trejo (34 y.o. male)  Date: 2/17/2025  Primary Diagnosis: Nausea and vomiting in adult [R11.2]  Alcohol-induced acute pancreatitis without infection or necrosis [K85.20]  Pancreatitis without necrosis or infection [K85.90]       Precautions:                        ASSESSMENT :   DEFICITS/IMPAIRMENTS:   The patient is limited by decreased functional mobility, independence in ADLs, high-level IADLs, ROM, strength, body mechanics, activity tolerance, endurance, safety awareness, command following, attention/concentration, coordination, balance     Based on the impairments listed above patient presents with difficulty with maintaining sitting and standing balance. Patient history of ETOH per chart review, intubated 2/1 for agitation, extubated and re intubated on 2/3 and trach 2/13. Communicated with nurse cleared for therapy mobilization. Patient lying on his left side with trach sleeping when received. Mobilized patient today with mod assist x 2. 
  Problem: Physical Therapy - Adult  Goal: By Discharge: Performs mobility at highest level of function for planned discharge setting.  See evaluation for individualized goals.  Description: FUNCTIONAL STATUS PRIOR TO ADMISSION: Patient was independent and active without use of DME.    HOME SUPPORT PRIOR TO ADMISSION: The patient lived with family but did not require assistance.    Physical Therapy Goals  Initiated 2/17/2025  1.  Patient will move from supine to sit and sit to supine, scoot up and down, and roll side to side in bed with independence within 7 day(s).    2.  Patient will perform sit to stand with modified independence within 7 day(s).  3.  Patient will transfer from bed to chair and chair to bed with modified independence using the least restrictive device within 7 day(s).  4.  Patient will ambulate with supervision/set-up for 50 feet with the least restrictive device within 7 day(s).     Outcome: Progressing   PHYSICAL THERAPY TREATMENT    Patient: Nathan Trejo (34 y.o. male)  Date: 2/20/2025  Diagnosis: Nausea and vomiting in adult [R11.2]  Alcohol-induced acute pancreatitis without infection or necrosis [K85.20]  Pancreatitis without necrosis or infection [K85.90] Pancreatitis without necrosis or infection      Precautions:                        ASSESSMENT:  Patient continues to benefit from skilled PT services and is progressing towards goals. Communicated with nurse cleared for therapy. Patient supine on bed when received, girlfriend and daughter in the room agreed with all goals set for the patient. Mobilized patient today with rolling walker, sit on the edge of bed, sit to stand min assist ,sitting and standing balance fair still leans backwards. Need verbal and tactile cues to maintain balance. Ambulate with rolling walker towards the recliner min assist, slow ataxic gait pattern noted. OOB to chair as tolerated performed some active range of motion exercise on both LE all planes. 
  Problem: Safety - Adult  Goal: Free from fall injury  2/13/2025 0010 by Laly Henderson RN  Outcome: Progressing  2/12/2025 1844 by Rae Harding RN  Outcome: Progressing     Problem: Safety - Medical Restraint  Goal: Remains free of injury from restraints (Restraint for Interference with Medical Device)  Description: INTERVENTIONS:  1. Determine that other, less restrictive measures have been tried or would not be effective before applying the restraint  2. Evaluate the patient's condition at the time of restraint application  3. Inform patient/family regarding the reason for restraint  4. Q2H: Monitor safety, psychosocial status, comfort, nutrition and hydration  2/13/2025 0010 by Laly Henderson RN  Outcome: Progressing  Flowsheets (Taken 2/12/2025 2000)  Remains free of injury from restraints (restraint for interference with medical device): Determine that other, less restrictive measures have been tried or would not be effective before applying the restraint  2/12/2025 1844 by Rae Harding RN  Outcome: Progressing  Flowsheets  Taken 2/12/2025 1800  Remains free of injury from restraints (restraint for interference with medical device):   Determine that other, less restrictive measures have been tried or would not be effective before applying the restraint   Evaluate the patient's condition at the time of restraint application  Taken 2/12/2025 1600  Remains free of injury from restraints (restraint for interference with medical device):   Determine that other, less restrictive measures have been tried or would not be effective before applying the restraint   Evaluate the patient's condition at the time of restraint application  Taken 2/12/2025 1400  Remains free of injury from restraints (restraint for interference with medical device):   Determine that other, less restrictive measures have been tried or would not be effective before applying the restraint   Evaluate the patient's condition at the 
  Problem: Safety - Adult  Goal: Free from fall injury  2/14/2025 0055 by Laly Henderson RN  Outcome: Progressing  2/13/2025 1812 by Elliot Brunner RN  Outcome: Progressing     Problem: Pain  Goal: Verbalizes/displays adequate comfort level or baseline comfort level  Recent Flowsheet Documentation  Taken 2/13/2025 2000 by Laly Henderson RN  Verbalizes/displays adequate comfort level or baseline comfort level: Assess pain using appropriate pain scale  2/13/2025 1812 by Elliot Brunner RN  Outcome: Progressing     Problem: Safety - Medical Restraint  Goal: Remains free of injury from restraints (Restraint for Interference with Medical Device)  Description: INTERVENTIONS:  1. Determine that other, less restrictive measures have been tried or would not be effective before applying the restraint  2. Evaluate the patient's condition at the time of restraint application  3. Inform patient/family regarding the reason for restraint  4. Q2H: Monitor safety, psychosocial status, comfort, nutrition and hydration  2/14/2025 0055 by Laly Henderson RN  Outcome: Progressing  Flowsheets (Taken 2/13/2025 2000)  Remains free of injury from restraints (restraint for interference with medical device): Determine that other, less restrictive measures have been tried or would not be effective before applying the restraint  2/13/2025 1812 by Elliot Brunner RN  Outcome: Progressing  Flowsheets  Taken 2/13/2025 1800  Remains free of injury from restraints (restraint for interference with medical device):   Determine that other, less restrictive measures have been tried or would not be effective before applying the restraint   Evaluate the patient's condition at the time of restraint application   Inform patient/family regarding the reason for restraint   Every 2 hours: Monitor safety, psychosocial status, comfort, nutrition and hydration  Taken 2/13/2025 1600  Remains free of injury from restraints (restraint for interference 
  Problem: Safety - Adult  Goal: Free from fall injury  2/20/2025 0823 by Caleb Mahan RN  Outcome: Progressing  Flowsheets (Taken 2/2/2025 0800 by Dianna Arredondo, RN)  Free From Fall Injury:   Instruct family/caregiver on patient safety   Based on caregiver fall risk screen, instruct family/caregiver to ask for assistance with transferring infant if caregiver noted to have fall risk factors  2/19/2025 1836 by Elliot Brunner, RN  Outcome: Progressing     Problem: Pain  Goal: Verbalizes/displays adequate comfort level or baseline comfort level  2/20/2025 0823 by Caleb Mahan RN  Outcome: Progressing  Flowsheets (Taken 2/13/2025 2000 by Laly Henderson, RN)  Verbalizes/displays adequate comfort level or baseline comfort level: Assess pain using appropriate pain scale  2/19/2025 1836 by Elliot Brunner, RN  Outcome: Progressing     Problem: Discharge Planning  Goal: Discharge to home or other facility with appropriate resources  2/20/2025 0823 by Caleb Mahan, RN  Outcome: Progressing  Flowsheets (Taken 2/8/2025 1945 by Krupa Carlos, RN)  Discharge to home or other facility with appropriate resources: Identify barriers to discharge with patient and caregiver  2/19/2025 1836 by Elliot Brunner, RN  Outcome: Progressing     
  Problem: Safety - Adult  Goal: Free from fall injury  2/25/2025 0249 by Domi Dudley RN  Outcome: Progressing     Problem: Pain  Goal: Verbalizes/displays adequate comfort level or baseline comfort level  2/25/2025 0249 by Domi Dudley RN  Outcome: Progressing     Problem: Discharge Planning  Goal: Discharge to home or other facility with appropriate resources  2/25/2025 0249 by Domi Dudley RN  Outcome: Progressing     Problem: Respiratory - Adult  Goal: Achieves optimal ventilation and oxygenation  2/25/2025 0733 by Chanda Juarez, RT  Outcome: Progressing     
  Problem: Safety - Adult  Goal: Free from fall injury  Outcome: HH/[facility] Progressing     Problem: Pain  Goal: Verbalizes/displays adequate comfort level or baseline comfort level  Outcome: HH/[facility]SPC Progressing     
  Problem: Safety - Adult  Goal: Free from fall injury  Outcome: Progressing     
  Problem: Safety - Adult  Goal: Free from fall injury  Outcome: Progressing     Problem: Pain  Goal: Verbalizes/displays adequate comfort level or baseline comfort level  Outcome: Progressing     Problem: Discharge Planning  Goal: Discharge to home or other facility with appropriate resources  Outcome: Progressing     
  Problem: Safety - Adult  Goal: Free from fall injury  Outcome: Progressing     Problem: Pain  Goal: Verbalizes/displays adequate comfort level or baseline comfort level  Outcome: Progressing     Problem: Discharge Planning  Goal: Discharge to home or other facility with appropriate resources  Outcome: Progressing     Problem: Safety - Medical Restraint  Goal: Remains free of injury from restraints (Restraint for Interference with Medical Device)  Description: INTERVENTIONS:  1. Determine that other, less restrictive measures have been tried or would not be effective before applying the restraint  2. Evaluate the patient's condition at the time of restraint application  3. Inform patient/family regarding the reason for restraint  4. Q2H: Monitor safety, psychosocial status, comfort, nutrition and hydration  Outcome: Progressing  Flowsheets  Taken 2/12/2025 1600  Remains free of injury from restraints (restraint for interference with medical device):   Determine that other, less restrictive measures have been tried or would not be effective before applying the restraint   Evaluate the patient's condition at the time of restraint application  Taken 2/12/2025 1400  Remains free of injury from restraints (restraint for interference with medical device):   Determine that other, less restrictive measures have been tried or would not be effective before applying the restraint   Evaluate the patient's condition at the time of restraint application   Inform patient/family regarding the reason for restraint   Every 2 hours: Monitor safety, psychosocial status, comfort, nutrition and hydration  Taken 2/12/2025 1200  Remains free of injury from restraints (restraint for interference with medical device):   Determine that other, less restrictive measures have been tried or would not be effective before applying the restraint   Evaluate the patient's condition at the time of restraint application   Inform patient/family 
  Problem: Safety - Adult  Goal: Free from fall injury  Outcome: Progressing     Problem: Pain  Goal: Verbalizes/displays adequate comfort level or baseline comfort level  Outcome: Progressing     Problem: Discharge Planning  Goal: Discharge to home or other facility with appropriate resources  Outcome: Progressing     Problem: Skin/Tissue Integrity  Goal: Skin integrity remains intact  Description: 1.  Monitor for areas of redness and/or skin breakdown  2.  Assess vascular access sites hourly  3.  Every 4-6 hours minimum:  Change oxygen saturation probe site  4.  Every 4-6 hours:  If on nasal continuous positive airway pressure, respiratory therapy assess nares and determine need for appliance change or resting period  Outcome: Progressing     Problem: Respiratory - Adult  Goal: Achieves optimal ventilation and oxygenation  2/18/2025 0925 by Paula Holloway, RN  Outcome: Progressing  Flowsheets (Taken 2/18/2025 0345 by Therese Fermin, RN)  Achieves optimal ventilation and oxygenation:   Assess for changes in respiratory status   Assess for changes in mentation and behavior   Position to facilitate oxygenation and minimize respiratory effort   Assess the need for suctioning and aspirate as needed   Assess and instruct to report shortness of breath or any respiratory difficulty   Respiratory therapy support as indicated  2/17/2025 2159 by Jonathan Anderson RCP  Outcome: Progressing     Problem: Confusion  Goal: Confusion, delirium, dementia, or psychosis is improved or at baseline  Description: INTERVENTIONS:  1. Assess for possible contributors to thought disturbance, including medications, impaired vision or hearing, underlying metabolic abnormalities, dehydration, psychiatric diagnoses, and notify attending LIP  2. [place] high risk fall precautions, as indicated  3. Provide frequent short contacts to provide reality reorientation, refocusing and direction  4. Decrease environmental stimuli, including noise 
  Problem: Safety - Adult  Goal: Free from fall injury  Outcome: Progressing     Problem: Pain  Goal: Verbalizes/displays adequate comfort level or baseline comfort level  Outcome: Progressing     Problem: Discharge Planning  Goal: Discharge to home or other facility with appropriate resources  Outcome: Progressing     Problem: Skin/Tissue Integrity  Goal: Skin integrity remains intact  Description: 1.  Monitor for areas of redness and/or skin breakdown  2.  Assess vascular access sites hourly  3.  Every 4-6 hours minimum:  Change oxygen saturation probe site  4.  Every 4-6 hours:  If on nasal continuous positive airway pressure, respiratory therapy assess nares and determine need for appliance change or resting period  Outcome: Progressing     Problem: Respiratory - Adult  Goal: Achieves optimal ventilation and oxygenation  Outcome: Progressing     Problem: Neurosensory - Adult  Goal: Achieves stable or improved neurological status  Outcome: Progressing     Problem: Nutrition Deficit:  Goal: Optimize nutritional status  Outcome: Progressing  Flowsheets (Taken 2/19/2025 8725 by Michelle Sanchez RD)  Nutrient intake appropriate for improving, restoring, or maintaining nutritional needs:   Recommend, monitor, and adjust tube feedings and TPN/PPN based on assessed needs   Assess nutritional status and recommend course of action     Problem: Confusion  Goal: Confusion, delirium, dementia, or psychosis is improved or at baseline  Description: INTERVENTIONS:  1. Assess for possible contributors to thought disturbance, including medications, impaired vision or hearing, underlying metabolic abnormalities, dehydration, psychiatric diagnoses, and notify attending LIP  2. [place] high risk fall precautions, as indicated  3. Provide frequent short contacts to provide reality reorientation, refocusing and direction  4. Decrease environmental stimuli, including noise as appropriate  5. Monitor and intervene to maintain adequate 
  Problem: Safety - Medical Restraint  Goal: Remains free of injury from restraints (Restraint for Interference with Medical Device)  Description: INTERVENTIONS:  1. Determine that other, less restrictive measures have been tried or would not be effective before applying the restraint  2. Evaluate the patient's condition at the time of restraint application  3. Inform patient/family regarding the reason for restraint  4. Q2H: Monitor safety, psychosocial status, comfort, nutrition and hydration  1/31/2025 2016 by Hung Giordano, RN  Outcome: Progressing  Flowsheets (Taken 1/31/2025 2000)  Remains free of injury from restraints (restraint for interference with medical device):   Determine that other, less restrictive measures have been tried or would not be effective before applying the restraint   Evaluate the patient's condition at the time of restraint application   Inform patient/family regarding the reason for restraint   Every 2 hours: Monitor safety, psychosocial status, comfort, nutrition and hydration  1/31/2025 1639 by Antonino Foss RN  Outcome: Progressing  Flowsheets  Taken 1/31/2025 1638  Remains free of injury from restraints (restraint for interference with medical device):   Determine that other, less restrictive measures have been tried or would not be effective before applying the restraint   Evaluate the patient's condition at the time of restraint application   Inform patient/family regarding the reason for restraint   Every 2 hours: Monitor safety, psychosocial status, comfort, nutrition and hydration  Taken 1/31/2025 0709  Remains free of injury from restraints (restraint for interference with medical device):   Determine that other, less restrictive measures have been tried or would not be effective before applying the restraint   Evaluate the patient's condition at the time of restraint application   Inform patient/family regarding the reason for restraint   Every 2 hours: Monitor 
  Problem: Safety - Medical Restraint  Goal: Remains free of injury from restraints (Restraint for Interference with Medical Device)  Description: INTERVENTIONS:  1. Determine that other, less restrictive measures have been tried or would not be effective before applying the restraint  2. Evaluate the patient's condition at the time of restraint application  3. Inform patient/family regarding the reason for restraint  4. Q2H: Monitor safety, psychosocial status, comfort, nutrition and hydration  2/2/2025 1126 by Dianna Arredondo RN  Outcome: Progressing  Flowsheets (Taken 2/2/2025 1125)  Remains free of injury from restraints (restraint for interference with medical device):   Determine that other, less restrictive measures have been tried or would not be effective before applying the restraint   Evaluate the patient's condition at the time of restraint application   Inform patient/family regarding the reason for restraint   Every 2 hours: Monitor safety, psychosocial status, comfort, nutrition and hydration  2/2/2025 0107 by Vy Red RN  Outcome: Progressing  Flowsheets  Taken 2/1/2025 2000 by Vy Red RN  Remains free of injury from restraints (restraint for interference with medical device): Every 2 hours: Monitor safety, psychosocial status, comfort, nutrition and hydration  Taken 2/1/2025 1200 by Nathan Baker RN  Remains free of injury from restraints (restraint for interference with medical device): Every 2 hours: Monitor safety, psychosocial status, comfort, nutrition and hydration     Problem: Respiratory - Adult  Goal: Achieves optimal ventilation and oxygenation  2/2/2025 0809 by Samanta Cunha RT  Outcome: Progressing  Flowsheets (Taken 2/2/2025 0800 by Dianna Arredondo RN)  Achieves optimal ventilation and oxygenation:   Assess for changes in respiratory status   Assess for changes in mentation and behavior   Position to facilitate oxygenation and minimize respiratory effort   Oxygen 
  Problem: Safety - Medical Restraint  Goal: Remains free of injury from restraints (Restraint for Interference with Medical Device)  Description: INTERVENTIONS:  1. Determine that other, less restrictive measures have been tried or would not be effective before applying the restraint  2. Evaluate the patient's condition at the time of restraint application  3. Inform patient/family regarding the reason for restraint  4. Q2H: Monitor safety, psychosocial status, comfort, nutrition and hydration  2/3/2025 1950 by Corin Pan RN  Outcome: Progressing  Flowsheets  Taken 2/3/2025 1536  Remains free of injury from restraints (restraint for interference with medical device):   Determine that other, less restrictive measures have been tried or would not be effective before applying the restraint   Evaluate the patient's condition at the time of restraint application   Inform patient/family regarding the reason for restraint   Every 2 hours: Monitor safety, psychosocial status, comfort, nutrition and hydration  Taken 2/3/2025 1012  Remains free of injury from restraints (restraint for interference with medical device):   Determine that other, less restrictive measures have been tried or would not be effective before applying the restraint   Evaluate the patient's condition at the time of restraint application   Inform patient/family regarding the reason for restraint   Every 2 hours: Monitor safety, psychosocial status, comfort, nutrition and hydration       
  Problem: Safety - Medical Restraint  Goal: Remains free of injury from restraints (Restraint for Interference with Medical Device)  Description: INTERVENTIONS:  1. Determine that other, less restrictive measures have been tried or would not be effective before applying the restraint  2. Evaluate the patient's condition at the time of restraint application  3. Inform patient/family regarding the reason for restraint  4. Q2H: Monitor safety, psychosocial status, comfort, nutrition and hydration  2/4/2025 0846 by Paula Gong  Outcome: Not Progressing  Flowsheets (Taken 2/3/2025 2000 by Vasquez Proctor, RN)  Remains free of injury from restraints (restraint for interference with medical device):   Determine that other, less restrictive measures have been tried or would not be effective before applying the restraint   Inform patient/family regarding the reason for restraint   Every 2 hours: Monitor safety, psychosocial status, comfort, nutrition and hydration  2/3/2025 1950 by Corin Pan RN  Outcome: Progressing  Flowsheets  Taken 2/3/2025 1536  Remains free of injury from restraints (restraint for interference with medical device):   Determine that other, less restrictive measures have been tried or would not be effective before applying the restraint   Evaluate the patient's condition at the time of restraint application   Inform patient/family regarding the reason for restraint   Every 2 hours: Monitor safety, psychosocial status, comfort, nutrition and hydration  Taken 2/3/2025 1012  Remains free of injury from restraints (restraint for interference with medical device):   Determine that other, less restrictive measures have been tried or would not be effective before applying the restraint   Evaluate the patient's condition at the time of restraint application   Inform patient/family regarding the reason for restraint   Every 2 hours: Monitor safety, psychosocial status, comfort, nutrition and 
  Problem: Safety - Medical Restraint  Goal: Remains free of injury from restraints (Restraint for Interference with Medical Device)  Description: INTERVENTIONS:  1. Determine that other, less restrictive measures have been tried or would not be effective before applying the restraint  2. Evaluate the patient's condition at the time of restraint application  3. Inform patient/family regarding the reason for restraint  4. Q2H: Monitor safety, psychosocial status, comfort, nutrition and hydration  Outcome: Completed  Flowsheets (Taken 2/16/2025 0800)  Remains free of injury from restraints (restraint for interference with medical device):   Determine that other, less restrictive measures have been tried or would not be effective before applying the restraint   Evaluate the patient's condition at the time of restraint application   Inform patient/family regarding the reason for restraint   Every 2 hours: Monitor safety, psychosocial status, comfort, nutrition and hydration     
  Problem: Safety - Medical Restraint  Goal: Remains free of injury from restraints (Restraint for Interference with Medical Device)  Description: INTERVENTIONS:  1. Determine that other, less restrictive measures have been tried or would not be effective before applying the restraint  2. Evaluate the patient's condition at the time of restraint application  3. Inform patient/family regarding the reason for restraint  4. Q2H: Monitor safety, psychosocial status, comfort, nutrition and hydration  Outcome: Not Progressing  Flowsheets  Taken 2/1/2025 1037  Remains free of injury from restraints (restraint for interference with medical device): Every 2 hours: Monitor safety, psychosocial status, comfort, nutrition and hydration  Taken 2/1/2025 0800  Remains free of injury from restraints (restraint for interference with medical device): Determine that other, less restrictive measures have been tried or would not be effective before applying the restraint     
  Problem: Safety - Medical Restraint  Goal: Remains free of injury from restraints (Restraint for Interference with Medical Device)  Description: INTERVENTIONS:  1. Determine that other, less restrictive measures have been tried or would not be effective before applying the restraint  2. Evaluate the patient's condition at the time of restraint application  3. Inform patient/family regarding the reason for restraint  4. Q2H: Monitor safety, psychosocial status, comfort, nutrition and hydration  Outcome: Not Progressing  Flowsheets  Taken 2/8/2025 0800 by Kaushik Ramirez RN  Remains free of injury from restraints (restraint for interference with medical device):   Determine that other, less restrictive measures have been tried or would not be effective before applying the restraint   Evaluate the patient's condition at the time of restraint application  Taken 2/7/2025 2148 by Rafiq Lujan RN  Remains free of injury from restraints (restraint for interference with medical device):   Determine that other, less restrictive measures have been tried or would not be effective before applying the restraint   Evaluate the patient's condition at the time of restraint application   Inform patient/family regarding the reason for restraint   Every 2 hours: Monitor safety, psychosocial status, comfort, nutrition and hydration  Taken 2/7/2025 2000 by Rafiq Lujan RN  Remains free of injury from restraints (restraint for interference with medical device):   Determine that other, less restrictive measures have been tried or would not be effective before applying the restraint   Evaluate the patient's condition at the time of restraint application   Inform patient/family regarding the reason for restraint   Every 2 hours: Monitor safety, psychosocial status, comfort, nutrition and hydration     Problem: Safety - Medical Restraint  Goal: Remains free of injury from restraints (Restraint for Interference with Medical 
  Problem: Safety - Medical Restraint  Goal: Remains free of injury from restraints (Restraint for Interference with Medical Device)  Description: INTERVENTIONS:  1. Determine that other, less restrictive measures have been tried or would not be effective before applying the restraint  2. Evaluate the patient's condition at the time of restraint application  3. Inform patient/family regarding the reason for restraint  4. Q2H: Monitor safety, psychosocial status, comfort, nutrition and hydration  Outcome: Progressing  Flowsheets  Taken 2/13/2025 1600  Remains free of injury from restraints (restraint for interference with medical device):   Determine that other, less restrictive measures have been tried or would not be effective before applying the restraint   Evaluate the patient's condition at the time of restraint application   Inform patient/family regarding the reason for restraint   Every 2 hours: Monitor safety, psychosocial status, comfort, nutrition and hydration  Taken 2/13/2025 1400  Remains free of injury from restraints (restraint for interference with medical device):   Determine that other, less restrictive measures have been tried or would not be effective before applying the restraint   Evaluate the patient's condition at the time of restraint application   Inform patient/family regarding the reason for restraint   Every 2 hours: Monitor safety, psychosocial status, comfort, nutrition and hydration  Taken 2/13/2025 1330  Remains free of injury from restraints (restraint for interference with medical device):   Determine that other, less restrictive measures have been tried or would not be effective before applying the restraint   Evaluate the patient's condition at the time of restraint application   Inform patient/family regarding the reason for restraint   Every 2 hours: Monitor safety, psychosocial status, comfort, nutrition and hydration  Taken 2/13/2025 1200  Remains free of injury from 
  Problem: Safety - Medical Restraint  Goal: Remains free of injury from restraints (Restraint for Interference with Medical Device)  Description: INTERVENTIONS:  1. Determine that other, less restrictive measures have been tried or would not be effective before applying the restraint  2. Evaluate the patient's condition at the time of restraint application  3. Inform patient/family regarding the reason for restraint  4. Q2H: Monitor safety, psychosocial status, comfort, nutrition and hydration  Outcome: Progressing  Flowsheets  Taken 2/5/2025 1600  Remains free of injury from restraints (restraint for interference with medical device):   Determine that other, less restrictive measures have been tried or would not be effective before applying the restraint   Evaluate the patient's condition at the time of restraint application   Inform patient/family regarding the reason for restraint   Every 2 hours: Monitor safety, psychosocial status, comfort, nutrition and hydration  Taken 2/5/2025 1400  Remains free of injury from restraints (restraint for interference with medical device):   Determine that other, less restrictive measures have been tried or would not be effective before applying the restraint   Inform patient/family regarding the reason for restraint   Evaluate the patient's condition at the time of restraint application   Every 2 hours: Monitor safety, psychosocial status, comfort, nutrition and hydration  Taken 2/5/2025 1222  Remains free of injury from restraints (restraint for interference with medical device):   Determine that other, less restrictive measures have been tried or would not be effective before applying the restraint   Evaluate the patient's condition at the time of restraint application   Inform patient/family regarding the reason for restraint   Every 2 hours: Monitor safety, psychosocial status, comfort, nutrition and hydration  Taken 2/5/2025 1200  Remains free of injury from restraints 
  Problem: Safety - Medical Restraint  Goal: Remains free of injury from restraints (Restraint for Interference with Medical Device)  Description: INTERVENTIONS:  1. Determine that other, less restrictive measures have been tried or would not be effective before applying the restraint  2. Evaluate the patient's condition at the time of restraint application  3. Inform patient/family regarding the reason for restraint  4. Q2H: Monitor safety, psychosocial status, comfort, nutrition and hydration  Outcome: Progressing  Flowsheets  Taken 2/6/2025 1600  Remains free of injury from restraints (restraint for interference with medical device):   Determine that other, less restrictive measures have been tried or would not be effective before applying the restraint   Evaluate the patient's condition at the time of restraint application   Inform patient/family regarding the reason for restraint   Every 2 hours: Monitor safety, psychosocial status, comfort, nutrition and hydration  Taken 2/6/2025 1400  Remains free of injury from restraints (restraint for interference with medical device):   Determine that other, less restrictive measures have been tried or would not be effective before applying the restraint   Evaluate the patient's condition at the time of restraint application   Inform patient/family regarding the reason for restraint   Every 2 hours: Monitor safety, psychosocial status, comfort, nutrition and hydration  Taken 2/6/2025 1206  Remains free of injury from restraints (restraint for interference with medical device):   Determine that other, less restrictive measures have been tried or would not be effective before applying the restraint   Evaluate the patient's condition at the time of restraint application   Inform patient/family regarding the reason for restraint   Every 2 hours: Monitor safety, psychosocial status, comfort, nutrition and hydration  Taken 2/6/2025 1200  Remains free of injury from restraints 
  Problem: Safety - Medical Restraint  Goal: Remains free of injury from restraints (Restraint for Interference with Medical Device)  Description: INTERVENTIONS:  1. Determine that other, less restrictive measures have been tried or would not be effective before applying the restraint  2. Evaluate the patient's condition at the time of restraint application  3. Inform patient/family regarding the reason for restraint  4. Q2H: Monitor safety, psychosocial status, comfort, nutrition and hydration  Outcome: Progressing  Flowsheets  Taken 2/8/2025 2000 by Krupa Carlos RN  Remains free of injury from restraints (restraint for interference with medical device): Determine that other, less restrictive measures have been tried or would not be effective before applying the restraint  Taken 2/8/2025 1407 by Kaushik Ramirez RN  Remains free of injury from restraints (restraint for interference with medical device):   Determine that other, less restrictive measures have been tried or would not be effective before applying the restraint   Evaluate the patient's condition at the time of restraint application     Problem: Respiratory - Adult  Goal: Achieves optimal ventilation and oxygenation  Outcome: Progressing  Flowsheets (Taken 2/8/2025 1945)  Achieves optimal ventilation and oxygenation: Assess for changes in respiratory status     Problem: Behavior  Goal: Pt/Family maintain appropriate behavior and adhere to behavioral management agreement, if implemented  Description: INTERVENTIONS:  1. Assess patient/family's coping skills and  non-compliant behavior (including use of illegal substances)  2. Notify security of behavior or suspected illegal substances which indicate the need for search of the family and/or belongings  3. Encourage verbalization of thoughts and concerns in a socially appropriate manner  4. Utilize positive, consistent limit setting strategies supporting safety of patient, staff and others  5. 
  Problem: Safety - Medical Restraint  Goal: Remains free of injury from restraints (Restraint for Interference with Medical Device)  Description: INTERVENTIONS:  1. Determine that other, less restrictive measures have been tried or would not be effective before applying the restraint  2. Evaluate the patient's condition at the time of restraint application  3. Inform patient/family regarding the reason for restraint  4. Q2H: Monitor safety, psychosocial status, comfort, nutrition and hydration  Outcome: Progressing  Flowsheets  Taken 2/9/2025 1417  Remains free of injury from restraints (restraint for interference with medical device):   Determine that other, less restrictive measures have been tried or would not be effective before applying the restraint   Evaluate the patient's condition at the time of restraint application   Inform patient/family regarding the reason for restraint   Every 2 hours: Monitor safety, psychosocial status, comfort, nutrition and hydration  Taken 2/9/2025 0800  Remains free of injury from restraints (restraint for interference with medical device):   Determine that other, less restrictive measures have been tried or would not be effective before applying the restraint   Evaluate the patient's condition at the time of restraint application   Inform patient/family regarding the reason for restraint   Every 2 hours: Monitor safety, psychosocial status, comfort, nutrition and hydration     
  Problem: Safety - Medical Restraint  Goal: Remains free of injury from restraints (Restraint for Interference with Medical Device)  Description: INTERVENTIONS:  1. Determine that other, less restrictive measures have been tried or would not be effective before applying the restraint  2. Evaluate the patient's condition at the time of restraint application  3. Inform patient/family regarding the reason for restraint  4. Q2H: Monitor safety, psychosocial status, comfort, nutrition and hydration  Outcome: Progressing  Flowsheets (Taken 1/31/2025 0771)  Remains free of injury from restraints (restraint for interference with medical device):   Determine that other, less restrictive measures have been tried or would not be effective before applying the restraint   Evaluate the patient's condition at the time of restraint application   Inform patient/family regarding the reason for restraint   Every 2 hours: Monitor safety, psychosocial status, comfort, nutrition and hydration     
  Problem: Safety - Medical Restraint  Goal: Remains free of injury from restraints (Restraint for Interference with Medical Device)  Description: INTERVENTIONS:  1. Determine that other, less restrictive measures have been tried or would not be effective before applying the restraint  2. Evaluate the patient's condition at the time of restraint application  3. Inform patient/family regarding the reason for restraint  4. Q2H: Monitor safety, psychosocial status, comfort, nutrition and hydration  Outcome: Progressing  Flowsheets (Taken 2/14/2025 0800)  Remains free of injury from restraints (restraint for interference with medical device):   Determine that other, less restrictive measures have been tried or would not be effective before applying the restraint   Evaluate the patient's condition at the time of restraint application   Inform patient/family regarding the reason for restraint   Every 2 hours: Monitor safety, psychosocial status, comfort, nutrition and hydration     
  Problem: Safety - Medical Restraint  Goal: Remains free of injury from restraints (Restraint for Interference with Medical Device)  Description: INTERVENTIONS:  1. Determine that other, less restrictive measures have been tried or would not be effective before applying the restraint  2. Evaluate the patient's condition at the time of restraint application  3. Inform patient/family regarding the reason for restraint  4. Q2H: Monitor safety, psychosocial status, comfort, nutrition and hydration  Outcome: Progressing  Flowsheets (Taken 2/15/2025 0800)  Remains free of injury from restraints (restraint for interference with medical device):   Determine that other, less restrictive measures have been tried or would not be effective before applying the restraint   Evaluate the patient's condition at the time of restraint application   Inform patient/family regarding the reason for restraint   Every 2 hours: Monitor safety, psychosocial status, comfort, nutrition and hydration     
  Problem: Safety - Medical Restraint  Goal: Remains free of injury from restraints (Restraint for Interference with Medical Device)  Description: INTERVENTIONS:  1. Determine that other, less restrictive measures have been tried or would not be effective before applying the restraint  2. Evaluate the patient's condition at the time of restraint application  3. Inform patient/family regarding the reason for restraint  4. Q2H: Monitor safety, psychosocial status, comfort, nutrition and hydration  Outcome: Progressing  Flowsheets (Taken 2/2/2025 2000)  Remains free of injury from restraints (restraint for interference with medical device):   Determine that other, less restrictive measures have been tried or would not be effective before applying the restraint   Every 2 hours: Monitor safety, psychosocial status, comfort, nutrition and hydration     
Speech LAnguage Pathology EVALUATION    Patient: Nathan Trejo (34 y.o. male)  Date: 2/18/2025  Primary Diagnosis: Nausea and vomiting in adult [R11.2]  Alcohol-induced acute pancreatitis without infection or necrosis [K85.20]  Pancreatitis without necrosis or infection [K85.90]       Precautions:           aspiration          ASSESSMENT :  Patient was taken off vent by RT with SLP present.   PMV was placed. He tolerated PMV for 20+ min without resp decline or complaint. Ok for staff to place PMV when in the room. The more he can wear the valve, the sooner he can be decannulated.   SLP evaluated swallowing. He currently is NOT ready for PO due to :  1) still on vent on a regular basis  2) secretion management issues with wet voice and copious secretions  3) weak swallow.   Will need re-eval by SLP when consistently off vent in day.   Admitted 1-28-25 with abd pain, vomiting. Dx: pancreatitis +opiates +THC  PMH :ETOH abuse with 12 ppd of beer, pancreatitis, h/o sz with W/d  2/1/25: intubated due to agitation  2/2/25: vomited overnight  2-25 through 2/8/25: agitated on vent  2/10/25: bronch with moderate amount of thick mucous in RLL>LLL  2/13/25: intensivist placed perc trach; bronch sound copious secretions in (B) LL  2/15/25: trying to pull NGT  2/16/25: somnulent, apneic        Patient will benefit from skilled intervention to address the above impairments.     PLAN :  Recommendations and Planned Interventions:  Diet: NPO; continue DHT until managing secretions better and SLP has re-evaluated swallowing  Ok for a few ice chips at a time when sitting upright    PMV Placement Recommendations: Speech/RN/Trained family member & Staff      Acute SLP Services:  . Patient's rehabilitation potential is considered to be Good.  Discharge Recommendations: Continue to assess pending progress     SUBJECTIVE:   Patient stated, “The last thing I remember was New Year's .”    OBJECTIVE:     Past Medical History:   Diagnosis 
awareness    Dysphagia:  Oral Assessment:     P.O. Trials:  PO Trials  Assessment Method(s): Observation;Palpation  Patient Position: upright in bed  Vocal Quality:  (still mostly low volume, that affects speech intelligibility)  Consistency Presented: Thin;Easy to chew;Pureed  How Presented: Self-fed/presented;SLP-fed/Presented (he tried to feed self, but needed assist due to weak UE. messy feeding due to stubborn persistance to eat independently. also dubhoff in the way. he kept pulling on it and insisting he was pulling on his beard)  Bolus Acceptance:  (\"I'm a picky eater\"; also noted impulsive drinking)  Bolus Formation/Control: No impairment  Propulsion: No impairment  Oral Residue: None  Initiation of Swallow: No impairment  Laryngeal Elevation: Functional  Aspiration Signs/Symptoms:  (coughing when chugging multiple sips of milk and distracted by drizzle down beard.  02 sats and RR remained stable)               Motor Speech:     Speech intelligibility continued to be impaired by low volume    Language Comprehension and Expression:      No aphasia             Neuro-Linguistics:                      Voice:       Respiratory Status/Airway:  Trach collar                         After treatment:   Patient left in no apparent distress in bed and Nursing notified    COMMUNICATION/EDUCATION:   Patient was educated regarding dysphagia .  He demonstrated Guarded understanding as evidenced by Limited understanding/response due to cognitive status.     The patient's plan of care including recommendations, planned interventions, and recommended diet changes were discussed with: Registered nurse and Physician    Patient understands intent and goals of therapy, but is neutral about his/her participation    Thank you,  Tuyet Hartman, SLP    SLP Individual Minutes  Time In: 0850  Time Out: 0910  Minutes: 20     Problem: SLP Adult - Impaired Swallowing  Goal: By Discharge: Advance to least restrictive diet without signs or 
indicated  Outcome: Progressing     Problem: Behavior  Goal: Pt/Family maintain appropriate behavior and adhere to behavioral management agreement, if implemented  Description: INTERVENTIONS:  1. Assess patient/family's coping skills and  non-compliant behavior (including use of illegal substances)  2. Notify security of behavior or suspected illegal substances which indicate the need for search of the family and/or belongings  3. Encourage verbalization of thoughts and concerns in a socially appropriate manner  4. Utilize positive, consistent limit setting strategies supporting safety of patient, staff and others  5. Encourage participation in the decision making process about the behavioral management agreement  6. If a visitor's behavior poses a threat to safety call refer to organization policy.  7. Initiate consult with , Psychosocial CNS, Spiritual Care as appropriate  Outcome: Progressing     Problem: Neurosensory - Adult  Goal: Achieves stable or improved neurological status  Outcome: Progressing     Problem: ABCDS Injury Assessment  Goal: Absence of physical injury  Outcome: Progressing     
while on the chair. Recommend to be up on the chair at least every meal times when tolerated. Activated chair alarm and notified nurse who agreed to monitor patient.     PLAN:  Patient continues to benefit from skilled intervention to address the above impairments.  Continue treatment per established plan of care.    Recommendations for staff mobility and toileting assistance:  Recommend that staff completes patient mobility with assist x2 using gait belt, rolling walker, and Xenia Stedy.    Recommend for next PT session: bed to bedside chair transfers and further progression of gait with existing device    Recommendation for discharge: (in order for the patient to meet his/her long term goals):   High intensity/comprehensive skilled physical therapy in a multidisciplinary setting as patient is working towards tolerating up to 3 hours of therapy/day 5-7x/week    Other factors to consider for discharge: no additional factors    IF patient discharges home will need the following DME: continuing to assess with progress       SUBJECTIVE:   Patient stated, \"ok.\"    OBJECTIVE DATA SUMMARY:   Critical Behavior:     Cognition  Arousal/Alertness: Appears intact  Following Commands: Follows one step commands consistently  Attention Span: Attends with cues to redirect  Problem Solving: Impaired  Insights: Decreased awareness of deficits  Initiation: Requires cues for some  Sequencing: Requires cues for some    Functional Mobility Training:  Bed Mobility:  Bed Mobility Training  Bed Mobility Training: Yes  Overall Level of Assistance: Minimal assistance  Interventions: Safety awareness training  Rolling: Minimal assistance  Supine to Sit: Minimal assistance  Sit to Supine: Minimal assistance  Scooting: Minimal assistance  Transfers:  Transfer Training  Transfer Training: Yes  Overall Level of Assistance: Minimal assistance  Interventions: Safety awareness training;Visual cues  Sit to Stand: Minimal assistance  Stand to Sit: 
-met  4) tolerate regular diet, thins without s/s aspiration by 2-21-25; continue to 2-25-25  Outcome: Progressing     Problem: SLP Adult - Impaired Communication  Goal: By Discharge: Demonstrates communication skills at highest level of function for planned discharge setting.  See evaluation for individualized goals.  Outcome: Progressing     
Progressing     Problem: Discharge Planning  Goal: Discharge to home or other facility with appropriate resources  Outcome: Progressing     Problem: Skin/Tissue Integrity  Goal: Skin integrity remains intact  Description: 1.  Monitor for areas of redness and/or skin breakdown  2.  Assess vascular access sites hourly  3.  Every 4-6 hours minimum:  Change oxygen saturation probe site  4.  Every 4-6 hours:  If on nasal continuous positive airway pressure, respiratory therapy assess nares and determine need for appliance change or resting period  Outcome: Progressing     Problem: Nutrition Deficit:  Goal: Optimize nutritional status  Outcome: Progressing  Flowsheets (Taken 2/10/2025 1002 by Michelle Sanchez RD)  Nutrient intake appropriate for improving, restoring, or maintaining nutritional needs: Recommend, monitor, and adjust tube feedings and TPN/PPN based on assessed needs     
RN  Outcome: Progressing     Problem: Neurosensory - Adult  Goal: Achieves stable or improved neurological status  2/10/2025 0027 by Krupa Carlos RN  Outcome: Progressing  2/9/2025 1654 by Antonino Foss, RN  Outcome: Progressing     
status  Outcome: Progressing  Flowsheets (Taken 2/23/2025 2100)  Achieves stable or improved neurological status: Assess for and report changes in neurological status   Pt resting in bed. Appears to be calm and comfortable. Medicating as ordered for pain. No s/s hallucinations. Pt calls out appropriately. Safety precautions in place.

## 2025-02-25 NOTE — DISCHARGE SUMMARY
\"[facility]\", \"TROPONINI\" in the last 72 hours.  No results found for: \"GLUCPOC\"    Physical Exam:    Gen:  Well-developed, well-nourished, in no acute distress  HEENT:  Pink conjunctivae, PERRL, hearing intact to voice, moist mucous membranes  Neck:  Supple, without masses, thyroid non-tender  Resp:  No accessory muscle use, clear breath sounds without wheezes rales or rhonchi  Card:  No murmurs, normal S1, S2 without thrills, bruits or peripheral edema  Abd:  Soft, non-tender, non-distended, normoactive bowel sounds are present, no palpable organomegaly and no detectable hernias  Lymph:  No cervical or inguinal adenopathy  Musc:  No cyanosis or clubbing  Skin:  No rashes or ulcers, skin turgor is good  Neuro:  Cranial nerves are grossly intact, no focal motor weakness, follows commands appropriately  Psych:  Good insight, oriented to person, place and time, alert      HOSPITAL COURSE & TREATMENT RENDERED:   Acute respiratory failure with hypoxemia s/p trach 2/13/25 and decannulation on 2/25/25. Pt is doing well. FU with pulmonary(teaching was done by pulmonary how to take care of the stoma).     2.  Haemophilus pneumonia. Intubated on 2/1  for agitation, extubated and then reintubated on 2/3 due to significant agitation and then underwent trach 2/13 and now decannulation. Blood cultures NTD. Completed antibiotics     3.  Recurrent fevers. Possibly drug-induced. Negative RVP. Resolved.      4.  Hallucinations. Resolved. MRI of the brain is unremarkable. TSH wnl. Wean benzo.      5.  Acute acute encephalopathy. Resolved   Likely secondary to delirium and withdrawal due to underlying substance abuse disorder. On methadone.      6.  Anxiety. Stable.       7.  Acute liver injury. Underlying hepatic steatosis. Liver Doppler negative for vein thrombosis. Completed N-acetylcysteine on 2/1      8.  Substance use disorder/ Alcohol use disorder: THC opioid use  Continue thiamine, folic acid and multivitamin  Weaning methadone and

## 2025-02-25 NOTE — DISCHARGE INSTRUCTIONS
ACUTE DIAGNOSES:  Nausea and vomiting in adult [R11.2]  Alcohol-induced acute pancreatitis without infection or necrosis [K85.20]  Pancreatitis without necrosis or infection [K85.90]    CHRONIC MEDICAL DIAGNOSES:  [unfilled]    DISCHARGE MEDICATIONS:   [unfilled]    It is important that you take the medication exactly as they are prescribed.   Keep your medication in the bottles provided by the pharmacist and keep a list of the medication names, dosages, and times to be taken in your wallet.   Do not take other medications without consulting your doctor.       DIET:  regular diet    ACTIVITY: activity as tolerated    ADDITIONAL INFORMATION: If you experience any of the following symptoms then please call your primary care physician or return to the emergency room if you cannot get hold of your doctor: Fever, chills, nausea, vomiting, diarrhea, change in mentation, falling, bleeding, shortness of breath.    FOLLOW UP CARE:   @PCP@  you are to call and set up an appointment to see them in 5 days.    Follow-up  Follow up with pulmonary [phone]       Information obtained by :  I understand that if any problems occur once I am at home I am to contact my physician.    I understand and acknowledge receipt of the instructions indicated above.                                                                                                                                           Physician's or R.N.'s Signature                                                                  Date/Time                                                                                                                                              Patient or Representative Signature                                                          Date/Time

## 2025-03-02 LAB
BACTERIA SPEC CULT: NORMAL
BACTERIA SPEC CULT: NORMAL
SERVICE CMNT-IMP: NORMAL
SERVICE CMNT-IMP: NORMAL